# Patient Record
Sex: MALE | Race: WHITE | NOT HISPANIC OR LATINO | Employment: OTHER | URBAN - METROPOLITAN AREA
[De-identification: names, ages, dates, MRNs, and addresses within clinical notes are randomized per-mention and may not be internally consistent; named-entity substitution may affect disease eponyms.]

---

## 2017-01-12 ENCOUNTER — HOSPITAL ENCOUNTER (OUTPATIENT)
Dept: PALLIATIVE MEDICINE | Facility: OTHER | Age: 72
Discharge: HOME | End: 2017-01-12
Attending: PHYSICIAN ASSISTANT

## 2017-01-12 DIAGNOSIS — G89.4 CHRONIC PAIN SYNDROME: ICD-10-CM

## 2017-01-12 DIAGNOSIS — F11.20 OPIOID TYPE DEPENDENCE, CONTINUOUS (H): ICD-10-CM

## 2017-01-12 DIAGNOSIS — T40.2X5A THERAPEUTIC OPIOID-INDUCED CONSTIPATION (OIC): ICD-10-CM

## 2017-01-12 DIAGNOSIS — M51.379 DEGENERATION OF LUMBAR OR LUMBOSACRAL INTERVERTEBRAL DISC: ICD-10-CM

## 2017-01-12 DIAGNOSIS — M54.15 RADICULOPATHY OF THORACOLUMBAR REGION: ICD-10-CM

## 2017-01-12 DIAGNOSIS — K59.03 THERAPEUTIC OPIOID-INDUCED CONSTIPATION (OIC): ICD-10-CM

## 2017-01-12 ASSESSMENT — MIFFLIN-ST. JEOR: SCORE: 1713.54

## 2017-01-23 ENCOUNTER — HOSPITAL ENCOUNTER (OUTPATIENT)
Dept: PALLIATIVE MEDICINE | Facility: OTHER | Age: 72
Discharge: HOME | End: 2017-01-23
Attending: PAIN MEDICINE

## 2017-01-23 DIAGNOSIS — M54.16 LUMBAR RADICULITIS: ICD-10-CM

## 2017-01-23 ASSESSMENT — MIFFLIN-ST. JEOR: SCORE: 1713.54

## 2017-01-25 ENCOUNTER — COMMUNICATION - HEALTHEAST (OUTPATIENT)
Dept: PALLIATIVE MEDICINE | Facility: OTHER | Age: 72
End: 2017-01-25

## 2017-01-26 ENCOUNTER — COMMUNICATION - HEALTHEAST (OUTPATIENT)
Dept: PALLIATIVE MEDICINE | Facility: CLINIC | Age: 72
End: 2017-01-26

## 2017-01-26 ENCOUNTER — HOSPITAL ENCOUNTER (OUTPATIENT)
Dept: PALLIATIVE MEDICINE | Facility: OTHER | Age: 72
Discharge: HOME | End: 2017-01-26
Attending: PAIN MEDICINE

## 2017-01-26 DIAGNOSIS — G89.4 CHRONIC PAIN SYNDROME: ICD-10-CM

## 2017-01-26 DIAGNOSIS — M54.16 BILATERAL LUMBAR RADICULOPATHY: ICD-10-CM

## 2017-01-26 ASSESSMENT — MIFFLIN-ST. JEOR: SCORE: 1788.39

## 2017-02-13 ENCOUNTER — COMMUNICATION - HEALTHEAST (OUTPATIENT)
Dept: PALLIATIVE MEDICINE | Facility: CLINIC | Age: 72
End: 2017-02-13

## 2017-02-13 DIAGNOSIS — G89.4 CHRONIC PAIN SYNDROME: ICD-10-CM

## 2017-02-16 ENCOUNTER — COMMUNICATION - HEALTHEAST (OUTPATIENT)
Dept: PALLIATIVE MEDICINE | Facility: CLINIC | Age: 72
End: 2017-02-16

## 2017-02-16 DIAGNOSIS — M51.379 DEGENERATION OF LUMBAR OR LUMBOSACRAL INTERVERTEBRAL DISC: ICD-10-CM

## 2017-03-09 ENCOUNTER — HOSPITAL ENCOUNTER (OUTPATIENT)
Dept: PALLIATIVE MEDICINE | Facility: OTHER | Age: 72
Discharge: HOME | End: 2017-03-09
Attending: PHYSICIAN ASSISTANT

## 2017-03-09 DIAGNOSIS — F11.20 OPIOID DEPENDENCE, CONTINUOUS (H): ICD-10-CM

## 2017-03-09 DIAGNOSIS — M54.15 RADICULOPATHY OF THORACOLUMBAR REGION: ICD-10-CM

## 2017-03-09 DIAGNOSIS — G89.4 CHRONIC PAIN SYNDROME: ICD-10-CM

## 2017-03-09 DIAGNOSIS — M51.379 DEGENERATION OF LUMBAR OR LUMBOSACRAL INTERVERTEBRAL DISC: ICD-10-CM

## 2017-03-09 ASSESSMENT — MIFFLIN-ST. JEOR: SCORE: 1736.22

## 2017-03-22 ENCOUNTER — COMMUNICATION - HEALTHEAST (OUTPATIENT)
Dept: PALLIATIVE MEDICINE | Facility: OTHER | Age: 72
End: 2017-03-22

## 2017-03-23 ENCOUNTER — HOSPITAL ENCOUNTER (OUTPATIENT)
Dept: PALLIATIVE MEDICINE | Facility: OTHER | Age: 72
Discharge: HOME | End: 2017-03-23
Attending: PAIN MEDICINE

## 2017-03-23 DIAGNOSIS — M54.16 BILATERAL LUMBAR RADICULOPATHY: ICD-10-CM

## 2017-03-23 DIAGNOSIS — M54.16 LUMBAR RADICULOPATHY, RIGHT: ICD-10-CM

## 2017-03-23 ASSESSMENT — MIFFLIN-ST. JEOR: SCORE: 1736.22

## 2017-03-27 ENCOUNTER — COMMUNICATION - HEALTHEAST (OUTPATIENT)
Dept: PALLIATIVE MEDICINE | Facility: OTHER | Age: 72
End: 2017-03-27

## 2017-04-05 ENCOUNTER — COMMUNICATION - HEALTHEAST (OUTPATIENT)
Dept: PALLIATIVE MEDICINE | Facility: CLINIC | Age: 72
End: 2017-04-05

## 2017-04-05 DIAGNOSIS — K59.03 CONSTIPATION DUE TO PAIN MEDICATION: ICD-10-CM

## 2017-04-08 ENCOUNTER — COMMUNICATION - HEALTHEAST (OUTPATIENT)
Dept: PALLIATIVE MEDICINE | Facility: OTHER | Age: 72
End: 2017-04-08

## 2017-04-08 DIAGNOSIS — G89.4 CHRONIC PAIN SYNDROME: ICD-10-CM

## 2017-04-18 ENCOUNTER — COMMUNICATION - HEALTHEAST (OUTPATIENT)
Dept: PALLIATIVE MEDICINE | Facility: OTHER | Age: 72
End: 2017-04-18

## 2017-04-18 DIAGNOSIS — G89.4 CHRONIC PAIN SYNDROME: ICD-10-CM

## 2017-05-03 ENCOUNTER — HOSPITAL ENCOUNTER (OUTPATIENT)
Dept: PALLIATIVE MEDICINE | Facility: OTHER | Age: 72
Discharge: HOME | End: 2017-05-03
Attending: PHYSICIAN ASSISTANT

## 2017-05-03 DIAGNOSIS — G89.4 CHRONIC PAIN SYNDROME: ICD-10-CM

## 2017-05-03 DIAGNOSIS — M54.15 RADICULOPATHY OF THORACOLUMBAR REGION: ICD-10-CM

## 2017-05-03 DIAGNOSIS — M51.379 DEGENERATION OF LUMBAR OR LUMBOSACRAL INTERVERTEBRAL DISC: ICD-10-CM

## 2017-05-03 DIAGNOSIS — F11.20 OPIOID TYPE DEPENDENCE, CONTINUOUS (H): ICD-10-CM

## 2017-05-03 ASSESSMENT — MIFFLIN-ST. JEOR: SCORE: 1736.22

## 2017-05-08 ENCOUNTER — COMMUNICATION - HEALTHEAST (OUTPATIENT)
Dept: PALLIATIVE MEDICINE | Facility: CLINIC | Age: 72
End: 2017-05-08

## 2017-05-08 DIAGNOSIS — G89.4 CHRONIC PAIN SYNDROME: ICD-10-CM

## 2017-05-09 ENCOUNTER — AMBULATORY - HEALTHEAST (OUTPATIENT)
Dept: PALLIATIVE MEDICINE | Facility: OTHER | Age: 72
End: 2017-05-09

## 2017-05-23 ENCOUNTER — COMMUNICATION - HEALTHEAST (OUTPATIENT)
Dept: PALLIATIVE MEDICINE | Facility: OTHER | Age: 72
End: 2017-05-23

## 2017-05-24 ENCOUNTER — HOSPITAL ENCOUNTER (OUTPATIENT)
Dept: PALLIATIVE MEDICINE | Facility: OTHER | Age: 72
Discharge: HOME | End: 2017-05-24
Attending: PAIN MEDICINE

## 2017-05-24 DIAGNOSIS — M54.16 LUMBAR RADICULOPATHY, RIGHT: ICD-10-CM

## 2017-05-24 ASSESSMENT — MIFFLIN-ST. JEOR: SCORE: 1736.22

## 2017-05-25 ENCOUNTER — COMMUNICATION - HEALTHEAST (OUTPATIENT)
Dept: PALLIATIVE MEDICINE | Facility: OTHER | Age: 72
End: 2017-05-25

## 2017-06-01 ENCOUNTER — AMBULATORY - HEALTHEAST (OUTPATIENT)
Dept: PALLIATIVE MEDICINE | Facility: OTHER | Age: 72
End: 2017-06-01

## 2017-06-13 ENCOUNTER — COMMUNICATION - HEALTHEAST (OUTPATIENT)
Dept: PALLIATIVE MEDICINE | Facility: OTHER | Age: 72
End: 2017-06-13

## 2017-06-13 DIAGNOSIS — G89.4 CHRONIC PAIN SYNDROME: ICD-10-CM

## 2017-06-28 ENCOUNTER — HOSPITAL ENCOUNTER (OUTPATIENT)
Dept: PALLIATIVE MEDICINE | Facility: OTHER | Age: 72
Discharge: HOME | End: 2017-06-28
Attending: PHYSICIAN ASSISTANT

## 2017-06-28 DIAGNOSIS — G89.4 CHRONIC PAIN SYNDROME: ICD-10-CM

## 2017-06-28 DIAGNOSIS — F11.20 OPIOID DEPENDENCE, CONTINUOUS (H): ICD-10-CM

## 2017-06-28 DIAGNOSIS — G89.29 CHRONIC RIGHT-SIDED THORACIC BACK PAIN: ICD-10-CM

## 2017-06-28 DIAGNOSIS — M51.379 DEGENERATION OF LUMBAR OR LUMBOSACRAL INTERVERTEBRAL DISC: ICD-10-CM

## 2017-06-28 DIAGNOSIS — M54.6 CHRONIC RIGHT-SIDED THORACIC BACK PAIN: ICD-10-CM

## 2017-06-28 DIAGNOSIS — M54.15 RADICULOPATHY OF THORACOLUMBAR REGION: ICD-10-CM

## 2017-06-28 ASSESSMENT — MIFFLIN-ST. JEOR: SCORE: 1736.22

## 2017-08-17 ENCOUNTER — COMMUNICATION - HEALTHEAST (OUTPATIENT)
Dept: PALLIATIVE MEDICINE | Facility: OTHER | Age: 72
End: 2017-08-17

## 2017-08-17 DIAGNOSIS — G89.4 CHRONIC PAIN SYNDROME: ICD-10-CM

## 2017-08-21 ENCOUNTER — COMMUNICATION - HEALTHEAST (OUTPATIENT)
Dept: PALLIATIVE MEDICINE | Facility: OTHER | Age: 72
End: 2017-08-21

## 2017-08-21 DIAGNOSIS — G89.4 CHRONIC PAIN SYNDROME: ICD-10-CM

## 2017-08-23 ENCOUNTER — HOSPITAL ENCOUNTER (OUTPATIENT)
Dept: PALLIATIVE MEDICINE | Facility: OTHER | Age: 72
Discharge: HOME | End: 2017-08-23
Attending: PHYSICIAN ASSISTANT

## 2017-08-23 DIAGNOSIS — F11.20 OPIOID TYPE DEPENDENCE, CONTINUOUS (H): ICD-10-CM

## 2017-08-23 DIAGNOSIS — M51.379 DEGENERATION OF LUMBAR OR LUMBOSACRAL INTERVERTEBRAL DISC: ICD-10-CM

## 2017-08-23 DIAGNOSIS — M54.15 RADICULOPATHY OF THORACOLUMBAR REGION: ICD-10-CM

## 2017-08-23 DIAGNOSIS — G89.4 CHRONIC PAIN SYNDROME: ICD-10-CM

## 2017-08-23 ASSESSMENT — MIFFLIN-ST. JEOR: SCORE: 1736.22

## 2017-09-25 ENCOUNTER — HOSPITAL ENCOUNTER (OUTPATIENT)
Dept: PALLIATIVE MEDICINE | Facility: OTHER | Age: 72
Discharge: HOME | End: 2017-09-25
Attending: PAIN MEDICINE

## 2017-09-25 DIAGNOSIS — M54.16 LUMBAR RADICULOPATHY, RIGHT: ICD-10-CM

## 2017-09-25 ASSESSMENT — MIFFLIN-ST. JEOR: SCORE: 1736.22

## 2017-09-26 ENCOUNTER — COMMUNICATION - HEALTHEAST (OUTPATIENT)
Dept: PALLIATIVE MEDICINE | Facility: OTHER | Age: 72
End: 2017-09-26

## 2017-10-18 ENCOUNTER — COMMUNICATION - HEALTHEAST (OUTPATIENT)
Dept: TELEHEALTH | Facility: CLINIC | Age: 72
End: 2017-10-18

## 2017-10-18 ENCOUNTER — HOSPITAL ENCOUNTER (OUTPATIENT)
Dept: PALLIATIVE MEDICINE | Facility: OTHER | Age: 72
Discharge: HOME | End: 2017-10-18
Attending: PHYSICIAN ASSISTANT

## 2017-10-18 DIAGNOSIS — G89.4 CHRONIC PAIN SYNDROME: ICD-10-CM

## 2017-10-18 DIAGNOSIS — F11.20 OPIOID TYPE DEPENDENCE, CONTINUOUS (H): ICD-10-CM

## 2017-10-18 DIAGNOSIS — M51.379 DEGENERATION OF LUMBAR OR LUMBOSACRAL INTERVERTEBRAL DISC: ICD-10-CM

## 2017-10-18 DIAGNOSIS — M54.15 RADICULOPATHY OF THORACOLUMBAR REGION: ICD-10-CM

## 2017-10-18 ASSESSMENT — MIFFLIN-ST. JEOR: SCORE: 1736.22

## 2017-10-24 ENCOUNTER — COMMUNICATION - HEALTHEAST (OUTPATIENT)
Dept: PALLIATIVE MEDICINE | Facility: OTHER | Age: 72
End: 2017-10-24

## 2017-10-24 DIAGNOSIS — G89.4 CHRONIC PAIN SYNDROME: ICD-10-CM

## 2017-11-06 ENCOUNTER — COMMUNICATION - HEALTHEAST (OUTPATIENT)
Dept: PALLIATIVE MEDICINE | Facility: OTHER | Age: 72
End: 2017-11-06

## 2017-11-07 ENCOUNTER — HOSPITAL ENCOUNTER (OUTPATIENT)
Dept: PALLIATIVE MEDICINE | Facility: OTHER | Age: 72
Discharge: HOME | End: 2017-11-07
Attending: PAIN MEDICINE

## 2017-11-07 DIAGNOSIS — M54.16 LUMBAR RADICULOPATHY, RIGHT: ICD-10-CM

## 2017-11-07 ASSESSMENT — MIFFLIN-ST. JEOR: SCORE: 1713.54

## 2017-11-08 ENCOUNTER — COMMUNICATION - HEALTHEAST (OUTPATIENT)
Dept: PALLIATIVE MEDICINE | Facility: OTHER | Age: 72
End: 2017-11-08

## 2017-12-04 ENCOUNTER — COMMUNICATION - HEALTHEAST (OUTPATIENT)
Dept: PALLIATIVE MEDICINE | Facility: OTHER | Age: 72
End: 2017-12-04

## 2017-12-04 DIAGNOSIS — G89.4 CHRONIC PAIN SYNDROME: ICD-10-CM

## 2017-12-12 ENCOUNTER — HOSPITAL ENCOUNTER (OUTPATIENT)
Dept: PALLIATIVE MEDICINE | Facility: OTHER | Age: 72
Discharge: HOME | End: 2017-12-12
Attending: PHYSICIAN ASSISTANT

## 2017-12-12 DIAGNOSIS — M51.379 DEGENERATION OF LUMBAR OR LUMBOSACRAL INTERVERTEBRAL DISC: ICD-10-CM

## 2017-12-12 DIAGNOSIS — M96.1 POSTLAMINECTOMY SYNDROME, LUMBAR REGION: ICD-10-CM

## 2017-12-12 DIAGNOSIS — G89.4 CHRONIC PAIN SYNDROME: ICD-10-CM

## 2017-12-12 DIAGNOSIS — M54.15 RADICULOPATHY OF THORACOLUMBAR REGION: ICD-10-CM

## 2017-12-12 DIAGNOSIS — F11.20 OPIOID TYPE DEPENDENCE, CONTINUOUS (H): ICD-10-CM

## 2017-12-12 ASSESSMENT — MIFFLIN-ST. JEOR: SCORE: 1713.54

## 2017-12-20 ENCOUNTER — COMMUNICATION - HEALTHEAST (OUTPATIENT)
Dept: PALLIATIVE MEDICINE | Facility: OTHER | Age: 72
End: 2017-12-20

## 2017-12-20 ENCOUNTER — HOSPITAL ENCOUNTER (OUTPATIENT)
Dept: RADIOLOGY | Facility: HOSPITAL | Age: 72
Discharge: HOME | End: 2017-12-20
Attending: PHYSICIAN ASSISTANT

## 2017-12-20 DIAGNOSIS — M96.1 POSTLAMINECTOMY SYNDROME, LUMBAR REGION: ICD-10-CM

## 2017-12-23 ENCOUNTER — COMMUNICATION - HEALTHEAST (OUTPATIENT)
Dept: PALLIATIVE MEDICINE | Facility: OTHER | Age: 72
End: 2017-12-23

## 2017-12-23 DIAGNOSIS — G89.4 CHRONIC PAIN SYNDROME: ICD-10-CM

## 2018-01-22 ENCOUNTER — COMMUNICATION - HEALTHEAST (OUTPATIENT)
Dept: PALLIATIVE MEDICINE | Facility: OTHER | Age: 73
End: 2018-01-22

## 2018-01-22 DIAGNOSIS — G89.4 CHRONIC PAIN SYNDROME: ICD-10-CM

## 2018-02-06 ENCOUNTER — HOSPITAL ENCOUNTER (OUTPATIENT)
Dept: PALLIATIVE MEDICINE | Facility: OTHER | Age: 73
Discharge: HOME | End: 2018-02-06
Attending: PHYSICIAN ASSISTANT

## 2018-02-06 DIAGNOSIS — M47.816 LUMBAR FACET ARTHROPATHY: ICD-10-CM

## 2018-02-06 DIAGNOSIS — F11.20 OPIOID DEPENDENCE, CONTINUOUS (H): ICD-10-CM

## 2018-02-06 DIAGNOSIS — M54.15 RADICULOPATHY OF THORACOLUMBAR REGION: ICD-10-CM

## 2018-02-06 DIAGNOSIS — G89.4 CHRONIC PAIN SYNDROME: ICD-10-CM

## 2018-02-06 ASSESSMENT — MIFFLIN-ST. JEOR: SCORE: 1736.22

## 2018-02-19 ENCOUNTER — COMMUNICATION - HEALTHEAST (OUTPATIENT)
Dept: PALLIATIVE MEDICINE | Facility: OTHER | Age: 73
End: 2018-02-19

## 2018-02-26 ENCOUNTER — HOSPITAL ENCOUNTER (OUTPATIENT)
Dept: PALLIATIVE MEDICINE | Facility: OTHER | Age: 73
Discharge: HOME | End: 2018-02-26
Attending: PAIN MEDICINE | Admitting: PAIN MEDICINE

## 2018-02-26 DIAGNOSIS — M54.16 LUMBAR RADICULOPATHY, RIGHT: ICD-10-CM

## 2018-02-26 DIAGNOSIS — M47.816 LUMBAR SPONDYLOSIS: ICD-10-CM

## 2018-02-26 ASSESSMENT — MIFFLIN-ST. JEOR: SCORE: 1736.22

## 2018-02-27 ENCOUNTER — COMMUNICATION - HEALTHEAST (OUTPATIENT)
Dept: PALLIATIVE MEDICINE | Facility: OTHER | Age: 73
End: 2018-02-27

## 2018-04-03 ENCOUNTER — HOSPITAL ENCOUNTER (OUTPATIENT)
Dept: PALLIATIVE MEDICINE | Facility: OTHER | Age: 73
Discharge: HOME | End: 2018-04-03
Attending: PHYSICIAN ASSISTANT

## 2018-04-03 ENCOUNTER — COMMUNICATION - HEALTHEAST (OUTPATIENT)
Dept: TELEHEALTH | Facility: CLINIC | Age: 73
End: 2018-04-03

## 2018-04-03 DIAGNOSIS — M51.379 DEGENERATION OF LUMBAR OR LUMBOSACRAL INTERVERTEBRAL DISC: ICD-10-CM

## 2018-04-03 DIAGNOSIS — G89.4 CHRONIC PAIN SYNDROME: ICD-10-CM

## 2018-04-03 DIAGNOSIS — F11.20 OPIOID TYPE DEPENDENCE, CONTINUOUS (H): ICD-10-CM

## 2018-04-03 DIAGNOSIS — M54.15 RADICULOPATHY OF THORACOLUMBAR REGION: ICD-10-CM

## 2018-04-03 ASSESSMENT — MIFFLIN-ST. JEOR: SCORE: 1736.22

## 2018-05-29 ENCOUNTER — COMMUNICATION - HEALTHEAST (OUTPATIENT)
Dept: PALLIATIVE MEDICINE | Facility: OTHER | Age: 73
End: 2018-05-29

## 2018-05-29 ENCOUNTER — COMMUNICATION - HEALTHEAST (OUTPATIENT)
Dept: TELEHEALTH | Facility: CLINIC | Age: 73
End: 2018-05-29

## 2018-05-29 ENCOUNTER — HOSPITAL ENCOUNTER (OUTPATIENT)
Dept: PALLIATIVE MEDICINE | Facility: OTHER | Age: 73
Discharge: HOME | End: 2018-05-29
Attending: PHYSICIAN ASSISTANT

## 2018-05-29 DIAGNOSIS — G89.4 CHRONIC PAIN SYNDROME: ICD-10-CM

## 2018-05-29 DIAGNOSIS — M54.15 RADICULOPATHY OF THORACOLUMBAR REGION: ICD-10-CM

## 2018-05-29 DIAGNOSIS — M54.16 LUMBAR RADICULOPATHY: ICD-10-CM

## 2018-05-29 DIAGNOSIS — M51.379 DEGENERATION OF LUMBAR OR LUMBOSACRAL INTERVERTEBRAL DISC: ICD-10-CM

## 2018-05-29 DIAGNOSIS — F11.20 OPIOID TYPE DEPENDENCE, CONTINUOUS (H): ICD-10-CM

## 2018-05-29 ASSESSMENT — MIFFLIN-ST. JEOR: SCORE: 1690.86

## 2018-06-07 ENCOUNTER — COMMUNICATION - HEALTHEAST (OUTPATIENT)
Dept: PALLIATIVE MEDICINE | Facility: CLINIC | Age: 73
End: 2018-06-07

## 2018-06-07 DIAGNOSIS — M51.379 DEGENERATION OF LUMBAR OR LUMBOSACRAL INTERVERTEBRAL DISC: ICD-10-CM

## 2018-06-11 ENCOUNTER — COMMUNICATION - HEALTHEAST (OUTPATIENT)
Dept: PALLIATIVE MEDICINE | Facility: OTHER | Age: 73
End: 2018-06-11

## 2018-06-11 DIAGNOSIS — G89.4 CHRONIC PAIN SYNDROME: ICD-10-CM

## 2018-06-18 ENCOUNTER — COMMUNICATION - HEALTHEAST (OUTPATIENT)
Dept: PALLIATIVE MEDICINE | Facility: OTHER | Age: 73
End: 2018-06-18

## 2018-06-20 ENCOUNTER — HOSPITAL ENCOUNTER (OUTPATIENT)
Dept: PALLIATIVE MEDICINE | Facility: OTHER | Age: 73
Discharge: HOME | End: 2018-06-20
Attending: PAIN MEDICINE | Admitting: PAIN MEDICINE

## 2018-06-20 DIAGNOSIS — M54.16 LUMBAR RADICULOPATHY: ICD-10-CM

## 2018-06-20 ASSESSMENT — MIFFLIN-ST. JEOR: SCORE: 1690.86

## 2018-06-21 ENCOUNTER — COMMUNICATION - HEALTHEAST (OUTPATIENT)
Dept: PALLIATIVE MEDICINE | Facility: OTHER | Age: 73
End: 2018-06-21

## 2018-07-24 ENCOUNTER — HOSPITAL ENCOUNTER (OUTPATIENT)
Dept: PALLIATIVE MEDICINE | Facility: OTHER | Age: 73
Discharge: HOME | End: 2018-07-24
Attending: PHYSICIAN ASSISTANT

## 2018-07-24 DIAGNOSIS — F11.90 CHRONIC, CONTINUOUS USE OF OPIOIDS: ICD-10-CM

## 2018-07-24 DIAGNOSIS — K59.03 THERAPEUTIC OPIOID-INDUCED CONSTIPATION (OIC): ICD-10-CM

## 2018-07-24 DIAGNOSIS — M51.379 DEGENERATION OF LUMBAR OR LUMBOSACRAL INTERVERTEBRAL DISC: ICD-10-CM

## 2018-07-24 DIAGNOSIS — G89.4 CHRONIC PAIN SYNDROME: ICD-10-CM

## 2018-07-24 DIAGNOSIS — T40.2X5A THERAPEUTIC OPIOID-INDUCED CONSTIPATION (OIC): ICD-10-CM

## 2018-07-24 ASSESSMENT — MIFFLIN-ST. JEOR: SCORE: 1690.86

## 2018-09-04 ENCOUNTER — COMMUNICATION - HEALTHEAST (OUTPATIENT)
Dept: PALLIATIVE MEDICINE | Facility: OTHER | Age: 73
End: 2018-09-04

## 2018-09-04 DIAGNOSIS — G89.4 CHRONIC PAIN SYNDROME: ICD-10-CM

## 2018-09-07 ENCOUNTER — COMMUNICATION - HEALTHEAST (OUTPATIENT)
Dept: PALLIATIVE MEDICINE | Facility: OTHER | Age: 73
End: 2018-09-07

## 2018-09-07 DIAGNOSIS — G89.4 CHRONIC PAIN SYNDROME: ICD-10-CM

## 2018-09-18 ENCOUNTER — RECORDS - HEALTHEAST (OUTPATIENT)
Dept: ADMINISTRATIVE | Facility: OTHER | Age: 73
End: 2018-09-18

## 2018-09-18 ENCOUNTER — HOSPITAL ENCOUNTER (OUTPATIENT)
Dept: PALLIATIVE MEDICINE | Facility: OTHER | Age: 73
Discharge: HOME | End: 2018-09-18
Attending: PHYSICIAN ASSISTANT

## 2018-09-18 DIAGNOSIS — M51.379 DEGENERATION OF LUMBAR OR LUMBOSACRAL INTERVERTEBRAL DISC: ICD-10-CM

## 2018-09-18 DIAGNOSIS — G89.4 CHRONIC PAIN SYNDROME: ICD-10-CM

## 2018-09-18 DIAGNOSIS — K59.03 THERAPEUTIC OPIOID-INDUCED CONSTIPATION (OIC): ICD-10-CM

## 2018-09-18 DIAGNOSIS — M54.15 RADICULOPATHY OF THORACOLUMBAR REGION: ICD-10-CM

## 2018-09-18 DIAGNOSIS — T40.2X5A THERAPEUTIC OPIOID-INDUCED CONSTIPATION (OIC): ICD-10-CM

## 2018-09-18 DIAGNOSIS — F11.90 CHRONIC, CONTINUOUS USE OF OPIOIDS: ICD-10-CM

## 2018-09-18 ASSESSMENT — MIFFLIN-ST. JEOR: SCORE: 1690.86

## 2018-10-01 ENCOUNTER — COMMUNICATION - HEALTHEAST (OUTPATIENT)
Dept: PALLIATIVE MEDICINE | Facility: OTHER | Age: 73
End: 2018-10-01

## 2018-10-02 ENCOUNTER — HOSPITAL ENCOUNTER (OUTPATIENT)
Dept: PALLIATIVE MEDICINE | Facility: OTHER | Age: 73
Discharge: HOME | End: 2018-10-02
Attending: PHYSICIAN ASSISTANT | Admitting: PAIN MEDICINE

## 2018-10-02 DIAGNOSIS — M54.15 RADICULOPATHY OF THORACOLUMBAR REGION: ICD-10-CM

## 2018-10-02 DIAGNOSIS — M54.16 LUMBAR RADICULOPATHY: ICD-10-CM

## 2018-10-02 ASSESSMENT — MIFFLIN-ST. JEOR: SCORE: 1637.57

## 2018-10-03 ENCOUNTER — COMMUNICATION - HEALTHEAST (OUTPATIENT)
Dept: PALLIATIVE MEDICINE | Facility: OTHER | Age: 73
End: 2018-10-03

## 2018-11-13 ENCOUNTER — HOSPITAL ENCOUNTER (OUTPATIENT)
Dept: PALLIATIVE MEDICINE | Facility: OTHER | Age: 73
Discharge: HOME | End: 2018-11-13
Attending: PHYSICIAN ASSISTANT

## 2018-11-13 DIAGNOSIS — G89.4 CHRONIC PAIN SYNDROME: ICD-10-CM

## 2018-11-13 DIAGNOSIS — M54.15 RADICULOPATHY OF THORACOLUMBAR REGION: ICD-10-CM

## 2018-11-13 DIAGNOSIS — F11.90 CHRONIC, CONTINUOUS USE OF OPIOIDS: ICD-10-CM

## 2018-11-13 DIAGNOSIS — M51.379 DEGENERATION OF LUMBAR OR LUMBOSACRAL INTERVERTEBRAL DISC: ICD-10-CM

## 2018-11-13 ASSESSMENT — MIFFLIN-ST. JEOR: SCORE: 1637.57

## 2018-11-15 LAB — ARUP MISCELLANEOUS TEST: NORMAL

## 2018-11-16 LAB
MISCELLANEOUS TEST DEPT. - HE HISTORICAL: NORMAL
PERFORMING LAB: NORMAL
SPECIMEN STATUS: NORMAL
TEST NAME: NORMAL

## 2018-12-03 ENCOUNTER — COMMUNICATION - HEALTHEAST (OUTPATIENT)
Dept: PALLIATIVE MEDICINE | Facility: OTHER | Age: 73
End: 2018-12-03

## 2018-12-03 DIAGNOSIS — G89.4 CHRONIC PAIN SYNDROME: ICD-10-CM

## 2018-12-03 DIAGNOSIS — K59.03 THERAPEUTIC OPIOID-INDUCED CONSTIPATION (OIC): ICD-10-CM

## 2018-12-03 DIAGNOSIS — T40.2X5A THERAPEUTIC OPIOID-INDUCED CONSTIPATION (OIC): ICD-10-CM

## 2018-12-11 ENCOUNTER — COMMUNICATION - HEALTHEAST (OUTPATIENT)
Dept: PALLIATIVE MEDICINE | Facility: OTHER | Age: 73
End: 2018-12-11

## 2018-12-11 DIAGNOSIS — G89.4 CHRONIC PAIN SYNDROME: ICD-10-CM

## 2019-01-08 ENCOUNTER — HOSPITAL ENCOUNTER (OUTPATIENT)
Dept: PALLIATIVE MEDICINE | Facility: OTHER | Age: 74
Discharge: HOME | End: 2019-01-08
Attending: PHYSICIAN ASSISTANT

## 2019-01-08 DIAGNOSIS — M51.379 DEGENERATION OF LUMBAR OR LUMBOSACRAL INTERVERTEBRAL DISC: ICD-10-CM

## 2019-01-08 DIAGNOSIS — M54.15 RADICULOPATHY OF THORACOLUMBAR REGION: ICD-10-CM

## 2019-01-08 DIAGNOSIS — F11.90 CHRONIC, CONTINUOUS USE OF OPIOIDS: ICD-10-CM

## 2019-01-08 DIAGNOSIS — G89.4 CHRONIC PAIN SYNDROME: ICD-10-CM

## 2019-01-08 ASSESSMENT — MIFFLIN-ST. JEOR: SCORE: 1637.57

## 2019-01-15 ENCOUNTER — COMMUNICATION - HEALTHEAST (OUTPATIENT)
Dept: PALLIATIVE MEDICINE | Facility: OTHER | Age: 74
End: 2019-01-15

## 2019-03-04 ENCOUNTER — COMMUNICATION - HEALTHEAST (OUTPATIENT)
Dept: PALLIATIVE MEDICINE | Facility: OTHER | Age: 74
End: 2019-03-04

## 2019-03-04 DIAGNOSIS — T40.2X5A THERAPEUTIC OPIOID-INDUCED CONSTIPATION (OIC): ICD-10-CM

## 2019-03-04 DIAGNOSIS — G89.4 CHRONIC PAIN SYNDROME: ICD-10-CM

## 2019-03-04 DIAGNOSIS — K59.03 THERAPEUTIC OPIOID-INDUCED CONSTIPATION (OIC): ICD-10-CM

## 2019-03-05 ENCOUNTER — HOSPITAL ENCOUNTER (OUTPATIENT)
Dept: PALLIATIVE MEDICINE | Facility: OTHER | Age: 74
Discharge: HOME | End: 2019-03-05
Attending: PHYSICIAN ASSISTANT

## 2019-03-05 DIAGNOSIS — F11.90 CHRONIC, CONTINUOUS USE OF OPIOIDS: ICD-10-CM

## 2019-03-05 DIAGNOSIS — T40.2X5A THERAPEUTIC OPIOID-INDUCED CONSTIPATION (OIC): ICD-10-CM

## 2019-03-05 DIAGNOSIS — M51.379 DEGENERATION OF LUMBAR OR LUMBOSACRAL INTERVERTEBRAL DISC: ICD-10-CM

## 2019-03-05 DIAGNOSIS — M54.15 RADICULOPATHY OF THORACOLUMBAR REGION: ICD-10-CM

## 2019-03-05 DIAGNOSIS — G89.4 CHRONIC PAIN SYNDROME: ICD-10-CM

## 2019-03-05 DIAGNOSIS — K59.03 THERAPEUTIC OPIOID-INDUCED CONSTIPATION (OIC): ICD-10-CM

## 2019-03-05 ASSESSMENT — MIFFLIN-ST. JEOR: SCORE: 1637.57

## 2019-03-12 ENCOUNTER — COMMUNICATION - HEALTHEAST (OUTPATIENT)
Dept: PALLIATIVE MEDICINE | Facility: OTHER | Age: 74
End: 2019-03-12

## 2019-04-04 ENCOUNTER — COMMUNICATION - HEALTHEAST (OUTPATIENT)
Dept: PALLIATIVE MEDICINE | Facility: OTHER | Age: 74
End: 2019-04-04

## 2019-04-30 ENCOUNTER — HOSPITAL ENCOUNTER (OUTPATIENT)
Dept: PALLIATIVE MEDICINE | Facility: OTHER | Age: 74
Discharge: HOME | End: 2019-04-30
Attending: PHYSICIAN ASSISTANT

## 2019-04-30 DIAGNOSIS — G89.4 CHRONIC PAIN SYNDROME: ICD-10-CM

## 2019-04-30 DIAGNOSIS — F11.90 CHRONIC, CONTINUOUS USE OF OPIOIDS: ICD-10-CM

## 2019-04-30 DIAGNOSIS — M96.1 POSTLAMINECTOMY SYNDROME, LUMBAR REGION: ICD-10-CM

## 2019-04-30 DIAGNOSIS — M51.379 DEGENERATION OF LUMBAR OR LUMBOSACRAL INTERVERTEBRAL DISC: ICD-10-CM

## 2019-04-30 ASSESSMENT — MIFFLIN-ST. JEOR: SCORE: 1637.57

## 2019-05-19 ENCOUNTER — COMMUNICATION - HEALTHEAST (OUTPATIENT)
Dept: PALLIATIVE MEDICINE | Facility: OTHER | Age: 74
End: 2019-05-19

## 2019-05-19 DIAGNOSIS — G89.4 CHRONIC PAIN SYNDROME: ICD-10-CM

## 2019-06-04 ENCOUNTER — HOSPITAL ENCOUNTER (OUTPATIENT)
Dept: PALLIATIVE MEDICINE | Facility: OTHER | Age: 74
Discharge: HOME | End: 2019-06-04
Attending: PAIN MEDICINE | Admitting: PAIN MEDICINE

## 2019-06-04 DIAGNOSIS — M54.16 LUMBAR RADICULOPATHY: ICD-10-CM

## 2019-06-04 ASSESSMENT — MIFFLIN-ST. JEOR: SCORE: 1637.57

## 2019-06-05 ENCOUNTER — COMMUNICATION - HEALTHEAST (OUTPATIENT)
Dept: PALLIATIVE MEDICINE | Facility: OTHER | Age: 74
End: 2019-06-05

## 2019-06-18 ENCOUNTER — COMMUNICATION - HEALTHEAST (OUTPATIENT)
Dept: PALLIATIVE MEDICINE | Facility: OTHER | Age: 74
End: 2019-06-18

## 2019-06-18 DIAGNOSIS — G89.4 CHRONIC PAIN SYNDROME: ICD-10-CM

## 2019-06-18 DIAGNOSIS — K59.03 THERAPEUTIC OPIOID-INDUCED CONSTIPATION (OIC): ICD-10-CM

## 2019-06-18 DIAGNOSIS — T40.2X5A THERAPEUTIC OPIOID-INDUCED CONSTIPATION (OIC): ICD-10-CM

## 2019-06-27 ENCOUNTER — HOSPITAL ENCOUNTER (OUTPATIENT)
Dept: PALLIATIVE MEDICINE | Facility: OTHER | Age: 74
Discharge: HOME | End: 2019-06-27
Attending: PHYSICIAN ASSISTANT

## 2019-06-27 ENCOUNTER — HOSPITAL ENCOUNTER (OUTPATIENT)
Dept: PHARMACY | Facility: OTHER | Age: 74
Discharge: HOME | End: 2019-06-27
Attending: PHARMACIST

## 2019-06-27 DIAGNOSIS — K59.03 DRUG-INDUCED CONSTIPATION: ICD-10-CM

## 2019-06-27 DIAGNOSIS — G89.4 CHRONIC PAIN SYNDROME: ICD-10-CM

## 2019-06-27 DIAGNOSIS — J30.2 SEASONAL ALLERGIC RHINITIS, UNSPECIFIED TRIGGER: ICD-10-CM

## 2019-06-27 DIAGNOSIS — I10 ESSENTIAL HYPERTENSION: ICD-10-CM

## 2019-06-27 DIAGNOSIS — F11.90 CHRONIC, CONTINUOUS USE OF OPIOIDS: ICD-10-CM

## 2019-06-27 DIAGNOSIS — F43.21 ADJUSTMENT DISORDER WITH DEPRESSED MOOD: ICD-10-CM

## 2019-06-27 DIAGNOSIS — M51.379 DEGENERATION OF LUMBAR OR LUMBOSACRAL INTERVERTEBRAL DISC: ICD-10-CM

## 2019-06-27 DIAGNOSIS — T40.2X5A THERAPEUTIC OPIOID-INDUCED CONSTIPATION (OIC): ICD-10-CM

## 2019-06-27 DIAGNOSIS — I25.810 CORONARY ARTERY DISEASE INVOLVING AUTOLOGOUS ARTERY CORONARY BYPASS GRAFT WITHOUT ANGINA PECTORIS: ICD-10-CM

## 2019-06-27 DIAGNOSIS — M96.1 POSTLAMINECTOMY SYNDROME, LUMBAR REGION: ICD-10-CM

## 2019-06-27 DIAGNOSIS — K59.03 THERAPEUTIC OPIOID-INDUCED CONSTIPATION (OIC): ICD-10-CM

## 2019-06-27 DIAGNOSIS — E11.9 TYPE 2 DIABETES MELLITUS WITHOUT COMPLICATION, WITHOUT LONG-TERM CURRENT USE OF INSULIN (H): ICD-10-CM

## 2019-06-27 RX ORDER — POLYETHYLENE GLYCOL 3350 17 G/17G
17 POWDER, FOR SOLUTION ORAL DAILY
Qty: 595 G | Refills: 1 | Status: SHIPPED | OUTPATIENT
Start: 2019-06-27 | End: 2023-01-17

## 2019-06-27 ASSESSMENT — MIFFLIN-ST. JEOR
SCORE: 1637.57
SCORE: 1637.57

## 2019-07-09 ENCOUNTER — COMMUNICATION - HEALTHEAST (OUTPATIENT)
Dept: PALLIATIVE MEDICINE | Facility: OTHER | Age: 74
End: 2019-07-09

## 2019-07-09 DIAGNOSIS — M51.379 DEGENERATION OF LUMBAR OR LUMBOSACRAL INTERVERTEBRAL DISC: ICD-10-CM

## 2019-08-20 ENCOUNTER — AMBULATORY - HEALTHEAST (OUTPATIENT)
Dept: PALLIATIVE MEDICINE | Facility: OTHER | Age: 74
End: 2019-08-20

## 2019-08-22 ENCOUNTER — HOSPITAL ENCOUNTER (OUTPATIENT)
Dept: PALLIATIVE MEDICINE | Facility: OTHER | Age: 74
Discharge: HOME | End: 2019-08-22
Attending: PHYSICIAN ASSISTANT

## 2019-08-22 DIAGNOSIS — F11.90 CHRONIC, CONTINUOUS USE OF OPIOIDS: ICD-10-CM

## 2019-08-22 DIAGNOSIS — G89.4 CHRONIC PAIN SYNDROME: ICD-10-CM

## 2019-08-22 DIAGNOSIS — M51.379 DEGENERATION OF LUMBAR OR LUMBOSACRAL INTERVERTEBRAL DISC: ICD-10-CM

## 2019-08-22 ASSESSMENT — MIFFLIN-ST. JEOR: SCORE: 1687.47

## 2019-08-23 ENCOUNTER — RECORDS - HEALTHEAST (OUTPATIENT)
Dept: ADMINISTRATIVE | Facility: OTHER | Age: 74
End: 2019-08-23

## 2019-08-28 ENCOUNTER — COMMUNICATION - HEALTHEAST (OUTPATIENT)
Dept: PALLIATIVE MEDICINE | Facility: OTHER | Age: 74
End: 2019-08-28

## 2019-08-28 DIAGNOSIS — G89.4 CHRONIC PAIN SYNDROME: ICD-10-CM

## 2019-10-22 ENCOUNTER — HOSPITAL ENCOUNTER (OUTPATIENT)
Dept: PALLIATIVE MEDICINE | Facility: OTHER | Age: 74
Discharge: HOME | End: 2019-10-22
Attending: PHYSICIAN ASSISTANT

## 2019-10-22 DIAGNOSIS — M51.379 DEGENERATION OF LUMBAR OR LUMBOSACRAL INTERVERTEBRAL DISC: ICD-10-CM

## 2019-10-22 DIAGNOSIS — M96.1 POSTLAMINECTOMY SYNDROME, LUMBAR REGION: ICD-10-CM

## 2019-10-22 DIAGNOSIS — G89.4 CHRONIC PAIN SYNDROME: ICD-10-CM

## 2019-10-22 DIAGNOSIS — M54.15 RADICULOPATHY OF THORACOLUMBAR REGION: ICD-10-CM

## 2019-10-22 DIAGNOSIS — F11.90 CHRONIC, CONTINUOUS USE OF OPIOIDS: ICD-10-CM

## 2019-10-22 ASSESSMENT — MIFFLIN-ST. JEOR: SCORE: 1687.47

## 2019-10-31 ENCOUNTER — COMMUNICATION - HEALTHEAST (OUTPATIENT)
Dept: PALLIATIVE MEDICINE | Facility: OTHER | Age: 74
End: 2019-10-31

## 2019-11-05 ENCOUNTER — HOSPITAL ENCOUNTER (OUTPATIENT)
Dept: PALLIATIVE MEDICINE | Facility: OTHER | Age: 74
Discharge: HOME | End: 2019-11-05
Attending: PAIN MEDICINE | Admitting: PAIN MEDICINE

## 2019-11-05 ENCOUNTER — COMMUNICATION - HEALTHEAST (OUTPATIENT)
Dept: PALLIATIVE MEDICINE | Facility: OTHER | Age: 74
End: 2019-11-05

## 2019-11-05 DIAGNOSIS — K59.03 THERAPEUTIC OPIOID-INDUCED CONSTIPATION (OIC): ICD-10-CM

## 2019-11-05 DIAGNOSIS — G89.4 CHRONIC PAIN SYNDROME: ICD-10-CM

## 2019-11-05 DIAGNOSIS — M54.16 LUMBAR RADICULOPATHY: ICD-10-CM

## 2019-11-05 DIAGNOSIS — T40.2X5A THERAPEUTIC OPIOID-INDUCED CONSTIPATION (OIC): ICD-10-CM

## 2019-11-05 ASSESSMENT — MIFFLIN-ST. JEOR: SCORE: 1687.47

## 2019-11-06 ENCOUNTER — COMMUNICATION - HEALTHEAST (OUTPATIENT)
Dept: PALLIATIVE MEDICINE | Facility: OTHER | Age: 74
End: 2019-11-06

## 2019-11-11 ENCOUNTER — COMMUNICATION - HEALTHEAST (OUTPATIENT)
Dept: PALLIATIVE MEDICINE | Facility: OTHER | Age: 74
End: 2019-11-11

## 2019-11-11 DIAGNOSIS — G89.4 CHRONIC PAIN SYNDROME: ICD-10-CM

## 2019-11-14 ENCOUNTER — COMMUNICATION - HEALTHEAST (OUTPATIENT)
Dept: PALLIATIVE MEDICINE | Facility: OTHER | Age: 74
End: 2019-11-14

## 2019-11-27 ENCOUNTER — COMMUNICATION - HEALTHEAST (OUTPATIENT)
Dept: PALLIATIVE MEDICINE | Facility: OTHER | Age: 74
End: 2019-11-27

## 2019-11-27 DIAGNOSIS — M51.379 DEGENERATION OF LUMBAR OR LUMBOSACRAL INTERVERTEBRAL DISC: ICD-10-CM

## 2019-11-27 DIAGNOSIS — G89.4 CHRONIC PAIN SYNDROME: ICD-10-CM

## 2019-12-02 ENCOUNTER — COMMUNICATION - HEALTHEAST (OUTPATIENT)
Dept: PALLIATIVE MEDICINE | Facility: OTHER | Age: 74
End: 2019-12-02

## 2019-12-03 ENCOUNTER — HOSPITAL ENCOUNTER (OUTPATIENT)
Dept: PALLIATIVE MEDICINE | Facility: OTHER | Age: 74
Discharge: HOME | End: 2019-12-03
Attending: PHYSICIAN ASSISTANT

## 2019-12-03 DIAGNOSIS — M96.1 POSTLAMINECTOMY SYNDROME, LUMBAR REGION: ICD-10-CM

## 2019-12-03 DIAGNOSIS — G89.4 CHRONIC PAIN SYNDROME: ICD-10-CM

## 2019-12-03 DIAGNOSIS — M51.379 DEGENERATION OF LUMBAR OR LUMBOSACRAL INTERVERTEBRAL DISC: ICD-10-CM

## 2019-12-03 DIAGNOSIS — F11.93 OPIOID WITHDRAWAL (H): ICD-10-CM

## 2019-12-03 DIAGNOSIS — F11.90 CHRONIC, CONTINUOUS USE OF OPIOIDS: ICD-10-CM

## 2019-12-03 DIAGNOSIS — M54.15 RADICULOPATHY OF THORACOLUMBAR REGION: ICD-10-CM

## 2019-12-03 ASSESSMENT — MIFFLIN-ST. JEOR: SCORE: 1687.47

## 2019-12-05 LAB

## 2020-01-07 ENCOUNTER — COMMUNICATION - HEALTHEAST (OUTPATIENT)
Dept: PALLIATIVE MEDICINE | Facility: OTHER | Age: 75
End: 2020-01-07

## 2020-01-07 DIAGNOSIS — G89.4 CHRONIC PAIN SYNDROME: ICD-10-CM

## 2020-01-07 DIAGNOSIS — M96.1 POSTLAMINECTOMY SYNDROME, LUMBAR REGION: ICD-10-CM

## 2020-01-09 ENCOUNTER — COMMUNICATION - HEALTHEAST (OUTPATIENT)
Dept: PALLIATIVE MEDICINE | Facility: OTHER | Age: 75
End: 2020-01-09

## 2020-01-13 ENCOUNTER — COMMUNICATION - HEALTHEAST (OUTPATIENT)
Dept: PALLIATIVE MEDICINE | Facility: OTHER | Age: 75
End: 2020-01-13

## 2020-01-15 ENCOUNTER — HOSPITAL ENCOUNTER (OUTPATIENT)
Dept: PALLIATIVE MEDICINE | Facility: OTHER | Age: 75
Discharge: HOME | End: 2020-01-15
Attending: PHYSICIAN ASSISTANT

## 2020-01-15 DIAGNOSIS — K59.03 THERAPEUTIC OPIOID-INDUCED CONSTIPATION (OIC): ICD-10-CM

## 2020-01-15 DIAGNOSIS — G89.4 CHRONIC PAIN SYNDROME: ICD-10-CM

## 2020-01-15 DIAGNOSIS — M54.15 RADICULOPATHY OF THORACOLUMBAR REGION: ICD-10-CM

## 2020-01-15 DIAGNOSIS — T40.2X5A THERAPEUTIC OPIOID-INDUCED CONSTIPATION (OIC): ICD-10-CM

## 2020-01-15 DIAGNOSIS — M96.1 POSTLAMINECTOMY SYNDROME, LUMBAR REGION: ICD-10-CM

## 2020-01-15 DIAGNOSIS — F11.90 CHRONIC, CONTINUOUS USE OF OPIOIDS: ICD-10-CM

## 2020-01-15 ASSESSMENT — MIFFLIN-ST. JEOR: SCORE: 1687.47

## 2020-02-05 ENCOUNTER — COMMUNICATION - HEALTHEAST (OUTPATIENT)
Dept: PALLIATIVE MEDICINE | Facility: OTHER | Age: 75
End: 2020-02-05

## 2020-02-05 DIAGNOSIS — G89.4 CHRONIC PAIN SYNDROME: ICD-10-CM

## 2020-02-13 ENCOUNTER — HOSPITAL ENCOUNTER (OUTPATIENT)
Dept: PHARMACY | Facility: OTHER | Age: 75
Discharge: HOME | End: 2020-02-13
Attending: PHARMACIST

## 2020-02-13 DIAGNOSIS — K59.03 DRUG-INDUCED CONSTIPATION: ICD-10-CM

## 2020-02-13 DIAGNOSIS — G89.4 CHRONIC PAIN SYNDROME: ICD-10-CM

## 2020-02-13 DIAGNOSIS — I10 ESSENTIAL HYPERTENSION: ICD-10-CM

## 2020-02-13 DIAGNOSIS — F43.21 ADJUSTMENT DISORDER WITH DEPRESSED MOOD: ICD-10-CM

## 2020-02-27 ENCOUNTER — COMMUNICATION - HEALTHEAST (OUTPATIENT)
Dept: PALLIATIVE MEDICINE | Facility: OTHER | Age: 75
End: 2020-02-27

## 2020-03-11 ENCOUNTER — HOSPITAL ENCOUNTER (OUTPATIENT)
Dept: PALLIATIVE MEDICINE | Facility: OTHER | Age: 75
Discharge: HOME | End: 2020-03-11
Attending: PHYSICIAN ASSISTANT

## 2020-03-11 DIAGNOSIS — M96.1 POSTLAMINECTOMY SYNDROME, LUMBAR REGION: ICD-10-CM

## 2020-03-11 DIAGNOSIS — F11.90 CHRONIC, CONTINUOUS USE OF OPIOIDS: ICD-10-CM

## 2020-03-11 DIAGNOSIS — G89.4 CHRONIC PAIN SYNDROME: ICD-10-CM

## 2020-03-11 RX ORDER — LIDOCAINE 50 MG/G
PATCH TOPICAL
Qty: 30 PATCH | Refills: 1 | Status: SHIPPED | OUTPATIENT
Start: 2020-03-11 | End: 2021-07-15

## 2020-03-11 ASSESSMENT — MIFFLIN-ST. JEOR: SCORE: 1601.29

## 2020-04-09 ENCOUNTER — HOSPITAL ENCOUNTER (OUTPATIENT)
Dept: PALLIATIVE MEDICINE | Facility: OTHER | Age: 75
Discharge: HOME | End: 2020-04-09
Attending: PHYSICIAN ASSISTANT

## 2020-04-09 DIAGNOSIS — F11.90 CHRONIC, CONTINUOUS USE OF OPIOIDS: ICD-10-CM

## 2020-04-09 DIAGNOSIS — M54.15 RADICULOPATHY OF THORACOLUMBAR REGION: ICD-10-CM

## 2020-04-09 DIAGNOSIS — F43.21 ADJUSTMENT DISORDER WITH DEPRESSED MOOD: ICD-10-CM

## 2020-04-09 DIAGNOSIS — G89.4 CHRONIC PAIN SYNDROME: ICD-10-CM

## 2020-04-09 DIAGNOSIS — M96.1 POSTLAMINECTOMY SYNDROME, LUMBAR REGION: ICD-10-CM

## 2020-05-11 ENCOUNTER — COMMUNICATION - HEALTHEAST (OUTPATIENT)
Dept: PALLIATIVE MEDICINE | Facility: OTHER | Age: 75
End: 2020-05-11

## 2020-05-11 DIAGNOSIS — G89.4 CHRONIC PAIN SYNDROME: ICD-10-CM

## 2020-05-11 DIAGNOSIS — T40.2X5A THERAPEUTIC OPIOID-INDUCED CONSTIPATION (OIC): ICD-10-CM

## 2020-05-11 DIAGNOSIS — K59.03 THERAPEUTIC OPIOID-INDUCED CONSTIPATION (OIC): ICD-10-CM

## 2020-06-04 ENCOUNTER — HOSPITAL ENCOUNTER (OUTPATIENT)
Dept: PALLIATIVE MEDICINE | Facility: OTHER | Age: 75
Discharge: HOME | End: 2020-06-04
Attending: PHYSICIAN ASSISTANT

## 2020-06-04 DIAGNOSIS — G89.4 CHRONIC PAIN SYNDROME: ICD-10-CM

## 2020-06-04 DIAGNOSIS — M54.15 RADICULOPATHY OF THORACOLUMBAR REGION: ICD-10-CM

## 2020-06-04 DIAGNOSIS — M51.379 DEGENERATION OF LUMBAR OR LUMBOSACRAL INTERVERTEBRAL DISC: ICD-10-CM

## 2020-06-04 DIAGNOSIS — F11.90 CHRONIC, CONTINUOUS USE OF OPIOIDS: ICD-10-CM

## 2020-06-09 ENCOUNTER — HOSPITAL ENCOUNTER (OUTPATIENT)
Dept: RADIOLOGY | Facility: HOSPITAL | Age: 75
Discharge: HOME | End: 2020-06-09
Attending: PHYSICIAN ASSISTANT

## 2020-06-09 DIAGNOSIS — M51.379 DEGENERATION OF LUMBAR OR LUMBOSACRAL INTERVERTEBRAL DISC: ICD-10-CM

## 2020-07-09 ENCOUNTER — COMMUNICATION - HEALTHEAST (OUTPATIENT)
Dept: PALLIATIVE MEDICINE | Facility: OTHER | Age: 75
End: 2020-07-09

## 2020-07-09 DIAGNOSIS — F43.21 ADJUSTMENT DISORDER WITH DEPRESSED MOOD: ICD-10-CM

## 2020-07-09 DIAGNOSIS — G89.4 CHRONIC PAIN SYNDROME: ICD-10-CM

## 2020-08-04 ENCOUNTER — HOSPITAL ENCOUNTER (OUTPATIENT)
Dept: PALLIATIVE MEDICINE | Facility: OTHER | Age: 75
Discharge: HOME | End: 2020-08-04
Attending: PHYSICIAN ASSISTANT

## 2020-08-04 DIAGNOSIS — T40.2X5A THERAPEUTIC OPIOID-INDUCED CONSTIPATION (OIC): ICD-10-CM

## 2020-08-04 DIAGNOSIS — G89.4 CHRONIC PAIN SYNDROME: ICD-10-CM

## 2020-08-04 DIAGNOSIS — F11.90 CHRONIC, CONTINUOUS USE OF OPIOIDS: ICD-10-CM

## 2020-08-04 DIAGNOSIS — M54.15 RADICULOPATHY OF THORACOLUMBAR REGION: ICD-10-CM

## 2020-08-04 DIAGNOSIS — M96.1 POSTLAMINECTOMY SYNDROME, LUMBAR REGION: ICD-10-CM

## 2020-08-04 DIAGNOSIS — K59.03 THERAPEUTIC OPIOID-INDUCED CONSTIPATION (OIC): ICD-10-CM

## 2020-09-09 ENCOUNTER — COMMUNICATION - HEALTHEAST (OUTPATIENT)
Dept: PALLIATIVE MEDICINE | Facility: OTHER | Age: 75
End: 2020-09-09

## 2020-09-09 DIAGNOSIS — G89.4 CHRONIC PAIN SYNDROME: ICD-10-CM

## 2020-09-23 ENCOUNTER — HOSPITAL ENCOUNTER (OUTPATIENT)
Dept: PALLIATIVE MEDICINE | Facility: OTHER | Age: 75
Discharge: HOME | End: 2020-09-23
Attending: PHYSICIAN ASSISTANT

## 2020-09-23 DIAGNOSIS — F11.90 CHRONIC, CONTINUOUS USE OF OPIOIDS: ICD-10-CM

## 2020-09-23 DIAGNOSIS — M51.379 DEGENERATION OF LUMBAR OR LUMBOSACRAL INTERVERTEBRAL DISC: ICD-10-CM

## 2020-09-23 DIAGNOSIS — G89.4 CHRONIC PAIN SYNDROME: ICD-10-CM

## 2020-09-23 ASSESSMENT — MIFFLIN-ST. JEOR: SCORE: 1592.21

## 2020-10-05 ENCOUNTER — COMMUNICATION - HEALTHEAST (OUTPATIENT)
Dept: PALLIATIVE MEDICINE | Facility: OTHER | Age: 75
End: 2020-10-05

## 2020-10-05 DIAGNOSIS — G89.4 CHRONIC PAIN SYNDROME: ICD-10-CM

## 2020-10-09 ENCOUNTER — COMMUNICATION - HEALTHEAST (OUTPATIENT)
Dept: PALLIATIVE MEDICINE | Facility: OTHER | Age: 75
End: 2020-10-09

## 2020-10-09 DIAGNOSIS — F43.21 ADJUSTMENT DISORDER WITH DEPRESSED MOOD: ICD-10-CM

## 2020-10-09 DIAGNOSIS — G89.4 CHRONIC PAIN SYNDROME: ICD-10-CM

## 2020-10-30 ENCOUNTER — COMMUNICATION - HEALTHEAST (OUTPATIENT)
Dept: PALLIATIVE MEDICINE | Facility: OTHER | Age: 75
End: 2020-10-30

## 2020-10-30 DIAGNOSIS — G89.4 CHRONIC PAIN SYNDROME: ICD-10-CM

## 2020-11-04 ENCOUNTER — COMMUNICATION - HEALTHEAST (OUTPATIENT)
Dept: PALLIATIVE MEDICINE | Facility: OTHER | Age: 75
End: 2020-11-04

## 2020-11-04 DIAGNOSIS — G89.4 CHRONIC PAIN SYNDROME: ICD-10-CM

## 2020-11-04 DIAGNOSIS — F43.21 ADJUSTMENT DISORDER WITH DEPRESSED MOOD: ICD-10-CM

## 2020-11-04 DIAGNOSIS — K59.03 THERAPEUTIC OPIOID-INDUCED CONSTIPATION (OIC): ICD-10-CM

## 2020-11-04 DIAGNOSIS — T40.2X5A THERAPEUTIC OPIOID-INDUCED CONSTIPATION (OIC): ICD-10-CM

## 2020-11-06 ENCOUNTER — COMMUNICATION - HEALTHEAST (OUTPATIENT)
Dept: PALLIATIVE MEDICINE | Facility: OTHER | Age: 75
End: 2020-11-06

## 2020-11-06 DIAGNOSIS — G89.4 CHRONIC PAIN SYNDROME: ICD-10-CM

## 2020-11-18 ENCOUNTER — HOSPITAL ENCOUNTER (OUTPATIENT)
Dept: PALLIATIVE MEDICINE | Facility: OTHER | Age: 75
Discharge: HOME | End: 2020-11-18
Attending: PHYSICIAN ASSISTANT

## 2020-11-18 DIAGNOSIS — M96.1 POSTLAMINECTOMY SYNDROME, LUMBAR REGION: ICD-10-CM

## 2020-11-18 DIAGNOSIS — F11.90 CHRONIC, CONTINUOUS USE OF OPIOIDS: ICD-10-CM

## 2020-11-18 DIAGNOSIS — G89.4 CHRONIC PAIN SYNDROME: ICD-10-CM

## 2020-11-18 ASSESSMENT — MIFFLIN-ST. JEOR: SCORE: 1592.21

## 2020-11-20 LAB

## 2021-01-05 ENCOUNTER — COMMUNICATION - HEALTHEAST (OUTPATIENT)
Dept: PALLIATIVE MEDICINE | Facility: OTHER | Age: 76
End: 2021-01-05

## 2021-01-05 DIAGNOSIS — G89.4 CHRONIC PAIN SYNDROME: ICD-10-CM

## 2021-01-11 ENCOUNTER — COMMUNICATION - HEALTHEAST (OUTPATIENT)
Dept: PALLIATIVE MEDICINE | Facility: OTHER | Age: 76
End: 2021-01-11

## 2021-01-11 DIAGNOSIS — F43.21 ADJUSTMENT DISORDER WITH DEPRESSED MOOD: ICD-10-CM

## 2021-01-11 DIAGNOSIS — G89.4 CHRONIC PAIN SYNDROME: ICD-10-CM

## 2021-01-21 ENCOUNTER — HOSPITAL ENCOUNTER (OUTPATIENT)
Dept: PALLIATIVE MEDICINE | Facility: OTHER | Age: 76
Discharge: HOME | End: 2021-01-21
Attending: PHYSICIAN ASSISTANT

## 2021-01-21 DIAGNOSIS — M54.15 RADICULOPATHY OF THORACOLUMBAR REGION: ICD-10-CM

## 2021-01-21 DIAGNOSIS — T40.2X5A THERAPEUTIC OPIOID-INDUCED CONSTIPATION (OIC): ICD-10-CM

## 2021-01-21 DIAGNOSIS — G89.4 CHRONIC PAIN SYNDROME: ICD-10-CM

## 2021-01-21 DIAGNOSIS — F11.90 CHRONIC, CONTINUOUS USE OF OPIOIDS: ICD-10-CM

## 2021-01-21 DIAGNOSIS — K59.03 THERAPEUTIC OPIOID-INDUCED CONSTIPATION (OIC): ICD-10-CM

## 2021-01-21 DIAGNOSIS — M96.1 POSTLAMINECTOMY SYNDROME, LUMBAR REGION: ICD-10-CM

## 2021-01-21 DIAGNOSIS — F43.21 ADJUSTMENT DISORDER WITH DEPRESSED MOOD: ICD-10-CM

## 2021-01-21 RX ORDER — GABAPENTIN 400 MG/1
400 CAPSULE ORAL 2 TIMES DAILY
Qty: 180 CAPSULE | Refills: 1 | Status: SHIPPED | OUTPATIENT
Start: 2021-01-21 | End: 2021-08-18

## 2021-01-21 RX ORDER — LUBIPROSTONE 24 UG/1
24 CAPSULE ORAL 2 TIMES DAILY WITH MEALS
Qty: 180 CAPSULE | Refills: 1 | Status: SHIPPED | OUTPATIENT
Start: 2021-01-21 | End: 2021-08-18

## 2021-01-21 RX ORDER — DULOXETIN HYDROCHLORIDE 60 MG/1
120 CAPSULE, DELAYED RELEASE ORAL DAILY
Qty: 180 CAPSULE | Refills: 1 | Status: SHIPPED | OUTPATIENT
Start: 2021-01-21 | End: 2021-08-18

## 2021-01-21 ASSESSMENT — MIFFLIN-ST. JEOR: SCORE: 1614.89

## 2021-02-01 ENCOUNTER — COMMUNICATION - HEALTHEAST (OUTPATIENT)
Dept: PALLIATIVE MEDICINE | Facility: OTHER | Age: 76
End: 2021-02-01

## 2021-02-09 ENCOUNTER — COMMUNICATION - HEALTHEAST (OUTPATIENT)
Dept: PALLIATIVE MEDICINE | Facility: OTHER | Age: 76
End: 2021-02-09

## 2021-02-10 ENCOUNTER — HOSPITAL ENCOUNTER (OUTPATIENT)
Dept: PALLIATIVE MEDICINE | Facility: OTHER | Age: 76
Discharge: HOME | End: 2021-02-10
Attending: PHYSICIAN ASSISTANT | Admitting: PAIN MEDICINE

## 2021-02-10 DIAGNOSIS — M54.15 RADICULOPATHY OF THORACOLUMBAR REGION: ICD-10-CM

## 2021-03-03 ENCOUNTER — COMMUNICATION - HEALTHEAST (OUTPATIENT)
Dept: PALLIATIVE MEDICINE | Facility: OTHER | Age: 76
End: 2021-03-03

## 2021-03-03 DIAGNOSIS — G89.4 CHRONIC PAIN SYNDROME: ICD-10-CM

## 2021-03-17 ENCOUNTER — HOSPITAL ENCOUNTER (OUTPATIENT)
Dept: PALLIATIVE MEDICINE | Facility: OTHER | Age: 76
Discharge: HOME | End: 2021-03-17
Attending: PHYSICIAN ASSISTANT

## 2021-03-17 DIAGNOSIS — M54.15 RADICULOPATHY OF THORACOLUMBAR REGION: ICD-10-CM

## 2021-03-17 DIAGNOSIS — M96.1 POSTLAMINECTOMY SYNDROME, LUMBAR REGION: ICD-10-CM

## 2021-03-17 DIAGNOSIS — G89.4 CHRONIC PAIN SYNDROME: ICD-10-CM

## 2021-03-17 DIAGNOSIS — F43.21 ADJUSTMENT DISORDER WITH DEPRESSED MOOD: ICD-10-CM

## 2021-03-17 DIAGNOSIS — F11.90 CHRONIC, CONTINUOUS USE OF OPIOIDS: ICD-10-CM

## 2021-03-17 ASSESSMENT — MIFFLIN-ST. JEOR: SCORE: 1614.89

## 2021-03-23 ENCOUNTER — AMBULATORY - HEALTHEAST (OUTPATIENT)
Dept: PALLIATIVE MEDICINE | Facility: OTHER | Age: 76
End: 2021-03-23

## 2021-03-23 DIAGNOSIS — M54.16 LUMBAR RADICULOPATHY: ICD-10-CM

## 2021-04-08 ENCOUNTER — COMMUNICATION - HEALTHEAST (OUTPATIENT)
Dept: PALLIATIVE MEDICINE | Facility: OTHER | Age: 76
End: 2021-04-08

## 2021-04-09 ENCOUNTER — HOSPITAL ENCOUNTER (OUTPATIENT)
Dept: PALLIATIVE MEDICINE | Facility: OTHER | Age: 76
Discharge: HOME | End: 2021-04-09
Attending: PAIN MEDICINE | Admitting: PAIN MEDICINE

## 2021-04-09 DIAGNOSIS — M54.16 LUMBAR RADICULOPATHY: ICD-10-CM

## 2021-04-13 ENCOUNTER — COMMUNICATION - HEALTHEAST (OUTPATIENT)
Dept: PALLIATIVE MEDICINE | Facility: OTHER | Age: 76
End: 2021-04-13

## 2021-04-14 ENCOUNTER — COMMUNICATION - HEALTHEAST (OUTPATIENT)
Dept: PALLIATIVE MEDICINE | Facility: OTHER | Age: 76
End: 2021-04-14

## 2021-04-14 DIAGNOSIS — G89.4 CHRONIC PAIN SYNDROME: ICD-10-CM

## 2021-05-04 ENCOUNTER — COMMUNICATION - HEALTHEAST (OUTPATIENT)
Dept: PALLIATIVE MEDICINE | Facility: OTHER | Age: 76
End: 2021-05-04

## 2021-05-04 DIAGNOSIS — G89.4 CHRONIC PAIN SYNDROME: ICD-10-CM

## 2021-05-04 DIAGNOSIS — F43.21 ADJUSTMENT DISORDER WITH DEPRESSED MOOD: ICD-10-CM

## 2021-05-04 RX ORDER — LAMOTRIGINE 25 MG/1
50 TABLET ORAL 2 TIMES DAILY
Qty: 360 TABLET | Refills: 0 | Status: SHIPPED | OUTPATIENT
Start: 2021-05-04 | End: 2021-08-02

## 2021-05-12 ENCOUNTER — HOSPITAL ENCOUNTER (OUTPATIENT)
Dept: PALLIATIVE MEDICINE | Facility: OTHER | Age: 76
Discharge: HOME | End: 2021-05-12
Attending: PHYSICIAN ASSISTANT

## 2021-05-12 DIAGNOSIS — G89.4 CHRONIC PAIN SYNDROME: ICD-10-CM

## 2021-05-12 DIAGNOSIS — F11.90 CHRONIC, CONTINUOUS USE OF OPIOIDS: ICD-10-CM

## 2021-05-12 DIAGNOSIS — M54.15 RADICULOPATHY OF THORACOLUMBAR REGION: ICD-10-CM

## 2021-05-12 DIAGNOSIS — M96.1 POSTLAMINECTOMY SYNDROME, LUMBAR REGION: ICD-10-CM

## 2021-05-12 RX ORDER — HYDROCODONE BITARTRATE AND ACETAMINOPHEN 10; 325 MG/1; MG/1
0.5-1 TABLET ORAL 2 TIMES DAILY PRN
Qty: 40 TABLET | Refills: 0 | Status: SHIPPED | OUTPATIENT
Start: 2021-06-16 | End: 2021-07-16

## 2021-05-12 RX ORDER — HYDROCODONE BITARTRATE 40 MG/1
40 TABLET, EXTENDED RELEASE ORAL DAILY
Qty: 30 EACH | Refills: 0 | Status: SHIPPED | OUTPATIENT
Start: 2021-07-05 | End: 2021-08-18

## 2021-05-12 RX ORDER — HYDROCODONE BITARTRATE 40 MG/1
40 TABLET, EXTENDED RELEASE ORAL DAILY
Qty: 30 EACH | Refills: 0 | Status: SHIPPED | OUTPATIENT
Start: 2021-06-05 | End: 2021-08-18

## 2021-05-12 ASSESSMENT — MIFFLIN-ST. JEOR: SCORE: 1614.89

## 2021-05-27 VITALS — BODY MASS INDEX: 28.88 KG/M2 | HEIGHT: 69 IN | WEIGHT: 195 LBS

## 2021-05-27 NOTE — TELEPHONE ENCOUNTER
Call placed to patient to review follow up recommendations from provider.  Patient plans to  the paperwork from the clinic at some point.  He declines the offer to have then mailed or faxed.

## 2021-05-28 NOTE — PROGRESS NOTES
PAIN CENTER PROGRESS NOTE    Subjective:   Naveen Toro is a 74 y.o. male who presents for evaluation of low back pain.  He has history of lumbar degeneration.  He has history of lumbar surgery, failed lumbar injections, and is currently using opioid management and implanted spinal cord stimulator to manage pain symptoms.  Follows Dr. Garcia of [facility].      Major issues:  1. Chronic pain syndrome    2. Lumbar Disc Degeneration    3. Chronic, continuous use of opioids    4. Postlaminectomy Syndrome (Lumbar)       Pain location and description: 4/10 intermittent burning, sharp pain in lower back and right buttock and hip.  Mainly lower back, right side worse than the left.  He states he had a flare of pain before [place] due to weather.  Function rated 8. At best, pain rating 3/10 and at worst 7/10, on average pain rated 4/10.  Radiation of pain: buttocks, hips, legs intermittently.    Gait disturbance: None reported.  Denies recent falls or changes in balance.  Uses AD.  Exacerbating factors: Activities, weather changes if cold or damp, winter months, walking on uneven ground, standing, sitting too long  Alleviating factors: Rest, warm pool, pain medications, SCS implant, injections, warmer weather, reclining/laying down  Associated symptoms: Numbness in right hip/leg to foot, weakness in right leg, night pain. Denies bowel or bladder incontinence, fever/chills, unexplained weight loss.  Adverse effects of medications: Constipation.  Amitiza two times a day which is working.  He still uses miralax powder prn a couple times per week.  Has a bowel movement every 2-3 days. Does report some drowsiness related to medications and depression.   Functional Symptoms: Sleep, walking, work, ADLs, relationships/social, sexual health, mood (depressed, angry, frustrated).  Current treatment efficacy: Fair; Morphine ER 15 mg every 8 hours and Vicodin 10/325 mg BID prn. Continues Gabapentin 400 mg bedtime and  "Cymbalta 120 mg daily without change.   Current treatment compliance: Good.  Attending scheduled appointments and taking medication as prescribed. Using SCS daily. Continues to see Dr. Frank monthly for pain psychology.  He states not too much exercise.  He has treadmill at home.      Since last visit, saw Dr. Everton Hoover on 04/16/19 for follow-up:  \"ASSESSMENT AND PLAN     ICD-10-CM   1. Type 2 diabetes mellitus without complication, without long-term current use of insulin ([facility]) E11.9 Hgb A1C   Hgb A1C   Diabetic Foot Check (Ep101)   Diabetic Foot Check   2. CKD (chronic kidney disease) stage 3, GFR 30-59 ml/min ([facility]). Creatinine normal on his last test N18.3 CBC W PLT NO DIFF   3. Coronary artery disease involving native coronary artery of native heart without angina pectoris ([facility]) I25.10   4. Chronic rhinitis. I wonder if this is actually secondary to his CPAP J31.0   5. Onychomycosis of toenail B35.1   6. MC (obstructive sleep apnea) G47.33   7. Dry mouth may be related to nasal congestion although certainly his chronic narcotic therapy may be contributing as well     Follow up Instructions: Pt to return to see me in six months.   Try salagen tablet at bedtime to reduce dry mouth overnight    You could also use a lemon drop or something to stimulate saliva production    The flonase will take about 5-7 days to help with allergy nose symptom.\"    Tried Salagen #3 tabs left, didn't seem to help that much so he doesn't think he will continue taking.  States flonase use was prn and he needs to use more regularly.  Taking an OTC sinus tablet isn't sure the name/dose.    States pain hasn't changed since last visit.  States he has \"not been very good\" due to weather and not doing as much.  He states he reclines to get pain relief \"most of the day\" over 4 hours a day. Limits errands due to pain, wife does most of housecleaning and cooking.  He is hoping to increase activity this summer as he has a 3 wheel bike for " "exercise.  He states this generally aggravates his pain.  Doesn't to go [employer] as water is too cold.  He states pain refers into right hip and groin.  He states laying in bed it wakes him up.      Had TFESI right L5-S1 on 10/02/18 and reports pain relief.  States it worked \"quite a bit\" but difficult to estimate a percentage.  He reports pain relief has lasted but starting to note it is wearing off. Denies post procedure complications.  He is unsure why he hasn't repeated this yet but will consider.    Had SCS battery replaced on 09/09/16 with [employer].  He reports he has 2 reprogramming sessions with [employer].  Seems to work fairly good and covering correct areas of pain.  He is on high frequency setting and doesn't feel except sometimes on feet when laying down. States this seems to be better than traditional.  He is keeping SCS on around the clock, rechargeable battery having to charge every week taking 2-3 hours to charge fully.         Review of Systems  Constitutional: Fatigue. Sleep disturbance, sleeps during the day.  Wearing CPAP every night.  Denies fever, chills, night sweats, weight loss.  Musculoskeletal: Positive for back pain, leg pain/weakness.  Denies joint pain, joint swelling, recent falls.  Gastrointestional: Denies difficulty swallowing, change in appetite, abdominal pain, constipation, nausea, vomiting, diarrhea, GERD, fecal incontinence.  Genitourinary: Denies urinary incontinence, dysuria, hematuria, UTI, frequency, hesitancy  Neurologic:  Denies headaches, confusion, seizure, weakness, changes in balance, changes in speech.  Psychiatric: Positive for depression. Denies memory loss, psychoses, suicidal ideation, substance use/abuse.     Objective:     Vitals:    04/30/19 0955   BP: 136/76   Pulse: 62   Resp: 16   Weight: 200 lb (90.7 kg)   Height: 5' 9\" (1.753 m)   PainSc:   4     Physical Exam  Constitutional- General appearance: Well groomed, well developed, comfortable, overweight and " appearance reflects stated age.  No acute distress or pain behaviors noted. Presents alone today.  Psychiatric- Judgment and insight: Normal.  Speech: Normal rhythm.  Thought process: Normal.  No abnormal thoughts reported. Alert & Oriented to person, place, and time.  Recent and remote memory: Normal.  Observed mood: Quiet, flat affect.  Respiratory- Breathing is non-labored; normal rhythm and rate.  Cardiovascular- Extremities warm and well perfused, no peripheral edema or varicosities.  Dermatologic- Exposed skin is clean, dry, and intact to inspection and palpation.   Musculoskeletal- Gait and station: Abnormal.  Gait evaluation demonstrates ambulating with single point cane with a stiff, slightly antalgic gait.  Patient does have difficulty rising from a seated position.      *Opioid Universal Precautions:    UDS/Swab - 11/13/18, results as expected  Opioid Agreement - 04/30/19  Opioid consent - 04/30/19  Pharmacy- as documented     Reviewed - 04/30/19 as expected  Pill Count - n/a  Psychological evaluation - monthly through Dr. Larry Frank  MME - 65  Pharmacogenetic testing - n/a  VICENTE Score: 58 04/30/19    Imaging:  None reviewed today    Assessment:   Naveen Toro is a 74 y.o. male seen in clinic today for lumbar degeneration and lumbar radiculopathy, most severe at L5-S1.  He continues pain management with Morphine 15 mg every 8 hours and Vicodin 10/325 mg BID prn daily as needed for severe pain.  He reports some relief with dosing but also wonders about if this could be maximized (he has failed opioid rotations to fentanyl, opana, oxycontin, and not advised to take methadone with cardiac risk). Discuss options of CBD oil trial as a supplement and he is interested. Reports OIC but failed Movantik, doing better with Amitiza.  He requests to repeat TFESI right L5-S1 as previous injection was over 3 months ago and relief has worn off.  He has tolerated steroid and injection without complication. He  continues to use his SCS for pain control of radiculopathy symptoms with good coverage.      Plan:   Continue Morphine ER 15 mg every 8 hours.    You may take 1-2 Vicodin 10/325 mg a day as needed for severe breakthrough pain or in the morning as needed.    As your opioid dose remains stable, I will send electronic refills to the pharmacy for 2 subsequent months until your next appointment so you do not have to call our refill line.  The fill dates for your medications are:  05/21/19 & 06/18/19  Check with your pharmacy that all prescriptions are on your profile. Call the pharmacy a week before you want to fill the prescription as stock may vary and the pharmacy may need to order the medication for you. I reserve the right to cancel the electronic prescription at the pharmacy in between appointments and would contact you with an explanation if this were to occur.  For constipation, continue Amitiza 24 mcg twice a day with meals.    Continue taking Cymbalta 120 mg at night   Continue Gabapentin 400 mg at bedtime   Continue Vitamin D 5,000 Units daily  CBD Oil (or Hemp oil) may be helpful - this is the pain reducing component of the cannabis plant and is considered to be a legal, non-addictive, supplement.  Look for a concentration of at least 250 mg. [employer] tends to be the least expensive. I would recommend starting the CBD oil at 2-5 drops two times per day and increasing as needed and as tolerated. On average, people use 20 drops two times per day (of [employer]).   -Website: https://[employer]/ or [employer] are some options  -If you'd prefer to buy locally, look at [employer] ([address] [place]) Whole foods, or [employer].   Continue use of spinal cord stimulator  You may repeat lumbar epidural injection as needed with Dr. Maier (it has been over 6 months).  Opioid agreement and consent form completed today  Discussed having MTM visit with Keanu Marvin PharmD  for evaluation of pain plan, you may schedule with him on a M or Th here in the office.  Follow-up in 8 weeks with Brea.       Brea Sheets PA-C  [address]  [place] [address]  Ph: [phone]  Fax: [phone]

## 2021-05-28 NOTE — PATIENT INSTRUCTIONS - HE
Continue Morphine ER 15 mg every 8 hours.    You may take 1-2 Vicodin 10/325 mg a day as needed for severe breakthrough pain or in the morning as needed.    As your opioid dose remains stable, I will send electronic refills to the pharmacy for 2 subsequent months until your next appointment so you do not have to call our refill line.  The fill dates for your medications are:  05/21/19 & 06/18/19  Check with your pharmacy that all prescriptions are on your profile. Call the pharmacy a week before you want to fill the prescription as stock may vary and the pharmacy may need to order the medication for you. I reserve the right to cancel the electronic prescription at the pharmacy in between appointments and would contact you with an explanation if this were to occur.  For constipation, continue Amitiza 24 mcg twice a day with meals.    Continue taking Cymbalta 120 mg at night   Continue Gabapentin 400 mg at bedtime   Continue Vitamin D 5,000 Units daily  CBD Oil (or Hemp oil) may be helpful - this is the pain reducing component of the cannabis plant and is considered to be a legal, non-addictive, supplement.  Look for a concentration of at least 250 mg. [employer] tends to be the least expensive. I would recommend starting the CBD oil at 2-5 drops two times per day and increasing as needed and as tolerated. On average, people use 20 drops two times per day (of [employer]).   -Website: https://[employer]/ or [employer] are some options  -If you'd prefer to buy locally, look at [facility] ([address] [place]) Whole foods, or [employer].   Continue use of spinal cord stimulator  You may repeat lumbar epidural injection as needed with Dr. Maier (it has been over 6 months).  Opioid agreement and consent form completed today  Discussed having MTM visit with Keanu Marvin PharmD for evaluation of pain plan, you may schedule with him on a M or Th here in the office.  Follow-up in 8  rody with Tonya

## 2021-05-29 NOTE — TELEPHONE ENCOUNTER
Medication being requested: vit D3 5000u daily, amitiza 24mcg 2xd  Last visit date: 4/30.  Provider: MILDRED  Next visit date: 6/27.  Provider: MILDRED  Expected follow up: 8wk  MTM visit (Pain Center) date: scheduled 6/27    Pertinent between visit information about requested medication (telephone, mychart, prior authorization, concerns, comments): no  Script being sent to provider by nurse- dates and quantity: amitiza filled qu60 q mo, last fill 5/25 per pharmacy, vit D filled 3/16 90 day supply  Requested Prescriptions     Pending Prescriptions Disp Refills     cholecalciferol, vitamin D3, 5,000 unit capsule [Pharmacy Med Name: VITAMIN D3 5000UNIT CAPS] 90 capsule 0     Sig: TAKE ONE CAPSULE BY MOUTH EVERY DAY     lubiprostone (AMITIZA) 24 MCG capsule [Pharmacy Med Name: AMITIZA 24MCG CAPS] 180 capsule 0     Sig: TAKE ONE CAPSULE BY MOUTH TWICE A DAY WITH MEALS     Pharmacy cued: [facility]

## 2021-05-29 NOTE — PROGRESS NOTES
Patient here with low back pain radiates to right hip to leg, Having an Epidural Steroid Injection today.

## 2021-05-29 NOTE — PATIENT INSTRUCTIONS - HE
POST PROCEDURE INSTRUCTIONS  PROCEDURE DONE TODAY:RIGHT TRANSFORAMINAL L5-S1 LUMBAR EPIDURAL STEROID INJECTION    Rest today. Resume activity tomorrow as able.  Patient demonstrates the ability to ambulate independently with a steady gait at the time of discharge.      It is not unusual to have a temporary increase in your pain after a procedure. You can ice the area 24-48 hrs. 15 min at a time.      You received a steroid injection. This medication can take 2-7 days to take effect. Some temporary side effects include:    Heartburn    Flushed-red face    Insomnia-trouble sleeping-jitters    Diabetics may see a rise in blood sugar for a few days    Low back or SI joint (sacroiliac) injection: you may experience numbness in one or both legs. Please walk with help. This is temporary and will wear off in a few hours. Do not drive if you are tingling, numbness or weakness in your hands/arms or legs/feet.    Headache:  If you experience a headache after a lumbar epidural injection, lie down and drink fluids (especially caffeine). The headache will go away gradually. If it persists notify our clinic.    Fever: call if fever 100 or over, redness/warmth/swelling over the injection site.    No public hot tubs/pools for a couple days    Physical therapy: check with pain center provider regarding resuming therapy.    Monitor your response to the injection and report this at your next visit.    If you have been referred for a procedure only, please call your referring provider for a follow up.    IF YOU PLAN TO GET A FLU SHOT YOU MUST WAIT 2 WEEKS AFTER THIS INJECTION.      CALL IF ANY QUESTIONS [phone]

## 2021-05-30 ENCOUNTER — RECORDS - HEALTHEAST (OUTPATIENT)
Dept: ADMINISTRATIVE | Facility: CLINIC | Age: 76
End: 2021-05-30

## 2021-05-30 VITALS — BODY MASS INDEX: 30.78 KG/M2 | WEIGHT: 215 LBS | HEIGHT: 70 IN

## 2021-05-30 VITALS — WEIGHT: 220 LBS | HEIGHT: 70 IN | BODY MASS INDEX: 31.5 KG/M2

## 2021-05-30 VITALS — WEIGHT: 231.5 LBS | HEIGHT: 70 IN | BODY MASS INDEX: 33.14 KG/M2

## 2021-05-30 VITALS — BODY MASS INDEX: 30.78 KG/M2 | HEIGHT: 70 IN | WEIGHT: 215 LBS

## 2021-05-30 VITALS — BODY MASS INDEX: 31.5 KG/M2 | WEIGHT: 220 LBS | HEIGHT: 70 IN

## 2021-05-30 NOTE — PROGRESS NOTES
PAIN CENTER PROGRESS NOTE    Subjective:   Naveen Toro is a 74 y.o. male who presents for evaluation of low back pain.  He has history of lumbar degeneration.  He has history of lumbar surgery, failed lumbar injections, and is currently using opioid management and implanted spinal cord stimulator to manage pain symptoms.  Follows Dr. Garcia of [facility].      Major issues:  1. Chronic pain syndrome    2. Lumbar Disc Degeneration    3. Therapeutic opioid-induced constipation (OIC)    4. Chronic, continuous use of opioids    5. Postlaminectomy Syndrome (Lumbar)       Pain location and description: 4/10 intermittent dull, sharp, burning pain in lower back and right buttock and hip.  Mainly lower back, right side worse than the left.  He states he had a flare of pain before Christmas due to weather.  Function rated 8. At best, pain rating 3/10 and at worst 7/10, on average pain rated 4/10.  Radiation of pain: buttocks, hips, legs intermittently.    Gait disturbance: None reported.  Denies recent falls or changes in balance.  Uses AD.  Exacerbating factors: Activities, weather changes if cold or damp, winter months, walking on uneven ground, standing, sitting too long  Alleviating factors: Rest, warm pool, pain medications, SCS implant, injections, warmer weather, reclining/laying down  Associated symptoms: Pain at night.  Numbness in right hip/leg to foot, weakness in right leg, night pain. Denies bowel or bladder incontinence, fever/chills, unexplained weight loss.  Adverse effects of medications: Constipation.  Amitiza two times a day which is working.  He still uses miralax powder prn a couple times per week.  Has a bowel movement every 2-3 days. Does report some drowsiness related to medications and depression.   Functional Symptoms: Pain interferes with sleep, walking, ADLs, relationships/social, sexual health, mood (depressed, angry, frustrated, anxious).  Current treatment efficacy: Fair; Morphine ER  "15 mg every 8 hours and Vicodin 10/325 mg BID prn. Continues Gabapentin 400 mg bedtime and Cymbalta 120 mg daily without change.   Current treatment compliance: Good.  Attending scheduled appointments and taking medication as prescribed. Using SCS daily. Continues to see Dr. Frank monthly for pain psychology.  He states not too much exercise.  He has treadmill at home.      Since last visit, he saw Keanu RothD for MTM consult today prior to visit:  \"MTM Initial Encounter  Assessment & Plan                                                     Medication Adherence/Access: No concerns noted      Back Pain: Partially improved- pt wanting to discuss additional options for pain control. Given sharp/burning nerve pains, may benefit from increased dose of Gabapentin. Per discussion with Brea Sheets PA-C, pt was previously on higher doses but was very sedated. This was around the time of SHERRIE and other high doses of medication, so may tolerate a dose increase at this time. Is still having some drowsiness during the day but this could be related to low mood. Would need close monitoring if gabapentin increase. Could also consider options like Ketamine which would be helpful as needed for pain and help with depressed mood. Other options to consider would be topical lidocaine for PRN relief and alpha lipoic acid as supplement, however I would recommend trial of gabapentin and ketamine first as these may be more beneficial.   -Consider increasing gabapentin to 400 mg two times a day   -Consider trial of Ketamine troches PRN   -Future considerations: trial of alpha lipoic acid 600 mg daily or topical lidocaine   PharmD to discuss above options with Brea Sheets PA-C      Mood: Needs improvement - continues to have depressed mood. Daytime drowsiness could be partly related to low mood or due to not sleeping well at night- would need to monitor if increasing gabapentin use. As noted above, may benefit from Ketamine " "to help with mood and pain.   -Consider Ketamine, as above      Constipation: Needs improvement - continues to have constipation with hard stools. May benefit from addition of gentle osmotic laxative to help with BM frequency and to soften stools.   -Start Miralax 17 g daily      Diabetes:  Stable - A1C at goal <7%. Reported fasting sugars are above goal , although pt was not very sure of ranges so these may not be accurate.   -Continue current therapy         CAD: Needs improvement - appropriately on high intensity statin. Is having bruising, may benefit from reducing aspirin to 81 mg daily.   -Consider reducing aspirin to 81 mg daily (Pt to discuss with PCP. Provided info in AVS).         Hypertension: BP x 2 and at last visit were above goal <140/90. May benefit from increased dose of amlodipine.   -Consider increasing amlodipine to 5 mg daily (pt to discuss with PCP- provided info in AVS).         Allergies: Improved - allergy symptom are getting better per pt report.   -Continue current therapy      Follow Up  No follow-ups on file.   Schedule with PharmD as needed\"      He had right TFESI L5-S1 on 06/04/19 with Dr. Maier and reports this went \"all right\" he describes pain relief took a few days and was 25-30% improvement in his back and bilateral legs equal.  Denies adverse side effects of steroid and denies complications.  Unsure if it is helpful now.  Reports with weather changes he has difficulty telling.    States pain hasn't changed since last visit.  States he has \"not been very good\" due to weather and not doing as much.  He states he reclines to get pain relief \"most of the day\" over 4 hours a day. Limits errands due to pain, wife does most of housecleaning and cooking.  He is hoping to increase activity this summer as he has a 3 wheel bike for exercise.  He states this generally aggravates his pain.  Doesn't to go [employer] as water is too cold.  He states pain refers into right hip and groin.  He " "states laying in bed it wakes him up.      Had SCS battery replaced on 09/09/16 with [employer].  He reports he has 2 reprogramming sessions with [employer].  Seems to work fairly good and covering correct areas of pain.  He is on high frequency setting and doesn't feel except sometimes on feet when laying down. States this seems to be better than traditional.  He is keeping SCS on around the clock, rechargeable battery having to charge every week taking 2-3 hours to charge fully. He is wondering if it would help to reprogram as he has been on high frequency for a long time.  He states when he lays in bed and arches his back he can \"tell if it is working.\"  He is wondering about going back to conventional setting.      Review of Systems  Constitutional: Fatigue. Sleep disturbance, sleeps during the day.  Wearing CPAP every night.  Denies fever, chills, night sweats, weight loss.  Musculoskeletal: Positive for back pain, leg pain/weakness.  Denies joint pain, joint swelling, recent falls.  Gastrointestional: Denies difficulty swallowing, change in appetite, abdominal pain, constipation, nausea, vomiting, diarrhea, GERD, fecal incontinence.  Genitourinary: Denies urinary incontinence, dysuria, hematuria, UTI, frequency, hesitancy  Neurologic:  Denies headaches, confusion, seizure, weakness, changes in balance, changes in speech.  Psychiatric: Positive for depression. Denies memory loss, psychoses, suicidal ideation, substance use/abuse.     Objective:     Vitals:    06/27/19 1101   BP: 134/66   Pulse: 63   Resp: 16   Weight: 200 lb (90.7 kg)   Height: 5' 9\" (1.753 m)   PainSc:   4     Physical Exam  Constitutional- General appearance: Well groomed, well developed, comfortable, overweight and appearance reflects stated age.  No acute distress or pain behaviors noted. Presents alone today.  Psychiatric- Judgment and insight: Normal.  Speech: Normal rhythm.  Thought process: Normal.  No abnormal thoughts reported. Alert & " Oriented to person, place, and time.  Recent and remote memory: Normal.  Observed mood: Quiet, flat affect.  Respiratory- Breathing is non-labored; normal rhythm and rate.  Cardiovascular- Extremities warm and well perfused, no peripheral edema or varicosities.  Dermatologic- Exposed skin is clean, dry, and intact to inspection and palpation.   Musculoskeletal- Gait and station: Abnormal.  Gait evaluation demonstrates ambulating with single point cane with a stiff, slightly antalgic gait.  Patient does have difficulty rising from a seated position.      *Opioid Universal Precautions:    UDS/Swab - 11/13/18, results as expected  Opioid Agreement - 04/30/19  Opioid consent - 04/30/19  Pharmacy- as documented     Reviewed - WI unavailable so call to pharmacy on 06/27/19 as expected:  Vicodin 28 day 06/18-07/16  Morphine 28 day 06/18-07/16  Gabapentin 90 day 03/19/19-06/18/19  Amitiza filled 06/20/19  Pill Count - n/a  Psychological evaluation - monthly through Dr. Larry Frank  MME - 65  Pharmacogenetic testing - n/a  VICENTE 04/30/19    Imaging:  None reviewed today    Assessment:   Naveen Toro is a 74 y.o. male seen in clinic today for lumbar degeneration and lumbar radiculopathy, most severe at L5-S1.  He continues pain management with Morphine 15 mg every 8 hours and Vicodin 10/325 mg BID prn daily as needed for severe pain.  He reports some relief with dosing but also wonders about if this could be maximized (he has failed opioid rotations to fentanyl, opana, oxycontin, and not advised to take methadone with cardiac risk). Discuss options of CBD oil trial as a supplement and he is interested. Reports OIC but failed Movantik, doing better with Amitiza.  He had repeat TFESI right L5-S1 tolerated steroid and injection without complication. He continues to use his SCS for pain control of radiculopathy symptoms with good coverage, discuss returning to conventional settings with [employer] reprogramming visit.    Review MTM recommendations today and patient would like to first increase Gabapentin, consider ketamine or alpha lipoic acid after med change.     Plan:   Continue Morphine ER 15 mg every 8 hours.    You may take 1-2 Vicodin 10/325 mg a day as needed for severe breakthrough pain or in the morning as needed.    As your opioid dose remains stable, I will send electronic refills to the pharmacy for 2 subsequent months until your next appointment so you do not have to call our refill line.  The fill dates for your medications are:  07/16/19 & 08/13/19  Check with your pharmacy that all prescriptions are on your profile. Call the pharmacy a week before you want to fill the prescription as stock may vary and the pharmacy may need to order the medication for you. I reserve the right to cancel the electronic prescription at the pharmacy in between appointments and would contact you with an explanation if this were to occur.  For constipation, continue Amitiza 24 mcg twice a day with meals.    Continue taking Cymbalta 120 mg at night   Increase Gabapentin 400 mg two times a day for pain - monitor for side effects.  Trial Lidoderm patch apply 1-3 patches to painful areas on 12 hours, off 12 hours.    Continue Vitamin D 5,000 Units daily  Discussed CBD Oil (or Hemp oil) may be helpful - this is the pain reducing component of the cannabis plant and is considered to be a legal, non-addictive, supplement.  Look for a concentration of at least 250 mg. [employer] tends to be the least expensive. I would recommend starting the CBD oil at 2-5 drops two times per day and increasing as needed and as tolerated. On average, people use 20 drops two times per day (of [employer]).   -Website: https://[employer].SheerID/ or WooWho are some options  -If you'd prefer to buy locally, look at [employer] ([address] [place]) [employer], or [place].   Continue use of spinal cord stimulator - schedule  reprogramming visit with [employer] if you would like to trial a conventional setting.  Follow-up in 8 weeks with Brea.       Brea Sheets PA-C  [address]  [place] [address]  Ph: [phone]  Fax: [phone]

## 2021-05-30 NOTE — PATIENT INSTRUCTIONS - HE
Recommendations from today's MTM visit:                                                    MTM (medication therapy management) is a service provided by a clinical pharmacist designed to help you get the most of out of your medicines. and Today we reviewed what your medicines are for, how to know if they are working, that your medicines are safe and how to make your medicine regimen as easy as possible.     1. In terms of pain control, I would recommend increasing gabapentin to 400 mg two times a day.     2. Consider other option like Ketamine which can be helpful as needed for pain and helpful for mood.     3. Topical Lidocaine which would be helpful as needed for pain relief if targeting specific areas of pain.     4. You can also consider Alpha Lipoic Acid 600 mg daily. This is available over the counter and has been shown to be helpful for nerve pains.     5. Talk to your primary care doctor about decreasing aspirin to 81 mg daily to lower your risk of bleeding/bruising.     6. Recommend talking to your primary care doctor about increasing the dose of amlodipine to 5 mg daily.     Next MTM visit: Schedule as needed     To schedule another MTM appointment, please call the clinic directly or you may call the MTM scheduling line at [phone] or toll-free at [phone].     My Clinical Pharmacist's contact information:                                                      It was a pleasure seeing you today!  Please feel free to contact me with any questions or concerns you have.      Keanu Marvin, Smita  Medication Therapy Management (MTM) Pharmacist  [facility]     You may receive a survey about the MTM services you received by email and/or US Mail.  I would appreciate your feedback to help me serve you better in the future. Your comments will be anonymous.

## 2021-05-30 NOTE — TELEPHONE ENCOUNTER
Last Visit   19  Next Visit   19     Med Refills From   Hydrocodone-acetaminophen & MS Contin to fill 19.

## 2021-05-30 NOTE — PATIENT INSTRUCTIONS - HE
Continue Morphine ER 15 mg every 8 hours.    You may take 1-2 Vicodin 10/325 mg a day as needed for severe breakthrough pain or in the morning as needed.    As your opioid dose remains stable, I will send electronic refills to the pharmacy for 2 subsequent months until your next appointment so you do not have to call our refill line.  The fill dates for your medications are:  07/16/19 & 08/13/19  Check with your pharmacy that all prescriptions are on your profile. Call the pharmacy a week before you want to fill the prescription as stock may vary and the pharmacy may need to order the medication for you. I reserve the right to cancel the electronic prescription at the pharmacy in between appointments and would contact you with an explanation if this were to occur.  For constipation, continue Amitiza 24 mcg twice a day with meals.    Continue taking Cymbalta 120 mg at night   Increase Gabapentin 400 mg two times a day for pain - monitor for side effects.  Trial Lidoderm patch apply 1-3 patches to painful areas on 12 hours, off 12 hours.    Continue Vitamin D 5,000 Units daily  Discussed CBD Oil (or Hemp oil) may be helpful - this is the pain reducing component of the cannabis plant and is considered to be a legal, non-addictive, supplement.  Look for a concentration of at least 250 mg. [employer] tends to be the least expensive. I would recommend starting the CBD oil at 2-5 drops two times per day and increasing as needed and as tolerated. On average, people use 20 drops two times per day (of [employer]).   -Website: https://Placecast.Cyprotex/ or Etable are some options  -If you'd prefer to buy locally, look at [employer] ([address] [place]) [email or web address], or [employer].   Continue use of spinal cord stimulator - schedule reprogramming visit with [employer] if you would like to trial a conventional setting.  Follow-up in 8 weeks with Brea.

## 2021-05-31 ENCOUNTER — RECORDS - HEALTHEAST (OUTPATIENT)
Dept: ADMINISTRATIVE | Facility: CLINIC | Age: 76
End: 2021-05-31

## 2021-05-31 VITALS — BODY MASS INDEX: 30.78 KG/M2 | HEIGHT: 70 IN | WEIGHT: 215 LBS

## 2021-05-31 VITALS — WEIGHT: 220 LBS | BODY MASS INDEX: 31.5 KG/M2 | HEIGHT: 70 IN

## 2021-05-31 VITALS — WEIGHT: 220 LBS | HEIGHT: 70 IN | BODY MASS INDEX: 31.5 KG/M2

## 2021-05-31 VITALS — HEIGHT: 70 IN | BODY MASS INDEX: 31.5 KG/M2 | WEIGHT: 220 LBS

## 2021-05-31 VITALS — BODY MASS INDEX: 31.5 KG/M2 | HEIGHT: 70 IN | WEIGHT: 220 LBS

## 2021-05-31 VITALS — BODY MASS INDEX: 31.5 KG/M2 | WEIGHT: 220 LBS | HEIGHT: 70 IN

## 2021-05-31 VITALS — WEIGHT: 215 LBS | HEIGHT: 70 IN | BODY MASS INDEX: 30.78 KG/M2

## 2021-05-31 NOTE — TELEPHONE ENCOUNTER
Medication being requested: vitamin D 3 5000 units daily  Last visit date: 8/22 Provider: MILDRED  Continue Vitamin D 5,000 Units daily  Next visit date: 10/22 Provider: MILDRED  Expected follow up: 8 weeks  MTM visit (Pain Center) date: no  Pertinent between visit information about requested medication (telephone, mychart, prior authorization, concerns): NA  Last date prescribed: 6/20  Provider responsible: MILDRED  Spoke with patient: no  Script being sent to provider - dates and quantity:   Requested Prescriptions     Pending Prescriptions Disp Refills     cholecalciferol, vitamin D3, 5,000 unit capsule [Pharmacy Med Name: VITAMIN D3 5000UNIT CAPS] 90 capsule 0     Sig: TAKE ONE CAPSULE BY MOUTH EVERY DAY     Pharmacy cued: [facility]

## 2021-05-31 NOTE — PATIENT INSTRUCTIONS - HE
Continue Morphine ER 15 mg every 8 hours.    You may take 1-2 Vicodin 10/325 mg a day as needed for severe breakthrough pain or in the morning as needed.    As your opioid dose remains stable, I will send electronic refills to the pharmacy for 2 subsequent months until your next appointment so you do not have to call our refill line.  The fill dates for your medications are:  09/10/19 & 10/08/19  Check with your pharmacy that all prescriptions are on your profile. Call the pharmacy a week before you want to fill the prescription as stock may vary and the pharmacy may need to order the medication for you. I reserve the right to cancel the electronic prescription at the pharmacy in between appointments and would contact you with an explanation if this were to occur.  For constipation, continue Amitiza 24 mcg twice a day with meals.    Continue taking Cymbalta 120 mg at night   Continue Gabapentin 400 mg two times a day for pain - monitor for side effects.  Continue Lidoderm patch apply 1-3 patches to painful areas on 12 hours, off 12 hours.    Continue Vitamin D 5,000 Units daily  Discussed CBD Oil (or Hemp oil) may be helpful at 600 mg concentration  Continue use of spinal cord stimulator - schedule reprogramming visit with [employer] if you would like to trial a conventional setting.  Follow-up in 8 weeks with Brea.

## 2021-06-01 ENCOUNTER — RECORDS - HEALTHEAST (OUTPATIENT)
Dept: ADMINISTRATIVE | Facility: CLINIC | Age: 76
End: 2021-06-01

## 2021-06-01 VITALS — WEIGHT: 220 LBS | HEIGHT: 70 IN | BODY MASS INDEX: 31.5 KG/M2

## 2021-06-01 VITALS — BODY MASS INDEX: 30.06 KG/M2 | WEIGHT: 210 LBS | HEIGHT: 70 IN

## 2021-06-01 VITALS — HEIGHT: 70 IN | BODY MASS INDEX: 30.06 KG/M2 | WEIGHT: 210 LBS

## 2021-06-01 VITALS — BODY MASS INDEX: 31.5 KG/M2 | WEIGHT: 220 LBS | HEIGHT: 70 IN

## 2021-06-02 VITALS — BODY MASS INDEX: 29.62 KG/M2 | HEIGHT: 69 IN | WEIGHT: 200 LBS

## 2021-06-02 VITALS — WEIGHT: 200 LBS | BODY MASS INDEX: 29.62 KG/M2 | HEIGHT: 69 IN

## 2021-06-02 VITALS — HEIGHT: 69 IN | BODY MASS INDEX: 29.62 KG/M2 | WEIGHT: 200 LBS

## 2021-06-02 VITALS — WEIGHT: 210 LBS | BODY MASS INDEX: 30.06 KG/M2 | HEIGHT: 70 IN

## 2021-06-02 VITALS — BODY MASS INDEX: 29.62 KG/M2 | WEIGHT: 200 LBS | HEIGHT: 69 IN

## 2021-06-02 NOTE — PROGRESS NOTES
Pt is being seen today by PATRICIA Mendoza, for refill and f/u of 6-intermittent, dull, achy, sharp back and bilat leg pain.   F8

## 2021-06-02 NOTE — PATIENT INSTRUCTIONS - HE
Discussed discontinuing Morphine due to chronic kidney disease changes.  Recommend trial of Exalgo (extended release hydromorphone) 8 mg 1 tab every 24 hours.  This would be due to start on 11/05, sent over to pharmacy now in case it needs time for approval and ordering in stock.  You may take 1-2 Vicodin 10/325 mg a day as needed for severe breakthrough pain or in the morning as needed.    For constipation, continue Amitiza 24 mcg twice a day with meals.    Continue taking Cymbalta 120 mg at night   Continue Gabapentin 400 mg two times a day for pain   Continue Lidoderm patch apply 1-3 patches to painful areas on 12 hours, off 12 hours.  Refill sent.  Continue Vitamin D 5,000 Units daily  Continue use of spinal cord stimulator - schedule reprogramming visit with [employer] if you would like to trial a conventional setting.  You may also repeat TFESI with Dr. Maier any time as it has been over 4 months since last procedure.  Follow-up in 6 weeks with Brea to evaluate medication change.

## 2021-06-02 NOTE — PROGRESS NOTES
PAIN CENTER PROGRESS NOTE    Subjective:   Naveen Toro is a 74 y.o. male who presents for evaluation of low back pain.  He has history of lumbar degeneration.  He has history of lumbar surgery, failed lumbar injections, and is currently using opioid management and implanted spinal cord stimulator to manage pain symptoms.  Follows Dr. Garcia of [facility].      Major issues:  1. Chronic pain syndrome    2. Lumbar Disc Degeneration    3. Postlaminectomy Syndrome (Lumbar)    4. Radiculopathy of thoracolumbar region    5. Chronic, continuous use of opioids       Pain location and description: 6/10 intermittent dull, aching, sharp pain in lower back and right buttock and hip.  Function rated 8. At best, pain rating 4/10 and at worst 7/10, on average pain rated 5/10.  Radiation of pain: buttocks, hips, legs intermittently.    Gait disturbance: None reported.  Denies recent falls or changes in balance.  Uses AD.  Exacerbating factors: Too much standing, walking, sitting  Alleviating factors: Rest, pain medications, SCS implant, injections, warmer weather, reclining/laying down  Associated symptoms: Pain at night.  Numbness in right hip/leg to foot, weakness in right leg, night pain. Denies bowel or bladder incontinence, fever/chills, unexplained weight loss.  Adverse effects of medications: Constipation.  Amitiza two times a day which is working.  He still uses miralax powder prn a couple times per week.  Has a bowel movement every 2-3 days. Does report some drowsiness related to medications and depression.   Functional Symptoms: Pain interferes with sleep, walking, work, ADLs, relationships/social, sexual health, mood (depressed, angry, frustrated, helpless/hopeless).  Current treatment efficacy: Fair; Morphine ER 15 mg every 8 hours and Vicodin 10/325 mg BID prn. Continues Gabapentin 400 mg bedtime and Cymbalta 120 mg daily without change.   Current treatment compliance: Good.  Attending scheduled  "appointments and taking medication as prescribed. Using SCS daily. Continues to see Dr. Frank monthly for pain psychology.  He states not too much exercise.  He has treadmill at home.      Since last visit, he saw Dr. Hoover on 10/15/19:  \"ASSESSMENT AND PLAN     ICD-10-CM   1. Type 2 diabetes mellitus without complication, without long-term current use of insulin (HRC) E11.9 Hgb A1C   Basic Metabolic Panel   Microalbumin/Creatinine Ratio   2. Essential hypertension (HRC) controlled I10 Basic Metabolic Panel   3. CKD (chronic kidney disease) stage 3, GFR 30-59 ml/min (HRC) confirmed with mildly elevated cystatin N18.3 Basic Metabolic Panel   4. Medication monitoring encounter Z51.81 ALT (SGPT)   5. Encounter for immunization Z23 Influenza IIV3 (Trivalent) Fluzone Highdose, 65+ Yrs (33309)   6. Iron deficiency anemia, unspecified iron deficiency anemia type resolved D50.9 Ferritin   Iron Profile (Iron,TIBC,%Sat.(Calc))   CBC W PLT NO DIFF   7. Elevated serum creatinine R79.89 Cystatin C      Follow up Instructions: Pt to return to see me in 6 months. \"    Discuss CKD, states PCP mentioned it has gotten a little worse.      States his pain is \"somewhat more\" severe and he states he is unsure if this is related to colder weather.  Denies new injuries, falls, or new pain symptoms.    He had right TFESI L5-S1 on 06/04/19 with Dr. Maier and reports this went \"all right\" he describes pain relief took a few days and was 25-30% improvement in his back and bilateral legs equal.  Denies adverse side effects of steroid and denies complications.  Would like to repeat at this time.      Had SCS battery replaced on 09/09/16 with [employer].  He reports he has 2 reprogramming sessions with [employer].  Seems to work fairly good and covering correct areas of pain.  He is on high frequency setting and doesn't feel except sometimes on feet when laying down. States this seems to be better than traditional.  He is keeping SCS on " "around the clock, rechargeable battery having to charge every week taking 2-3 hours to charge fully. He is wondering if it would help to reprogram as he has been on high frequency for a long time.  He states when he lays in bed and arches his back he can \"tell if it is working.\"  He is wondering about going back to conventional setting.  States he only feels it laying flat.      Last visit discussed increasing Gabapentin to 400 mg two times a day - he states he has and is well tolerated.  Lidocaine 5% patch obtained and uses prn - thinks they do help for 3-4 hours at a time.  Uses 1 patch per time.    Discussed CBD oil and he states he did obtain it from [employer] liquid concentration 300 mg.  He states he spent $60 + tax.  He used twice daily x 4 weeks and he states he has been taking it more infrequently now.  He states he saw something on the news this morning and that [facility] didn't say it didn't help for pain but that there is \"no proof\" that it helps.  He thinks it maybe helped \"some\" more for his mood than for pain.  He states no difference in sleep.  He denies side effects.  He is curious about increasing his concentration to 600 mg but will be around $100 for next dose.   Hasn't been using, saving up for next refill.    Review of Systems  Constitutional: Fatigue. Sleep disturbance, sleeps during the day.  Wearing CPAP every night.  Denies fever, chills, night sweats, weight loss.  Musculoskeletal: Positive for back pain, leg pain/weakness.  Denies joint pain, joint swelling, recent falls.  Gastrointestional: Denies difficulty swallowing, change in appetite, abdominal pain, constipation, nausea, vomiting, diarrhea, GERD, fecal incontinence.  Genitourinary: Denies urinary incontinence, dysuria, hematuria, UTI, frequency, hesitancy  Neurologic:  Denies headaches, confusion, seizure, weakness, changes in balance, changes in speech.  Psychiatric: Positive for depression. Denies memory loss, psychoses, " "suicidal ideation, substance use/abuse.     Objective:     Vitals:    10/22/19 1006   BP: 132/64   Pulse: 64   Weight: 211 lb (95.7 kg)   Height: 5' 9\" (1.753 m)   PainSc:   6   PainLoc: Back     Physical Exam  Constitutional- General appearance: Well groomed, well developed, comfortable, overweight and appearance reflects stated age.  No acute distress or pain behaviors noted. Presents alone today.  Psychiatric- Judgment and insight: Normal.  Speech: Normal rhythm.  Thought process: Normal.  No abnormal thoughts reported. Alert & Oriented to person, place, and time.  Recent and remote memory: Normal.  Observed mood: Quiet, flat affect.  Respiratory- Breathing is non-labored; normal rhythm and rate.  Cardiovascular- Extremities warm and well perfused, no peripheral edema or varicosities.  Dermatologic- Exposed skin is clean, dry, and intact to inspection and palpation.   Musculoskeletal- Gait and station: Abnormal.  Gait evaluation demonstrates ambulating with single point cane with a stiff, slightly antalgic gait.  Patient does have difficulty rising from a seated position.      *Opioid Universal Precautions:    UDS/Swab - 11/13/18, results as expected  Opioid Agreement - 04/30/19  Opioid consent - 04/30/19  Pharmacy- as documented     Reviewed - 10/22/19 as expected  Pill Count - n/a  Psychological evaluation - monthly through Dr. Larry Frank  MME - 65  Pharmacogenetic testing - n/a  VICENTE 08/22/19    Imaging:  None reviewed today    Assessment:   Naveen Toro is a 74 y.o. male seen in clinic today for lumbar degeneration and lumbar radiculopathy, most severe at L5-S1.  He continues pain management with Morphine 15 mg every 8 hours and Vicodin 10/325 mg BID prn daily as needed for severe pain.  We discuss concerns with stage 3 CKD and MSER use; he is advised to change to Exalgo ER 8 mg (MME on morphine 30 and MME dilaudid 32). He reports some relief with dosing but also wonders about if this could be " maximized (he has failed opioid rotations to fentanyl, opana, oxycontin, and not advised to take methadone with cardiac risk). Discuss options of CBD oil trial as a supplement and he has started x 1 month, discuss increasing the concentration. Reports OIC but failed Movantik, doing better with Amitiza.  He had TFESI right L5-S1 06/2019 tolerated steroid and injection without complication, may repeat. He continues to use his SCS for pain control of radiculopathy symptoms with good coverage, discuss returning to conventional settings with [employer] reprogramming visit.       Plan:   Discussed discontinuing Morphine due to chronic kidney disease changes.  Recommend trial of Exalgo (extended release hydromorphone) 8 mg 1 tab every 24 hours.  This would be due to start on 11/05, sent over to pharmacy now in case it needs time for approval and ordering in stock.  You may take 1-2 Vicodin 10/325 mg a day as needed for severe breakthrough pain or in the morning as needed.    For constipation, continue Amitiza 24 mcg twice a day with meals.    Continue taking Cymbalta 120 mg at night   Continue Gabapentin 400 mg two times a day for pain   Continue Lidoderm patch apply 1-3 patches to painful areas on 12 hours, off 12 hours.  Refill sent.  Continue Vitamin D 5,000 Units daily  Continue use of spinal cord stimulator - schedule reprogramming visit with [employer] if you would like to trial a conventional setting.  You may also repeat TFESI with Dr. Maier any time as it has been over 4 months since last procedure.  Follow-up in 6 weeks with Brea to evaluate medication change.       Brea Sheets PA-C  [address]  [place] [address]  Ph: [phone]  Fax: [phone]

## 2021-06-03 VITALS — BODY MASS INDEX: 31.25 KG/M2 | HEIGHT: 69 IN | WEIGHT: 211 LBS

## 2021-06-03 VITALS — HEIGHT: 69 IN | BODY MASS INDEX: 31.25 KG/M2 | WEIGHT: 211 LBS

## 2021-06-03 VITALS — BODY MASS INDEX: 29.62 KG/M2 | HEIGHT: 69 IN | WEIGHT: 200 LBS

## 2021-06-03 VITALS — HEIGHT: 69 IN | WEIGHT: 200 LBS | BODY MASS INDEX: 29.62 KG/M2

## 2021-06-03 VITALS — HEIGHT: 69 IN | WEIGHT: 211 LBS | BODY MASS INDEX: 31.25 KG/M2

## 2021-06-03 VITALS — BODY MASS INDEX: 29.62 KG/M2 | WEIGHT: 200 LBS | HEIGHT: 69 IN

## 2021-06-03 VITALS — WEIGHT: 200 LBS | BODY MASS INDEX: 29.62 KG/M2 | HEIGHT: 69 IN

## 2021-06-03 NOTE — PROGRESS NOTES
Pt is being seen today by PATRICIA Mendoza, for refills, UDT, VICENTE and f/u of 7-constant, dull, aching, burning back and rt leg pain.  F8

## 2021-06-03 NOTE — TELEPHONE ENCOUNTER
It appears patient is coming to see me same day as his medication is due to refill.  Waiting on ordering as we may need to make further adjustments in Vicodin as he did not tolerate the Exalgo dose.

## 2021-06-03 NOTE — TELEPHONE ENCOUNTER
"Patient calls, reporting side effects from the \"new med\"  Call placed to patient:  Reviewed side effects of:  Sleepiness and fatigue  Sweats  Upset stomach/nausea  Patient reports that he took only one dose of hydromorphone 8 mg, no longer taking morphine(last dose taken on 11/09)  Continues with norco 10/325    In review of the plan:  Patient also states that he has been out of gabapentin with his new pharmacy([facility])  [facility] pharmacy has closed and transferred all patients to [facility].  There has been some confusion with getting this filled with the change of pharmacies.  Patient has an existing refill on the current prescription but [facility] is unable to fill anything.    Patient does not want to take another dose of exalgo due to the reported side effects, however this could related to withdrawal symptoms as well as he is not longer taking morphine.          "

## 2021-06-03 NOTE — PATIENT INSTRUCTIONS - HE
Start Hysingla (24 hour hydrocodone without tylenol) to take 40 mg daily for 10 days and then if tolerated without side effects increase to 60 mg daily.  This medication will likely need a prior authorization, so I am sending in a week of your usual Vicodin prescription to take during this time.  Once the Hysingla is approved and filled, stop taking Vicodin and bring any remaining tabs to your next visit for witnessed disposal.  For withdrawal, you may take Vistaril 25 mg 1-2 capsules every 6 hours as needed - this may make you drowsy.      For constipation, continue Amitiza 24 mcg twice a day with meals.    Continue taking Cymbalta 120 mg at night   Continue Gabapentin 400 mg two times a day for pain   Continue Lidoderm patch apply 1-3 patches to painful areas on 12 hours, off 12 hours.  Refill sent.  Continue Vitamin D 5,000 Units daily  Continue use of spinal cord stimulator - will try to obtain the recent programming report and scan into chart.  You may also repeat TFESI with Dr. Maier as it has been over 2 weeks.  Diagnostics: UDT/SWAB collected today results are pending.  Patient required a random Urine Drug Test due to the need to comply with [place] [employer] Policy Guidelines and CDC Guideline for the use of any controlled substances. Reasons for definitive testing include:  -Identify specific medication(s) or drug(s) in a class (e.g. Benzodiazepines, barbiturates, opioids, antidepressants)  -Definitive concentration of medication(s), drug(s), and/or metabolites needed to guide management  -Identify non-prescribed medication or illicit drug use for ongoing safe prescribing of controlled substances  -Identify substances that may present high risk for additive or synergistic interaction with prescription medication (e.g. Alcohol).  Patient is either being considered for or taking a controlled substance. Unexpected findings will be discussed and treatment decision may be adjusted. Testing is being  implemented across the board randomly w/o bias related to age, race, gender, socioeconomic status or Mosque affiliation.   Follow-up in 6 weeks with Brea to evaluate medication change.

## 2021-06-03 NOTE — TELEPHONE ENCOUNTER
Patient left message that he was returning a call with an update, on how he was doing with Hydrocodone only. Would like us to call him back.    Per enc on 11/12  Note      Per Dr. Parker, patient ok to increase to hydrocodone 10/325 mg 4 times daily.  Gabapentin has been approved and sent to the pharmacy.  Patient advised to call the clinic by the end of this week, prior to the weekend to offer an update as to his status.  Patient agrees to this plan and will call by the end of this week.

## 2021-06-03 NOTE — TELEPHONE ENCOUNTER
Medication being requested: amitiza 24 mcg, gabapentin 400 mg and vitamin D3 5000 units  Last visit date:10/22  Provider: MILDRED  For constipation, continue Amitiza 24 mcg twice a day with meals.    Continue Vitamin D 5,000 Units daily  Next visit date: 12/03 Provider: MILDRED  Expected follow up: 8 weeks  MTM visit (Pain Center) date: no  Pertinent between visit information about requested medication (telephone, mychart, prior authorization, concerns): 11/5 with JA procedure  Last date prescribed: 6/20, 8/22, 8/29 respectively  Provider responsible: MILDRED  Spoke with patient: no  Script being sent to provider - dates and quantity: gabapentin refused-too soon    Pharmacy cued: cat

## 2021-06-03 NOTE — PATIENT INSTRUCTIONS - HE
POST PROCEDURE INSTRUCTIONS  PROCEDURE DONE TODAY: RIGHT L5-S1 EPIDURAL STEROID INJECTION    Rest today. Resume activity tomorrow as able.  Patient demonstrates the ability to ambulate independently with a steady gait at the time of discharge.      It is not unusual to have a temporary increase in your pain after a procedure. You can ice the area 24-48 hrs. 15 min at a time.      You received a steroid injection. This medication can take 2-7 days to take effect. Some temporary side effects include:    Heartburn    Flushed-red face    Insomnia-trouble sleeping-jitters    Diabetics may see a rise in blood sugar for a few days    Low back or SI joint (sacroiliac) injection: you may experience numbness in one or both legs. Please walk with help. This is temporary and will wear off in a few hours. Do not drive if you are tingling, numbness or weakness in your hands/arms or legs/feet.    Headache:  If you experience a headache after a lumbar epidural injection, lie down and drink fluids (especially caffeine). The headache will go away gradually. If it persists notify our clinic.    Fever: call if fever 100 or over, redness/warmth/swelling over the injection site.    No public hot tubs/pools for a couple days    Physical therapy: check with pain center provider regarding resuming therapy.    Monitor your response to the injection and report this at your next visit.    If you have been referred for a procedure only, please call your referring provider for a follow up.    IF YOU PLAN TO GET A FLU SHOT YOU MUST WAIT 2 WEEKS AFTER THIS INJECTION.      CALL IF ANY QUESTIONS [phone]

## 2021-06-03 NOTE — TELEPHONE ENCOUNTER
patient left another message, that he needs a refill of his Hydrocodone. Has one pill left.     Per  last OV with [facility]Y.  You may take 1-2 Vicodin 10/325 mg a day as needed for severe breakthrough pain or in the morning as needed.     Message had been left on VM, can get refill at appt tomorrow.

## 2021-06-03 NOTE — TELEPHONE ENCOUNTER
Per Dr. Parker, patient ok to increase to hydrocodone 10/325 mg 4 times daily.  Gabapentin has been approved and sent to the pharmacy.  Patient advised to call the clinic by the end of this week, prior to the weekend to offer an update as to his status.  Patient agrees to this plan and will call by the end of this week.

## 2021-06-03 NOTE — TELEPHONE ENCOUNTER
"Returned call to patient as requested, states he is doing \"somewhat better\" with the increase of Hydrocodone. He also states that he has restarted using CBD oil as of today. Requested that he call back next week if not continuing to improve.  "

## 2021-06-03 NOTE — PROGRESS NOTES
PAIN CENTER PROGRESS NOTE    Subjective:   Naveen Toro is a 74 y.o. male who presents for evaluation of low back pain.  He has history of lumbar degeneration.  He has history of lumbar surgery, failed lumbar injections, and is currently using opioid management and implanted spinal cord stimulator to manage pain symptoms. Had SCS battery replaced on 09/09/16 with [employer].     Major issues:  1. Chronic pain syndrome    2. Postlaminectomy Syndrome (Lumbar)    3. Radiculopathy of thoracolumbar region    4. Chronic, continuous use of opioids    5. Lumbar Disc Degeneration       Pain location and description: 7/10 constant dull, aching, burning pain in lower back and right leg.  Function rated 8. At best, pain rating 4/10 and at worst 7/10, on average pain rated 5/10.  Radiation of pain: buttocks, hips, legs intermittently.    Gait disturbance: None reported.  Denies recent falls or changes in balance.  Uses AD.  Exacerbating factors: Too much standing, walking, sitting  Alleviating factors: Rest, pain medications, SCS implant, injections, warmer weather, reclining/laying down  Associated symptoms: Pain at night.  Numbness in right hip/leg to foot, weakness in right leg, night pain. Denies bowel or bladder incontinence, fever/chills, unexplained weight loss.  Adverse effects of medications: Constipation maybe a little better without the MSER.  Amitiza two times a day which is working. Has a bowel movement every 2-3 days. Does report some drowsiness related to medications and depression.   Functional Symptoms: Pain interferes with sleep, walking, work, ADLs, relationships/social, sexual health, mood (frustrated, anxious).  Current treatment efficacy: Fair; Vicodin 10/325 mg QID prn. Continues Gabapentin 400 mg BID and Cymbalta 120 mg daily without change.   Current treatment compliance: Good.  Attending scheduled appointments and taking medication as prescribed. Using SCS daily. Continues to see Dr. Frank monthly for  "pain psychology.  He states not too much exercise.  He has treadmill at home.      Since last visit, due to CKD stage 3 we discussed discontinuation of morphine ER and start of Exalgo. He reported blurry vision, temperature changes, drowsiness, nausea on 1 dose of this new medication. He called nurse line reporting side effects and refused to continue taking it.  Was advised per Dr. Parker in this providers absence to increase Vicodin 10/325 mg to four times a day prn and follow-up with this provider.  He states pain is worse since last visit.  He states without morphine he has had withdrawal symptoms of sweating, hot/cold, and anxious after 3 hours of taking Vicodin.  He is not on any withdrawal medications.    He also started CBD Oil and has continued to use that twice a day, seems to help his joint shoulder pain and hand pain.  Hasn't seen a lot of relief in lower back pain.  He states his back is aching in the axial middle.  Wearing lidoderm patch.      He had right TFESI L5-S1 on 11/05/19 with Dr. Maier and reports this was helpful to reduce pain for a few weeks.  Wants to repeat more frequently.  Denies complications, does get flushed from steroid for short term.      He did have sleep follow-up on 11/12/19 reviewed today:  \"Assessment/Plan    Complex Sleep Apnea: Severe severity. Not controlled.   Discussed with Froy the pressure change from last time, 20/16 to 21/17, did not improve his AHI. At this time I would recommend getting titration sleep study for further evaluation and Froy agree. Order was placed. Froy may need to get a new machine after sleep study and I will order this.  -Encouraged nightly use of PAP therapy and for at least 4 hours.  -Discussed replacing supplies/equipment and how often this can be done based on insurance. Timeframe grid for coverage given today.   -Pathophysiology of sleep apnea and risks of untreated sleep apnea discussed.  -Discussed importance of appropriatly treating " "sleep apnea to reduce cardiovascular, neurological, endocrine, kidney, and other system future pathology.   -Reviewed sleep study data with patient.  -Importance of maintaining healthy weight in the management of sleep apnea and overall health.  -Importance of getting adequate amounts of sleep, 7 1/2 - 8 hours nightly.   -Importance to avoid driving if feeling sleepy or fatigued or pull over if feeling drowsy while already driving reviewed  -Changed Bi-Level therapy at 22/18 cmH20.   -Naveen has great compliance on download today  -Naveen is benefiting from PAP therapy and should continue to wear it nightly.    CKD  Discussed with Naveen how sleep apnea is correlated to CKD and may exacerbate the symptoms of kidney disease, such as daytime fatigue, sleepiness, and impaired neurocognitive function, along with cardiovascular complications.     Coronary Artery Disease/Hx CABG/Cardiomyopathy  Discussed with Naveen how CAD is correlated to sleep apnea and treating sleep apnea appropriately plays a temple role in protecting the heart and can help with CAD management.     Hypertension  Discussed with Naveen how HTN is correlated to sleep apnea and how treating sleep apnea appropriately plays a temple role in protecting the heart and can help with HTN management.    Naveen to return to clinic in pending sleep study, sooner if needed.     Guy Dumont, APRN, CNP 11/12/2019, 2:19 PM\"    Had SCS battery replaced on 09/09/16 with [employer].  He reports he has 3 reprogramming sessions with [email or web address].  Seems to work fairly good and covering correct areas of pain.  He did return to conventional setting a month ago.      Review of Systems  Constitutional: Fatigue. Sleep disturbance, sleeps during the day.  Wearing CPAP every night.  Denies fever, night sweats, weight loss.  Musculoskeletal: Positive for back pain, leg pain/weakness.  Denies joint pain, joint swelling, recent falls.  Gastrointestional: Denies difficulty swallowing, change in " "appetite, abdominal pain, constipation, nausea, vomiting, diarrhea, GERD, fecal incontinence.  Genitourinary: Denies urinary incontinence, dysuria, hematuria, UTI, frequency, hesitancy  Neurologic:  Denies headaches, confusion, seizure, weakness, changes in balance, changes in speech.  Psychiatric: Positive for depression, anxiety. Denies memory loss, psychoses, suicidal ideation, substance use/abuse.     Objective:     Vitals:    12/03/19 1044   BP: 156/74   Pulse: 83   Weight: 211 lb (95.7 kg)   Height: 5' 9\" (1.753 m)   PainSc:   7   PainLoc: Back     Physical Exam  Constitutional- General appearance: Well groomed, well developed, uncomfortable, overweight and appearance reflects stated age.  No acute distress or pain behaviors noted. Presents alone today.  Psychiatric- Judgment and insight: Normal.  Speech: Normal rhythm.  Thought process: Normal.  No abnormal thoughts reported. Alert & Oriented to person, place, and time.  Recent and remote memory: Normal.  Observed mood: frustrated, anxious.  Respiratory- Breathing is non-labored; normal rhythm and rate.  Cardiovascular- Extremities warm and well perfused, no peripheral edema or varicosities.  Dermatologic- Exposed skin is clean, dry, and intact to inspection and palpation.   Musculoskeletal- Gait and station: Abnormal.  Gait evaluation demonstrates ambulating with single point cane with a stiff, antalgic gait.  Patient does have difficulty rising from a seated position.      *Opioid Universal Precautions:    UDS/Swab - Collected 12/03/19, results pending  Opioid Agreement - 04/30/19  Opioid consent - 04/30/19  Pharmacy- as documented     Reviewed - 12/03/19 as expected  Pill Count - n/a  Psychological evaluation - monthly through Dr. Larry Frank  MME - 40  Pharmacogenetic testing - n/a  VICENTE Score: 62 12/03/19    Imaging:  None reviewed today    Assessment:   Naveen Toro is a 74 y.o. male seen in clinic today for lumbar degeneration and lumbar " radiculopathy, most severe at L5-S1. He reports pain as increased due to change in medications, was unable to tolerate Exalgo 8 mg in place of MSER 15 mg two times a day therefore we discuss trial of a different extended release medication as currently Vicodin dosing is not covering pain 24 hours a day and reports after 3 hours of dose he is having withdrawal symptoms.  He has previously failed OxyContin and Fentanyl.  Discuss hysingla as an extended release hydrocodone and will start at current 40 mg and plan to titrate up once tolerated for additional pain control in place of Vicodin.  Discuss withdrawal medications to support during this transition, discuss the likelihood these will also cause drowsiness.  Discuss options of CBD oil trial as a supplement and he has started x 1 month, discuss increasing the concentration. Reports OIC but failed Movantik, doing better with Amitiza.  He continues TFESI prn and SCS for pain control of radiculopathy symptoms with good coverage.     Plan:   Start Hysingla (24 hour hydrocodone without tylenol) to take 40 mg daily for 10 days and then if tolerated without side effects increase to 60 mg daily.  This medication will likely need a prior authorization, so I am sending in a week of your usual Vicodin prescription to take during this time.  Once the Hysingla is approved and filled, stop taking Vicodin and bring any remaining tabs to your next visit for witnessed disposal.  For withdrawal, you may take Vistaril 25 mg 1-2 capsules every 6 hours as needed - this may make you drowsy.      For constipation, continue Amitiza 24 mcg twice a day with meals.    Continue taking Cymbalta 120 mg at night   Continue Gabapentin 400 mg two times a day for pain   Continue Lidoderm patch apply 1-3 patches to painful areas on 12 hours, off 12 hours.  Refill sent.  Continue Vitamin D 5,000 Units daily  Continue use of spinal cord stimulator - will try to obtain the recent programming report and  scan into chart.  You may also repeat TFESI with Dr. Maier as it has been over 2 weeks.  Diagnostics: UDT/SWAB collected today results are pending.  Patient required a random Urine Drug Test due to the need to comply with [facility] Model Policy Guidelines and CDC Guideline for the use of any controlled substances. Reasons for definitive testing include:  -Identify specific medication(s) or drug(s) in a class (e.g. Benzodiazepines, barbiturates, opioids, antidepressants)  -Definitive concentration of medication(s), drug(s), and/or metabolites needed to guide management  -Identify non-prescribed medication or illicit drug use for ongoing safe prescribing of controlled substances  -Identify substances that may present high risk for additive or synergistic interaction with prescription medication (e.g. Alcohol).  Patient is either being considered for or taking a controlled substance. Unexpected findings will be discussed and treatment decision may be adjusted. Testing is being implemented across the board randomly w/o bias related to age, race, gender, socioeconomic status or Confucianist affiliation.   Follow-up in 6 weeks with Brea to evaluate medication change.       Brea Sheets PA-C  [facility]  [address]  [place] [address]  Ph: [phone]  Fax: [phone]

## 2021-06-04 VITALS — HEIGHT: 69 IN | BODY MASS INDEX: 28.44 KG/M2 | WEIGHT: 192 LBS

## 2021-06-04 VITALS — WEIGHT: 211 LBS | BODY MASS INDEX: 31.25 KG/M2 | HEIGHT: 69 IN

## 2021-06-04 VITALS — WEIGHT: 192 LBS | BODY MASS INDEX: 28.35 KG/M2

## 2021-06-04 VITALS — BODY MASS INDEX: 31.25 KG/M2 | WEIGHT: 211 LBS | HEIGHT: 69 IN

## 2021-06-05 VITALS — HEIGHT: 69 IN | BODY MASS INDEX: 28.88 KG/M2 | WEIGHT: 195 LBS

## 2021-06-05 VITALS — HEIGHT: 69 IN | WEIGHT: 190 LBS | BODY MASS INDEX: 28.14 KG/M2

## 2021-06-05 VITALS — BODY MASS INDEX: 28.8 KG/M2 | WEIGHT: 195 LBS

## 2021-06-05 VITALS — HEIGHT: 69 IN | WEIGHT: 195 LBS | BODY MASS INDEX: 28.88 KG/M2

## 2021-06-05 VITALS — BODY MASS INDEX: 28.14 KG/M2 | WEIGHT: 190 LBS | HEIGHT: 69 IN

## 2021-06-05 VITALS — WEIGHT: 195 LBS | BODY MASS INDEX: 28.8 KG/M2

## 2021-06-05 NOTE — PATIENT INSTRUCTIONS - HE
Continue Hysingla (24 hour hydrocodone without tylenol) 60 mg daily. The next refill is to start on 02/10/2020.  I will send it to refill by 02/07/2020 so that your pharmacy has a few days to order it in stock as they likely don't carry it on the shelves.  Please speak to your pharmacy manager to find out how much time is expected for this refill and how the process can go better for you.    Disposal of Vicodin and Exalgo tablets today today as you are no longer taking these medications.  For constipation, continue Amitiza 24 mcg twice a day with meals. Refills sent  Continue taking Cymbalta 120 mg at night   Continue Gabapentin 400 mg two times a day for pain   Continue Lidoderm patch apply 1-3 patches to painful areas on 12 hours, off 12 hours.    Continue Vitamin D 5,000 Units daily - refill sent, this should go through work comp.  Continue use of spinal cord stimulator   Follow-up in 4 weeks with Keanu Marvin for MTM visit to follow-up on medications and how the refills have been going.  You can see me in 8 weeks.

## 2021-06-05 NOTE — TELEPHONE ENCOUNTER
Prior Authorization Request  Who s requesting:  Pharmacy/Sudeep  Pharmacy Name and Location: [facility]  Medication Name: hysingla er 30mg, 1/day  Insurance Plan: [employer]  Insurance Member ID Number:  [id number]  CoverMyMeds Key: N/A  Informed patient that prior authorizations can take up to 10 business days for response:   Yes  Okay to leave a detailed message: No    '

## 2021-06-05 NOTE — TELEPHONE ENCOUNTER
Central PA team  [phone]  Pool: HE PA MED ([id number])          PA has been initiated.       PA form completed and faxed insurance via Cover My Meds     Key:  [id number]      Medication:  Hysingla ER 60MG tablets      Insurance:  [employer]        Response will be received via fax and may take up to 5-10 business days depending on plan

## 2021-06-05 NOTE — TELEPHONE ENCOUNTER
Central PA team  [phone]  Pool: HE PA MED ([id number])          PA has been initiated.       PA form completed and faxed insurance via Cover My Meds     Key:  [id number]      Medication:  LIDOCAINE PATCH    Insurance:  [employer]        Response will be received via fax and may take up to 5-10 business days depending on plan

## 2021-06-05 NOTE — TELEPHONE ENCOUNTER
Medication being requested: Lidocaine 5%  Last visit date: 12/3/19 Provider: MILDRED  Next visit date: 1/15/20 Provider: MILDRED  Expected follow up: 6 weeks  MTM visit (Pain Center) date: No  Pertinent between visit information about requested medication (telephone, mychart, prior authorization, concerns):   Note from visit on 12/3/19  Start Hysingla (24 hour hydrocodone without tylenol) to take 40 mg daily for 10 days and then if tolerated without side effects increase to 60 mg daily.  This medication will likely need a prior authorization, so I am sending in a week of your usual Vicodin prescription to take during this time.  Once the Hysingla is approved and filled, stop taking Vicodin and bring any remaining tabs to your next visit for witnessed disposal.  For withdrawal, you may take Vistaril 25 mg 1-2 capsules every 6 hours as needed - this may make you drowsy.      For constipation, continue Amitiza 24 mcg twice a day with meals.    Continue taking Cymbalta 120 mg at night   Continue Gabapentin 400 mg two times a day for pain   Continue Lidoderm patch apply 1-3 patches to painful areas on 12 hours, off 12 hours.  Refill sent.   Continue Vitamin D 5,000 Units daily  Continue use of spinal cord stimulator - will try to obtain the recent programming report and scan into chart.  You may also repeat TFESI with Dr. Maier as it has been over 2 weeks.  Last date prescribed: 10/22/19  Provider responsible: MILDRED  Spoke with patient: debra  Script being sent to provider - dates and quantity: I did not see the refill done according to note so I cued for refill  Requested Prescriptions     Pending Prescriptions Disp Refills     lidocaine (LIDODERM) 5 % [Pharmacy Med Name: LIDOCAINE 5% PATCH] 30 patch 0     Sig: UNWRAP AND APPLY 1 PATCH TO SKIN DAILY, REMOVE AND DISCARD PATCH WITHIN 12 HOURS OR AS DIRECTED BY DOCTOR     Pharmacy cued: [facility]

## 2021-06-05 NOTE — TELEPHONE ENCOUNTER
Prior Authorization Request  Who s requesting:  Pharmacy/Sudeep  Pharmacy Name and Location: [facility]  Medication Name: lidocaine 5% patch, 1/day  Insurance Plan: [employer]  Insurance Member ID Number:  [id number]  CoverMyMeds Key: [id number]  Informed patient that prior authorizations can take up to 10 business days for response:   Yes  Okay to leave a detailed message: No

## 2021-06-05 NOTE — PROGRESS NOTES
PAIN CENTER PROGRESS NOTE    Subjective:   Naveen Toro is a 75 y.o. male who presents for evaluation of low back pain.  He has history of lumbar degeneration.  He has history of lumbar surgery, failed lumbar injections, and is currently using opioid management and implanted spinal cord stimulator to manage pain symptoms. Had SCS battery replaced on 09/09/16 with [employer].     Major issues:  1. Chronic, continuous use of opioids    2. Chronic pain syndrome    3. Therapeutic opioid-induced constipation (OIC)    4. Postlaminectomy Syndrome (Lumbar)    5. Radiculopathy of thoracolumbar region       Pain location and description: 4/10 intermittent dull, aching pain in lower back and right leg.  Last visit pain rated 7/10. Function rated 8. At best, pain rating 3/10 and at worst 5/10, on average pain rated 4/10.  Radiation of pain: buttocks, hips, legs intermittently.    Gait disturbance: None reported.  Denies recent falls or changes in balance.  Uses AD.  Exacerbating factors: Too much standing, walking, sitting  Alleviating factors: Rest, pain medications, SCS implant, injections, warmer weather, reclining/laying down, some walking.  Associated symptoms: Pain at night.  Numbness in right hip/leg to foot, weakness in right leg, night pain. Denies bowel or bladder incontinence, fever/chills, unexplained weight loss.  Adverse effects of medications: Constipation maybe a little better without the MSER.  Amitiza two times a day which is working. Has a bowel movement every 2-3 days. Does report some drowsiness related to medications and depression.   Functional Symptoms: Pain interferes with sleep, walking, work, ADLs, relationships/social, sexual health, mood (frustrated, anxious).  Current treatment efficacy: Fair; Hysingla 60 mg daily. Continues Gabapentin 400 mg BID and Cymbalta 120 mg daily without change.   Current treatment compliance: Good.  Attending scheduled appointments and taking medication as prescribed.  "Using SCS daily. Continues to see Dr. Frank monthly for pain psychology.  He states not too much exercise.  He has treadmill at home.  He brings all medication bottles in today for review.    Due to CKD stage 3 we discontinued morphine ER and then patient failed Exalgo due to side effects reported blurry vision, temperature changes, drowsiness, nausea on 1 dose of this new medication. He called nurse line reporting side effects and refused to continue taking it.  We decided to continue Vicodin and see if we could get Hysingla prior authorized as he was tolerating Vicodin.  He started Hysingla 40 mg daily and tolerated so increased to 60 mg daily.  He states there have been delays getting it at the pharmacy related to insurance approval and also likely a stock issue.  He did not speak to pharmacist or manager to understand what the issues were but that he has been unhappy since changing from [facility] to [facility]. I see a prior authorization of this medication on 01/13/2020 and now formulary states patient must try/fail Xtampza ER capsule, however he does not use his medicare for these medications as he is a work comp patient and therefore we do not do PAs for his medication.  He states Navdeep Long is his WC adjustor [phone] and is typically responsive to his calls.    He takes Hysingla dose at noon.  He states he is not having many gaps of pain control/coverage. He states sometimes at 1500 he felt like he had more coldness in feet and having \"withdrawal\" almost which he states has gotten better on the higher dose. He states he sleeps more and states he is awake for something interesting but otherwise doses off during the day.  He has not tried taking later in the day.  He is no longer taking hydroxyzine which was prescribed for withdrawal.  Denies any worsening of constipation, nausea, dizziness, itching, mood changes on medication.      He had right TFESI L5-S1 on 11/05/19 with Dr. Maier " "and reports this was helpful to reduce pain for a few weeks.  Wants to repeat more frequently.  Denies complications, does get flushed from steroid for short term.      He did have sleep study on 12/09/2019 and results reviewed by RADHA Servin, NERY on 12/31/19:  Guy Dumont APRN, CNP - 12/09/2019 8:00 PM CST  \"Please let Froy know the sleep study found a lower pressure of 12/8 cmH20 worked well for him. I have to admit I am a little surprised a lower pressure worked. However, if he is willing to try a lower pressure I will place the order to change it. I will then get a download in one month to assess his numbers after the change. Please let me know if there are any questions.    Thanks,  RADHA Koch, CNP 12/31/2019, 8:37 AM\"    Had SCS battery replaced on 09/09/16 with [employer].  He reports he has 3 reprogramming sessions with [employer].  Seems to work fairly good and covering correct areas of pain.  He did return to conventional setting a month ago.      Review of Systems  Constitutional: Fatigue. Sleep disturbance, sleeps during the day.  Wearing CPAP every night.  Denies fever, night sweats, weight loss.  Musculoskeletal: Positive for back pain, leg pain/weakness.  Denies joint pain, joint swelling, recent falls.  Gastrointestional: Denies difficulty swallowing, change in appetite, abdominal pain, constipation, nausea, vomiting, diarrhea, GERD, fecal incontinence.  Genitourinary: Denies urinary incontinence, dysuria, hematuria, UTI, frequency, hesitancy  Neurologic:  Denies headaches, confusion, seizure, weakness, changes in balance, changes in speech.  Psychiatric: Positive for depression, anxiety. Denies memory loss, psychoses, suicidal ideation, substance use/abuse.     Objective:     Vitals:    01/15/20 1021   BP: (!) 166/107   Pulse: 69   Resp: 18   Weight: 211 lb (95.7 kg)   Height: 5' 9\" (1.753 m)   PainSc:   4     Physical Exam  Constitutional- General appearance: Well groomed, " well developed, comfortable, overweight and appearance reflects stated age.  No acute distress or pain behaviors noted. Presents alone today.  Psychiatric- Judgment and insight: Normal.  Speech: Normal rhythm, slowed rate.  Thought process: Normal.  No abnormal thoughts reported. Alert & Oriented to person, place, and time.  Recent and remote memory: Normal.  Observed mood: frustrated, anxious.  Respiratory- Breathing is non-labored; normal rhythm and rate.  Cardiovascular- Extremities warm and well perfused, no peripheral edema or varicosities.  Dermatologic- Exposed skin is clean, dry, and intact to inspection and palpation.   Musculoskeletal- Gait and station: Abnormal.  Gait evaluation demonstrates ambulating with single point cane with a stiff, antalgic gait.  Patient does have difficulty rising from a seated position.      *Opioid Universal Precautions:    UDS/Swab - Reviewed from 12/03/19, results as expected  Opioid Agreement - 04/30/19  Opioid consent - 04/30/19  Pharmacy- as documented     Reviewed - 01/15/2020 as expected  Pill Count - n/a  Psychological evaluation - monthly through Dr. Larry Frank  MME - 60  Pharmacogenetic testing - n/a  VICENTE 12/03/19    Imaging:  None reviewed today    Assessment:   Naveen Toro is a 75 y.o. male seen in clinic today for lumbar degeneration and lumbar radiculopathy, most severe at L5-S1. He is reporting less pain per pain intensity numerical scale since starting Hysingla; he had initially some withdrawal upon changing and at 40 mg dose, is doing better for pain control on 60 mg dose but does note daytime drowsiness.  This was present before and likely multifactorial; he states if he is mentally stimulated or interested he is able to stay awake.  Denies dosing off while driving or other activities.  He is offered to reduce back down to 40 mg dose with supplementing breakthrough pain with Vicodin prn but he declines.  He is advised to continue current medications  and have MTM visit next month.  He has frustrations over the refill process; likely a combination of getting work comp approval and also having the pharmacy stock the medication. Also, our prior authorization department attempted to authorize Hysingla in error as he is a work comp patient and they ran it through his private insurance.   I explain to him he should speak to his  to better understand this process as he is new to their pharmacy.  He is also ok to fill 1-2 days early in case the medication needs to be ordered as per  his refill was 1 day late in January.  He did bring back unused opioid medications which were disposed of today (see CMA note).     Plan:   Continue Hysingla (24 hour hydrocodone without tylenol) 60 mg daily. The next refill is to start on 02/10/2020.  I will send it to refill by 02/07/2020 so that your pharmacy has a few days to order it in stock as they likely don't carry it on the shelves.  Please speak to your pharmacy manager to find out how much time is expected for this refill and how the process can go better for you.    Disposal of Vicodin and Exalgo tablets today today as you are no longer taking these medications.  For constipation, continue Amitiza 24 mcg twice a day with meals. Refills sent  Continue taking Cymbalta 120 mg at night   Continue Gabapentin 400 mg two times a day for pain   Continue Lidoderm patch apply 1-3 patches to painful areas on 12 hours, off 12 hours.    Continue Vitamin D 5,000 Units daily - refill sent, this should go through work comp.  Continue use of spinal cord stimulator   Follow-up in 4 weeks with Keanu Marvin for MTM visit to follow-up on medications and how the refills have been going.  You can see me in 8 weeks.       Brea Sheets PA-C  [facility]  [address]  [place] [address]  Ph: [phone]  Fax: [phone]

## 2021-06-05 NOTE — PROGRESS NOTES
Patient presents to the clinic today for a follow up with Brea Sheets PA-C regarding back and leg pain. Patient describes their pain as achy and dull. Her/His function score is 8.    Patient came in today with pills to destruct, they were as followed:    *Hydromorphone 8mg ER had 27 tablets destroyed  *Hydrocodone 10/325mg had 28 tablets destroyed  *Hydroxyzine 25mg had 28 tablets destroyed

## 2021-06-06 NOTE — TELEPHONE ENCOUNTER
I will prepare letter for patient with below information from Keanu's visit.  Please find out from patient if he would like us to mail/fax it to him or if he has Navdeep's information to fax or mail.  Thanks.

## 2021-06-06 NOTE — PATIENT INSTRUCTIONS - HE
"Recommendations from today's MTM visit:                                                    MTM (medication therapy management) is a service provided by a clinical pharmacist designed to help you get the most of out of your medicines. and Today we reviewed what your medicines are for, how to know if they are working, that your medicines are safe and how to make your medicine regimen as easy as possible.     1. Start Lamotrigine 25 mg daily for 2 weeks then increase to 1 tab two times a day for two weeks. This medicine should help with burning, tingling, electric types of pain and would likely also help with mood.     2. Start Melatonin 5 mg 30 minutes before bedtime. This is to help reset your \"internal-clock\" and promote better sleep.     3. Use Diclofenac 1% gel 2 grams up to four times a day as needed for pain flares. Think of this as a topical version of ibuprofen. Effective for pain, but safer for the kidneys.     4. Increase Miralax to once daily to help maintain daily, soft bowel movements.     It was great to speak with you today.  I value your experience and would be very thankful for your time with providing feedback on our clinic survey. You may receive a survey via email or text message in the next few days.     Next MTM visit: Schedule with me as needed, based on work comp coverage    To schedule another MTM appointment, please call the clinic directly or you may call the MTM scheduling line at [phone] or toll-free at [phone].     My Clinical Pharmacist's contact information:                                                      It was a pleasure seeing you today!  Please feel free to contact me with any questions or concerns you have.      Keanu Marvin, PharmD  Medication Therapy Management (MTM) Pharmacist  [facility]        "

## 2021-06-06 NOTE — PROGRESS NOTES
Pt is being seen today by PATRICIA Mendoza, for refills and f/u of 6-intermittent, achy, dull, burning back pain.   F8

## 2021-06-06 NOTE — TELEPHONE ENCOUNTER
PT requested this letter be faxed.  Pt stated he would call back with the contact information and fax number.

## 2021-06-06 NOTE — TELEPHONE ENCOUNTER
The patient called.  He requested the letter be faxed to .  The letter was faxed 02/27/20 at [phone].

## 2021-06-06 NOTE — PATIENT INSTRUCTIONS - HE
Reduce Hysingla (24 hour hydrocodone without tylenol) to 40 mg daily. Sent to pharmacy for today to get approved and stocked before you need to start it on Saturday.  If there are any delays, have adjustor call me today or tomorrow.   Continue lamical 25 mg take at night to reduce daytime drowsiness. OK to increase to 25 mg two times a day next week if you are tolerating.    For constipation, continue Amitiza 24 mcg twice a day with meals.   Continue taking Cymbalta 120 mg at night   Continue Gabapentin 400 mg two times a day for pain   Continue Lidoderm patch apply 1-3 patches to painful areas on 12 hours, off 12 hours.    May continue using voltaren gel to lower back 2 grams 4 times a day  Continue Vitamin D 5,000 Units daily   Continue use of spinal cord stimulator   See Dr. Hoover and followup labs as scheduled  Follow-up in 4 weeks with Brea

## 2021-06-06 NOTE — PROGRESS NOTES
PAIN CENTER PROGRESS NOTE    Subjective:   Naveen Toro is a 75 y.o. male who presents for evaluation of low back pain.  He has history of lumbar degeneration.  He has history of lumbar surgery, failed lumbar injections, and is currently using opioid management and implanted spinal cord stimulator to manage pain symptoms. Had SCS battery replaced on 09/09/16 with [employer].     Major issues:  1. Chronic pain syndrome    2. Postlaminectomy Syndrome (Lumbar)    3. Chronic, continuous use of opioids       Pain location and description: 5-6/10 intermittent dull, aching, burning pain in lower back and right leg.  Last visit pain rated 5/10. Function rated 8. At best, pain rating 4/10 and at worst 6/10, on average pain rated 5/10.  Radiation of pain: buttocks, hips, legs intermittently.    Gait disturbance: None reported.  Denies recent falls or changes in balance.  Uses AD.  Exacerbating factors: Too much standing, walking, sitting  Alleviating factors: Rest, pain medications, SCS implant, injections, warmer weather, reclining/laying down, some walking.  Associated symptoms: Pain at night.  Numbness in right hip/leg to foot, weakness in right leg, night pain. Denies bowel or bladder incontinence, fever/chills, unexplained weight loss.  Adverse effects of medications: Constipation maybe a little better without the MSER.  Amitiza two times a day which is working. Has a bowel movement every 2-3 days. Does report some   drowsiness related to medications and depression.  Low appetite.  Functional Symptoms: Pain interferes with sleep, walking, work, ADLs, relationships/social, sexual health, mood (depressd, frustrated, angry).  Current treatment efficacy: Fair; Hysingla 60 mg daily. Continues Gabapentin 400 mg BID and Cymbalta 120 mg daily without change.   Current treatment compliance: Good.  Attending scheduled appointments and taking medication as prescribed. Using SCS daily. Continues to see Dr. Frank monthly for pain  "psychology.  He states not too much exercise.  He has treadmill at home.      He had MTM visit on 02/13/2020 with Keanu Marvin PharmD:  \"MTM Initial Encounter  Assessment & Plan                                                     Medication Adherence/Access: No concerns noted      Back Pain: Partially improved- Feels like pain is fairly well controlled with current Hysingla dose, but is having flares from 3-7 pm, likely not related to hysingla. Given that pain is low back, and doesn't seem to be neuropathic, likely not related to trough in gabapentin levels. May benefit from addition of topical NSAID as a as needed option to help prevent pain from flaring significantly. Given sharp/burning/electrical nerve pains, may benefit from addition of lamotrigine, which would also likely be beneficial for mood.  -Start diclofenac 1% gel-2 g up to 4 times daily as needed  -Start lamotrigine 25 mg daily x2 weeks, then 1 tab twice daily x2 weeks     Mood/Sleep: Needs improvement - continues to have depressed mood.  Suspect that daytime drowsiness is partly related to low mood and partly related she not sleeping well at night.  Discussed sleep hygiene points that patient can implement.  May also benefit from addition of melatonin to help reset pt's sleep/wake cycle. As noted above, addition of Lamotrigine may also be beneficial for mood and give pt more drive and energy to be active during the day.   -Patient to avoid watching TV in bed  -If unable to fall asleep for 15 to 20 minutes, patient to get out of bed, try relaxing activity, then return to bed  -Start melatonin 5 mg 30 minutes before bedtime  -Start lamotrigine, as above     Constipation: Needs improvement - continues to have constipation with hard stools and some pain and straining.  Would likely benefit from increasing MiraLAX use  -Patient to use MiraLAX 17 g daily      Hypertension: Improved-BP at goal of less than 130/80.  Pulse at goal of .  -Continue current " "therapy     Follow Up  No follow-ups on file.   Schedule with PharmD as needed\"    He needed letter for work comp justifying the new medications and this was provided on 02/27.  He states he picked up topical diclofenac on Monday has only been able to use 1 day, unsure how helpful. He reports lamictal was started 03/03 and reports this may be helping the leg pain. He is taking 25 mg daily.  He denies side effects but wife states they knew someone in burn unit from [facility] so very hesitant about medication.    States he is sleeping a lot during the day. Wife is present today and reports often he will go to coffee in the morning with friends and once he returns home mid morning will recline in chair and often sleep the rest of the day, getting up only to use the bathroom or sometimes to eat.  Sometimes he will decline eating dinner and has low appetite.  She notes this did not seem as severe when he was taking hysingla 40 mg dose.      Due to CKD stage 3 we discontinued morphine ER and then patient failed Exalgo due to side effects reported blurry vision, temperature changes, drowsiness, nausea on 1 dose of this new medication. He called nurse line reporting side effects and refused to continue taking it.  We decided to continue Vicodin and see if we could get Hysingla prior authorized as he was tolerating Vicodin.  He started Hysingla 40 mg daily and tolerated so increased to 60 mg daily.  He states there have been delays getting it at the pharmacy related to insurance approval and also likely a stock issue.  He did not speak to pharmacist or manager to understand what the issues were but that he has been unhappy since changing from [facility] to [facility]. He states Navdeepkelton Ruizsylvia is his  adjustor [phone] and is typically responsive to his calls.    He had right TFESI L5-S1 on 11/05/19 with Dr. Maier and reports this was helpful to reduce pain for a few weeks.  Wants to repeat more frequently.  " "Denies complications, does get flushed from steroid for short term.      Had SCS battery replaced on 09/09/16 with [employer].  He reports he has 3 reprogramming sessions with [employer].  Seems to work fairly good and covering correct areas of pain.  He did return to conventional setting a month ago.      Review of Systems  Constitutional: Fatigue. Sleep disturbance, sleeps during the day.  Wearing CPAP every night.  Denies fever, night sweats, weight loss.  Musculoskeletal: Positive for back pain, leg pain/weakness.  Denies joint pain, joint swelling, recent falls.  Gastrointestional: Denies difficulty swallowing, change in appetite, abdominal pain, constipation, nausea, vomiting, diarrhea, GERD, fecal incontinence.  Genitourinary: Denies urinary incontinence, dysuria, hematuria, UTI, frequency, hesitancy  Neurologic:  Denies headaches, confusion, seizure, weakness, changes in balance, changes in speech.  Psychiatric: Positive for depression, anxiety. Denies memory loss, psychoses, suicidal ideation, substance use/abuse.     Objective:     Vitals:    03/11/20 0937   BP: 152/75   Pulse: 67   Weight: 192 lb (87.1 kg)   Height: 5' 9\" (1.753 m)   PainSc:   6   PainLoc: Back     Physical Exam  Constitutional- General appearance: Well groomed, well developed, comfortable, overweight and appearance reflects stated age.  No acute distress or pain behaviors noted. Presents with wife today.  Psychiatric- Judgment and insight: Normal.  Poor historian.  Speech: Normal rhythm, slowed rate.  Thought process: Normal.  No abnormal thoughts reported. Alert & Oriented to person, place, and time.  Recent and remote memory: Normal.  Observed mood: concerned, quiet.  Respiratory- Breathing is non-labored; normal rhythm and rate.  Cardiovascular- Extremities warm and well perfused, no peripheral edema or varicosities.  Dermatologic- Exposed skin is clean, dry, and intact to inspection and palpation.   Musculoskeletal- Gait and station: " Abnormal.  Gait evaluation demonstrates ambulating with single point cane with a stiff, antalgic gait.  Patient does have difficulty rising from a seated position.      *Opioid Universal Precautions:    UDT/Blood - 12/03/19, results as expected  Opioid Agreement - 04/30/19  Opioid consent - 04/30/19  Pharmacy- as documented     Reviewed - 03/11/20 as expected  Pill Count - n/a  Psychological evaluation - monthly through Dr. Larry Frank  MME - 60  Pharmacogenetic testing - n/a  VICENTE 12/03/19    Imaging:  None reviewed today    Assessment:   Naveen Toro is a 75 y.o. male seen in clinic today for lumbar degeneration and lumbar radiculopathy, most severe at L5-S1. He is reporting less pain per pain intensity numerical scale since starting Hysingla; he had initially taken 40 mg dose, is doing better for pain control on 60 mg dose but does note daytime drowsiness, wife reports he is sleeping most of the day.  He does not appear drowsy today but he is agreeable to reduction in dose back to 40 mg to see if this improves drowsiness and keeps pain stable.  He has added in lamictal and advised to take dose at night as this is helping his radicular pain.  We review that his med changes as a result of his kidney function changes; he will see PCP next month to re-evaluate renal function.      Plan:   Reduce Hysingla (24 hour hydrocodone without tylenol) to 40 mg daily. Sent to pharmacy for today to get approved and stocked before you need to start it on Saturday.  If there are any delays, have adjustor call me today or tomorrow.   Continue lamical 25 mg take at night to reduce daytime drowsiness. OK to increase to 25 mg two times a day next week if you are tolerating.    For constipation, continue Amitiza 24 mcg twice a day with meals.   Continue taking Cymbalta 120 mg at night   Continue Gabapentin 400 mg two times a day for pain   Continue Lidoderm patch apply 1-3 patches to painful areas on 12 hours, off 12 hours.     May continue using voltaren gel to lower back 2 grams 4 times a day  Continue Vitamin D 5,000 Units daily   Continue use of spinal cord stimulator   See Dr. Hoover and followup labs as scheduled  Follow-up in 4 weeks with Brea Sheets PA-C  [facility]  [address] [place] [address]. [address]  [place] [address]  Ph: [phone]  Fax: [phone]

## 2021-06-07 NOTE — PROGRESS NOTES
PAIN CENTER PROGRESS NOTE    Subjective:   Naveen Toro is a 75 y.o. male who presents for evaluation of low back pain.  He has history of lumbar degeneration.  He has history of lumbar surgery, failed lumbar injections, and is currently using opioid management and implanted spinal cord stimulator to manage pain symptoms. Had SCS battery replaced on 09/09/16 with [employer].     Major issues:  1. Chronic, continuous use of opioids    2. Chronic pain syndrome    3. Adjustment disorder with depressed mood    4. Postlaminectomy Syndrome (Lumbar)    5. Radiculopathy of thoracolumbar region       Pain location and description: 4/10 intermittent dull, aching pain in lower back and right leg. Function rated 4.  Radiation of pain: buttocks, hips, legs intermittently.     Gait disturbance: None reported.  Denies recent falls or changes in balance.  Uses AD.  Exacerbating factors: Too much standing, walking, sitting, lifting/bending  Alleviating factors: Rest, pain medications, SCS implant, injections, warmer weather, reclining/laying down, some walking.  Associated symptoms: Pain at night.  Numbness in right hip/leg to foot, weakness in right leg, night pain. Denies bowel or bladder incontinence, fever/chills, unexplained weight loss.  Adverse effects of medications: Constipation maybe a little better without the MSER.  Amitiza two times a day which is working. Has a bowel movement every 2-3 days.  He states his appetite is no much different.  Functional Symptoms: Pain interferes with sleep, ADLs, relationships/social, mood.  Current treatment efficacy: Good; Hysingla 40 mg daily. Continues Gabapentin 400 mg BID and Cymbalta 120 mg daily without change.  Lamictal 25 mg two times a day helping leg pain.  Current treatment compliance: Good.  Attending scheduled appointments and taking medication as prescribed. Using SCS daily. Continues to see Dr. Frank monthly for pain psychology.  He states not too much exercise.  He has  "treadmill at home.      Since the last Pain Center visit, the patient denies any use of anticoagulant medications. Denies any new diagnostic testing, new treatments or new medical conditions, any changes in medications, or any ED/urgent care visits.  He denies any changes in back pain, denies new falls or injuries.  Patient states his pain is a dull, aching in his lower back and states most of the leg pain has let up quite a bit.  He attributes this to lamictal 25 mg BID.  He states he has not had side effects.      We decided to reduce Hysingla to 40 mg daily last visit due to fatigue.  Patient reports this reduction seemed to be tolerated, states it took a few days to \"level out\" and denies withdrawal but reports somewhat anxious during that change.  He states this dose is fairly well to control his pain taking 0900 and feels it is lasting through the night.  He states this has helped his daytime drowsiness is a little better, still has days he sleeps but has been up doing more activity.  He states he is cleaning the bedroom up, walking more.  He states his sleep has been a little better as he is taking melatonin 5 mg before bed.  He denies waking groggy in the morning.      He had MTM visit on 02/13/2020 with Keanu Marvin PharmD:  \"MTM Initial Encounter  Assessment & Plan                                                     Medication Adherence/Access: No concerns noted      Back Pain: Partially improved- Feels like pain is fairly well controlled with current Hysingla dose, but is having flares from 3-7 pm, likely not related to hysingla. Given that pain is low back, and doesn't seem to be neuropathic, likely not related to trough in gabapentin levels. May benefit from addition of topical NSAID as a as needed option to help prevent pain from flaring significantly. Given sharp/burning/electrical nerve pains, may benefit from addition of lamotrigine, which would also likely be beneficial for mood.  -Start diclofenac " "1% gel-2 g up to 4 times daily as needed  -Start lamotrigine 25 mg daily x2 weeks, then 1 tab twice daily x2 weeks     Mood/Sleep: Needs improvement - continues to have depressed mood.  Suspect that daytime drowsiness is partly related to low mood and partly related she not sleeping well at night.  Discussed sleep hygiene points that patient can implement.  May also benefit from addition of melatonin to help reset pt's sleep/wake cycle. As noted above, addition of Lamotrigine may also be beneficial for mood and give pt more drive and energy to be active during the day.   -Patient to avoid watching TV in bed  -If unable to fall asleep for 15 to 20 minutes, patient to get out of bed, try relaxing activity, then return to bed  -Start melatonin 5 mg 30 minutes before bedtime  -Start lamotrigine, as above     Constipation: Needs improvement - continues to have constipation with hard stools and some pain and straining.  Would likely benefit from increasing MiraLAX use  -Patient to use MiraLAX 17 g daily      Hypertension: Improved-BP at goal of less than 130/80.  Pulse at goal of .  -Continue current therapy     Follow Up  No follow-ups on file.   Schedule with PharmD as needed\"      He had right TFESI L5-S1 on 11/05/19 with Dr. Maier and reports this was helpful to reduce pain for a few weeks.  Wants to repeat more frequently.  Denies complications, does get flushed from steroid for short term.      Had SCS battery replaced on 09/09/16 with [employer].  He reports he has 3 reprogramming sessions with [employer].  Seems to work fairly good and covering correct areas of pain.  He did return to conventional setting.  He is using it 24 hours a day.  He states he doesn't feel it working unless he is in a certain position; laying down or reclining in recliner he can feel it work.  He states charging is ok.      *Opioid Universal Precautions:    UDT/Blood - 12/03/19, results as expected  Opioid Agreement - " 04/30/19  Opioid consent - 04/30/19  Pharmacy- as documented     Reviewed - 04/09/2020 as expected  Pill Count - n/a  Psychological evaluation - monthly through Dr. Larry Frank  MME - 40  Pharmacogenetic testing - n/a  VICENTE 12/03/19    Imaging:  None reviewed today    Assessment:   Naveen Toro is a 75 y.o. male seen in clinic today for lumbar degeneration and lumbar radiculopathy, most severe at L5-S1. He is reporting less pain since starting Hysingla and lamictal; doing stable on current doses, reports less daytime drowsiness so will keep at reduced dose.  He will also continue adjunct duloxetine and gabapentin for nerve pain and mood.  He is to continue Vitamin D for chronic pain, sleep, and mood benefit.  He may continue topicals as needed, review use when he has more activity related pain.  Review kidney function changes; he will see PCP when able in the future re-evaluate renal function after COVID19 delays.      Plan:   Continue Hysingla (24 hour hydrocodone without tylenol) 40 mg daily. Sent to pharmacy for today 04/09 to get approved and stocked before you need to start it on Sunday, 04/12.    Continue lamical 25 mg two times a day - 90 day refill sent  For constipation, continue Amitiza 24 mcg twice a day with meals.   Continue taking Cymbalta 120 mg at night   Continue Gabapentin 400 mg two times a day for pain   Continue Lidoderm patch apply 1-3 patches to painful areas on 12 hours, off 12 hours.    May continue using voltaren gel to lower back 2 grams 4 times a day  Continue Vitamin D 5,000 Units daily - 90 day refill sent  Continue use of spinal cord stimulator   Follow-up in 8 weeks with Brea      Call Time: 17 minutes  Start - 0948  Stop - 1561     Brea Sheets PA-C  [facility]  [address]  [place] [address]  Ph: [phone]  Fax: [phone]

## 2021-06-07 NOTE — PATIENT INSTRUCTIONS - HE
Continue Hysingla (24 hour hydrocodone without tylenol) 40 mg daily. Sent to pharmacy for today 04/09 to get approved and stocked before you need to start it on Sunday, 04/12.    Continue lamical 25 mg two times a day - 90 day refill sent  For constipation, continue Amitiza 24 mcg twice a day with meals.   Continue taking Cymbalta 120 mg at night   Continue Gabapentin 400 mg two times a day for pain   Continue Lidoderm patch apply 1-3 patches to painful areas on 12 hours, off 12 hours.    May continue using voltaren gel to lower back 2 grams 4 times a day  Continue Vitamin D 5,000 Units daily - 90 day refill sent  Continue use of spinal cord stimulator   Follow-up in 8 weeks with Brea

## 2021-06-08 NOTE — PROGRESS NOTES
PAIN CENTER PROGRESS NOTE    Subjective:   Naveen Toro is a 75 y.o. male who presents for evaluation of low back pain.  He has history of lumbar degeneration.  He has history of lumbar surgery, failed lumbar injections, and is currently using opioid management and implanted spinal cord stimulator to manage pain symptoms. Had SCS battery replaced on 09/09/16 with [employer].     Major issues:  1. Radiculopathy of thoracolumbar region    2. Chronic pain syndrome    3. Lumbar Disc Degeneration    4. Chronic, continuous use of opioids       Pain location and description: 4/10 intermittent mild aching pain in bilateral lower back and hips. Function rated 4.    Radiation of pain: buttocks, hips.    Gait disturbance: None reported.  Denies recent falls or changes in balance.  Uses AD.  Exacerbating factors: Too much standing, walking, sitting, lifting/bending  Alleviating factors: Rest, pain medications, SCS implant, injections, warmer weather, reclining/laying down, some walking.  Associated symptoms: Pain at night.  Numbness in right hip/leg to foot, weakness in right leg, night pain. Denies bowel or bladder incontinence, fever/chills, unexplained weight loss.  Adverse effects of medications: Constipation maybe a little better without the MSER.  Amitiza two times a day which is working. Has a bowel movement every 2-3 days.  He states his appetite is not much different.  Functional Symptoms: See CMA note  Current treatment efficacy: Good; Hysingla 40 mg daily. Continues Gabapentin 400 mg BID and Cymbalta 120 mg daily without change.  Lamictal 25 mg two times a day helping leg pain.  Current treatment compliance: Good.  Attending scheduled appointments and taking medication as prescribed. Using SCS daily. Continues to see Dr. Frank monthly for pain psychology.  He states not too much exercise.  He has treadmill at home.      Since the last Pain Center visit, the patient denies any use of anticoagulant medications.  Denies any new diagnostic testing, new treatments or new medical conditions, any changes in medications, or any ED/urgent care visits.  He denies any changes in back pain, denies new falls or injuries.  Patient states his pain is a dull, aching in his lower back and states most of the leg pain has let up quite a bit.  He attributes this to lamictal 25 mg BID.  He states he has not had side effects.       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  He has 1 daughter age 16.  Froy was at the [facility] when it happened and then had to pack up and travel to [facility] where his sons reside but he his home now and doesn't anticipate any more travel.  He hasn't spoken to Dr. Frank yet but has an appointment at the end of the month.  He states his sleep has been bad as he has difficulty getting to sleep and staying asleep.  He states his appetite is ok; having 1 meal and snacks, not as much appetite the last 6-8 months.      We decided to reduce Hysingla to 40 mg daily due to fatigue.  Patient reports this reduction seemed to be tolerated.  He states this dose is fairly well to control his pain taking 0900 and feels it is lasting through the night.  He states this has helped his daytime drowsiness is a little better, still has days he sleeps but has been up doing more activity.  He states his pain level is maybe a little more than usual due to stress and grief.  He denies new pain location or new injuries/trauma.      He had right TFESI L5-S1 on 11/05/19 with Dr. Maier and reports this was helpful to reduce pain for a few weeks.  Wants to repeat more frequently but doesn't have anything scheduled right now.  Denies complications, does get flushed from steroid for short term.      Had SCS battery  replaced on 09/09/16 with [employer].  He reports he has 3 reprogramming sessions with [employer].  Seems to work fairly good and covering correct areas of pain.  He did return to conventional setting.  He is using it 24 hours a day.  He states he doesn't feel it working unless he is in a certain position; laying down or reclining in recliner he can feel it work.  He states charging is ok.  He had x-ray to evaluate leads in 12/2017, would like to repeat due to coverage concerns.    Review of Systems  Constitutional: Fatigue. Sleep disturbance, sleeps during the day.  Wearing CPAP every night.  Denies fever, night sweats, weight loss.  Musculoskeletal: Positive for back pain, leg pain/weakness.  Denies joint pain, joint swelling, recent falls.  Gastrointestional: Denies difficulty swallowing, change in appetite, abdominal pain, constipation, nausea, vomiting, diarrhea, GERD, fecal incontinence.  Genitourinary: Denies urinary incontinence, dysuria, hematuria, UTI, frequency, hesitancy  Neurologic:  Denies headaches, confusion, seizure, weakness, changes in balance, changes in speech.  Psychiatric: Positive for depression, anxiety, grief. Denies memory loss, psychoses, suicidal ideation, substance     *Opioid Universal Precautions:    UDT/Blood - 12/03/19, results as expected  Opioid Agreement - 04/30/19  Opioid consent - 04/30/19  Pharmacy- as documented     Reviewed - 06/04/20 as expected  Pill Count - n/a  Psychological evaluation - monthly through Dr. Larry Frank  MME - 40  Pharmacogenetic testing - n/a  VICENTE 12/03/19    Imaging:  None reviewed today    Assessment:   Naveen Toro is a 75 y.o. male seen in clinic today for lumbar degeneration and lumbar radiculopathy, most severe at L5-S1. He is reporting less pain since starting Hysingla and lamictal; doing stable on current doses, reports less daytime drowsiness so will keep at reduced dose.  He will also continue adjunct duloxetine and gabapentin for nerve  pain and mood.  He is to continue Vitamin D for chronic pain, sleep, and mood benefit.  He may continue topicals as needed, review use when he has more activity related pain.      Plan:   Continue Hysingla (24 hour hydrocodone without tylenol) 40 mg daily.   As your opioid dose remains stable, I will send electronic refills to the pharmacy for 2 subsequent months until your next appointment so you do not have to call our refill line.  The fill dates for your medications are:  Ok to fill 06/09/20 to start on 06/11/20 & 07/07/20 to start on 07/09/20  Check with your pharmacy that all prescriptions are on your profile. Call the pharmacy a week before you want to fill the prescription as stock may vary and the pharmacy may need to order the medication for you. I reserve the right to cancel the electronic prescription at the pharmacy in between appointments and would contact you with an explanation if this were to occur.  Continue lamical 25 mg two times a day   For constipation, continue Amitiza 24 mcg twice a day with meals.   Continue taking Cymbalta 120 mg at night   Continue Gabapentin 400 mg two times a day for pain   Continue Lidoderm patch apply 1-3 patches to painful areas on 12 hours, off 12 hours.    May continue using voltaren gel to lower back 2 grams 4 times a day  Continue Vitamin D 5,000 Units daily   Continue use of spinal cord stimulator - will order lead imaging to check placement.  Follow-up in 8 weeks with Brea      Call Time: 22 minutes  Start - 1108  Stop - 7450     Brea Sheets PA-C  [facility]  [address]  [place] [address]  Ph: [phone]  Fax: [phone]

## 2021-06-08 NOTE — PROGRESS NOTES
PAIN CENTER PROGRESS NOTE    Subjective:   Naveen Toro is a 72 y.o. male who presents for evaluation of low back pain.  He has history of lumbar degeneration.  He has history of lumbar surgery, failed lumbar injections, and is currently using opioid management and implanted spinal cord stimulator to manage pain symptoms.      Major issues:  1. Opioid Dependence With Continuous Use    2. Lumbar Disc Degeneration    3. Radiculopathy of thoracolumbar region    4. Therapeutic opioid-induced constipation (OIC)       Pain location and description: 8/10 constant dull, throbbing ache in lower back and right hip is sharp, stabbing, grinding pain. Last pain rating 4/10.  Function rated 7.  Denies fall, injury, new activity to cause increase in pain but intermittently has increased hip pain.  Radiation of pain: buttocks, hips, legs intermittently.   Paresthesias, numbness, weakness: Denies new lower extremity symptoms.  Gait disturbance: None reported.  Denies recent falls or changes in balance.    Exacerbating factors: Activities, weather changes if cold or damp, cold weather in winter.  Alleviating factors: Rest, warm pool, pain medications, SCS implant.  Associated symptoms: Sedentary lifestyle, depression.   Adverse effects of medications: Constipation.  Uses miralax powder prn.  Does report some drowsiness related to medications and depression.   Current treatment efficacy: Fair; Morphine ER 15 mg every 8 hours and Vicodin BID prn. Continues Gabapentin and Cymbalta without change.    Current treatment compliance: Good.  Attending scheduled appointments and taking medication as prescribed. Using SCS daily. Continues to see Dr. Frank monthly for pain psychology.  Has not been compliant with warm pool exercise recently.    Right hip pain increased since [place], possibly before.  Denies injury or fall.  He states it started down from his back and down around the hip.  He denies groin pain.  He has usual pain in lower  "legs from back.  He states no previous hip x-rays or orthopedic evaluations.  Difficulty bearing weight, position changes help reclining is best and laying down.      He has not had any LESI as discussed last visit and reports all relief has worn off from treatment on 08/01/16.  He would like to repeat at this time.    Had SCS battery replaced on 09/09/16 with [employer].  He reports he has reprogrammed with [employer].  He describes a visit to his cabin when he was unable to turn down the stimulation while he was laying down.  He states he was able to reduce the strength but may need to reprogram as \"something changed.\"  States he is only feeling stimulator in certain positions by leaning back in certain positions.  He states laying flat also is better stimulation.      Review of Systems  Constitutional: Fatigue. Weight gain 30 pounds, less active. Sleep disturbance, sleeps during the day.  Wearing CPAP every night.  Denies fever, chills, night sweats, weight loss.  Musculoskeletal: Positive for back pain, right leg pain.  Denies weakness, joint pain, joint swelling, recent falls.  Gastrointestional: Denies difficulty swallowing, change in appetite, abdominal pain, constipation, nausea, vomiting, diarrhea, GERD, fecal incontinence.  Genitourinary: Denies urinary incontinence, dysuria, hematuria, UTI, frequency, hesitancy  Neurologic: Denies headaches, confusion, seizure, weakness, changes in balance, changes in speech.  Psychiatric: Positive for depression. Denies memory loss, psychoses, suicidal ideation, substance use/abuse.     Objective:     Vitals:    01/12/17 1111   BP: 155/80   Pulse: 68   Resp: 14   Weight: 215 lb (97.5 kg)   Height: 5' 9.5\" (1.765 m)   PainSc:   8     Physical Exam  Constitutional- General appearance: Presents with wife today, uncomfortable shifting positions in chair, overweight and appearance reflects stated age.  No acute distress or pain behaviors noted.   Psychiatric- Judgment and " insight: Normal.  Speech: Normal rhythm.  Thought process: Normal.  No abnormal thoughts reported. Alert & Oriented to person, place, and time.  Recent and remote memory: Normal.  Observed mood: Depressed, flat. Poor eye contact.  Difficult historian.  Respiratory- Breathing is non-labored; normal rhythm and rate.  Cardiovascular- Extremities warm and well perfused, no peripheral edema or varicosities.  Dermatologic- Exposed skin is clean, dry, and intact to inspection and palpation.   Musculoskeletal- Gait and station: Abnormal.  Gait evaluation demonstrates ambulating with single point cane with a stiff, slightly antalgic gait.  Patient does have difficulty rising from a seated position.     *Opioid Universal Precautions:    UDS/Swab - 09/27/16  Opioid Consent - due    Opioid Agreement -   Pharmacy- as documented    [place]  Reviewed - 09/27/16  Pill Count - n/a  Psychological evaluation - through Dr. Frank  MME - 65  Pharmacogenetic testing - n/a    Assessment:   Naveen Toro is a 72 y.o. male seen in clinic today for lumbar degeneration and lumbar radiculopathy.  He continues pain management with Morphine 15 mg every 8 hours and Vicodin 10/325 mg BID prn daily as needed for severe pain.  Reports OIC and discuss Movantik today with possible side effects.  Did have relief from LESI in 08/2016 but now has worn off and recommend repeating with right TFESI L5-S1 as he is having increased right hip and leg symptoms.  He is also to meet with [employer] for further reprogramming as he is having some changes related to position and he had battery replaced 09/09/16.  If hip pain persists after injection, he will see PMD to evaluate for separate hip pathology.     Plan:   Continue Morphine ER 15 mg every 8 hours.    You may take 1-2 Vicodin 10/325 mg a day as needed for severe breakthrough pain or in the morning as needed.    Both to fill 01/18/17  Please call our Nurse Triage phone # [phone] seven days in advance  for next month's opioid prescription refill to be sent electronically to your pharmacy.  Start movantik 25 mg daily for opioid induced constipation.  Should work 6-12 hours after taking.  Stop OTC measures  Continue taking Cymbalta 120 mg at night for less drowsiness during the day.  Please schedule with [employer] for a reprogramming visit for your spinal cord stimulator.  Schedule with Dr. Maier for repeat LESI   If this doesn't help right hip, see Dr. Hoover for separate hip pathology work up  Follow-up in 8 weeks with Brea.       Brea Sheets PA-C  [address]  [place] [address]  Ph: [phone]  Fax: [phone]

## 2021-06-08 NOTE — TELEPHONE ENCOUNTER
Completed  Requested Prescriptions     Signed Prescriptions Disp Refills     lubiprostone (AMITIZA) 24 MCG capsule 180 capsule 0     Sig: Take 1 capsule (24 mcg total) by mouth 2 (two) times a day with meals.     Authorizing Provider: LADI KAHN     gabapentin (NEURONTIN) 400 MG capsule 180 capsule 0     Sig: Take 1 capsule (400 mg total) by mouth 2 (two) times a day.     Authorizing Provider: LADI KAHN     HYDROcodone bitartrate (HYSINGLA ER) 40 mg TP24 30 each 0     Sig: Take 40 mg by mouth daily.     Authorizing Provider: LADI KAHN

## 2021-06-08 NOTE — PATIENT INSTRUCTIONS - HE
Continue Hysingla (24 hour hydrocodone without tylenol) 40 mg daily.   As your opioid dose remains stable, I will send electronic refills to the pharmacy for 2 subsequent months until your next appointment so you do not have to call our refill line.  The fill dates for your medications are:  Ok to fill 06/09/20 to start on 06/11/20 & 07/07/20 to start on 07/09/20  Check with your pharmacy that all prescriptions are on your profile. Call the pharmacy a week before you want to fill the prescription as stock may vary and the pharmacy may need to order the medication for you. I reserve the right to cancel the electronic prescription at the pharmacy in between appointments and would contact you with an explanation if this were to occur.  Continue lamical 25 mg two times a day  For constipation, continue Amitiza 24 mcg twice a day with meals.   Continue taking Cymbalta 120 mg at night   Continue Gabapentin 400 mg two times a day for pain   Continue Lidoderm patch apply 1-3 patches to painful areas on 12 hours, off 12 hours.    May continue using voltaren gel to lower back 2 grams 4 times a day  Continue Vitamin D 5,000 Units daily   Continue use of spinal cord stimulator   Follow-up in 8 weeks with Brea

## 2021-06-08 NOTE — PROGRESS NOTES
[employer] Reprogramming Visit    Mr. Toro has a [employer] Spinal Cord Stimulator implanted.  He is here today to have the [employer] representative adjust the stimulator programs to better cover his pain.  He is also having some increased pain in his right hip.  He will be returning here on Thursday for an evaluation with me.

## 2021-06-09 NOTE — PROGRESS NOTES
PAIN CENTER PROGRESS NOTE    Subjective:   Naveen Toro is a 72 y.o. male who presents for evaluation of low back pain.  He has history of lumbar degeneration.  He has history of lumbar surgery, failed lumbar injections, and is currently using opioid management and implanted spinal cord stimulator to manage pain symptoms.      Major issues:  1. Chronic pain syndrome    2. Lumbar Disc Degeneration    3. Opioid dependence, continuous    4. Radiculopathy of thoracolumbar region       Pain location and description: 4/10 intermittent to steady dull, ache in lower back and right hip is sharp, stabbing, grinding pain. Last pain rating 8/10.  Function rated 8.   Radiation of pain: buttocks, hips, legs intermittently.   Paresthesias, numbness, weakness: Denies new lower extremity symptoms.  Gait disturbance: None reported.  Denies recent falls or changes in balance.  Uses AD.  Exacerbating factors: Activities, weather changes if cold or damp, cold weather in winter.  Alleviating factors: Rest, warm pool, pain medications, SCS implant, injections.  Associated symptoms: Sedentary lifestyle, depression.   Adverse effects of medications: Constipation.  Uses miralax powder prn.  Does report some drowsiness related to medications and depression.   Current treatment efficacy: Fair; Morphine ER 15 mg every 8 hours and Vicodin BID prn. Continues Gabapentin and Cymbalta without change.    Current treatment compliance: Good.  Attending scheduled appointments and taking medication as prescribed. Using SCS daily. Continues to see Dr. Frank monthly for pain psychology.  Has not been compliant with warm pool exercise recently.    Had TFESI right L5-S1 on 01/26/17 and reports it was 80% helpful for pain relief and within last week has started to taper off.  Denies steroid adverse effects or post procedure complications.  He asks about repeating before his pain level increases.    Had SCS battery replaced on 09/09/16 with [employer].  He  "reports he has reprogrammed with [employer].  He describes a visit to his cabin when he was unable to turn down the stimulation while he was laying down.  He states he was able to reduce the strength but may need to reprogram as \"something changed.\"  States he is only feeling stimulator in certain positions by leaning back in certain positions.  He states laying flat also is better stimulation.      States he took Movantik once and felt it work too strong had loose bowel movement within an hour.  He states he had abdominal pains and cramping and didn't take it again.      Review of Systems  Constitutional: Fatigue. Weight gain 30 pounds, less active. Sleep disturbance, sleeps during the day.  Wearing CPAP every night.  Denies fever, chills, night sweats, weight loss.  Musculoskeletal: Positive for back pain, right leg pain.  Denies weakness, joint pain, joint swelling, recent falls.  Gastrointestional: Denies difficulty swallowing, change in appetite, abdominal pain, constipation, nausea, vomiting, diarrhea, GERD, fecal incontinence.  Genitourinary: Denies urinary incontinence, dysuria, hematuria, UTI, frequency, hesitancy  Neurologic: Denies headaches, confusion, seizure, weakness, changes in balance, changes in speech.  Psychiatric: Positive for depression. Denies memory loss, psychoses, suicidal ideation, substance use/abuse.     Objective:     Vitals:    03/09/17 0949   BP: (!) 151/91   Pulse: 79   Resp: 16   Weight: 220 lb (99.8 kg)   Height: 5' 9.5\" (1.765 m)   PainSc:   4     Physical Exam  Constitutional- General appearance: Presents alone today, comfortable, overweight and appearance reflects stated age.  No acute distress or pain behaviors noted.   Psychiatric- Judgment and insight: Normal.  Speech: Normal rhythm.  Thought process: Normal.  No abnormal thoughts reported. Alert & Oriented to person, place, and time.  Recent and remote memory: Normal.  Observed mood: Depressed, flat. Poor eye contact.  " Difficult historian.  Respiratory- Breathing is non-labored; normal rhythm and rate.  Cardiovascular- Extremities warm and well perfused, no peripheral edema or varicosities.  Dermatologic- Exposed skin is clean, dry, and intact to inspection and palpation.   Musculoskeletal- Gait and station: Abnormal.  Gait evaluation demonstrates ambulating independently with a stiff, slightly antalgic gait.  Patient does have difficulty rising from a seated position.     *Opioid Universal Precautions:    UDS/Swab - 09/27/16  Opioid Consent - due    Opioid Agreement - 03/09/17  Pharmacy- as documented    [place]  Reviewed - 03/09/17  Pill Count - n/a  Psychological evaluation - through Dr. Frank  MME - 65  Pharmacogenetic testing - n/a    Assessment:   Naveen Toro is a 72 y.o. male seen in clinic today for lumbar degeneration and lumbar radiculopathy.  He continues pain management with Morphine 15 mg every 8 hours and Vicodin 10/325 mg BID prn daily as needed for severe pain.  Reports OIC and discuss Movantik today with possible side effects and that 12.5 mg dose may be better tolerated.  Did have relief from LESI in 01/2016 but is starting to wear off and recommend repeating with right TFESI L5-S1 as he is having increased right hip and leg symptoms.  He is also to meet with [employer] for further reprogramming as he is having some changes related to position and he had battery replaced 09/09/16.       Plan:   Continue Morphine ER 15 mg every 8 hours.    You may take 1-2 Vicodin 10/325 mg a day as needed for severe breakthrough pain or in the morning as needed.    As your opioid dose remains stable, I will send electronic refills to the pharmacy for 2 subsequent months until your next appointment so you do not have to call our refill line.  The fill dates for your medications are:  03/16/17 &  04/13/17  Check with your pharmacy that all prescriptions are on your profile. Call the pharmacy a week before you want to fill the  prescription as stock may vary and the pharmacy may need to order the medication for you. I reserve the right to cancel the electronic prescription at the pharmacy in between appointments and would contact you with an explanation if this were to occur.  Reduce movantik to 12.5 mg daily for opioid induced constipation.  Should work 6-12 hours after taking.  Stop OTC measures  Continue taking Cymbalta 120 mg at night for less drowsiness during the day. - refill sent today  Continue Gabapentin 400 mg at bedtime - refill sent today  Schedule with Dr. Maier for repeat LESI with a   Opioid agreement renewed today  Follow-up in 8 weeks with Brea.       Brea Sheets PA-C  [address]  [place] [address]  Ph: [phone]  Fax: [phone]

## 2021-06-09 NOTE — TELEPHONE ENCOUNTER
Medication being requested: Lamictal and melatonin  Last visit date:   6/4 Provider: MILDRED  Next visit date:   8/4 Provider: Mildred  Expected follow up: 8 weeks  MTM visit (Pain Center) date: na  Pertinent between visit information about requested medication (telephone, mychart, prior authorization, concerns): na  Last date prescribed: 4/9 &  2/13  Provider responsible: BE  Spoke with patient: no  Script being sent to provider - dates and quantity:   Pharmacy cued: [facility]

## 2021-06-10 NOTE — PROGRESS NOTES
PAIN CENTER PROGRESS NOTE    Subjective:   Naveen Toro is a 75 y.o. male who presents for evaluation of low back pain.  He has history of lumbar degeneration.  He has history of lumbar surgery, failed lumbar injections, and is currently using opioid management and implanted spinal cord stimulator to manage pain symptoms. Had SCS battery replaced on 09/09/16 with [employer].     Major issues:  1. Chronic, continuous use of opioids    2. Chronic pain syndrome    3. Postlaminectomy Syndrome (Lumbar)    4. Radiculopathy of thoracolumbar region    5. Therapeutic opioid-induced constipation (OIC)       Pain location and description: 4/10 intermittent aching, burning, stabbing pain in bilateral lower back and hips. Function rated 2.  Mainly left leg pain which goes down to calf and sometimes into left foot.    Radiation of pain: buttocks, hips.    Gait disturbance: None reported.  Denies recent falls or changes in balance.  Uses AD.  Exacerbating factors: Too much standing, walking, sitting, lifting/bending  Alleviating factors: Rest, pain medications, SCS implant, injections, warmer weather, reclining/laying down, some walking.  Associated symptoms: Pain at night.  Numbness in right hip/leg to foot, weakness in right leg, night pain. Denies bowel or bladder incontinence, fever/chills, unexplained weight loss.  Adverse effects of medications: Constipation maybe a little better without the MSER.  Amitiza two times a day which is working. Has a bowel movement every 2-3 days.  He states his appetite is not much different.  Functional Symptoms: See CMA note  Current treatment efficacy: Good; Hysingla 40 mg daily. Continues Gabapentin 400 mg BID and Cymbalta 120 mg daily without change.  Lamictal 25 mg two times a day helping leg pain.  Current treatment compliance: Good.  Attending scheduled appointments and taking medication as prescribed. Using SCS daily. Continues to see Dr. Frank monthly for pain psychology.  He states  "not too much exercise.  He has treadmill at home.      Since the last Pain Center visit, the patient denies any use of anticoagulant medications. Denies any new diagnostic testing, new treatments or new medical conditions, any changes in medications, or any ED/urgent care visits.  He denies any changes in back pain, denies new falls or injuries.  Patient states his pain is a dull, aching in his lower back and states most of the leg pain has let up quite a bit, primarily located in left leg to calf.  He states in general does better in summer months, hasn't needed to schedule lumbar procedure but knows it is available.    He saw Dr. Hoover on 06/04/20 for annual wellness exam:  \"Assessment/Plan     1. Encounter for Medicare annual wellness exam   2. Type 2 diabetes mellitus without complication, without long-term current use of insulin (HRC) controlled   3. CKD (chronic kidney disease) stage 3, GFR 30-59 ml/min (HRC) with a slight bump in the creatinine today   4. Iron deficiency anemia, unspecified iron deficiency anemia type. Iron levels are now normal and hemoglobin is just slightly diminished   5. Screening for AAA (abdominal aortic aneurysm)   6. Essential hypertension (HRC) controlled   7. Coronary artery disease involving native coronary artery of native heart without angina pectoris (HRC)   8. Chronic midline low back pain without sciatica (HRC) for which he follows at the pain clinic. He does have some new medications   9. Expected bereavement due to life event due to the death of his son   Plan  we will follow up with your lab results\"    He continues Hysingla to 40 mg daily.  He states this dose is fairly well to control his pain taking 0900 and feels it is lasting through the night.  He states this has helped his daytime drowsiness is a little better, still has days he sleeps but has been up doing more activity.        Had SCS battery replaced on 09/09/16 with [employer].  He reports he has 3 reprogramming " sessions with [employer].  Seems to work fairly good and covering correct areas of pain.  He did return to conventional setting.  He is using it 24 hours a day.  He states he doesn't feel it working unless he is in a certain position; laying down or reclining in recliner he can feel it work.  He states charging is ok.  He had x-ray to evaluate leads in 12/2017, would like to repeat due to coverage concerns and repeat imaging demonstrated leads in normal position.    Review of Systems  Constitutional: Fatigue. Sleep disturbance, sleeps during the day.  Wearing CPAP every night.  Denies fever, night sweats, weight loss.  Musculoskeletal: Positive for back pain, leg pain/weakness.  Denies joint pain, joint swelling, recent falls.  Gastrointestional: Denies difficulty swallowing, change in appetite, abdominal pain, constipation, nausea, vomiting, diarrhea, GERD, fecal incontinence.  Genitourinary: Denies urinary incontinence, dysuria, hematuria, UTI, frequency, hesitancy  Neurologic:  Denies headaches, confusion, seizure, weakness, changes in balance, changes in speech.  Psychiatric: Positive for depression, anxiety. Denies memory loss, psychoses, suicidal ideation, substance     Objective:     Vitals:    08/04/20 0828   BP: 146/80   Pulse: 63   Resp: 18     Physical Exam  Constitutional- General appearance: Normal.  Well developed, comfortable, weight overweight, and appearance reflects stated age.  No acute distress or pain behaviors noted.  Presents alone today.  Psychiatric- Judgment and insight: Normal.  Speech: Normal rhythm.  Thought process: Normal.  No abnormal thoughts reported. Alert & Oriented to person, place, and time.  Recent and remote memory: Normal.  Observed mood: appropriate  Respiratory- Breathing is non-labored; normal rhythm and rate.  Dermatologic- Exposed skin is clean, dry, and intact to inspection and palpation.  Musculoskeletal- Gait and station: Ambulates independently with antalgia.  Sit to  stand with slight difficulty.    *Opioid [place] Precautions:    UDT/Blood - 12/03/19, results as expected  Opioid Agreement - 08/04/20  Opioid consent - 08/04/20  Pharmacy- as documented     Reviewed - [place] unavailable, call to pharmacy was not open at time of visit.  Used last refill dates available in epic.  Pill Count - n/a  Psychological evaluation - monthly through Dr. Larry Frank  MME - 40  Pharmacogenetic testing - n/a  VICENTE 08/04/20    Imaging:  EXAM: XR THORACOLUMBAR 2 VWS  LOCATION: [facility]  DATE/TIME: 6/9/2020 11:21 AM     INDICATION: Spinal cord simulator lead position.  COMPARISON: 12/20/2017.     IMPRESSION:   Transitional lumbosacral anatomy with sacralized L5.     Spinal cord stimulator leads are not changed in position. Spinal cord simulator lead entering into the spinal canal at the level of T11-T12 terminates at the level of the T9 pedicles. Additional spinal cord stimulator lead entering into the spinal canal   at the level of T12-L1 terminates at the level of the infrapedicular T11 vertebral body.     Unchanged old compression deformities of the T12, L1, and L2 vertebrae. No new fracture. No aggressive osseous abnormality. No significant change in lumbar spondylosis.    Assessment:   Naveen Toro is a 75 y.o. male seen in clinic today for lumbar degeneration and lumbar radiculopathy, most severe at L5-S1. He is reporting stable pain with current doses and intermittent injections and use of SCS.  Reports less daytime drowsiness so will keep at reduced dose.  He will also continue adjunct duloxetine and gabapentin for nerve pain and mood.  He is to continue Vitamin D for chronic pain, sleep, and mood benefit.  He may continue topicals as needed, review use when he has more activity related pain.      Plan:   Continue Hysingla (24 hour hydrocodone without tylenol) 40 mg daily.   As your opioid dose remains stable, I will send electronic refills to the pharmacy for 2 subsequent  months until your next appointment so you do not have to call our refill line.  The fill dates for your medications are:  Ok to fill 08/4/08 to start on 08/08/20 & 09/03/20 to start on 09/07/20  Check with your pharmacy that all prescriptions are on your profile. Call the pharmacy a week before you want to fill the prescription as stock may vary and the pharmacy may need to order the medication for you. I reserve the right to cancel the electronic prescription at the pharmacy in between appointments and would contact you with an explanation if this were to occur.  Continue lamical 25 mg two times a day   For constipation, continue Amitiza 24 mcg twice a day with meals.   Continue taking Cymbalta 120 mg at night.  Continue Gabapentin 400 mg two times a day for pain   Continue Lidoderm patch apply 1-3 patches to painful areas on 12 hours, off 12 hours.    May continue using voltaren gel to lower back 2 grams 4 times a day  Continue Vitamin D 5,000 Units daily   Continue use of spinal cord stimulator, reviewed imaging leads appear unchanged.  Opioid agreement and consent form signed today.  Discussed bringing these copies and your most recent After Visit Summary (AVS) from our appointment to the pharmacy when you are refilling controlled medications to assist with any additional information the pharmacist may require.   Follow-up in 8 weeks with Brea in clinic     BERNABE Mendoza [facility]  [address]  [place] [address]  Ph: [phone]  Fax: [phone]

## 2021-06-10 NOTE — PROGRESS NOTES
Patient presents to the clinic today for a follow up with Brea Sheets PA-C regarding back pain. Patient describes their pain as aching, burning, stabbing, intemittent (F=2).    Jojo SCHAFER CMA  8:19 AM  8/4/2020

## 2021-06-10 NOTE — PATIENT INSTRUCTIONS - HE
Continue Hysingla (24 hour hydrocodone without tylenol) 40 mg daily.   As your opioid dose remains stable, I will send electronic refills to the pharmacy for 2 subsequent months until your next appointment so you do not have to call our refill line.  The fill dates for your medications are:  Ok to fill 08/4/08 to start on 08/08/20 & 09/03/20 to start on 09/07/20  Check with your pharmacy that all prescriptions are on your profile. Call the pharmacy a week before you want to fill the prescription as stock may vary and the pharmacy may need to order the medication for you. I reserve the right to cancel the electronic prescription at the pharmacy in between appointments and would contact you with an explanation if this were to occur.  Continue lamical 25 mg two times a day   For constipation, continue Amitiza 24 mcg twice a day with meals.   Continue taking Cymbalta 120 mg at night.  Continue Gabapentin 400 mg two times a day for pain   Continue Lidoderm patch apply 1-3 patches to painful areas on 12 hours, off 12 hours.    May continue using voltaren gel to lower back 2 grams 4 times a day  Continue Vitamin D 5,000 Units daily   Continue use of spinal cord stimulator, reviewed imaging leads appear unchanged.  Opioid agreement and consent form signed today.  Discussed bringing these copies and your most recent After Visit Summary (AVS) from our appointment to the pharmacy when you are refilling controlled medications to assist with any additional information the pharmacist may require.   Follow-up in 8 weeks with Brea in clinic

## 2021-06-10 NOTE — PROGRESS NOTES
PAIN CENTER PROGRESS NOTE    Subjective:   Naveen Toro is a 72 y.o. male who presents for evaluation of low back pain.  He has history of lumbar degeneration.  He has history of lumbar surgery, failed lumbar injections, and is currently using opioid management and implanted spinal cord stimulator to manage pain symptoms.      Major issues:  1. Opioid Dependence With Continuous Use    2. Lumbar Disc Degeneration    3. Radiculopathy of thoracolumbar region       Pain location and description: 5/10 dull ache in lower back and down right leg.  Mainly lower back, right side worse than the left.  Function rated 7.   Radiation of pain: buttocks, hips, legs intermittently.   Paresthesias, numbness, weakness: Denies new lower extremity symptoms.  States leg pain symptoms not bad.  Gait disturbance: None reported.  Denies recent falls or changes in balance.  Uses AD.  Exacerbating factors: Activities, weather changes if cold or damp, cold weather in winter.  Alleviating factors: Rest, warm pool, pain medications, SCS implant, injections.  Associated symptoms: Sedentary lifestyle, depression.  Cramping in calves intermittent, tries to stretch frequency is variable a few times per week.  Adverse effects of medications: Constipation.  Uses miralax powder prn.  Does report some drowsiness related to medications and depression.  Discontinued Movantik.  Current treatment efficacy: Fair; Morphine ER 15 mg every 8 hours and Vicodin BID prn. Continues Gabapentin and Cymbalta without change.   Denies taking muscle relaxants.    Current treatment compliance: Good.  Attending scheduled appointments and taking medication as prescribed. Using SCS daily. Continues to see Dr. Frank monthly for pain psychology.  Has not been compliant with warm pool exercise recently.    Had TFESI right L5-S1 on 03/23/17 and reports it was helpful for pain relief about 75% and still helping some.  Denies steroid adverse effects or post procedure  "complications.  He asks about repeating before his pain level increases.      Had SCS battery replaced on 09/09/16 with [employer].  He reports he has 2 reprogramming sessions with [employer].  Seems to work fairly good and covering correct areas of pain.  He is on high dose setting and doesn't feel except sometimes on feet when laying down.  He is keeping on around the clock, rechargeable battery with good charge once per week but thinks he should do every 3-4 hours.    Review of Systems  Constitutional: Fatigue. Weight gain 30 pounds, less active. Sleep disturbance, sleeps during the day.  Wearing CPAP every night.  Denies fever, chills, night sweats, weight loss.  Musculoskeletal: Positive for back pain, right leg pain.  Denies weakness, joint pain, joint swelling, recent falls.  Gastrointestional: Denies difficulty swallowing, change in appetite, abdominal pain, constipation, nausea, vomiting, diarrhea, GERD, fecal incontinence.  Genitourinary: Denies urinary incontinence, dysuria, hematuria, UTI, frequency, hesitancy  Neurologic: Denies headaches, confusion, seizure, weakness, changes in balance, changes in speech.  Psychiatric: Positive for depression. Denies memory loss, psychoses, suicidal ideation, substance use/abuse.     Objective:     Vitals:    05/03/17 1057   BP: 160/79   Pulse: 65   Resp: 14   Weight: 220 lb (99.8 kg)   Height: 5' 9.5\" (1.765 m)   PainSc:   5     Physical Exam  Constitutional- General appearance: Presents alone today, comfortable, overweight and appearance reflects stated age.  No acute distress or pain behaviors noted.   Psychiatric- Judgment and insight: Normal.  Speech: Normal rhythm.  Thought process: Normal.  No abnormal thoughts reported. Alert & Oriented to person, place, and time.  Recent and remote memory: Normal.  Observed mood: Appropriate. Poor eye contact.  Difficult historian.  Respiratory- Breathing is non-labored; normal rhythm and rate.  Cardiovascular- Extremities warm " and well perfused, no peripheral edema or varicosities.  Dermatologic- Exposed skin is clean, dry, and intact to inspection and palpation.   Musculoskeletal- Gait and station: Abnormal.  Gait evaluation demonstrates ambulating independently with a stiff, slightly antalgic gait.  Patient does have difficulty rising from a seated position.     *Opioid Universal Precautions:    UDS/Swab - 09/27/16  Opioid Consent - due    Opioid Agreement - 03/09/17  Pharmacy- as documented    [place]  Reviewed - 05/03/17 as expected  Pill Count - n/a  Psychological evaluation - through Dr. Frank  MME - 65  Pharmacogenetic testing - n/a    Assessment:   Naveen Toro is a 72 y.o. male seen in clinic today for lumbar degeneration and lumbar radiculopathy.  He continues pain management with Morphine 15 mg every 8 hours and Vicodin 10/325 mg BID prn daily as needed for severe pain.  Reports OIC but failed Movantik.  Did have relief from LESI 03/23/2017 but is starting to wear off and recommend repeating right TFESI L5-S1 every 2-3 months as he is having increased right hip and leg symptoms.         Plan:   Continue Morphine ER 15 mg every 8 hours.    You may take 1-2 Vicodin 10/325 mg a day as needed for severe breakthrough pain or in the morning as needed.    As your opioid dose remains stable, I will send electronic refills to the pharmacy for 2 subsequent months until your next appointment so you do not have to call our refill line.  The fill dates for your medications are:  05/11/17 & 06/08/17  Check with your pharmacy that all prescriptions are on your profile. Call the pharmacy a week before you want to fill the prescription as stock may vary and the pharmacy may need to order the medication for you. I reserve the right to cancel the electronic prescription at the pharmacy in between appointments and would contact you with an explanation if this were to occur.  Discontinued movantik, continue Miralax as needed for constipation 1  capful in 8 ounces liquid.  Continue taking Cymbalta 120 mg at night for less drowsiness during the day  Continue Gabapentin 400 mg at bedtime   Schedule with Dr. Maier for repeat LESI with a  after 05/23.  Continue use of spinal cord stimulator  Follow-up in 8 weeks with Brea.       Brea Sheets PA-C  [address]  [place] [address]  Ph: [phone]  Fax: [phone]

## 2021-06-11 NOTE — PROGRESS NOTES
Patient is here for a follow up visit with Brea Sheets PA-C. Patient has low back and bilateral hand pain, describes the pain as intermittent, dull, sharp, radiating and throbbing and rates the pain 4/10. Patient needs refills on Hyslinga. Functionality is 8. Patient states their pain interferes with sleep, walking, work, ADL's and relationships.

## 2021-06-11 NOTE — PATIENT INSTRUCTIONS - HE
Continue Hysingla (24 hour hydrocodone without tylenol) 40 mg daily.   As your opioid dose remains stable, I will send electronic refills to the pharmacy for 2 subsequent months until your next appointment so you do not have to call our refill line.  The fill dates for your medications are:  Ok to fill 10/04/20 to start on 10/08/20 & 11/01/20 to start 11/05/20  Check with your pharmacy that all prescriptions are on your profile. Call the pharmacy a week before you want to fill the prescription as stock may vary and the pharmacy may need to order the medication for you. I reserve the right to cancel the electronic prescription at the pharmacy in between appointments and would contact you with an explanation if this were to occur.  Continue lamical 25 mg two times a day   For constipation, continue Amitiza 24 mcg twice a day with meals.   Continue taking Cymbalta 120 mg at night.  Continue Gabapentin 400 mg two times a day for pain   Continue Lidoderm patch apply 1-3 patches to painful areas on 12 hours, off 12 hours.    May continue using voltaren gel to lower back 2 grams 4 times a day  Continue Vitamin D 5,000 Units daily   Continue use of spinal cord stimulator  Follow-up in 8 weeks with Brea in clinic

## 2021-06-11 NOTE — PROGRESS NOTES
PAIN CENTER PROGRESS NOTE    Subjective:   Naveen Toro is a 75 y.o. male who presents for evaluation of low back pain.  He has history of lumbar degeneration.  He has history of lumbar surgery, failed lumbar injections, and is currently using opioid management and implanted spinal cord stimulator to manage pain symptoms. Had SCS battery replaced on 09/09/16 with [employer].     Major issues:  1. Chronic, continuous use of opioids    2. Chronic pain syndrome    3. Lumbar Disc Degeneration       Pain location and description: 4/10 intermittent dull to sharp, radiating and throbbing in bilateral lower back and hips. Function rated 8. At best pain rated 3/10, at worst 7/10 with activity.  Radiation of pain: buttocks, hips.    Gait disturbance: Denies recent falls or changes in balance.  Uses AD.  Exacerbating factors: Too much standing, walking, sitting prolonged (10-20 minutes), lifting/bending  Alleviating factors: Rest, pain medications, SCS implant, injections, warmer weather, reclining/laying down, some walking.  Associated symptoms: Pain at night.  Numbness in right hip/leg to foot, weakness in right leg, night pain. Denies bowel or bladder incontinence, fever/chills, unexplained weight loss.  Adverse effects of medications: Constipation maybe a little better without the MSER.  Amitiza two times a day which is working. Has a bowel movement every 2-3 days.  He states his appetite is not much different.  Functional Symptoms: See rooming note  Current treatment efficacy: Good; Hysingla 40 mg daily at 0900. Continues Gabapentin 400 mg BID and Cymbalta 120 mg daily without change.  Lamictal 25 mg two times a day helping leg/neuropathic pain.  Current treatment compliance: Good.  Attending scheduled appointments and taking medication as prescribed. Using SCS daily. Continues to see Dr. Frank monthly for pain psychology.  He states not too much exercise, he has a treadmill at home.      Since the last Pain Center  visit, the patient denies any use of anticoagulant medications. Denies any new diagnostic testing, new treatments or new medical conditions, any changes in medications, or any ED/urgent care visits.  He denies any changes in back pain, denies new falls or injuries.  Patient states his pain is a dull, aching across his lower back and states most of the leg pain has let up quite a bit.  He states in general does better in summer months, hasn't needed to schedule lumbar procedure but knows it is available.  We discuss today lumbar facet injections as he has spondylosis in lower back and not much reported leg pain at this time.    He continues Hysingla to 40 mg daily.  He states this dose is fairly well to control his pain taking 0900 and feels it is lasting through the night.  He states this has helped his daytime drowsiness is a little better, still has days he sleeps but has been up doing more activity.        Had SCS battery replaced on 09/09/16 with [employer].  He reports he has 3 reprogramming sessions with [employer].  Seems to work fairly good and covering correct areas of pain.  He did return to conventional setting.  He is using it 24 hours a day.  He states he doesn't feel it working unless he is in a certain position; laying down or reclining in recliner he can feel it work.  He states charging is ok.  He had x-ray to evaluate leads in 12/2017 and 06/2020.    He does report more arthritis type symptoms in bilateral hands with nodules on PIP joints; he has not seen PCP, ortho, or rheumatologist about this.  States he is not taking oral NSAIDs.  Does have voltaren gel at home which he hasn't tried on his hands.  Discuss he needs a separate appointment outside of work comp visit for this concern.    Review of Systems  Constitutional: Fatigue. Sleep disturbance, sleeps during the day.  Wearing CPAP every night.  Denies fever, night sweats, weight loss.  Musculoskeletal: Positive for back pain.  Hand joint pain,  joint swelling.  Denies recent falls.  Gastrointestional: Denies difficulty swallowing, change in appetite, abdominal pain, constipation, nausea, vomiting, diarrhea, GERD, fecal incontinence.  Genitourinary: Denies urinary incontinence, dysuria, hematuria, UTI, frequency, hesitancy  Neurologic:  Denies headaches, confusion, seizure, weakness, changes in balance, changes in speech.  Psychiatric: Positive for depression, anxiety. Denies memory loss, psychoses, suicidal ideation, substance     Objective:     Vitals:    09/23/20 0823   BP: 133/60   Pulse: (!) 59     Physical Exam  Constitutional- General appearance: Normal.  Well developed, comfortable, weight overweight, and appearance reflects stated age.  No acute distress or pain behaviors noted. Presents alone today.  Psychiatric- Judgment and insight: Normal.  Speech: Normal rhythm.  Thought process: Normal.  No abnormal thoughts reported. Alert & Oriented to person, place, and time.  Recent and remote memory: Normal.  Observed mood: appropriate  Respiratory- Breathing is non-labored; normal rhythm and rate.  Dermatologic- Exposed skin is clean, dry, and intact to inspection and palpation.  Musculoskeletal- Gait and station: Ambulates independently with antalgia.  Sit to stand with slight difficulty.    *Opioid [place] Precautions:    UDT/Blood - 12/03/19, results as expected  Opioid Agreement - 08/04/20  Opioid consent - 08/04/20  Pharmacy- as documented     Reviewed - 09/23/20 as expected  Pill Count - n/a  Psychological evaluation - monthly through Dr. Larry Frank  MME - 40  Pharmacogenetic testing - n/a  VICENTE 08/04/20    Imaging:  EXAM: XR THORACOLUMBAR 2 VWS  LOCATION: [facility]  DATE/TIME: 6/9/2020 11:21 AM     INDICATION: Spinal cord simulator lead position.  COMPARISON: 12/20/2017.     IMPRESSION:   Transitional lumbosacral anatomy with sacralized L5.     Spinal cord stimulator leads are not changed in position. Spinal cord simulator lead  entering into the spinal canal at the level of T11-T12 terminates at the level of the T9 pedicles. Additional spinal cord stimulator lead entering into the spinal canal   at the level of T12-L1 terminates at the level of the infrapedicular T11 vertebral body.     Unchanged old compression deformities of the T12, L1, and L2 vertebrae. No new fracture. No aggressive osseous abnormality. No significant change in lumbar spondylosis.    Assessment:   Naveen Toro is a 75 y.o. male with visit today for lumbar degeneration and lumbar radiculopathy, most severe at L5-S1. He is reporting stable pain with current doses and intermittent injections and use of SCS.  He will also continue adjunct duloxetine and gabapentin for nerve pain and mood.  He is to continue Vitamin D for chronic pain, sleep, and mood benefit.  He may continue topicals as needed, review use when he has more activity related pain.      Plan:   Continue Hysingla (24 hour hydrocodone without tylenol) 40 mg daily.   As your opioid dose remains stable, I will send electronic refills to the pharmacy for 2 subsequent months until your next appointment so you do not have to call our refill line.  The fill dates for your medications are:  Ok to fill 10/04/20 to start on 10/08/20 & 11/01/20 to start 11/05/20  Check with your pharmacy that all prescriptions are on your profile. Call the pharmacy a week before you want to fill the prescription as stock may vary and the pharmacy may need to order the medication for you. I reserve the right to cancel the electronic prescription at the pharmacy in between appointments and would contact you with an explanation if this were to occur.  Continue lamical 25 mg two times a day   For constipation, continue Amitiza 24 mcg twice a day with meals.   Continue taking Cymbalta 120 mg at night.  Continue Gabapentin 400 mg two times a day for pain   Continue Lidoderm patch apply 1-3 patches to painful areas on 12 hours, off 12  hours.    May continue using voltaren gel to lower back 2 grams 4 times a day  Continue Vitamin D 5,000 Units daily   Continue use of spinal cord stimulator  Follow-up in 8 weeks with Brea in clinic     Brea Sheets PA-C  [facility]  [address]  [place] [address]  Ph: [phone]  Fax: [phone]

## 2021-06-11 NOTE — PROGRESS NOTES
PAIN CENTER PROGRESS NOTE    Subjective:   Naveen Toro is a 72 y.o. male who presents for evaluation of low back pain.  He has history of lumbar degeneration.  He has history of lumbar surgery, failed lumbar injections, and is currently using opioid management and implanted spinal cord stimulator to manage pain symptoms.  Dr. Garcia.      Major issues:  1. Chronic pain syndrome    2. Lumbar Disc Degeneration    3. Chronic right-sided thoracic back pain    4. Opioid dependence, continuous    5. Radiculopathy of thoracolumbar region       Pain location and description: 4/10 dull ache in lower back and intermittently sharp and burning, aching down right leg.  Mainly lower back, right side worse than the left.  Function rated 8.  Last visit pain rated 5/10.  Sometimes has mid back pain that is right sided.    Radiation of pain: buttocks, hips, legs intermittently.  Sometimes into right great toe.  Paresthesias, numbness, weakness: Denies new lower extremity symptoms.  States leg pain symptoms not bad.  Gait disturbance: None reported.  Denies recent falls or changes in balance.  Uses AD.  Exacerbating factors: Activities, weather changes if cold or damp, cold weather in winter.  Alleviating factors: Rest, warm pool, pain medications, SCS implant, injections.  Associated symptoms: Sedentary lifestyle, depression.  Cramping in calves intermittent, tries to stretch frequency is variable a few times per week.  Adverse effects of medications: Constipation.  Uses miralax powder prn.  Does report some drowsiness related to medications and depression.  Discontinued Movantik.  Current treatment efficacy: Fair; Morphine ER 15 mg every 8 hours and Vicodin BID prn. Continues Gabapentin and Cymbalta without change.   Denies taking muscle relaxants.    Current treatment compliance: Good.  Attending scheduled appointments and taking medication as prescribed. Using SCS daily. Continues to see Dr. Frank monthly for pain psychology.   Has not been compliant with warm pool exercise recently.    Had TFESI right L5-S1 on 05/24/17 and reports took 1 week to help, gave pain relief 25%.  He denies post procedure complications or adverse side effects of steroid.  He states in general he feels his lower back and leg pain worsening, unsure if there have been changes in his lower back without recent MRI.  Denies new injury or trauma.  He states he has right lower thoracic pain as well which he has concerns about.  Previously treated this area with trigger points in 2015 but had short duration and minimal relief of this treatment.      Had SCS battery replaced on 09/09/16 with [employer].  He reports he has 2 reprogramming sessions with [employer].  Seems to work fairly good and covering correct areas of pain.  He is on high dose setting and doesn't feel except sometimes on feet when laying down.  He is keeping on around the clock, rechargeable battery with good charge once per week but thinks he should do every 3-4 hours. He is set to high density and is long as long as his battery is charged.  He states he is having to charge every 4-5 days taking 2-3 hours to charge fully.    Review of Systems  Constitutional: Fatigue. Weight gain 30 pounds, less active. Sleep disturbance, sleeps during the day.  Wearing CPAP every night.  Denies fever, chills, night sweats, weight loss.  Musculoskeletal: Positive for back pain, right leg pain.  Denies weakness, joint pain, joint swelling, recent falls.  Gastrointestional: Denies difficulty swallowing, change in appetite, abdominal pain, constipation, nausea, vomiting, diarrhea, GERD, fecal incontinence.  Genitourinary: Denies urinary incontinence, dysuria, hematuria, UTI, frequency, hesitancy  Neurologic: Denies headaches, confusion, seizure, weakness, changes in balance, changes in speech.  Psychiatric: Positive for depression. Denies memory loss, psychoses, suicidal ideation, substance use/abuse.     Objective:  "    Vitals:    06/28/17 0918   BP: 136/74   Pulse: 66   Resp: 16   Weight: 220 lb (99.8 kg)   Height: 5' 9.5\" (1.765 m)   PainSc:   4     Physical Exam  Constitutional- General appearance: Presents alone today, appears tired, uncomfortable, overweight and appearance reflects stated age.  No acute distress or pain behaviors noted.   Psychiatric- Judgment and insight: Normal.  Speech: Normal rhythm.  Thought process: Normal.  No abnormal thoughts reported. Alert & Oriented to person, place, and time.  Recent and remote memory: Normal.  Observed mood: Appropriate. Poor eye contact.  Difficult historian.  Respiratory- Breathing is non-labored; normal rhythm and rate.  Cardiovascular- Extremities warm and well perfused, no peripheral edema or varicosities.  Dermatologic- Exposed skin is clean, dry, and intact to inspection and palpation.   Musculoskeletal- Gait and station: Abnormal.  Gait evaluation demonstrates ambulating independently with a stiff, slightly antalgic gait.  Patient does have difficulty rising from a seated position.     *Opioid Universal Precautions:    UDS/Swab - 09/27/16  Opioid Consent - due    Opioid Agreement - 03/09/17  Pharmacy- as documented    [place]  Reviewed - 05/03/17 as expected  Pill Count - n/a  Psychological evaluation - through Dr. Frank  MME - 65  Pharmacogenetic testing - n/a    Assessment:   Naveen Toro is a 72 y.o. male seen in clinic today for lumbar degeneration and lumbar radiculopathy.  He continues pain management with Morphine 15 mg every 8 hours and Vicodin 10/325 mg BID prn daily as needed for severe pain.  Reports OIC but failed Movantik, continues over the counter measures.  Did have relief from recent TFESI right L5-S1 without complication but reported as 25% as he is having more pain in his right lower extremity.   He continues to use his SCS for pain control of radiculopathy symptoms with good coverage. He is wondering about reassessment of lumbar spine with MRI " and also evaluation of lower thoracic pain with imaging; he is instructed to see Dr. 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     Plan:   Continue Morphine ER 15 mg every 8 hours.    You may take 1-2 Vicodin 10/325 mg a day as needed for severe breakthrough pain or in the morning as needed.    As your opioid dose remains stable, I will send electronic refills to the pharmacy for 2 subsequent months until your next appointment so you do not have to call our refill line.  The fill dates for your medications are:  07/06/17 & 08/03/17  Check with your pharmacy that all prescriptions are on your profile. Call the pharmacy a week before you want to fill the prescription as stock may vary and the pharmacy may need to order the medication for you. I reserve the right to cancel the electronic prescription at the pharmacy in between appointments and would contact you with an explanation if this were to occur.  Miralax as needed for constipation 1 capful in 8 ounces liquid.  Continue taking Cymbalta 120 mg at night for less drowsiness during the day  Continue Gabapentin 400 mg at bedtime.  Continue use of spinal cord stimulator  Make appointment with Dr. Garcia to evaluate lower thoracic spine and also increased right lower extremity pain with physical exam, updated imaging, and surgical consult with partners if indicated.  Have records faxed to us for review [phone]  Follow-up in 8 weeks with Brea.       Brea Sheets PA-C  [address]  [place] [address]  Ph: [phone]  Fax: [phone]

## 2021-06-11 NOTE — TELEPHONE ENCOUNTER
Medication being requested: Vitamin D3  Last visit date: 8/4/20 Provider: MILDRED  Next visit date: 9/23/20 Provider: MILDRED  Expected follow up: 8 weeks  MTM visit (Pain Center) date: No  Pertinent between visit information about requested medication (telephone, mychart, prior authorization, concerns): No  Last date prescribed: 4/9/20  Provider responsible: MILDRED  Spoke with patient: debra  Script being sent to provider - dates and quantity:   Requested Prescriptions     Pending Prescriptions Disp Refills     cholecalciferol, vitamin D3, 125 mcg (5,000 unit) capsule [Pharmacy Med Name: VITAMIN D3 5000 UNIT CAPSULES] 90 capsule 0     Sig: Take 1 capsule (5,000 Units total) by mouth daily.     Pharmacy cued: [facility]

## 2021-06-12 NOTE — TELEPHONE ENCOUNTER
[facility] call, they had thought this patient wanted to transfer everything to them, there appears to be a new RX that sent to [facility] today, 11/5.  Was this sent to [facility] in error.

## 2021-06-12 NOTE — PROGRESS NOTES
PAIN CENTER PROGRESS NOTE    Subjective:   Naveen Toro is a 72 y.o. male who presents for evaluation of low back pain.  He has history of lumbar degeneration.  He has history of lumbar surgery, failed lumbar injections, and is currently using opioid management and implanted spinal cord stimulator to manage pain symptoms.  Dr. Garcia.      Major issues:  1. Chronic pain syndrome    2. Lumbar Disc Degeneration    3. Radiculopathy of thoracolumbar region    4. Opioid Dependence With Continuous Use       Pain location and description: 5/10 intermittent dull ache and grinding in lower back and to right buttock and leg.  Mainly lower back, right side worse than the left.  Function rated 7.  Last visit pain rated 4/10.  He states his pain hasn't been too bad this summer.   Radiation of pain: buttocks, hips, legs intermittently.  Sometimes into right great toe.  Right now reports this as fairly well controlled, some pain into right buttock.  Paresthesias, numbness, weakness: Denies new lower extremity symptoms.  States leg pain symptoms not bad currently attributed to SCS and LESI treatments.  Gait disturbance: None reported.  Denies recent falls or changes in balance.  Uses AD.  Exacerbating factors: Activities, weather changes if cold or damp, winter months.  Alleviating factors: Rest, warm pool, pain medications, SCS implant, injections, warmer weather.  Associated symptoms: Sedentary lifestyle, depression.  Cramping in calves intermittent, tries to stretch frequency is variable a few times per week.  Adverse effects of medications: Constipation.  Uses miralax powder prn.  Does report some drowsiness related to medications and depression.  Discontinued Movantik.  Current treatment efficacy: Fair; Morphine ER 15 mg every 8 hours and Vicodin BID prn. Continues Gabapentin 400 mg bedtime and Cymbalta 120 mg daily without change.   Denies taking muscle relaxants.    Current treatment compliance: Good.  Attending scheduled  appointments and taking medication as prescribed. Using SCS daily. Continues to see Dr. Frank monthly for pain psychology.  Has not been compliant with warm pool exercise in the summer months as he is often at the family cabin in [place].    Had TFESI right L5-S1 on 05/24/17 and reports took 1 week to help, gave pain relief 25%.  He denies post procedure complications or adverse side effects of steroid.  Scheduled 09/05/17 for repeat.      Had SCS battery replaced on 09/09/16 with [employer].  He reports he has 2 reprogramming sessions with [employer].  Seems to work fairly good and covering correct areas of pain.  He is on high dose setting and doesn't feel except sometimes on feet when laying down.  He is keeping on around the clock, rechargeable battery having to charge every 4-5 days taking 2-3 hours to charge fully.    Thoracic pain not as bad over the past month or so.  He states he hasn't scheduled with Dr. Garcia which he requested last visit to evaluate his thoracic and lumbar spine.  He denies new imaging and wasn't aware that his new battery is MRI compatible.    Review of Systems  Constitutional: Fatigue. Weight gain 30 pounds, less active. Sleep disturbance, sleeps during the day.  Wearing CPAP every night.  Denies fever, chills, night sweats, weight loss.  Musculoskeletal: Positive for back pain, right leg pain.  Denies weakness, joint pain, joint swelling, recent falls.  Gastrointestional: Denies difficulty swallowing, change in appetite, abdominal pain, constipation, nausea, vomiting, diarrhea, GERD, fecal incontinence.  Genitourinary: Denies urinary incontinence, dysuria, hematuria, UTI, frequency, hesitancy  Neurologic: Denies headaches, confusion, seizure, weakness, changes in balance, changes in speech.  Psychiatric: Positive for depression. Denies memory loss, psychoses, suicidal ideation, substance use/abuse.     Objective:     Vitals:    08/23/17 0853   BP: 138/74   Pulse: (!) 58   Resp: 16  "  Weight: 220 lb (99.8 kg)   Height: 5' 9.5\" (1.765 m)   PainSc:   5     Physical Exam  Constitutional- General appearance: Well groomed, well developed, comfortable, overweight and appearance reflects stated age.  No acute distress or pain behaviors noted. Presents alone today.  Psychiatric- Judgment and insight: Normal.  Speech: Normal rhythm.  Thought process: Normal.  No abnormal thoughts reported. Alert & Oriented to person, place, and time.  Recent and remote memory: Normal.  Observed mood: Appropriate.   Respiratory- Breathing is non-labored; normal rhythm and rate.  Cardiovascular- Extremities warm and well perfused, no peripheral edema or varicosities.  Dermatologic- Exposed skin is clean, dry, and intact to inspection and palpation.   Musculoskeletal- Gait and station: Abnormal.  Gait evaluation demonstrates ambulating independently with a stiff, slightly antalgic gait.  Patient does have difficulty rising from a seated position.     *Opioid Universal Precautions:    UDS/Swab - 09/27/16  Opioid Consent - due    Opioid Agreement - 03/09/17  Pharmacy- as documented    [place]  Reviewed - 05/03/17 as expected  Pill Count - n/a  Psychological evaluation - monthly through Dr. Frank  MME - 65  Pharmacogenetic testing - n/a    Assessment:   Naveen Toro is a 72 y.o. male seen in clinic today for lumbar degeneration and lumbar radiculopathy.  He continues pain management with Morphine 15 mg every 8 hours and Vicodin 10/325 mg BID prn daily as needed for severe pain.  Reports OIC but failed Movantik, continues over the counter measures.  Did have relief from recent TFESI right L5-S1 without complication and will repeat next month, re-educated on the procedure and possible risks and benefits.  He continues to use his SCS for pain control of radiculopathy symptoms with good coverage. He is wondering about reassessment of lumbar spine with MRI and also evaluation of lower thoracic pain with imaging; he is instructed " to see Dr. 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  Currently his symptoms are stable and he has not yet scheduled this.     Plan:   Continue Morphine ER 15 mg every 8 hours.    You may take 1-2 Vicodin 10/325 mg a day as needed for severe breakthrough pain or in the morning as needed.    As your opioid dose remains stable, I will send electronic refills to the pharmacy for 2 subsequent months until your next appointment so you do not have to call our refill line.  The fill dates for your medications are:  8/31/17 & 09/28/17  Check with your pharmacy that all prescriptions are on your profile. Call the pharmacy a week before you want to fill the prescription as stock may vary and the pharmacy may need to order the medication for you. I reserve the right to cancel the electronic prescription at the pharmacy in between appointments and would contact you with an explanation if this were to occur.  Continue Miralax as needed for constipation 1 capful in 8 ounces liquid.  Continue taking Cymbalta 120 mg at night for less drowsiness during the day  Continue Gabapentin 400 mg at bedtime.  Continue use of spinal cord stimulator  Make appointment with Dr. Garcia for consult regarding low back and leg pain. Ok to have updated MRI with new battery as it is MRI compatible. Have records faxed to us for review [phone]  Keep scheduled epidural injection with Dr. Maier and have a  day of procedure.  Follow-up in 8 weeks with Brea.       Brea Sheets PA-C  [address]. [address]  [place] [address]  Ph: [phone]  Fax: [phone]

## 2021-06-12 NOTE — TELEPHONE ENCOUNTER
I haven't sent anything recently for lamictal; last RX was from Dr. Parker on 10/09 to [facility].  Please send back to me with clarification if further action needed, thanks.

## 2021-06-12 NOTE — TELEPHONE ENCOUNTER
Approving to new pharmacy.  Allowing to process refill on 10/06/20 in order to have this ready on 10/08/20 as it also needs work comp approval.

## 2021-06-12 NOTE — TELEPHONE ENCOUNTER
Medication being requested: Lamotrigine 25 mg  Last visit date: 9/23/20 Provider: MILDRED  Next visit date: 11/18/20 Provider RW  Expected follow up: 8 WEEKS  MTM visit (Pain Center) date: NA  Pertinent between visit information about requested medication (telephone, mychart, prior authorization, concerns): NA  Last date prescribed: 7/10/20  Provider responsible: BE  Spoke with patient: NO  Script being sent to provider - dates and quantity:   Requested Prescriptions     Pending Prescriptions Disp Refills     lamoTRIgine (LAMICTAL) 25 MG tablet 180 tablet 0     Sig: TAKE 1 TABLET BY MOUTH TWICE DAILY     Pharmacy cued: [facility]

## 2021-06-12 NOTE — TELEPHONE ENCOUNTER
Medication being requested: Duloxetine, Amitiza, Gabapentin and Melatonin  Last visit date: 9/23/20.  Provider: MILDRED  Next visit date: 11/18/20.  Provider: MILDRED  Expected follow up: 8 weeks  MTM visit (Pain Center) date: No  UDT date: 12/2019  Agreement date: 8/2020   (Last fill date; name; strength; provider; MME; quantity):  None of the medication noted on   Pertinent between visit information about requested medication (telephone, mychart, prior authorization, concerns, comments): No  Last Ordered:  Duloxetine 8/4/20  Amitiza 8/4/20  Gabapentin 8/4/20  Melatonin 7/10/20  Script being sent to provider by nurse- dates and quantity:   11/4/20-2/2/21  Requested Prescriptions     Pending Prescriptions Disp Refills     melatonin 5 mg Tab tablet [Pharmacy Med Name: MELATONIN 5MG] 30 tablet 2     Sig: Take 1 tablet (5 mg total) by mouth at bedtime.     gabapentin (NEURONTIN) 400 MG capsule [Pharmacy Med Name: GABAPENTIN 400MG] 180 capsule 0     Sig: Take 1 capsule (400 mg total) by mouth 2 (two) times a day.     lubiprostone (AMITIZA) 24 MCG capsule [Pharmacy Med Name: AMITIZA 24MCG] 180 capsule 0     Sig: Take 1 capsule (24 mcg total) by mouth 2 (two) times a day with meals.     DULoxetine (CYMBALTA) 60 MG capsule [Pharmacy Med Name: DULOXETINE HCL CAP DR 60 MG] 180 capsule 0     Sig: Take 2 capsules (120 mg total) by mouth daily.     Pharmacy cued: [facility]  Standing orders for withdrawal protocol implemented: ZACH

## 2021-06-12 NOTE — TELEPHONE ENCOUNTER
Medication being requested: Hysingla 40 mg  Last visit date: 9/23    Provider: MILDRED  Next visit date:  11/18    Provider: MILDRED  Expected follow up: 8 weeks  MTM visit (Pain Center) date: FRANSISCO  UDT date: 12/2019  Agreement date: 8/4/2020   (Last fill date; name; strength; provider; MME; quantity):   reviewed: Hysingla 40 mg, Last fills 8/8-9/7, then 9/8-10/8 next refill due 10/8  Pertinent between visit information about requested medication (telephone, mychart, prior authorization, concerns, comments):   Script being sent to provider by nurse- dates and quantity: Patient called to report that his pharmacy does not have Hysingla so he found another that does. [facility] [place].  Pharmacy cued: [facility]  Standing orders for withdrawal protocol implemented: fransisco

## 2021-06-12 NOTE — TELEPHONE ENCOUNTER
Call from pt stating script for Hysingla was supposed to be sent to [facility], not [facility]. Called [facility] to cancel.  Med queued for [facility]

## 2021-06-13 NOTE — PATIENT INSTRUCTIONS - HE
Continue Hysingla (24 hour hydrocodone without tylenol) 40 mg daily.   As your opioid dose remains stable, I will send electronic refills to the pharmacy for 2 subsequent months until your next appointment so you do not have to call our refill line.  The fill dates for your medications are:  Ok to fill 12/06/20 & 01/05/21  Check with your pharmacy that all prescriptions are on your profile. Call the pharmacy a week before you want to fill the prescription as stock may vary and the pharmacy may need to order the medication for you. I reserve the right to cancel the electronic prescription at the pharmacy in between appointments and would contact you with an explanation if this were to occur.  Continue lamical 25 mg two times a day   For constipation, continue Amitiza 24 mcg twice a day with meals. Discussed you have been on this dose since 07/24/2018.  Other options for constipation until you can get it approved with work comp:  Step    Medication   Regimen  1.                                          Senna    1 capsule two times a day  2.            Increase senna  2 caps two times a day  3.        Increase senna  3 caps two times a day  4.     Increase Senna  4 caps two times a day   AND ADD      Sorbitol   30 mL po two times a day      OR       Polyethylene Glycol (PEG) 17 grams in 4-8 oz liquid po daily      OR      Bisacodyl   2 tabs two times a day  5.     Increase sorbitol  30 mL three times a day  OR  PEG    17 grams in 4-8 oz liquid two times a day  OR   Bisacodyl   3 tabs three times a day  6.     Discuss other prescription options with provider          Continue taking Cymbalta 120 mg at night.  Continue Gabapentin 400 mg two times a day for pain   Continue Lidoderm patch apply 1-3 patches to painful areas on 12 hours, off 12 hours.    May continue using voltaren gel to lower back 2 grams 4 times a day  Continue Vitamin D 5,000 Units daily - 90 day refill sent to new pharmacy  Continue use of spinal cord  stimulator  Diagnostics: UDT/Blood collected today results are pending.  Patient required a random Drug Test due to the need to comply with [place] Model Policy Guidelines and CDC Guideline for the use of any controlled substances. Reasons for definitive testing include:  -Identify specific medication(s) or drug(s) in a class (e.g. Benzodiazepines, barbiturates, opioids, antidepressants)  -Definitive concentration of medication(s), drug(s), and/or metabolites needed to guide management  -Identify non-prescribed medication or illicit drug use for ongoing safe prescribing of controlled substances  -Identify substances that may present high risk for additive or synergistic interaction with prescription medication (e.g. Alcohol).  Patient is either being considered for or taking a controlled substance. Unexpected findings will be discussed and treatment decision may be adjusted. Testing is being implemented w/o bias related to age, race, gender, socioeconomic status or Jew affiliation.   Follow-up in 8 weeks with Brea in clinic

## 2021-06-13 NOTE — PROGRESS NOTES
PAIN CENTER PROGRESS NOTE    Subjective:   Naveen Toro is a 72 y.o. male who presents for evaluation of low back pain.  He has history of lumbar degeneration.  He has history of lumbar surgery, failed lumbar injections, and is currently using opioid management and implanted spinal cord stimulator to manage pain symptoms.  Dr. Garcia.      Major issues:  1. Chronic pain syndrome    2. Lumbar Disc Degeneration    3. Opioid Dependence With Continuous Use    4. Radiculopathy of thoracolumbar region       Pain location and description: 4/10 intermittent dull ache in lower back and sharp to right buttock and hip.  Mainly lower back, right side worse than the left.  Function rated 7.  Last visit pain rated 5/10.  He states he feels detention decent today with good weather.  Radiation of pain: buttocks, hips, legs intermittently.  Sometimes into right great toe.  Right now reports this as fairly well controlled, some pain into right buttock.  Paresthesias, numbness, weakness: Denies new lower extremity symptoms.  States leg pain symptoms not bad currently attributed to SCS and LESI treatments.  Gait disturbance: None reported.  Denies recent falls or changes in balance.  Uses AD.  Exacerbating factors: Activities, weather changes if cold or damp, winter months.  Alleviating factors: Rest, warm pool, pain medications, SCS implant, injections, warmer weather.  Associated symptoms: Sedentary lifestyle, depression.  Cramping in calves intermittent, tries to stretch frequency is variable a few times per week.  Adverse effects of medications: Constipation.  Uses miralax powder prn.  Does report some drowsiness related to medications and depression.  Discontinued Movantik.  Current treatment efficacy: Fair; Morphine ER 15 mg every 8 hours and Vicodin BID prn. Continues Gabapentin 400 mg bedtime and Cymbalta 120 mg daily without change.   Denies taking muscle relaxants.    Current treatment compliance: Good.  Attending scheduled  "appointments and taking medication as prescribed. Using SCS daily. Continues to see Dr. Frank monthly for pain psychology.  Has not been compliant with warm pool exercise in the summer months as he is often at the family cabin in [place].    Had TFESI right L5-S1 on 09/25/17 and reports pain relief 50%  He denies post procedure complications or adverse side effects of steroid.  He states he noticed this time he had steroid adverse effects warm and nauseated 2nd day and lasted for 3-4 days.  He denies any other symptoms including fatigue, fever, chills, site reaction.    Had SCS battery replaced on 09/09/16 with [employer].  He reports he has 2 reprogramming sessions with [employer].  Seems to work fairly good and covering correct areas of pain.  He is on high dose setting and doesn't feel except sometimes on feet when laying down.  He is keeping on around the clock, rechargeable battery having to charge every 3-4 days taking 2-3 hours to charge fully.      He has not made attempts to see Dr. Garcia as pain is stable.    Review of Systems  Constitutional: Fatigue. Weight gain 30 pounds, less active. Sleep disturbance, sleeps during the day.  Wearing CPAP every night.  Denies fever, chills, night sweats, weight loss.  Musculoskeletal: Positive for back pain, right leg pain.  Denies weakness, joint pain, joint swelling, recent falls.  Gastrointestional: Denies difficulty swallowing, change in appetite, abdominal pain, constipation, nausea, vomiting, diarrhea, GERD, fecal incontinence.  Genitourinary: Denies urinary incontinence, dysuria, hematuria, UTI, frequency, hesitancy  Neurologic: Denies headaches, confusion, seizure, weakness, changes in balance, changes in speech.  Psychiatric: Positive for depression. Denies memory loss, psychoses, suicidal ideation, substance use/abuse.     Objective:     Vitals:    10/18/17 1024   BP: 134/78   Pulse: 64   Resp: 16   Weight: 220 lb (99.8 kg)   Height: 5' 9.5\" (1.765 m) "   PainSc:   4     Physical Exam  Constitutional- General appearance: Well groomed, well developed, comfortable, overweight and appearance reflects stated age.  No acute distress or pain behaviors noted. Presents alone today, wife is in waiting room.  Psychiatric- Judgment and insight: Normal.  Speech: Normal rhythm.  Thought process: Normal.  No abnormal thoughts reported. Alert & Oriented to person, place, and time.  Recent and remote memory: Normal.  Observed mood: Appropriate.   Respiratory- Breathing is non-labored; normal rhythm and rate.  Cardiovascular- Extremities warm and well perfused, no peripheral edema or varicosities.  Dermatologic- Exposed skin is clean, dry, and intact to inspection and palpation.   Musculoskeletal- Gait and station: Abnormal.  Gait evaluation demonstrates ambulating with single point cane with a stiff, slightly antalgic gait.  Patient does have difficulty rising from a seated position.     *Opioid Universal Precautions:    UDS/Swab - Collected 10/18/17, result pending   Opioid Agreement - 03/09/17  Pharmacy- as documented    [place]  Reviewed - unable to review on 10/18/17 due to website  Pill Count - n/a  Psychological evaluation - monthly through Dr. Frank  MME - 65  Pharmacogenetic testing - n/a    Assessment:   Naveen Toro is a 72 y.o. male seen in clinic today for lumbar degeneration and lumbar radiculopathy.  He continues pain management with Morphine 15 mg every 8 hours and Vicodin 10/325 mg BID prn daily as needed for severe pain.  Reports OIC but failed Movantik, continues over the counter measures.  Did have relief from recent TFESI right L5-S1 without complication. He continues to use his SCS for pain control of radiculopathy symptoms with good coverage. As he is stable with plan of care, he has not scheduled with Dr. Garcia.     Plan:   Continue Morphine ER 15 mg every 8 hours.    You may take 1-2 Vicodin 10/325 mg a day as needed for severe breakthrough pain or in  the morning as needed.    As your opioid dose remains stable, I will send electronic refills to the pharmacy for 2 subsequent months until your next appointment so you do not have to call our refill line.  The fill dates for your medications are:  10/26/17 & fill 11/22/17 for use 11/23/17 due to holiday.    Check with your pharmacy that all prescriptions are on your profile. Call the pharmacy a week before you want to fill the prescription as stock may vary and the pharmacy may need to order the medication for you. I reserve the right to cancel the electronic prescription at the pharmacy in between appointments and would contact you with an explanation if this were to occur.  Continue Miralax as needed for constipation 1 capful in 8 ounces liquid.  Continue taking Cymbalta 120 mg at night for less drowsiness during the day  Continue Gabapentin 400 mg at bedtime.  Continue use of spinal cord stimulator  Diagnostics: UDT/SWAB collected today results are pending.  UDT/SWAB:  Patient required a random Urine Drug Testing, due to the need to comply with [facility] Model Policy Guidelines and CDC Guideline for the use of any controlled substances. This is to ensure that patient is compliant with treatment, and monitor for risks such as diversion, abuse, or any other aberrant behaviors. Patient is either being considered for or taking a controlled substance. Unexpected findings will be discussed and treatment decision may be adjusted. Testing is being implemented across the board randomly w/o bias related to age, race, gender, socioeconomic status or Protestant affiliation.   Follow-up in 8 weeks with Brea.       Brea Sheets PA-C  [address]  [place] [address]  Ph: [phone]  Fax: [phone]

## 2021-06-13 NOTE — PROGRESS NOTES
Patient presents to the clinic today for a follow up with Brea Sheets PA-C regarding back pain.     Pain score: 5  intermittent  What does your pain feel like: aching, stabbing  Does the pain interfere with:  Work: n/a  Walking/distance: yes  Sleep: yes  Daily activities: yes  Relationships/social life: no  Mood: no  F= 5

## 2021-06-13 NOTE — PROGRESS NOTES
PAIN CENTER PROGRESS NOTE    Subjective:   Naveen Toro is a 75 y.o. male who presents for evaluation of low back pain.  He has history of lumbar degeneration.  He has history of lumbar surgery, failed lumbar injections, and is currently using opioid management and implanted spinal cord stimulator to manage pain symptoms. Had SCS battery replaced on 09/09/16 with [employer].     Major issues:  1. Postlaminectomy Syndrome (Lumbar)    2. Chronic pain syndrome    3. Chronic, continuous use of opioids       Pain location and description: 5/10 intermittent aching to stabbing pain in bilateral lower back and hips. Function rated 5.   Radiation of pain: buttocks, hips.    Gait disturbance: Denies recent falls or changes in balance.  Uses AD.  Exacerbating factors: Too much standing, walking, sitting prolonged (10-20 minutes), lifting/bending, cold or rainy weather changes  Alleviating factors: Rest, pain medications, SCS implant, injections, warmer weather, reclining/laying down, some walking.  Associated symptoms: Pain at night.  Numbness in right hip/leg to foot, weakness in right leg, night pain. Denies bowel or bladder incontinence, fever/chills, unexplained weight loss.  Adverse effects of medications: Constipation maybe a little better without the MSER.  Amitiza prescribed two times a day which is working. Has a bowel movement every 2-3 days.  Recently, taking 3 times a week on average to space out dosing as refill was denied.  Denies use of OTC.   Functional Symptoms: See CMA note  Current treatment efficacy: Good; Hysingla 40 mg daily at 0900. Continues Gabapentin 400 mg BID and Cymbalta 120 mg daily without change.  Lamictal 25 mg two times a day helping leg/neuropathic pain.  Current treatment compliance: Good.  Attending scheduled appointments and taking medication as prescribed. Using SCS daily. Continues to see Dr. Frank monthly for pain psychology.  He states not too much exercise, he has a treadmill at  home.      Since the last Pain Center visit, the patient denies any use of anticoagulant medications. Denies any new diagnostic testing, new treatments or new medical conditions, any changes in medications, or any ED/urgent care visits.  He denies any changes in back pain, denies new falls or injuries.   We discussed last visit lumbar facet injections as he has spondylosis in lower back and not much reported leg pain at this time.  He also has not repeated LESI since 11/2019, may consider with pain in colder weather.  States some increase in aching due to weather - tries heat and ice.  He states unsure which one works better.  He states he is inside most of the time, did go to [place] the last few days and felt the same as being at home.       He continues Hysingla to 40 mg daily.  He states this dose is fairly well to control his pain taking 0900 and feels it is lasting through the night.  He states this has helped his daytime drowsiness is a little better, still has days he sleeps but has been up doing more activity.  He states he had to change pharmacies as [facility] couldn't get it anymore.  Last refill was sent to wrong pharmacy and had to be filled after hours by on call provider - [facility] pharmacy has since been removed from his pharmacy.  Also states his Amitiza was refused by work comp insurance stating he should be on senna or bisacodyl - he has failed these in the past.  Has been on Amitiza since 07/2018 with good benefit.  Also previously on Movantik.  No previous trials of symproic.      Had SCS battery replaced on 09/09/16 with [employer].  He reports he has 3 reprogramming sessions with [employer].  Seems to work fairly good and covering correct areas of pain.  He did return to conventional setting.  He is using it 24 hours a day.  He states he doesn't feel it working unless he is in a certain position; laying down or reclining in recliner he can feel it work.  He states charging is ok.  He had  x-ray to evaluate leads in 12/2017 and 06/2020.    Review of Systems  Constitutional: Fatigue. Sleep disturbance, sleeps during the day.  Wearing CPAP every night.  Denies fever, night sweats, weight loss.  Musculoskeletal: Positive for back pain, leg pain.  Denies neck pain. Denies recent falls.  Gastrointestional: Denies difficulty swallowing, change in appetite, abdominal pain, constipation, nausea, vomiting, diarrhea, GERD, fecal incontinence.  Genitourinary: Denies urinary incontinence, dysuria, hematuria, UTI, frequency, hesitancy  Neurologic:  Denies headaches, confusion, seizure, weakness, changes in balance, changes in speech.  Psychiatric: Positive for depression, anxiety. Denies memory loss, psychoses, suicidal ideation, substance     Objective:     Vitals:    11/18/20 1018   BP: 137/78   Pulse: 63   Resp: 14     Physical Exam  Constitutional- General appearance: Normal.  Well developed, comfortable, weight overweight, and appearance reflects stated age.  No acute distress or pain behaviors noted. Presents alone today.  Psychiatric- Judgment and insight: Normal.  Speech: Normal rhythm.  Thought process: Normal.  No abnormal thoughts reported. Alert & Oriented to person, place, and time.  Recent and remote memory: Normal.  Observed mood: appropriate  Respiratory- Breathing is non-labored; normal rhythm and rate.  Dermatologic- Exposed skin is clean, dry, and intact to inspection and palpation.  Musculoskeletal- Gait and station: Ambulates independently with antalgia.  Sit to stand with slight difficulty.    *Opioid [place] Precautions:    UDT/Blood - Collected 11/18/20, results pending  Opioid Agreement - 08/04/20  Opioid consent - 08/04/20  Pharmacy- as documented     Reviewed - 11/18/20 as expected  Pill Count - n/a  Psychological evaluation - monthly through Dr. Larry Frank  MME - 40  Pharmacogenetic testing - n/a  VICENTE 08/04/20    Imaging:  EXAM: XR THORACOLUMBAR 2 VWS  LOCATION: [facility]  Hospital  DATE/TIME: 6/9/2020 11:21 AM     INDICATION: Spinal cord simulator lead position.  COMPARISON: 12/20/2017.     IMPRESSION:   Transitional lumbosacral anatomy with sacralized L5.     Spinal cord stimulator leads are not changed in position. Spinal cord simulator lead entering into the spinal canal at the level of T11-T12 terminates at the level of the T9 pedicles. Additional spinal cord stimulator lead entering into the spinal canal   at the level of T12-L1 terminates at the level of the infrapedicular T11 vertebral body.     Unchanged old compression deformities of the T12, L1, and L2 vertebrae. No new fracture. No aggressive osseous abnormality. No significant change in lumbar spondylosis.    Assessment:   Naveen Toro is a 75 y.o. male with visit today for lumbar degeneration and lumbar radiculopathy, most severe at L5-S1. He is reporting stable pain with current doses and intermittent injections and use of SCS.  He will also continue adjunct duloxetine and gabapentin for nerve pain and mood.  He is to continue Vitamin D for chronic pain, sleep, and mood benefit.  He may continue topicals as needed, review use when he has more activity related pain.  Discussed for OIC, continue to recommend Amitiza as he has been on for over 2 years with good benefit and has failed the following OTC medications (listed below in the table).  If not approved, alternatives may be symproic (may still be a coverage issue).    Plan:   Continue Hysingla (24 hour hydrocodone without tylenol) 40 mg daily.   As your opioid dose remains stable, I will send electronic refills to the pharmacy for 2 subsequent months until your next appointment so you do not have to call our refill line.  The fill dates for your medications are:  Ok to fill 12/06/20 & 01/05/21  Check with your pharmacy that all prescriptions are on your profile. Call the pharmacy a week before you want to fill the prescription as stock may vary and the pharmacy may  need to order the medication for you. I reserve the right to cancel the electronic prescription at the pharmacy in between appointments and would contact you with an explanation if this were to occur.  Continue lamical 25 mg two times a day   For constipation, continue Amitiza 24 mcg twice a day with meals. Discussed you have been on this dose since 07/24/2018.  Other options for constipation until you can get it approved with work comp:  Step    Medication   Regimen  1.                                          Senna    1 capsule two times a day  2.            Increase senna  2 caps two times a day  3.        Increase senna  3 caps two times a day  4.     Increase Senna  4 caps two times a day   AND ADD      Sorbitol   30 mL po two times a day      OR       Polyethylene Glycol (PEG) 17 grams in 4-8 oz liquid po daily      OR      Bisacodyl   2 tabs two times a day  5.     Increase sorbitol  30 mL three times a day  OR  PEG    17 grams in 4-8 oz liquid two times a day  OR   Bisacodyl   3 tabs three times a day  6.     Discuss other prescription options with provider          Continue taking Cymbalta 120 mg at night.  Continue Gabapentin 400 mg two times a day for pain   Continue Lidoderm patch apply 1-3 patches to painful areas on 12 hours, off 12 hours.    May continue using voltaren gel to lower back 2 grams 4 times a day  Continue Vitamin D 5,000 Units daily - 90 day refill sent to new pharmacy  Continue use of spinal cord stimulator  Diagnostics: UDT/Blood collected today results are pending.  Patient required a random Drug Test due to the need to comply with [place] Policy Guidelines and CDC Guideline for the use of any controlled substances. Reasons for definitive testing include:  -Identify specific medication(s) or drug(s) in a class (e.g. Benzodiazepines, barbiturates, opioids, antidepressants)  -Definitive concentration of medication(s), drug(s), and/or metabolites needed to guide  management  -Identify non-prescribed medication or illicit drug use for ongoing safe prescribing of controlled substances  -Identify substances that may present high risk for additive or synergistic interaction with prescription medication (e.g. Alcohol).  Patient is either being considered for or taking a controlled substance. Unexpected findings will be discussed and treatment decision may be adjusted. Testing is being implemented w/o bias related to age, race, gender, socioeconomic status or Muslim affiliation.   Follow-up in 8 weeks with Brea in clinic     BERNABE Mendoza [facility]  [address] [place] [address]. [address]  [place] [address]  Ph: [phone]  Fax: [phone]

## 2021-06-14 NOTE — PROGRESS NOTES
PAIN CENTER PROGRESS NOTE    Subjective:   Naveen Toro is a 76 y.o. male who presents for evaluation of low back pain.  He has history of lumbar degeneration.  He has history of lumbar surgery, failed lumbar injections, and is currently using opioid management and implanted spinal cord stimulator to manage pain symptoms. Had SCS battery replaced on 09/09/16 with [employer].     Major issues:  1. Chronic, continuous use of opioids    2. Chronic pain syndrome    3. Therapeutic opioid-induced constipation (OIC)    4. Adjustment disorder with depressed mood    5. Postlaminectomy Syndrome (Lumbar)    6. Radiculopathy of thoracolumbar region       Pain location and description: 5/10 intermittent aching to stabbing pain in bilateral lower back and hips. Function rated 5.   Radiation of pain: buttocks, hips.    Gait disturbance: Denies recent falls or changes in balance.  Uses cane.    Exacerbating factors: Too much standing, walking, sitting prolonged (10-20 minutes), lifting/bending, cold or rainy weather changes  Alleviating factors: Rest, pain medications, SCS implant, injections, warmer weather, reclining/laying down, some walking.  Associated symptoms: Pain at night.  Numbness in right hip/leg to foot, weakness in right leg, night pain. Denies bowel or bladder incontinence, fever/chills, unexplained weight loss.  Adverse effects of medications: Constipation taking Amitiza prescribed two times a day which is working. Has a bowel movement every 2-3 days.  Hasn't taken miralax recently.  Denies use of OTC.   Functional Symptoms: See CMA note  Current treatment efficacy: Good; Hysingla 40 mg daily at 0900. Continues Gabapentin 400 mg BID and Cymbalta 120 mg daily without change.  Lamictal 25 mg two times a day helping leg/neuropathic pain.  Current treatment compliance: Good.  Attending scheduled appointments and taking medication as prescribed. Using SCS daily. Continues to see Dr. Frank monthly for pain psychology.   "He states not too much exercise, he has a treadmill at home.      Since the last Pain Center visit, the patient denies any use of anticoagulant medications. Denies any new diagnostic testing, new treatments or new medical conditions, any changes in medications, or any ED/urgent care visits.  He denies any changes in back pain, denies new falls or injuries.         He states \"could be better\" in regards to pain control.  Denies new areas of pain.  Getting to be more of a steady pain and grinding in his lower back.  Cold weather is aggravating.  Pain refers into buttock mainly on right side for past month.  He states pain into the leg but stays to mid hamstring.  He states some weakness and numbness in his legs which feels gradually progressive.  He states his function is about the same; doesn't walk as much as he used to.  Uses cane if he goes to store and doesn't use inside the house.      He saw Dr. Hoover on 12/08/2020 reviewed today:  \"ASSESSMENT AND PLAN     ICD-10-CM   1. Type 2 diabetes mellitus without complication, without long-term current use of insulin (HRC) . We will attempt a dosage reduction because of the excellent A1c results the last two checks E11.9 Hgb A1C   Microalb/Creat Ratio   Basic Metabolic Panel   blood glucose (PRODIGY NO CODING BLOOD GLUC) test strip   lancets (PRODIGY TWIST TOP LANCETS 28G)   metFORMIN (GLUCOPHAGE) 500 MG tablet   2. Essential hypertension (HRC) controlled I10 Basic Metabolic Panel   3. Stage 3a chronic kidney disease basically stable. He does have a slow steady increase in his creatinine over years which is not unexpected related to his diabetes N18.31 Basic Metabolic Panel   4. Medication monitoring encounter Z51.81 ALT (SGPT)   5. Urine frequency no sign of urinary tract infection on his urinalysis. Most likely this represents prostatism with some flare up possibly related to some mild prostatitis R35.0 UA Micro If   6. Encounter for immunization Z23 Influenza IIV4 " "(Quadrivalent) Fluad, 65+ Yrs (35983)   7. 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 He is not a candidate for NSAID therapy given his diabetes and blood pressure and renal insufficiency M25.716     Follow up Instructions: Pt to return to see me in six months.   Reduce the metformin to 500 mg twice a day with next refill    Try Oumar Emu or Flexall 454 topical for the hand stiffness    I will follow up with the urine results    You diabetes looks great    The kidney test is stable\"     He continues Hysingla to 40 mg daily.  He states this dose is fairly well to control his pain taking 0900 and feels it is lasting through the night.  He states this has helped his daytime drowsiness is a little better, still has days he sleeps but has been up doing more activity.      Had SCS battery replaced on 09/09/16 with [employer].  He reports he has 3 reprogramming sessions with [employer].  Seems to work fairly good and covering correct areas of pain.  He did return to conventional setting.  He is using it 24 hours a day.  He states he doesn't feel it working unless he is in a certain position; laying down or reclining in recliner he can feel it work.  He states charging is ok.  He had x-ray to evaluate leads in 12/2017 and 06/2020.  He states lately the right side is where he notices coverage and doesn't have adequate coverage on the left side for a few months.      Review of Systems  Constitutional: Fatigue. Sleep disturbance, sleeps during the day.  Wearing CPAP every night.  Denies fever, night sweats, weight loss.  Musculoskeletal: Positive for back pain, right leg pain.  Denies neck pain. Denies recent falls.  Gastrointestional: Denies difficulty swallowing, change in appetite, abdominal pain, nausea, vomiting, diarrhea, GERD, fecal incontinence.  Genitourinary: Denies urinary incontinence, dysuria, hematuria, UTI, frequency, hesitancy  Neurologic:  Denies " headaches, confusion, seizure, weakness, changes in balance, changes in speech.  Psychiatric: Positive for depression, anxiety. Denies memory loss, psychoses, suicidal ideation, substance     Objective:     Vitals:    01/21/21 1503   BP: 141/77   Pulse: 68   Resp: 16     Physical Exam  Constitutional- General appearance: Normal.  Well developed, comfortable, overweight, and appearance reflects stated age.  No acute distress or pain behaviors noted. Presents alone today.  Psychiatric- Judgment and insight: Normal.  Speech: Normal rhythm.  Thought process: Normal.  No abnormal thoughts reported. Alert & Oriented to person, place, and time.  Recent and remote memory: Normal.  Observed mood: appropriate  Respiratory- Breathing is non-labored; normal rhythm and rate.  Dermatologic- Exposed skin is clean, dry, and intact to inspection and palpation.  Musculoskeletal- Gait and station: Ambulates independently with antalgia.  Sit to stand with slight difficulty.    *Opioid [place] Precautions:    UDT/Blood - Reviewed from 11/18/20, results expected  Opioid Agreement - 08/04/20  Opioid consent - 08/04/20  Pharmacy- as documented     Reviewed - 01/21/2021 as expected  Pill Count - n/a  Psychological evaluation - monthly through Dr. Larry Frank  MME - 40  Pharmacogenetic testing - n/a    Imaging:  EXAM: XR THORACOLUMBAR 2 VWS  LOCATION: [facility]  DATE/TIME: 6/9/2020 11:21 AM     INDICATION: Spinal cord simulator lead position.  COMPARISON: 12/20/2017.     IMPRESSION:   Transitional lumbosacral anatomy with sacralized L5.     Spinal cord stimulator leads are not changed in position. Spinal cord simulator lead entering into the spinal canal at the level of T11-T12 terminates at the level of the T9 pedicles. Additional spinal cord stimulator lead entering into the spinal canal   at the level of T12-L1 terminates at the level of the infrapedicular T11 vertebral body.     Unchanged old compression deformities of  the T12, L1, and L2 vertebrae. No new fracture. No aggressive osseous abnormality. No significant change in lumbar spondylosis.    Assessment:   Naveen Toro is a 76 y.o. male with visit today for lumbar degeneration and lumbar radiculopathy, most severe at L5-S1. He is reporting worsened pain without new injury or change in activity.  Discuss he has not had TFESI in over 1 year; recommend repeating to see if pain levels can reduce to baseline with current doses and intermittent injections and use of SCS.  Discussed also he may need another reprogramming visit if he is not having adequate stimulator coverage in left lower extremity.  He will also continue adjunct duloxetine and gabapentin for nerve pain and mood.  Creatinine clearance is 56.3 ml/min calculated from labs 12/08/20.  He is to continue Vitamin D for chronic pain, sleep, and mood benefit.  He may continue topicals as needed, review use when he has more activity related pain.        Plan:   Continue Hysingla (24 hour hydrocodone without tylenol) 40 mg daily.   Ok to fill 02/04/2021   Please call our phone # [phone] and choose option #4 to request a medication refill seven days in advance for your second month opioid prescription refill to be sent electronically to your pharmacy.  We will not return a phone call when the refill is sent to your pharmacy, but expect you to communicate with your pharmacy to ensure it is received and ready by the date needed.  Increase lamical to 25 mg in am and 50 mg (2 tabs) at night x 2 weeks, then 50 mg twice a day.  Call nurse line with any side effect concerns.  Monitor for rash which is emergent.  Continue taking Cymbalta 120 mg at night. 90 day refill sent  Continue Gabapentin 400 mg two times a day for pain 90 day refill sent  Continue Lidoderm patch apply 1-3 patches to painful areas on 12 hours, off 12 hours.    May continue using voltaren gel to lower back 2 grams 4 times a day  Continue Vitamin D 5,000  Units daily   Continue use of spinal cord stimulator - schedule with [employer] here for assessment of leads/programminga as you do not have coverage on the left side.  Schedule right TFESI with Dr. Maier and a .  Follow-up in 8 weeks with Brea in clinic     BERNABE Mendoza [facility]  [address]  [place] [address]  Ph: [phone]  Fax: [phone]

## 2021-06-14 NOTE — PROGRESS NOTES
PAIN CENTER PROGRESS NOTE    Subjective:   Naveen Toro is a 72 y.o. male who presents for evaluation of low back pain.  He has history of lumbar degeneration.  He has history of lumbar surgery, failed lumbar injections, and is currently using opioid management and implanted spinal cord stimulator to manage pain symptoms.  Dr. Garcia.      Major issues:  1. Chronic pain syndrome    2. Lumbar Disc Degeneration       Pain location and description: 5/10 dull ache constant in lower back and intermittently sharp to right buttock and hip.  Mainly lower back, right side worse than the left.  Function rated 8.  Last visit pain rated 4/10.    Radiation of pain: buttocks, hips, legs intermittently.  Sometimes into right great toe.  Right now reports this as fairly well controlled, some pain into right buttock.  Paresthesias, numbness, weakness: Denies new lower extremity symptoms.  States leg pain symptoms not bad currently attributed to SCS and LESI treatments.  Gait disturbance: None reported.  Denies recent falls or changes in balance.  Uses AD.  Exacerbating factors: Activities, weather changes if cold or damp, winter months.  Alleviating factors: Rest, warm pool, pain medications, SCS implant, injections, warmer weather.  Associated symptoms: Sedentary lifestyle, depression.  Cramping in calves intermittent, tries to stretch frequency is variable a few times per week.  Adverse effects of medications: Constipation.  Uses miralax powder prn.  Does report some drowsiness related to medications and depression.  Discontinued Movantik.  Current treatment efficacy: Fair; Morphine ER 15 mg every 8 hours and Vicodin BID prn. Continues Gabapentin 400 mg bedtime and Cymbalta 120 mg daily without change.   Denies taking muscle relaxants.    Current treatment compliance: Good.  Attending scheduled appointments and taking medication as prescribed. Using SCS daily. Continues to see Dr. Frank monthly for pain psychology.      Had  TFESI right L5-S1 on 11/07/17 and reports pain relief about 50%.   He feels it is still helpful but hard with colder weather to know for sure.  He denies post procedure complications or adverse side effects of steroid.  He states he noticed this time he had steroid adverse effects warm and nauseated lasts 2-3 days.  He denies any other symptoms including dizziness, severe headache, fatigue, fever, chills, site reaction.      Had SCS battery replaced on 09/09/16 with [employer].  He reports he has 2 reprogramming sessions with [employer].  Seems to work fairly good and covering correct areas of pain.  He is on high dose setting and doesn't feel except sometimes on feet when laying down.  He is keeping on around the clock, rechargeable battery having to charge every 3-4 days taking 2-3 hours to charge fully.      He did see Dr. Garcia at [facility] since last visit; records unavailable to review today but will request them. He states he was not advised to do any imaging or further treatments than what he is already doing.  He states he is having upper lumbar, lower thoracic pain that is bothersome and would like to know if there are any procedures to help this area of pain.  We review ordering thoracolumbar x-ray to evaluate.    Review of Systems  Constitutional: Fatigue. Weight gain 30 pounds, less active. Sleep disturbance, sleeps during the day.  Wearing CPAP every night.  Denies fever, chills, night sweats, weight loss.  Musculoskeletal: Positive for back pain, right leg pain.  Denies weakness, joint pain, joint swelling, recent falls.  Gastrointestional: Denies difficulty swallowing, change in appetite, abdominal pain, constipation, nausea, vomiting, diarrhea, GERD, fecal incontinence.  Genitourinary: Denies urinary incontinence, dysuria, hematuria, UTI, frequency, hesitancy  Neurologic: Denies headaches, confusion, seizure, weakness, changes in balance, changes in speech.  Psychiatric: Positive for  "depression. Denies memory loss, psychoses, suicidal ideation, substance use/abuse.     Objective:     Vitals:    12/12/17 1055   BP: 144/76   Pulse: (!) 57   Resp: 16   Weight: 215 lb (97.5 kg)   Height: 5' 9.5\" (1.765 m)   PainSc:   5     Physical Exam  Constitutional- General appearance: Well groomed, well developed, comfortable, overweight and appearance reflects stated age.  No acute distress or pain behaviors noted. Presents alone today, wife is in waiting room.  Psychiatric- Judgment and insight: Normal.  Speech: Normal rhythm.  Thought process: Normal.  No abnormal thoughts reported. Alert & Oriented to person, place, and time.  Recent and remote memory: Normal.  Observed mood: Appropriate.   Respiratory- Breathing is non-labored; normal rhythm and rate.  Cardiovascular- Extremities warm and well perfused, no peripheral edema or varicosities.  Dermatologic- Exposed skin is clean, dry, and intact to inspection and palpation.   Musculoskeletal- Gait and station: Abnormal.  Gait evaluation demonstrates ambulating with single point cane with a stiff, slightly antalgic gait.  Patient does have difficulty rising from a seated position.  Pain in back from T10-L5    *Opioid [place] Precautions:    UDS/Swab - Reviewed from 10/18/17 as expected.   Opioid Agreement - 03/09/17  Pharmacy- as documented    [place]  Reviewed - called pharmacy for last refill dates on 12/12/17 as expected  Pill Count - n/a  Psychological evaluation - monthly through Dr. Frank  MME - 65  Pharmacogenetic testing - n/a  VICENTE Score: 72 12/12/17    Assessment:   Naveen Toro is a 72 y.o. male seen in clinic today for lumbar degeneration and lumbar radiculopathy.  He continues pain management with Morphine 15 mg every 8 hours and Vicodin 10/325 mg BID prn daily as needed for severe pain.  Reports OIC but failed Movantik, continues over the counter measures.  Did have relief from recent TFESI right L5-S1 without complication. He continues to use " his SCS for pain control of radiculopathy symptoms with good coverage.  He is reporting pain in lower thoracic, upper lumbar spine and no new injury or activity.  Will evaluate with x-ray to determine if degeneration above the levels of his fusion, and can assess if injections at higher level would be appropriate.  He did see Dr. Garcia and will sign a release of information to review the consult note.     Plan:   Continue Morphine ER 15 mg every 8 hours.    You may take 1-2 Vicodin 10/325 mg a day as needed for severe breakthrough pain or in the morning as needed.    As your opioid dose remains stable, I will send electronic refills to the pharmacy for 2 subsequent months until your next appointment so you do not have to call our refill line.  The fill dates for your medications are:  12/21/17 & 01/18/18  Check with your pharmacy that all prescriptions are on your profile. Call the pharmacy a week before you want to fill the prescription as stock may vary and the pharmacy may need to order the medication for you. I reserve the right to cancel the electronic prescription at the pharmacy in between appointments and would contact you with an explanation if this were to occur.  Continue Miralax as needed for constipation 1 capful in 8 ounces liquid.  Continue taking Cymbalta 120 mg at night for less drowsiness during the day  Continue Gabapentin 400 mg at bedtime.  Continue use of spinal cord stimulator  Sign release of information today for Dr. Garcia at [facility]  Will order lower thoracic and lumbar x-rays to evaluate pain that is higher in your back to determine if there are injection treatments that may be helpful   Follow-up in 8 weeks with Brea.       Brea Sheets PA-C  [address]  [place] [address]  Ph: [phone]  Fax: [phone]

## 2021-06-14 NOTE — PROGRESS NOTES
Patient presents to the clinic today for a follow up with Brea Sheets PA-C regarding back pain.      Pain score: 5  intermittent  What does your pain feel like: aching, stabbing, mainly on right side  Does the pain interfere with:  Work: n/a  Walking/distance: yes  Sleep: yes  Daily activities: yes  Relationships/social life: no  Mood: no  F= 5

## 2021-06-14 NOTE — TELEPHONE ENCOUNTER
Medication being requested: Lamictal  Last visit date: 11/18 Provider: MILDRED  Next visit date:  1/21 Provider: MILDRED  Expected follow up: 8 weeks  MTM visit (Pain Center) date: na  Pertinent between visit information about requested medication (telephone, mychart, prior authorization, concerns): na  Last date prescribed: 10/9   90 day supply  Provider responsible: MILDRED  Spoke with patient: no  Script being sent to provider - dates and quantity:   Requested Prescriptions     Pending Prescriptions Disp Refills     lamoTRIgine (LAMICTAL) 25 MG tablet 180 tablet 0     Sig: TAKE 1 TABLET BY MOUTH TWICE DAILY     Pharmacy cued: [facility]

## 2021-06-14 NOTE — PATIENT INSTRUCTIONS - HE
Continue Hysingla (24 hour hydrocodone without tylenol) 40 mg daily.   Ok to fill 02/04/2021   Please call our phone # [phone] and choose option #4 to request a medication refill seven days in advance for your second month opioid prescription refill to be sent electronically to your pharmacy.  We will not return a phone call when the refill is sent to your pharmacy, but expect you to communicate with your pharmacy to ensure it is received and ready by the date needed.  Increase lamical to 25 mg in am and 50 mg (2 tabs) at night x 2 weeks, then 50 mg twice a day.  Call nurse line with any side effect concerns.  Monitor for rash which is emergent.  Continue taking Cymbalta 120 mg at night. 90 day refill sent  Continue Gabapentin 400 mg two times a day for pain 90 day refill sent  Continue Lidoderm patch apply 1-3 patches to painful areas on 12 hours, off 12 hours.    May continue using voltaren gel to lower back 2 grams 4 times a day  Continue Vitamin D 5,000 Units daily   Continue use of spinal cord stimulator - schedule with [employer] here for assessment of leads/programminga as you do not have coverage on the left side.  Schedule right TFESI with Dr. Maier and a .  Follow-up in 8 weeks with Brea in clinic

## 2021-06-15 NOTE — TELEPHONE ENCOUNTER
Medication being requested: Hysingla  Last visit date: 1/21/21.  Provider: MILDRED  Next visit date: 3/17/21.  Provider: MILDRED  Expected follow up: 8 weeks  MTM visit (Pain Center) date: No  UDT date: 1/2020  Agreement date: 8/2020   (Last fill date; name; strength; provider; MME; quantity):  Unable to login to , queued off of last Rx ordered to start 2/4/21  Pertinent between visit information about requested medication (telephone, mychart, prior authorization, concerns, comments): No  Script being sent to provider by nurse- dates and quantity:   3/6/21-4/5/21  Requested Prescriptions     Pending Prescriptions Disp Refills     HYDROcodone bitartrate (HYSINGLA ER) 40 mg TP24 30 each 0     Sig: Take 40 mg by mouth daily.     Pharmacy cued: [facility]  Standing orders for withdrawal protocol implemented: [facility]

## 2021-06-15 NOTE — PATIENT INSTRUCTIONS - HE
POST PROCEDURE INSTRUCTIONS  PROCEDURE DONE TODAY: Right L5-S1 Epidural Steroid Injection    Rest today. Resume activity tomorrow as able.  Patient demonstrates the ability to ambulate independently with a steady gait at the time of discharge.      It is not unusual to have a temporary increase in your pain after a procedure. You can ice the area 24-48 hrs. 15 min at a time.      You received a steroid injection. This medication can take 2-7 days to take effect. Some temporary side effects include:    Heartburn    Flushed-red face    Insomnia-trouble sleeping-jitters    Diabetics may see a rise in blood sugar for a few days    Low back or SI joint (sacroiliac) injection: you may experience numbness in one or both legs. Please walk with help. This is temporary and will wear off in a few hours. Do not drive if you are tingling, numbness or weakness in your hands/arms or legs/feet.    Headache:  If you experience a headache after a lumbar epidural injection, lie down and drink fluids (especially caffeine). The headache will go away gradually. If it persists notify our clinic.    Fever: call if fever 100 or over, redness/warmth/swelling over the injection site.    No public hot tubs/pools for a couple days    Physical therapy: check with pain center provider regarding resuming therapy.    Monitor your response to the injection and report this at your next visit.    If you have been referred for a procedure only, please call your referring provider for a follow up.    IF YOU PLAN TO GET A FLU SHOT YOU MUST WAIT 2 WEEKS AFTER THIS INJECTION.      CALL IF ANY QUESTIONS [phone]

## 2021-06-15 NOTE — PROGRESS NOTES
PAIN CENTER PROGRESS NOTE    Subjective:   Naveen Toro is a 73 y.o. male who presents for evaluation of low back pain.  He has history of lumbar degeneration.  He has history of lumbar surgery, failed lumbar injections, and is currently using opioid management and implanted spinal cord stimulator to manage pain symptoms.  Dr. Garcia.      Major issues:  1. Radiculopathy of thoracolumbar region    2. Lumbar facet arthropathy    3. Chronic pain syndrome    4. Opioid dependence, continuous       Pain location and description: 5/10 dull ache intermittent in lower back and intermittently sharp to right buttock and hip.  Mainly lower back, right side worse than the left.  Function rated 8.  Last visit pain rated 5/10.  At best, pain rating 4/10 and at worst 7/10.  Radiation of pain: buttocks, hips, legs intermittently.  Sometimes into right great toe.  Right now reports this as fairly well controlled, some pain into right buttock.  Paresthesias, numbness, weakness: Denies new lower extremity symptoms.  States leg pain symptoms not bad currently attributed to SCS and LESI treatments.  Gait disturbance: None reported.  Denies recent falls or changes in balance.  Uses AD.  Exacerbating factors: Activities, weather changes if cold or damp, winter months, walking, sitting too long  Alleviating factors: Rest, warm pool, pain medications, SCS implant, injections, warmer weather, reclining  Associated symptoms: Sedentary lifestyle, depression.  Cramping in calves intermittent, tries to stretch frequency is variable a few times per week.  Numbness and weakness chronic in right leg and foot.  Denies bowel or bladder incontinence, fever/chills, unexplained weight loss.  Adverse effects of medications: Constipation.  Uses miralax powder prn. Denies stool softener. Does report some drowsiness related to medications and depression.   Functional Symptoms: Sleep, walking, activities of daily living, relationships, mood.  Current  treatment efficacy: Fair; Morphine ER 15 mg every 8 hours and Vicodin BID prn. Continues Gabapentin 400 mg bedtime and Cymbalta 120 mg daily without change.   Denies taking muscle relaxants.    Current treatment compliance: Good.  Attending scheduled appointments and taking medication as prescribed. Using SCS daily. Continues to see Dr. Frank monthly for pain psychology.      Had TFESI right L5-S1 on 11/07/17 and reports pain relief about 50%.   He feels it is still helpful but hard with colder weather to know for sure.  He denies post procedure complications or adverse side effects of steroid.  He states he noticed this time he had steroid adverse effects warm and nauseated lasts 2-3 days.  He denies any other symptoms including dizziness, severe headache, fatigue, fever, chills, site reaction.      Had SCS battery replaced on 09/09/16 with [employer].  He reports he has 2 reprogramming sessions with [employer].  Seems to work fairly good and covering correct areas of pain.  He is on high dose setting and doesn't feel except sometimes on feet when laying down.  He is keeping on around the clock, rechargeable battery having to charge every 3-4 days taking 2-3 hours to charge fully.      He did see Dr. Garcia at [facility].  He states he was not advised to do any imaging or further treatments than what he is already doing.  We did order a thoracolumbar x-ray last visit and reviewed (see below) with spine model.  L5-S1 demonstrates most advanced degeneration and also facet arthropathy, discuss facet injection today as he is scheduled with Dr. Maier.    Review of Systems  Constitutional: Fatigue. Weight gain 30 pounds, less active. Sleep disturbance, sleeps during the day.  Wearing CPAP every night.  Denies fever, chills, night sweats, weight loss.  Musculoskeletal: Positive for back pain, right leg pain/weakness.  Denies joint pain, joint swelling, recent falls.  Gastrointestional: Denies difficulty  "swallowing, change in appetite, abdominal pain, constipation, nausea, vomiting, diarrhea, GERD, fecal incontinence.  Genitourinary: Denies urinary incontinence, dysuria, hematuria, UTI, frequency, hesitancy  Neurologic: Denies headaches, confusion, seizure, weakness, changes in balance, changes in speech.  Psychiatric: Positive for depression. Denies memory loss, psychoses, suicidal ideation, substance use/abuse.     Objective:     Vitals:    02/06/18 1100   BP: 148/73   Pulse: 65   Resp: 16   Weight: 220 lb (99.8 kg)   Height: 5' 9.5\" (1.765 m)   PainSc:   5     Physical Exam  Constitutional- General appearance: Well groomed, well developed, comfortable, overweight and appearance reflects stated age.  No acute distress or pain behaviors noted. Presents alone today, wife is in waiting room.  Psychiatric- Judgment and insight: Normal.  Speech: Normal rhythm.  Thought process: Normal.  No abnormal thoughts reported. Alert & Oriented to person, place, and time.  Recent and remote memory: Normal.  Observed mood: Appropriate.   Respiratory- Breathing is non-labored; normal rhythm and rate.  Cardiovascular- Extremities warm and well perfused, no peripheral edema or varicosities.  Dermatologic- Exposed skin is clean, dry, and intact to inspection and palpation.   Musculoskeletal- Gait and station: Abnormal.  Gait evaluation demonstrates ambulating with single point cane with a stiff, slightly antalgic gait.  Patient does have difficulty rising from a seated position.  Pain in back from T10-L5    *Opioid [place] Precautions:    UDS/Swab - 10/18/17 as expected.   Opioid Agreement - 03/09/17  Pharmacy- as documented    [place]  Reviewed - 12/12/17 as expected  Pill Count - n/a  Psychological evaluation - monthly through Dr. Frank  MME - 65  Pharmacogenetic testing - n/a  VICENTE 12/12/17    Imaging:  Thoracolumbar x-rays 12/20/17:  FINDINGS:  There are posterior spinal stimulator leads. Assuming 12 thoracic type vertebral " bodies, one of the leads terminates at the T8-T9 level and the other at the T11 level. There are median sternotomy wires. Superior endplate deformity of L3 which may   represent a Schmorl's node vs. less likely an age-indeterminate compression fracture. There is severe loss of disc space at L5-S1. Severe facet arthropathy at the lumbosacral junction. Retrolisthesis of L2 on L3 and L3 on L4. Atherosclerotic   calcification of the abdominal aorta.     Assessment:   Naveen Toro is a 73 y.o. male seen in clinic today for lumbar degeneration and lumbar radiculopathy, most severe at L5-S1.  He continues pain management with Morphine 15 mg every 8 hours and Vicodin 10/325 mg BID prn daily as needed for severe pain.  Reports OIC but failed Movantik, continues over the counter measures.  Did have relief from recent TFESI right L5-S1 without complication.  We also discuss lumbar facet injection at L5-S1 as he has severe facet arthropathy as well. He continues to use his SCS for pain control of radiculopathy symptoms with good coverage.      Plan:   Continue Morphine ER 15 mg every 8 hours.    You may take 1-2 Vicodin 10/325 mg a day as needed for severe breakthrough pain or in the morning as needed.    As your opioid dose remains stable, I will send electronic refills to the pharmacy for 2 subsequent months until your next appointment so you do not have to call our refill line.  The fill dates for your medications are:  02/15/18 & 03/15/18  Check with your pharmacy that all prescriptions are on your profile. Call the pharmacy a week before you want to fill the prescription as stock may vary and the pharmacy may need to order the medication for you. I reserve the right to cancel the electronic prescription at the pharmacy in between appointments and would contact you with an explanation if this were to occur.  Continue Miralax as needed for constipation 1 capful in 8 ounces liquid.  Continue taking Cymbalta 120 mg at night  for less drowsiness during the day - 90 day refill sent when you need to fill it  Continue Gabapentin 400 mg at bedtime - 90 day refill sent when you need to fill it  Continue Vitamin D 5,000 Units daily - 90 day refill sent when you need to fill it  Continue use of spinal cord stimulator  Reviewed x-rays today; you can keep appointment with Dr. Maier on 02/20 and discuss repeating lumbar epidural injection versus lumbar facet injection to treat arthritis pain in lower back.   Follow-up in 8 weeks with Brea.       Brea Sheets PA-C  [address] [place] [address]. [address]  [place] [address]  Ph: [phone]  Fax: [phone]

## 2021-06-16 ENCOUNTER — RECORDS - HEALTHEAST (OUTPATIENT)
Dept: ADMINISTRATIVE | Facility: OTHER | Age: 76
End: 2021-06-16

## 2021-06-16 NOTE — PROGRESS NOTES
PAIN CENTER PROGRESS NOTE    Subjective:   Naveen Toro is a 76 y.o. male who presents for evaluation of low back pain.  He has history of lumbar degeneration.  He has history of lumbar surgery, failed lumbar injections, and is currently using opioid management and implanted spinal cord stimulator to manage pain symptoms. Had SCS battery replaced on 09/09/16 with [employer].     Major issues:  1. Chronic, continuous use of opioids    2. Chronic pain syndrome    3. Postlaminectomy Syndrome (Lumbar)    4. Radiculopathy of thoracolumbar region    5. Adjustment disorder with depressed mood       Pain location and description: See CMA Note  Radiation of pain: buttocks, hips.    Gait disturbance: Denies recent falls or changes in balance.  Uses cane.    Exacerbating factors: Too much standing, walking, sitting prolonged (10-20 minutes), lifting/bending, cold or rainy weather changes  Alleviating factors: Rest, pain medications, SCS implant, injections, warmer weather, reclining/laying down, some walking.  Associated symptoms: Pain at night.  Numbness in right hip/leg to foot, weakness in right leg, night pain. Denies bowel or bladder incontinence, fever/chills, unexplained weight loss.  Adverse effects of medications: Constipation taking Amitiza prescribed two times a day which is working. Has a bowel movement every 2-3 days.  Hasn't taken miralax recently.  Denies use of OTC.   Functional Symptoms: See CMA note  Current treatment efficacy: Good; see medication list.  Current treatment compliance: Good.  Attending scheduled appointments and taking medication as prescribed. Using SCS daily. Continues to see Dr. Frank monthly for pain psychology.  He states not too much exercise, he has a treadmill at home.      Since the last Pain Center visit, the patient denies any use of anticoagulant medications. Denies any new diagnostic testing, new treatments or new medical conditions, any changes in medications, or any ED/urgent  care visits.  He denies any changes in back pain, denies new falls or injuries.   He had repeat right L5-S1 TFESI on 02/10/2021 and reports 3 weeks of 50% pain relief and at this point he is unsure if any pain relief is lasting.    He is having 2nd COVID vaccine today and will reschedule LESI for 2 weeks after.         He was also to increase lamictal for nerve pain in bilateral legs to 50 mg two times a day and he reports he is taking 2 tablets at night but not during the day as he was unclear on the dosing instructions and also needs a refill.      He continues Hysingla to 40 mg daily.  He states this dose is fairly well to control his pain taking 0900 during the day but finding more pain in the evening and bedtime hours. He states this has helped his daytime drowsiness is a little better, still has days he sleeps but has been up doing more activity.  He states he is sore by the time he goes to bed at night and unsure if it lasts the 24 hours.  He states he is pretty stiff in the morning when he gets up out of bed.  We discuss use of short-acting opioid at night as he did previously take Vicodin and he would like to resume.    Had SCS battery replaced on 09/09/16 with [employer]. He has conventional setting.  He is using it 24 hours a day.  He states he doesn't feel it working unless he is in a certain position; laying down or reclining in recliner he can feel it work.  He states charging is ok.  He had x-ray to evaluate leads in 12/2017 and 06/2020.  He states lately the right side is where he notices coverage and doesn't have adequate coverage on the left side for a few months.  He is meeting with [facility] today for assistance after our visit.    Review of Systems  Constitutional: Fatigue. Sleep disturbance, sleeps during the day.  Wearing CPAP every night.  Denies fever, night sweats, weight loss.  Musculoskeletal: Positive for back pain, right leg pain.  Denies neck pain. Denies recent  falls.  Gastrointestional: Denies difficulty swallowing, change in appetite, abdominal pain, nausea, vomiting, diarrhea, GERD, fecal incontinence.  Genitourinary: Denies urinary incontinence, dysuria, hematuria, UTI, frequency, hesitancy  Neurologic:  Denies headaches, confusion, seizure, weakness, changes in balance, changes in speech.  Psychiatric: Positive for depression, anxiety. Denies memory loss, psychoses, suicidal ideation, substance     Objective:     Vitals:    03/17/21 1101   BP: 147/74   Pulse: (!) 57   Resp: 16     Physical Exam  Constitutional- General appearance: Normal.  Well developed, comfortable, overweight, and appearance reflects stated age.  No acute distress or pain behaviors noted. Presents alone today.  Psychiatric- Judgment and insight: Normal.  Speech: Normal rhythm.  Thought process: Normal.  No abnormal thoughts reported. Alert & Oriented to person, place, and time.  Recent and remote memory: Normal.  Observed mood: appropriate  Respiratory- Breathing is non-labored; normal rhythm and rate.  Dermatologic- Exposed skin is clean, dry, and intact to inspection and palpation.  Musculoskeletal- Gait and station: Ambulates independently with antalgia.  Sit to stand with slight difficulty.    *Opioid [place] Precautions:    UDT/Blood - 11/18/20, results expected  Opioid Agreement - 08/04/20  Opioid consent - 08/04/20  Pharmacy- as documented     Reviewed -03/17/2021 as expected  Pill Count - n/a  Psychological evaluation - monthly through Dr. Larry Frank  MME - 40 increasing to 50  Pharmacogenetic testing - n/a    Imaging:  EXAM: XR THORACOLUMBAR 2 VWS  LOCATION: [facility]  DATE/TIME: 6/9/2020 11:21 AM     INDICATION: Spinal cord simulator lead position.  COMPARISON: 12/20/2017.     IMPRESSION:   Transitional lumbosacral anatomy with sacralized L5.     Spinal cord stimulator leads are not changed in position. Spinal cord simulator lead entering into the spinal canal at the  level of T11-T12 terminates at the level of the T9 pedicles. Additional spinal cord stimulator lead entering into the spinal canal   at the level of T12-L1 terminates at the level of the infrapedicular T11 vertebral body.     Unchanged old compression deformities of the T12, L1, and L2 vertebrae. No new fracture. No aggressive osseous abnormality. No significant change in lumbar spondylosis.    Assessment:   Naveen Toro is a 76 y.o. male with visit today for lumbar degeneration and lumbar radiculopathy, most severe at L5-S1. He is reporting worsened pain without new injury or change in activity.  He did have TFESI with 50% pain relief x 2 weeks, would like to repeat as it had been over 1 year and will see if we can get prolonged duration of relief with repeat - discuss avoiding 2 weeks after COVID vaccine.  Discussed also visit today for SCS reprogramming visit as he is not having adequate stimulator coverage in left lower extremity.  He will also continue adjunct duloxetine and gabapentin for nerve pain and mood and will increase lamotrigine for pain and mood benefit.  Creatinine clearance is 56.3 ml/min calculated from labs 12/08/20.  He is to continue Vitamin D for chronic pain, sleep, and mood benefit.  He may continue topicals as needed, review use when he has more activity related pain.  Will continue dose of Hysingla and will add in Vicodin 10/325 mg 1 tab in evening or bedtime to take for increased pain - previously tolerated dose and still within CDC guidelines for MME.      Plan:   Continue Hysingla (24 hour hydrocodone without tylenol) 40 mg daily.   Ok to fill 04/02/2021 & 05/01/21 to start on 05/02/21  Also ok to take Vicodin 10/325 mg 1 tab at bedtime as needed for pain - refills sent for 03/17/2021 & 04/16/2021  Increase lamictal to 50 mg twice a day.  90 day supply sent  Continue taking Cymbalta 120 mg at night. 90 day refill at pharmacy  Continue Gabapentin 400 mg two times a day for pain  90  day refill at pharmacy  Continue Lidoderm patch apply 1-3 patches to painful areas on 12 hours, off 12 hours.    May continue using voltaren gel to lower back 2 grams 4 times a day  Continue Vitamin D 5,000 Units daily   Continue use of spinal cord stimulator - meeting today with [employer] assessment of leads/programming as you do not have coverage on the left side.  Repeat TFESI with Dr. Maier at least 2 weeks after COVID vaccine  Follow-up in 8 weeks with Brea in clinic     Brea Sheets PA-C  [facility]  [address] [place] [address]. [address]  [place] [address]  Ph: [phone]  Fax: [phone]

## 2021-06-16 NOTE — TELEPHONE ENCOUNTER
Telephone Encounter by Irma Mauricio RN at 12/2/2019 10:14 AM     Author: Irma Mauricio RN Service: -- Author Type: Registered Nurse    Filed: 12/2/2019 10:26 AM Encounter Date: 12/2/2019 Status: Signed    : Irma Mauricio RN (Registered Nurse)       Medication being requested: Hydrocodone  Last visit date: 10/22   Provider: MILDRED  Next visit date: 12/3   Provider: MILDRED  Expected follow up: 6 weeks  MTM visit (Pain Center) date: na  UDT date: 11/13/18  Agreement date: 4/30/19   snipped in:    Pertinent between visit information about requested medication (telephone, mychart, prior authorization, concerns, comments): Refill request denied, patient has appointment on 12/3 when medication refill is due.  Script being sent to provider by nurse- dates and quantity: na  Pharmacy cued: [facility]  Standing orders for withdrawal protocol implemented: [facility]

## 2021-06-16 NOTE — PATIENT INSTRUCTIONS - HE
Continue Hysingla (24 hour hydrocodone without tylenol) 40 mg daily.   Ok to fill 04/02/2021 & 05/01/21 to start on 05/02/21  Also ok to take Vicodin 10/325 mg 1 tab at bedtime as needed for pain - refills sent for 03/17/2021 & 04/16/2021  Increase lamictal to 50 mg twice a day.  90 day supply sent  Continue taking Cymbalta 120 mg at night. 90 day refill at pharmacy  Continue Gabapentin 400 mg two times a day for pain  90 day refill at pharmacy  Continue Lidoderm patch apply 1-3 patches to painful areas on 12 hours, off 12 hours.    May continue using voltaren gel to lower back 2 grams 4 times a day  Continue Vitamin D 5,000 Units daily   Continue use of spinal cord stimulator - meeting today with [employer] assessment of leads/programming as you do not have coverage on the left side.  Repeat TFESI with Dr. Maier at least 2 weeks after COVID vaccine  Follow-up in 8 weeks with Brea in clinic

## 2021-06-16 NOTE — TELEPHONE ENCOUNTER
Telephone Encounter by Sada Guzman RN at 1/7/2020 12:27 PM     Author: Sada Guzman RN Service: -- Author Type: Registered Nurse    Filed: 1/7/2020 12:33 PM Encounter Date: 1/7/2020 Status: Signed    : Sada Guzman RN (Registered Nurse)       Medication being requested: Hysingla     snipped in:    Script being sent to provider by nurse- dates and quantity: 1/12/20-2/11/20, Qty: 30  Requested Prescriptions     Pending Prescriptions Disp Refills   ? lidocaine (LIDODERM) 5 % [Pharmacy Med Name: LIDOCAINE 5% PATCH] 30 patch 0     Sig: UNWRAP AND APPLY 1 PATCH TO SKIN DAILY, REMOVE AND DISCARD PATCH WITHIN 12 HOURS OR AS DIRECTED BY DOCTOR   ? HYDROcodone bitartrate (HYSINGLA ER) 60 mg TP24  0     Sig: Take 60 mg by mouth daily.     Pharmacy cued: [facility]  Standing orders for withdrawal protocol implemented: NA

## 2021-06-16 NOTE — PATIENT INSTRUCTIONS - HE
POST PROCEDURE INSTRUCTIONS  PROCEDURE DONE TODAY: L5-S1 transforaminal epidural steroid injection    Rest today. Resume activity tomorrow as able.  Patient demonstrates the ability to ambulate independently with a steady gait at the time of discharge.      It is not unusual to have a temporary increase in your pain after a procedure. You can ice the area 24-48 hrs. 15 min at a time.      You received a steroid injection. This medication can take 2-7 days to take effect. Some temporary side effects include:    Heartburn    Flushed-red face    Insomnia-trouble sleeping-jitters    Diabetics may see a rise in blood sugar for a few days    Low back or SI joint (sacroiliac) injection: you may experience numbness in one or both legs. Please walk with help. This is temporary and will wear off in a few hours. Do not drive if you are tingling, numbness or weakness in your hands/arms or legs/feet.    Headache:  If you experience a headache after a lumbar epidural injection, lie down and drink fluids (especially caffeine). The headache will go away gradually. If it persists notify our clinic.    Fever: call if fever 100 or over, redness/warmth/swelling over the injection site.    No public hot tubs/pools for a couple days    Physical therapy: check with pain center provider regarding resuming therapy.    Monitor your response to the injection and report this at your next visit.    If you have been referred for a procedure only, please call your referring provider for a follow up.    IF YOU PLAN TO GET A FLU SHOT YOU MUST WAIT 2 WEEKS AFTER THIS INJECTION.      CALL IF ANY QUESTIONS [phone]

## 2021-06-16 NOTE — TELEPHONE ENCOUNTER
"Telephone Encounter by Sada Guzman RN at 2/27/2020 11:25 AM     Author: Sada Guzman RN Service: -- Author Type: Registered Nurse    Filed: 2/27/2020 11:31 AM Encounter Date: 2/27/2020 Status: Signed    : Sada Guzman RN (Registered Nurse)       Patient calling stating he needs a letter sent to \"[employer]\" from Brea Gonzales PA-C stating why he needs to be on the additional medication that was discussed at appointment with Keanu.    2/13/20      Brea Sheets PA-C please let me know if you need any further assistance.  Patient does have an appointment scheduled for 3/11/20.       "

## 2021-06-16 NOTE — TELEPHONE ENCOUNTER
Telephone Encounter by Mary Jo Peck at 1/14/2020  9:23 AM     Author: Mary Jo Peck Service: -- Author Type: --    Filed: 1/14/2020  9:23 AM Encounter Date: 1/13/2020 Status: Signed    : Mary Jo Peck       PRIOR AUTHORIZATION DENIED    Denial Rational: must try/fail Xtampza ER capsule sprinkle        Appeal Information: This medication was denied. If physician would like to appeal because patient has contraindication or allergy to covered medication please write letter of medical necessity and route back to PA team to initiate.  If no further action is needed please close encounter thank you.

## 2021-06-16 NOTE — TELEPHONE ENCOUNTER
Telephone Encounter by Mica Amador RN at 11/27/2019 11:18 AM     Author: Mica Amador RN Service: -- Author Type: Registered Nurse    Filed: 11/27/2019 11:44 AM Encounter Date: 11/27/2019 Status: Signed    : Mica Amador RN (Registered Nurse)       Medication being requested: norco 10/325  Last visit date: 10/22  Provider: [facility]  11/05-PA visit  Next visit date: 12/03  Provider: [facility]  Expected follow up: 8 weeks  MTM visit (Pain Center) date: no  UDS- 11/13/18 CSA- 04/30/19   snipped in:    Pertinent between visit information about requested medication (telephone, mychart, prior authorization, concerns, comments): NA  Script being sent to provider by nurse- dates and quantity:   Requested Prescriptions     Pending Prescriptions Disp Refills   ? HYDROmorphone (EXALGO ER) 8 mg Tb24 28 tablet 0     Sig: Take 1 tablet by mouth daily.   ? HYDROcodone-acetaminophen (NORCO )  mg per tablet 56 tablet 0     Sig: Take 1 tablet by mouth 2 (two) times a day as needed for pain.     Pharmacy cued: [facility]  Standing orders for withdrawal protocol implemented: NA

## 2021-06-16 NOTE — TELEPHONE ENCOUNTER
Telephone Encounter by Mica Amador RN at 2/5/2020 10:45 AM     Author: Mica Amador RN Service: -- Author Type: Registered Nurse    Filed: 2/5/2020 10:56 AM Encounter Date: 2/5/2020 Status: Signed    : Mica Amador RN (Registered Nurse)       Medication being requested: gabapentin 400 mg  Last visit date: 1/15 Provider: MIL[facility]ED  Next visit date: 3/11  Provider: [facility]ED  Expected follow up: 4 weels with Keanu WALLER  8 weeks for RW  MTM visit (Pain Center) date: 2/13  UDT- 12/2019  CSA- 04/30/19   snipped in:    Pertinent between visit information about requested medication (telephone, mychart, prior authorization, concerns, comments):   NA  Script being sent to provider by nurse- dates and quantity:     Requested Prescriptions     Pending Prescriptions Disp Refills   ? gabapentin (NEURONTIN) 400 MG capsule [Pharmacy Med Name: GABAPENTIN 400MG CAPSULES] 180 capsule 0     Sig: Take 1 capsule (400 mg total) by mouth 2 (two) times a day.     Pharmacy cued: [facility]  Standing orders for withdrawal protocol implemented: NA

## 2021-06-17 NOTE — TELEPHONE ENCOUNTER
Telephone Encounter by Irma Mauricio RN at 5/11/2020  3:16 PM     Author: Irma Mauricio RN Service: -- Author Type: Registered Nurse    Filed: 5/11/2020  3:38 PM Encounter Date: 5/11/2020 Status: Signed    : Irma Mauricio RN (Registered Nurse)       Medication being requested: Neurontin, Amitiza, Hysingla  Last visit date: 4/9   Provider: [facility]ED  Next visit date: 6/4   Provider: [facility]ED  Expected follow up: 8 weeks  MTM visit (Pain Center) date: 2/13  UDT date: 12/2019  Agreement date: 4/30/19   snipped in:      Pertinent between visit information about requested medication (telephone, mychart, prior authorization, concerns, comments): We decided to reduce Hysingla to 40 mg daily last visit due to fatigue. Continue Gabapentin 400 mg two times a day for pain. For constipation, continue Amitiza 24 mcg twice a day with meals  Script being sent to provider by nurse- dates and quantity:   Requested Prescriptions     Pending Prescriptions Disp Refills   ? lubiprostone (AMITIZA) 24 MCG capsule [Pharmacy Med Name: AMITIZA 24MCG CAPSULES] 180 capsule 0     Sig: TAKE ONE CAPSULE BY MOUTH TWICE DAILY WITH MEALS   ? gabapentin (NEURONTIN) 400 MG capsule [Pharmacy Med Name: GABAPENTIN 400MG CAPSULES] 180 capsule 0     Sig: TAKE 1 CAPSULE BY MOUTH TWICE DAILY   ? HYDROcodone bitartrate (HYSINGLA ER) 40 mg TP24 30 each 0     Sig: Take 40 mg by mouth daily.     Pharmacy cued: [facility]  Standing orders for withdrawal protocol implemented: [facility]
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
## 2021-06-17 NOTE — PATIENT INSTRUCTIONS - HE
Continue Hysingla (24 hour hydrocodone without tylenol) 40 mg daily.   Ok to fill 06/02/21 to start on 06/05/21 & ok to fill 06/30/21 to start on 07/05/21 (due to holiday weekend).    Also ok to take Vicodin 10/325 mg 1 tab at bedtime as needed for pain and added #10 tabs to use during the day as needed for increased pain.  #40 tabs to last 30 days  - refills sent for 05/17/2021 & 06/16/2021  Continue lamictal to 50 mg twice a day.    Continue taking Cymbalta 120 mg at night.   Continue Gabapentin 400 mg two times a day for pain.  Discussed we could try reducing dose to see if needed, consider change to 300 mg two times a day and further reductions as tolerated.    Continue Lidoderm patch apply 1-3 patches to painful areas on 12 hours, off 12 hours.    May continue using voltaren gel to lower back 2 grams 4 times a day  Continue Vitamin D 5,000 Units daily   Continue use of spinal cord stimulator  May repeat LESI with Dr. Maier in July as needed.  There is a future order in the chart.  Follow-up in 8 weeks with Brea in clinic

## 2021-06-17 NOTE — TELEPHONE ENCOUNTER
Refill request: lamictal 25 mg  Last ov with RW: 3/17/21  4/9/21: lumbar epidural with JA  Follow up in 8 weeks  Next ov with RW: 5/12/21    lamictal last ordered on 3/17/21  Appears to be a 45 day refill not 90, patient would need a refill at this time.    Requested Prescriptions     Pending Prescriptions Disp Refills     lamoTRIgine (LAMICTAL) 25 MG tablet [Pharmacy Med Name: LAMOTRIGINE 25MG] 180 tablet 1     Sig: Take 2 tablets (50 mg total) by mouth 2 (two) times a day.     [facility]

## 2021-06-17 NOTE — PROGRESS NOTES
Patient presents to the clinic today for a follow up with Brea Sheets PA-C regarding back pain.      Pain score: 4  intermittent  What does your pain feel like: aching, stabbing, mainly on right side  Does the pain interfere with:  Work: n/a  Walking/distance: yes  Sleep: yes  Daily activities: yes  Relationships/social life: no  Mood: no  F= 5

## 2021-06-17 NOTE — PROGRESS NOTES
PAIN CENTER PROGRESS NOTE    Subjective:   Naveen Toro is a 76 y.o. male who presents for evaluation of low back pain.  He has history of lumbar degeneration.  He has history of lumbar surgery, failed lumbar injections, and is currently using opioid management and implanted spinal cord stimulator to manage pain symptoms. Had SCS battery replaced on 09/09/16 with [employer].     Major issues:  1. Chronic, continuous use of opioids    2. Chronic pain syndrome    3. Postlaminectomy Syndrome (Lumbar)    4. Radiculopathy of thoracolumbar region       Pain location and description: See CMA Note  Radiation of pain: buttocks, hips.    Gait disturbance: Denies recent falls or changes in balance.  Uses cane.    Exacerbating factors: Too much standing, walking, sitting prolonged (10-20 minutes), lifting/bending, cold or rainy weather changes  Alleviating factors: Rest, pain medications, SCS implant, injections, warmer weather, reclining/laying down, some walking.  Associated symptoms: Pain at night.  Numbness in right hip/leg to foot, weakness in right leg, night pain. Denies bowel or bladder incontinence, fever/chills, unexplained weight loss.  Adverse effects of medications: Constipation taking Amitiza prescribed two times a day which is working. Has a bowel movement every 2-3 days.  Uses miralax 2-3 times per month as needed.    Functional Symptoms: See CMA note  Current treatment efficacy: Good; see medication list.  Current treatment compliance: Good.  Attending scheduled appointments and taking medication as prescribed. Using SCS daily. Continues to see Dr. Frank monthly for pain psychology.  He states not too much exercise, he has a treadmill at home.      Since the last Pain Center visit, the patient denies any use of anticoagulant medications. Denies any new diagnostic testing, new treatments or new medical conditions, any changes in medications, or any ED/urgent care visits.  He denies any changes in back pain,  "denies new falls or injuries.   He had repeat right L5-S1 TFESI on 04/09/2021 and reports this seems to help by 30-40% pain relief and difficult to tell if still lasting.  He denies side effects or complications or procedure.    He states this past month was \"goofy weather\" with damp, cold days which increased pain in the center of his lower back.  He states this is aching in character.  He states on these days he doesn't do as much and stays in recliner more.  He states sometimes uses heating pad - helps a little.  He states this relaxes his muscles.  He denies use of OTC.  He states the leg pain is not as bad recently which he attributes to lamictal increase.        He continues Hysingla 40 mg daily.  He states this dose is fairly well to control his pain during the day but finding more pain in the evening and bedtime hours.  He states he is sore by the time he goes to bed at night and unsure if it lasts the 24 hours.  He states he is pretty stiff in the morning when he gets up out of bed.  We discuss use of short-acting opioid at night as he did previously take Vicodin and he would like to resume.  Added in Vicodin 10/325 mg last visit and mainly taking it at night when he goes to bed which seems to help his soreness at night.  He states he does still wake up 3-4 hours later in pain.      Had SCS battery replaced on 09/09/16 with [employer]. He has conventional setting.  He is using it 24 hours a day.  He states he doesn't feel it working unless he is in a certain position; laying down or reclining in recliner he can feel it work.  He states charging is ok.  He had x-ray to evaluate leads in 12/2017 and 06/2020. He had reprogramming last visit and reports his pain is about the same, some of his settings were adjusted and seems like it is working better than it was. He states he still feels he has to lean back to get the best coverage.      Review of Systems  Constitutional: Fatigue. Sleep disturbance, sleeps during " the day.  Wearing CPAP every night.  Denies fever, night sweats, weight loss.  Musculoskeletal: Positive for back pain, right leg pain.  Denies neck pain. Denies recent falls.  Gastrointestional: Denies difficulty swallowing, change in appetite, abdominal pain, nausea, vomiting, diarrhea, GERD, fecal incontinence.  Genitourinary: Denies urinary incontinence, dysuria, hematuria, UTI, frequency, hesitancy  Neurologic:  Denies headaches, confusion, seizure, weakness, changes in balance, changes in speech.  Psychiatric: Positive for depression, anxiety. Denies memory loss, psychoses, suicidal ideation, substance     Objective:     Vitals:    05/12/21 1246   BP: 162/69   Pulse: 70   Resp: 16     Physical Exam  Constitutional- General appearance: Normal.  Well developed, comfortable, overweight, and appearance reflects stated age.  No acute distress or pain behaviors noted. Presents alone today.  Psychiatric- Judgment and insight: Normal.  Speech: Normal rhythm.  Thought process: Normal.  No abnormal thoughts reported. Alert & Oriented to person, place, and time.  Recent and remote memory: Normal.  Observed mood: appropriate  Respiratory- Breathing is non-labored; normal rhythm and rate.  Dermatologic- Exposed skin is clean, dry, and intact to inspection and palpation.  Musculoskeletal- Gait and station: Ambulates independently with antalgia.  Sit to stand with slight difficulty.    *Opioid [place] Precautions:    UDT/Blood - 11/18/20, results expected  Opioid Agreement - 08/04/20  Opioid consent - 08/04/20  Pharmacy- as documented     Reviewed - 05/12/2021 as expected  Pill Count - n/a  Psychological evaluation - monthly through Dr. Larry Frank  MME -  50  Pharmacogenetic testing - n/a    Imaging:  EXAM: XR THORACOLUMBAR 2 VWS  LOCATION: [facility]  DATE/TIME: 6/9/2020 11:21 AM     INDICATION: Spinal cord simulator lead position.  COMPARISON: 12/20/2017.     IMPRESSION:   Transitional lumbosacral anatomy  with sacralized L5.     Spinal cord stimulator leads are not changed in position. Spinal cord simulator lead entering into the spinal canal at the level of T11-T12 terminates at the level of the T9 pedicles. Additional spinal cord stimulator lead entering into the spinal canal   at the level of T12-L1 terminates at the level of the infrapedicular T11 vertebral body.     Unchanged old compression deformities of the T12, L1, and L2 vertebrae. No new fracture. No aggressive osseous abnormality. No significant change in lumbar spondylosis.    Assessment:   Naveen Toro is a 76 y.o. male with visit today for lumbar degeneration and lumbar radiculopathy, most severe at L5-S1. He did have TFESI with 40% pain relief, lasts anywhere from a couple weeks to over 1 month.  Discuss repeating at 3 month intervals as needed.  Continues use of SCS with recent reprogramming 03/2021.  He will also continue adjunct duloxetine and gabapentin for nerve pain and mood and lamotrigine for pain and mood benefit - leg pain has improved and we discuss considering a weaning of gabapentin as he may not need both medications and may make his daytime fatigue lessen.  He is to continue Vitamin D for chronic pain, sleep, and mood benefit.  He may continue topicals as needed, review use when he has more activity related pain.  Will continue dose of Hysingla and Vicodin 10/325 mg 1 tab in evening or bedtime to take for increased pain - discuss adjusting quantity to also have a dose to take during daytime when pain is severe - not to exceed 2 doses in 24 hours.      Plan:   Continue Hysingla (24 hour hydrocodone without tylenol) 40 mg daily.   Ok to fill 06/02/21 to start on 06/05/21 & ok to fill 06/30/21 to start on 07/05/21 (due to holiday weekend).    Also ok to take Vicodin 10/325 mg 1 tab at bedtime as needed for pain and added #10 tabs to use during the day as needed for increased pain.  #40 tabs to last 30 days  - refills sent for 05/17/2021  & 06/16/2021  Continue lamictal to 50 mg twice a day.    Continue taking Cymbalta 120 mg at night.   Continue Gabapentin 400 mg two times a day for pain.  Discussed we could try reducing dose to see if needed, consider change to 300 mg two times a day and further reductions as tolerated.    Continue Lidoderm patch apply 1-3 patches to painful areas on 12 hours, off 12 hours.    May continue using voltaren gel to lower back 2 grams 4 times a day  Continue Vitamin D 5,000 Units daily   Continue use of spinal cord stimulator  May repeat LESI with Dr. Maier in July as needed.  There is a future order in the chart.  Follow-up in 8 weeks with Brea in clinic     BERNABE Mendoza [facility]  [address]  [place] [address]  Ph: [phone]  Fax: [phone]

## 2021-06-18 NOTE — PROGRESS NOTES
PAIN CENTER PROGRESS NOTE    Subjective:   Naveen Toro is a 73 y.o. male who presents for evaluation of low back pain.  He has history of lumbar degeneration.  He has history of lumbar surgery, failed lumbar injections, and is currently using opioid management and implanted spinal cord stimulator to manage pain symptoms.  Follows Dr. Garcia of [facility].      Major issues:  1. Chronic pain syndrome    2. Lumbar Disc Degeneration    3. Radiculopathy of thoracolumbar region    4. Opioid Dependence With Continuous Use       Pain location and description: 6/10 constant aching to sharp in lower back and right buttock and hip.  Mainly lower back, right side worse than the left.  Function rated 8.  Last visit pain rated 5/10.  At best, pain rating 4/10 and at worst 7/10, on average pain rated 6/10.  Radiation of pain: buttocks, hips, legs intermittently.  Sometimes into right great toe.  Right now reports this as fairly well controlled, some pain into right buttock.  Paresthesias, numbness, weakness: Numbness in right foot and calf.  Gait disturbance: None reported.  Denies recent falls or changes in balance.  Uses AD.  Exacerbating factors: Activities, weather changes if cold or damp, winter months, walking, sitting too long  Alleviating factors: Rest, warm pool, pain medications, SCS implant, injections, warmer weather, reclining  Associated symptoms: Numbness in right leg and foot, night pain. Denies weakness, bowel or bladder incontinence, fever/chills, unexplained weight loss.  Adverse effects of medications: Constipation.  Uses miralax powder every 3 days prn drinks V8 juice every morning. Denies stool softener. Does report some drowsiness related to medications and depression.   Functional Symptoms: Sleep, walking, activities of daily living, relationships, sexual health, mood (depressed, angry, frustrated, anxious, helpless/hopeless).  Current treatment efficacy: Fair; Morphine ER 15 mg every 8 hours  "and Vicodin BID prn. Continues Gabapentin 400 mg bedtime and Cymbalta 120 mg daily without change.   Denies taking muscle relaxants.    Current treatment compliance: Good.  Attending scheduled appointments and taking medication as prescribed. Using SCS daily. Continues to see Dr. Frank monthly for pain psychology.      Denies any new diagnostic testing, medications, new medical conditions, any ER/urgent care visits since last seen. He takes  mg since his open heart surgery for anticoagulant.  He states he has had dental x-rays with 4 teeth pulled on the bottom.  He has to have upper extractions coming up in preparation for dentures.  He is asking what he can take for when he has dental extractions.  In the past, he had SHERRIE from taking Ibuprofen after dental work and is worried about that.  He also recently had Sinus infection treated with antibiotics per Dr. Hoover completed 1 week ago and he has had persistent frontal headache mainly left side. Using ice today.  Wonders if this headache is related to his pain medications, no recent changes or adjustment in doses.    Feels pain has worsened in past 2 months.  He denies any new injuries.    Had TFESI right L5-S1 on 02/26/18 and reports pain relief about 40%.  He states if he didn't do these injections he would \"really know\" his pain.  He reports this relief has worn off and he is ready to repeat.    Had SCS battery replaced on 09/09/16 with [employer].  He reports he has 2 reprogramming sessions with [employer].  Seems to work fairly good and covering correct areas of pain.  He is on high dose setting and doesn't feel except sometimes on feet when laying down.  He is keeping on around the clock, rechargeable battery having to charge every week taking 2-3 hours to charge fully.      Review of Systems  Constitutional: Fatigue. Weight gain 30 pounds, less active. Sleep disturbance, sleeps during the day.  Wearing CPAP every night.  Denies fever, chills, night " "sweats, weight loss.  Musculoskeletal: Positive for back pain, right leg pain/weakness.  Denies joint pain, joint swelling, recent falls.  Gastrointestional: Denies difficulty swallowing, change in appetite, abdominal pain, constipation, nausea, vomiting, diarrhea, GERD, fecal incontinence.  Genitourinary: Denies urinary incontinence, dysuria, hematuria, UTI, frequency, hesitancy  Neurologic: Headaches.  Denies confusion, seizure, weakness, changes in balance, changes in speech.  Psychiatric: Positive for depression. Denies memory loss, psychoses, suicidal ideation, substance use/abuse.     Objective:     Vitals:    05/29/18 1115   BP: 126/85   Pulse: 75   Resp: 16   Weight: 210 lb (95.3 kg)   Height: 5' 9.5\" (1.765 m)   PainSc:   6     Physical Exam  Constitutional- General appearance: Well groomed, well developed, uncomfortable using ice today on head, overweight and appearance reflects stated age.  No acute distress or pain behaviors noted. Presents alone today.  Psychiatric- Judgment and insight: Normal.  Speech: Normal rhythm.  Thought process: Normal.  No abnormal thoughts reported. Alert & Oriented to person, place, and time.  Recent and remote memory: Normal.  Observed mood: Appropriate, concerned.  Respiratory- Breathing is non-labored; normal rhythm and rate.  Cardiovascular- Extremities warm and well perfused, no peripheral edema or varicosities.  Dermatologic- Exposed skin is clean, dry, and intact to inspection and palpation.   Musculoskeletal- Gait and station: Abnormal.  Gait evaluation demonstrates ambulating with single point cane with a stiff, slightly antalgic gait.  Patient does have difficulty rising from a seated position.      *Opioid Universal Precautions:    UDS/Swab - 10/18/17 as expected.   Opioid Agreement - 03/09/17  Pharmacy- as documented    [place]  Reviewed - 12/12/17 as expected.    Pill Count - n/a  Psychological evaluation - monthly through Dr. Frank  MME - 65  Pharmacogenetic " testing - n/a  VICENTE 12/12/17     Imaging:  None reviewed today    Assessment:   Naveen Toro is a 73 y.o. male seen in clinic today for lumbar degeneration and lumbar radiculopathy, most severe at L5-S1.  He continues pain management with Morphine 15 mg every 8 hours and Vicodin 10/325 mg BID prn daily as needed for severe pain.  Reports OIC but failed Movantik, continues over the counter measures.  Did have relief from recent TFESI right L5-S1 without complication but does report headaches as a side effect of the steroid.  He is also a candidate for lumbar medial branch blocks without steroid in pursuit of lumbar RF for lower back pain as he has severe facet arthropathy as well. He continues to use his SCS for pain control of radiculopathy symptoms with good coverage.      Plan:   Continue Morphine ER 15 mg every 8 hours.    You may take 1-2 Vicodin 10/325 mg a day as needed for severe breakthrough pain or in the morning as needed.    As your opioid dose remains stable, I will send electronic refills to the pharmacy for 2 subsequent months until your next appointment so you do not have to call our refill line.  The fill dates for your medications are:  06/07/18 & 07/05/18  Check with your pharmacy that all prescriptions are on your profile. Call the pharmacy a week before you want to fill the prescription as stock may vary and the pharmacy may need to order the medication for you. I reserve the right to cancel the electronic prescription at the pharmacy in between appointments and would contact you with an explanation if this were to occur.  Continue Miralax as needed for constipation 1 capful in 8 ounces liquid.  Take more frequently, can also add in stool softener with it 1-2 capsules a day.  Also use fluid and fiber in diet.    Continue taking Cymbalta 120 mg at night   Continue Gabapentin 400 mg at bedtime   Continue Vitamin D 5,000 Units daily   Continue use of spinal cord stimulator  Repeat Lumbar epidural  steroid injection with Dr. Maier and have a .  If you need additional pain control for dental work, you must work with oral surgeon on recommendations.  It isn't against your opioid agreement to fill a prescription.  Advise against adding in more NSAIDS which are hardest on kidneys.  Surgeon may consider a short course of oxycodone or tramadol depending on pain severity as these do not have tylenol or ibuprofen included.    Follow-up in 8 weeks with Brea.       Brea Sheets PA-C  [address] [place] [address]. [address]  [place] [address]  Ph: [phone]  Fax: [phone]

## 2021-06-19 NOTE — PROGRESS NOTES
PAIN CENTER PROGRESS NOTE    Subjective:   Naveen Toro is a 73 y.o. male who presents for evaluation of low back pain.  He has history of lumbar degeneration.  He has history of lumbar surgery, failed lumbar injections, and is currently using opioid management and implanted spinal cord stimulator to manage pain symptoms.  Follows Dr. Garcia of [facility].      Major issues:  1. Chronic pain syndrome    2. Lumbar Disc Degeneration    3. Therapeutic opioid-induced constipation (OIC)    4. Chronic, continuous use of opioids       Pain location and description: 4/10 constant grinding pain in lower back and right buttock and hip.  Mainly lower back, right side worse than the left.  Function rated 8.  Last visit pain rated 6/10.  At best, pain rating 3/10 and at worst 6/10, on average pain rated 5/10.  Radiation of pain: buttocks, hips, legs intermittently.  Sometimes into right great toe.  Right now reports this as fairly well controlled, some pain into right buttock.  Paresthesias, numbness, weakness: Numbness in right foot and calf.  Gait disturbance: None reported.  Denies recent falls or changes in balance.  Uses AD.  Exacerbating factors: Activities, weather changes if cold or damp, winter months, walking, sitting too long  Alleviating factors: Rest, warm pool, pain medications, SCS implant, injections, warmer weather, reclining  Associated symptoms: Numbness in right leg and foot, weakness right leg, night pain. Denies bowel or bladder incontinence, fever/chills, unexplained weight loss.  Adverse effects of medications: Constipation.  Uses miralax powder every 3 days prn drinks V8 juice every morning. Denies stool softener.  Has a bowel movement every 2-3 days. Does report some drowsiness related to medications and depression.   Functional Symptoms: Sleep, walking, activities of daily living, relationships, sexual health, mood (depressed, angry, frustrated, helpless/hopeless).  Current treatment  "efficacy: Fair; Morphine ER 15 mg every 8 hours and Vicodin BID prn. Continues Gabapentin 400 mg bedtime and Cymbalta 120 mg daily without change.   Denies taking muscle relaxants.    Current treatment compliance: Good.  Attending scheduled appointments and taking medication as prescribed. Using SCS daily. Continues to see Dr. Frank monthly for pain psychology.      Denies any new diagnostic testing, medications, new medical conditions, any ER/urgent care visits since last seen. He takes  mg since his open heart surgery for anticoagulant.  Denies adverse effects to medications except constipation which is not new.  He states he has no questions today.  Reports he did see Dr. Hoover for headaches on 06/27/18 and negative work up with CT head and carotid ultrasound and headaches have resolved without new treatment after 6 months.    Feels pain has improved in past 2 months.     Had TFESI right L5-S1 on 06/20/18 and reports pain relief about 40-50%.  Felt better right away almost as soon as medication placed.  He states it is still helpful at this point.  He states if he didn't do these injections he would \"really know\" his pain.   He denies adverse effects or complications from the procedure.    Had SCS battery replaced on 09/09/16 with [employer].  He reports he has 2 reprogramming sessions with [employer].  Seems to work fairly good and covering correct areas of pain.  He is on high dose setting and doesn't feel except sometimes on feet when laying down.  He is keeping on around the clock, rechargeable battery having to charge every week taking 2-3 hours to charge fully.      Review of Systems  Constitutional: Fatigue. Sleep disturbance, sleeps during the day.  Wearing CPAP every night.  Denies fever, chills, night sweats, weight loss.  Musculoskeletal: Positive for back pain, right leg pain/weakness.  Denies joint pain, joint swelling, recent falls.  Gastrointestional: Denies difficulty swallowing, change in " "appetite, abdominal pain, constipation, nausea, vomiting, diarrhea, GERD, fecal incontinence.  Genitourinary: Denies urinary incontinence, dysuria, hematuria, UTI, frequency, hesitancy  Neurologic:  Denies headaches, confusion, seizure, weakness, changes in balance, changes in speech.  Psychiatric: Positive for depression. Denies memory loss, psychoses, suicidal ideation, substance use/abuse.     Objective:     Vitals:    07/24/18 1057   BP: 130/69   Pulse: (!) 57   Resp: 16   Weight: 210 lb (95.3 kg)   Height: 5' 9.5\" (1.765 m)   PainSc:   4     Physical Exam  Constitutional- General appearance: Well groomed, well developed, comfortable, overweight and appearance reflects stated age.  No acute distress or pain behaviors noted. Presents alone today.  Psychiatric- Judgment and insight: Normal.  Speech: Normal rhythm.  Thought process: Normal.  No abnormal thoughts reported. Alert & Oriented to person, place, and time.  Recent and remote memory: Normal.  Observed mood: Appropriate, concerned.  Respiratory- Breathing is non-labored; normal rhythm and rate.  Cardiovascular- Extremities warm and well perfused, no peripheral edema or varicosities.  Dermatologic- Exposed skin is clean, dry, and intact to inspection and palpation.   Musculoskeletal- Gait and station: Abnormal.  Gait evaluation demonstrates ambulating with single point cane with a stiff, slightly antalgic gait.  Patient does have difficulty rising from a seated position.      *Opioid Universal Precautions:    UDS/Swab - 10/18/17 as expected.   Opioid Agreement - 04/03/18  Opioid consent - 04/03/18  Pharmacy- as documented    [place]  Reviewed - 07/24/18  Pill Count - n/a  Psychological evaluation - monthly through Dr. Frank  MME - 65  Pharmacogenetic testing - n/a  VICENTE Score: 66 07/24/18    Imaging:  None reviewed today    Assessment:   Naveen Toro is a 73 y.o. male seen in clinic today for lumbar degeneration and lumbar radiculopathy, most severe at " L5-S1.  He continues pain management with Morphine 15 mg every 8 hours and Vicodin 10/325 mg BID prn daily as needed for severe pain.  Reports OIC but failed Movantik, continues over the counter measures discuss Amitiza today as another option.  Did have relief from recent TFESI right L5-S1 without complication. He continues to use his SCS for pain control of radiculopathy symptoms with good coverage.      Plan:   Continue Morphine ER 15 mg every 8 hours.    You may take 1-2 Vicodin 10/325 mg a day as needed for severe breakthrough pain or in the morning as needed.    As your opioid dose remains stable, I will send electronic refills to the pharmacy for 2 subsequent months until your next appointment so you do not have to call our refill line.  The fill dates for your medications are:  08/07/18 & 09/04/18  Check with your pharmacy that all prescriptions are on your profile. Call the pharmacy a week before you want to fill the prescription as stock may vary and the pharmacy may need to order the medication for you. I reserve the right to cancel the electronic prescription at the pharmacy in between appointments and would contact you with an explanation if this were to occur.  For constipation, start Amitiza 24 mcg twice a day with meals.  Reduce to once per day if side effects.  Do not take over the counter medications with this.    Continue taking Cymbalta 120 mg at night   Continue Gabapentin 400 mg at bedtime   Continue Vitamin D 5,000 Units daily   Continue use of spinal cord stimulator  Follow-up in 8 weeks with Brea.       Brea Sheets PA-C  [address] [facility]. [address]  [place] [address]  Ph: [phone]  Fax: [phone]

## 2021-06-20 NOTE — LETTER
Letter by Brea Sheets PA-C at      Author: Brea Sheets PA-C Service: -- Author Type: --    Filed:  Encounter Date: 2/27/2020 Status: (Other)         February 27, 2020     Patient: Naveen Toro   YOB: 1945   Date of Visit: 02/13/2020       To Whom it May Concern:    This patient was recommended to have the following medication therapies for his chronic back pain.  His mood and sleep concerns directly relate to his chronic back injury and therefore are also included in this communication.    Back Pain: Partially improved- Feels like pain is fairly well controlled with current Hysingla dose, but is having flares from 3-7 pm, likely not related to hysingla. Given that pain is low back, and doesn't seem to be neuropathic, likely not related to trough in gabapentin levels. May benefit from addition of topical NSAID as a as needed option to help prevent pain from flaring significantly. Given sharp/burning/electrical nerve pains, may benefit from addition of lamotrigine, which would also likely be beneficial for mood.  -Start diclofenac 1% gel-2 g up to 4 times daily as needed  -Start lamotrigine 25 mg daily x2 weeks, then 1 tab twice daily x2 weeks     Mood/Sleep: Needs improvement - continues to have depressed mood.  Suspect that daytime drowsiness is partly related to low mood and partly related she not sleeping well at night.  Discussed sleep hygiene points that patient can implement.  May also benefit from addition of melatonin to help reset pt's sleep/wake cycle. As noted above, addition of Lamotrigine may also be beneficial for mood and give pt more drive and energy to be active during the day.   -Patient to avoid watching TV in bed  -If unable to fall asleep for 15 to 20 minutes, patient to get out of bed, try relaxing activity, then return to bed  -Start melatonin 5 mg 30 minutes before bedtime  -Start lamotrigine, as above    Please contact our office if you have further  questions or concerns.    Thanks,    Electronically signed by Brea Sheets PA-C

## 2021-06-20 NOTE — PROGRESS NOTES
PAIN CENTER PROGRESS NOTE    Subjective:   Naveen Toro is a 73 y.o. male who presents for evaluation of low back pain.  He has history of lumbar degeneration.  He has history of lumbar surgery, failed lumbar injections, and is currently using opioid management and implanted spinal cord stimulator to manage pain symptoms.  Follows Dr. Garcia of [facility].      Major issues:  1. Chronic pain syndrome    2. Lumbar Disc Degeneration    3. Chronic, continuous use of opioids    4. Radiculopathy of thoracolumbar region    5. Therapeutic opioid-induced constipation (OIC)       Pain location and description: 4/10 intermittent pain in lower back and right buttock and hip.  Mainly lower back, right side worse than the left.  Function rated 8.  Last visit pain rated 6/10.  At best, pain rating 3/10 and at worst 5/10, on average pain rated 4/10.  Radiation of pain: buttocks, hips, legs intermittently.    Paresthesias, numbness, weakness: Numbness in right foot and calf.  Gait disturbance: None reported.  Denies recent falls or changes in balance.  Uses AD.  Exacerbating factors: Activities, weather changes if cold or damp, winter months, walking, sitting too long  Alleviating factors: Rest, warm pool, pain medications, SCS implant, injections, warmer weather, reclining  Associated symptoms: Numbness in feet, weakness in legs, night pain. Denies bowel or bladder incontinence, fever/chills, unexplained weight loss.  Adverse effects of medications: Constipation.  Uses miralax powder every 3 days prn drinks V8 juice every morning. Denies stool softener.  Has a bowel movement every 2-3 days. Does report some drowsiness related to medications and depression.   Functional Symptoms: Sleep, walking, relationships, sexual health, mood (depressed, angry, frustrated, helpless/hopeless).  Current treatment efficacy: Fair; Morphine ER 15 mg every 8 hours and Vicodin BID prn. Continues Gabapentin 400 mg bedtime and Cymbalta 120 mg  daily without change.   Denies taking muscle relaxants.    Current treatment compliance: Good.  Attending scheduled appointments and taking medication as prescribed. Using SCS daily. Continues to see Dr. Frank monthly for pain psychology.      Denies any new diagnostic testing, medications, new medical conditions, any ER/urgent care visits since last seen. He takes  mg since his open heart surgery for anticoagulant.  Denies adverse effects to medications except constipation which is not new.  He states he has no questions today.     Reports pain as unchanged since last visit.      Had TFESI right L5-S1 on 06/20/18 and reports pain relief about 40-50%.  Felt better right away almost as soon as medication placed.  He reports pain relief has worn off and he plans to repeat at 3 months.    Had SCS battery replaced on 09/09/16 with [employer].  He reports he has 2 reprogramming sessions with [employer].  Seems to work fairly good and covering correct areas of pain.  He is on high frequency setting and doesn't feel except sometimes on feet when laying down. States this seems to be better than traditional.  He is keeping on around the clock, rechargeable battery having to charge every week taking 2-3 hours to charge fully.  He states he forgot a week to charge and has had an episode where it has shut off on him.         Review of Systems  Constitutional: Fatigue. Sleep disturbance, sleeps during the day.  Wearing CPAP every night.  Denies fever, chills, night sweats, weight loss.  Musculoskeletal: Positive for back pain, leg pain/weakness.  Denies joint pain, joint swelling, recent falls.  Gastrointestional: Denies difficulty swallowing, change in appetite, abdominal pain, constipation, nausea, vomiting, diarrhea, GERD, fecal incontinence.  Genitourinary: Denies urinary incontinence, dysuria, hematuria, UTI, frequency, hesitancy  Neurologic:  Denies headaches, confusion, seizure, weakness, changes in balance, changes  "in speech.  Psychiatric: Positive for depression. Denies memory loss, psychoses, suicidal ideation, substance use/abuse.     Objective:     Vitals:    09/18/18 1140   BP: 139/75   Pulse: 67   Resp: 16   Weight: 210 lb (95.3 kg)   Height: 5' 9.5\" (1.765 m)   PainSc:   4     Physical Exam  Constitutional- General appearance: Well groomed, well developed, comfortable, overweight and appearance reflects stated age.  No acute distress or pain behaviors noted. Presents alone today.  Psychiatric- Judgment and insight: Normal.  Speech: Normal rhythm.  Thought process: Normal.  No abnormal thoughts reported. Alert & Oriented to person, place, and time.  Recent and remote memory: Normal.  Observed mood: Appropriate  Respiratory- Breathing is non-labored; normal rhythm and rate.  Cardiovascular- Extremities warm and well perfused, no peripheral edema or varicosities.  Dermatologic- Exposed skin is clean, dry, and intact to inspection and palpation.   Musculoskeletal- Gait and station: Abnormal.  Gait evaluation demonstrates ambulating with single point cane with a stiff, slightly antalgic gait.  Patient does have difficulty rising from a seated position.      *Opioid Universal Precautions:    UDS/Swab - 10/18/17 as expected.   Opioid Agreement - 04/03/18  Opioid consent - 04/03/18  Pharmacy- as documented    [place]  Reviewed - 09/18/18  Pill Count - n/a  Psychological evaluation - monthly through Dr. Frank  MME - 65  Pharmacogenetic testing - n/a  VICENTE 07/24/18    Imaging:  None reviewed today    Assessment:   Naveen Toro is a 73 y.o. male seen in clinic today for lumbar degeneration and lumbar radiculopathy, most severe at L5-S1.  He continues pain management with Morphine 15 mg every 8 hours and Vicodin 10/325 mg BID prn daily as needed for severe pain.  Reports OIC but failed Movantik, continues over the counter measures discuss Amitiza today as another option.  Continues to have relief with TFESI right L5-S1 without " complication. He continues to use his SCS for pain control of radiculopathy symptoms with good coverage.      Plan:   Continue Morphine ER 15 mg every 8 hours.    You may take 1-2 Vicodin 10/325 mg a day as needed for severe breakthrough pain or in the morning as needed.    As your opioid dose remains stable, I will send electronic refills to the pharmacy for 2 subsequent months until your next appointment so you do not have to call our refill line.  The fill dates for your medications are:  10/02/18 & 10/30/18  Check with your pharmacy that all prescriptions are on your profile. Call the pharmacy a week before you want to fill the prescription as stock may vary and the pharmacy may need to order the medication for you. I reserve the right to cancel the electronic prescription at the pharmacy in between appointments and would contact you with an explanation if this were to occur.  For constipation, start Amitiza 24 mcg twice a day with meals.  Reduce to once per day if side effects.  Do not take over the counter medications with this.    Continue taking Cymbalta 120 mg at night   Continue Gabapentin 400 mg at bedtime   Continue Vitamin D 5,000 Units daily - 90 day supply refill  Continue use of spinal cord stimulator  Schedule repeat epidural injection with Dr. Maier after 09/20/18  Follow-up in 8 weeks with Brea.       Brea Sheets PA-C  [address]  [place] [address]  Ph: [phone]  Fax: [phone]

## 2021-06-21 NOTE — PROGRESS NOTES
Pt is being seen today by PATRICIA Mendoza, for f/u of 4-grinding, stabbing back pain F-7. Due for UDS and VICENTE.

## 2021-06-21 NOTE — PROGRESS NOTES
PAIN CENTER PROGRESS NOTE    Subjective:   Naveen Toro is a 73 y.o. male who presents for evaluation of low back pain.  He has history of lumbar degeneration.  He has history of lumbar surgery, failed lumbar injections, and is currently using opioid management and implanted spinal cord stimulator to manage pain symptoms.  Follows Dr. Garcia of [facility].      Major issues:  1. Chronic pain syndrome    2. Lumbar Disc Degeneration    3. Radiculopathy of thoracolumbar region    4. Chronic, continuous use of opioids       Pain location and description: 4/10 intermittent pain in lower back and right buttock and hip.  Mainly lower back, right side worse than the left.  Function rated 7.  Last visit pain rated 4/10.  At best, pain rating 3/10 and at worst 7/10, on average pain rated 5/10.  Radiation of pain: buttocks, hips, legs intermittently.    Paresthesias, numbness, weakness: numbness in feet, bilateral lower legs and weakness in right knee.  Gait disturbance: None reported.  Denies recent falls or changes in balance.  Uses AD.  Exacerbating factors: Activities, weather changes if cold or damp, winter months, walking on uneven ground, sitting too long  Alleviating factors: Rest, warm pool, pain medications, SCS implant, injections, warmer weather, reclining  Associated symptoms: Numbness in feet and bilateral legs, weakness in right knee, night pain. Denies bowel or bladder incontinence, fever/chills, unexplained weight loss.  Adverse effects of medications: Constipation.  Amitiza two times a day which is working.   Denies stool softener.  Has a bowel movement every 2-3 days. Does report some drowsiness related to medications and depression.   Functional Symptoms: Sleep, walking, work, relationships, ADLs, relationships/social, sexual health, mood (depressed, frustrated, helpless/hopeless).  Current treatment efficacy: Fair; Morphine ER 15 mg every 8 hours and Vicodin BID prn. Continues Gabapentin 400 mg  "bedtime and Cymbalta 120 mg daily without change.   Denies taking muscle relaxants.    Current treatment compliance: Good.  Attending scheduled appointments and taking medication as prescribed. Using SCS daily. Continues to see Dr. Frank monthly for pain psychology.  He states not too much exercise.  He has treadmill at home.      Denies any new diagnostic testing, medications, new medical conditions, any ER/urgent care visits since last seen. He takes  mg since his open heart surgery for anticoagulant.  Denies adverse effects to medications except constipation which is not new.  He states he has no questions today.     Saw Dr. Hoover on 10/16/18 for office visit reviewed today:  \"ASSESSMENT AND PLAN     ICD-10-CM   1. Type 2 diabetes mellitus without complication, without long-term current use of insulin (HRC) E11.9 Hgb A1c   Microalb/Creat Ratio   Hgb A1c   Microalb/Creat Ratio   2. Essential hypertension (HRC) I10 amLODIPine (NORVASC) 2.5 MG tablet   3. Dyslipidemia (HRC) E78.5 atorvastatin (LIPITOR) 40 MG tablet   4. Coronary artery disease involving native coronary artery of native heart without angina pectoris (HRC) I25.10 \"    Reports pain as unchanged since last visit.      Had TFESI right L5-S1 on 10/02/18 and reports pain relief about 20% depending on day and activity.  States it worked \"quite a bit\" but difficult to estimate a percentage.  He reports pain relief has lasted but starting to note it is wearing off. Denies post procedure complications.      Had SCS battery replaced on 09/09/16 with [employer].  He reports he has 2 reprogramming sessions with [employer].  Seems to work fairly good and covering correct areas of pain.  He is on high frequency setting and doesn't feel except sometimes on feet when laying down. States this seems to be better than traditional.  He is keeping on around the clock, rechargeable battery having to charge every week taking 2-3 hours to charge fully.  He states he " "forgot a week to charge and has had an episode where it has shut off on him.         Review of Systems  Constitutional: Fatigue. Sleep disturbance, sleeps during the day.  Wearing CPAP every night.  Denies fever, chills, night sweats, weight loss.  Musculoskeletal: Positive for back pain, leg pain/weakness.  Denies joint pain, joint swelling, recent falls.  Gastrointestional: Denies difficulty swallowing, change in appetite, abdominal pain, constipation, nausea, vomiting, diarrhea, GERD, fecal incontinence.  Genitourinary: Denies urinary incontinence, dysuria, hematuria, UTI, frequency, hesitancy  Neurologic:  Denies headaches, confusion, seizure, weakness, changes in balance, changes in speech.  Psychiatric: Positive for depression. Denies memory loss, psychoses, suicidal ideation, substance use/abuse.     Objective:     Vitals:    11/13/18 1153   BP: 180/89   Pulse: 73   Weight: 200 lb (90.7 kg)   Height: 5' 9\" (1.753 m)   PainSc:   4   PainLoc: Back     Physical Exam  Constitutional- General appearance: Well groomed, well developed, comfortable, overweight and appearance reflects stated age.  No acute distress or pain behaviors noted. Presents alone today.  Psychiatric- Judgment and insight: Normal.  Speech: Normal rhythm.  Thought process: Normal.  No abnormal thoughts reported. Alert & Oriented to person, place, and time.  Recent and remote memory: Normal.  Observed mood: Appropriate  Respiratory- Breathing is non-labored; normal rhythm and rate.  Cardiovascular- Extremities warm and well perfused, no peripheral edema or varicosities.  Dermatologic- Exposed skin is clean, dry, and intact to inspection and palpation.   Musculoskeletal- Gait and station: Abnormal.  Gait evaluation demonstrates ambulating with single point cane with a stiff, slightly antalgic gait.  Patient does have difficulty rising from a seated position.      *Opioid Universal Precautions:    UDS/Swab - Collected 11/13/18, results " pending  Opioid Agreement - 04/03/18  Opioid consent - 04/03/18  Pharmacy- as documented    [place]  Reviewed - 09/18/18.  Called pharmacy and last refill for both 10/30/18 for 30 days.  Pill Count - n/a  Psychological evaluation - monthly through Dr. Frank  MME - 65  Pharmacogenetic testing - n/a  VICENTE Score: 33 11/13/18    Imaging:  None reviewed today    Assessment:   Naveen Toro is a 73 y.o. male seen in clinic today for lumbar degeneration and lumbar radiculopathy, most severe at L5-S1.  He continues pain management with Morphine 15 mg every 8 hours and Vicodin 10/325 mg BID prn daily as needed for severe pain.  Reports OIC but failed Movantik, doing better with Amitiza.  Continues to have relief with TFESI right L5-S1 without complication. He continues to use his SCS for pain control of radiculopathy symptoms with good coverage.      Plan:   Continue Morphine ER 15 mg every 8 hours.    You may take 1-2 Vicodin 10/325 mg a day as needed for severe breakthrough pain or in the morning as needed.    As your opioid dose remains stable, I will send electronic refills to the pharmacy for 2 subsequent months until your next appointment so you do not have to call our refill line.  The fill dates for your medications are:  11/27/18 & 12/25/18 (can attempt to fill 12/23 or 12/24 due to holiday)  Check with your pharmacy that all prescriptions are on your profile. Call the pharmacy a week before you want to fill the prescription as stock may vary and the pharmacy may need to order the medication for you. I reserve the right to cancel the electronic prescription at the pharmacy in between appointments and would contact you with an explanation if this were to occur.  For constipation, start Amitiza 24 mcg twice a day with meals.    Continue taking Cymbalta 120 mg at night   Continue Gabapentin 400 mg at bedtime - refill sent in today  Continue Vitamin D 5,000 Units daily  Continue use of spinal cord  stimulator  Diagnostics: UDT/SWAB collected today results are pending.  Patient required a random Urine Drug Test due to the need to comply with [place] [employer] Policy Guidelines and CDC Guideline for the use of any controlled substances. Reasons for definitive testing include:  -Identify specific medication(s) or drug(s) in a class (e.g. Benzodiazepines, barbiturates, opioids, antidepressants)  -Definitive concentration of medication(s), drug(s), and/or metabolites needed to guide management  -Identify non-prescribed medication or illicit drug use for ongoing safe prescribing of controlled substances  -Identify substances that may present high risk for additive or synergistic interaction with prescription medication (e.g. Alcohol).  Patient is either being considered for or taking a controlled substance. Unexpected findings will be discussed and treatment decision may be adjusted. Testing is being implemented across the board randomly w/o bias related to age, race, gender, socioeconomic status or [employer] affiliation.   Follow-up in 8 weeks with Brea.       Brea Sheets PA-C  [address] [facility] [facility] [address] [facility]  [place] [address]  Ph: [phone]  Fax: [phone]

## 2021-06-22 NOTE — PROGRESS NOTES
PAIN CENTER PROGRESS NOTE    Subjective:   Naveen Toro is a 74 y.o. male who presents for evaluation of low back pain.  He has history of lumbar degeneration.  He has history of lumbar surgery, failed lumbar injections, and is currently using opioid management and implanted spinal cord stimulator to manage pain symptoms.  Follows Dr. Garcia of [facility].      Major issues:  1. Chronic pain syndrome    2. Lumbar Disc Degeneration    3. Radiculopathy of thoracolumbar region    4. Chronic, continuous use of opioids       Pain location and description: 4/10 intermittent dull, burning, sharp pain in lower back and right buttock and hip.  Mainly lower back, right side worse than the left.  He states he had a flare of pain before [place] due to weather.  Function rated 8.  Last visit pain rated 4/10.  At best, pain rating 3/10 and at worst 7/10, on average pain rated 5/10.  Radiation of pain: buttocks, hips, legs intermittently.    Paresthesias, numbness, weakness: numbness in feet, bilateral lower legs and weakness in right knee.  Gait disturbance: None reported.  Denies recent falls or changes in balance.  Uses AD.  Exacerbating factors: Activities, weather changes if cold or damp, winter months, walking on uneven ground, standing, sitting too long  Alleviating factors: Rest, warm pool, pain medications, SCS implant, injections, warmer weather, reclining  Associated symptoms: Numbness in feet and bilateral legs, weakness in right leg, night pain. Denies bowel or bladder incontinence, fever/chills, unexplained weight loss.  Adverse effects of medications: Constipation.  Amitiza two times a day which is working.  He still uses miralax powder prn a couple times per week.  Has a bowel movement every 2-3 days. Does report some drowsiness related to medications and depression.   Functional Symptoms: Sleep, walking, work, ADLs, relationships/social, sexual health, mood (depressed, frustrated).  Current treatment  "efficacy: Fair; Morphine ER 15 mg every 8 hours and Vicodin 10/325 mg BID prn. Continues Gabapentin 400 mg bedtime and Cymbalta 120 mg daily without change.   Denies taking muscle relaxants.    Current treatment compliance: Good.  Attending scheduled appointments and taking medication as prescribed. Using SCS daily. Continues to see Dr. Frank monthly for pain psychology.  He states not too much exercise.  He has treadmill at home.      Denies any new diagnostic testing, medications, new medical conditions, any ER/urgent care visits since last seen. He takes  mg since his open heart surgery for anticoagulant. Saw Jada Jacobson DPM on 11/14/18 for food check and nail onychomycosis debridement. Denies adverse effects to medications except constipation which is not new.  He states he has no questions today.     Reports pain as unchanged since last visit.      Had TFESI right L5-S1 on 10/02/18 and reports pain relief about 20% depending on day and activity.  States it worked \"quite a bit\" but difficult to estimate a percentage.  He reports pain relief has lasted but starting to note it is wearing off. Denies post procedure complications.      Had SCS battery replaced on 09/09/16 with [employer].  He reports he has 2 reprogramming sessions with [employer].  Seems to work fairly good and covering correct areas of pain.  He is on high frequency setting and doesn't feel except sometimes on feet when laying down. States this seems to be better than traditional.  He is keeping SCS on around the clock, rechargeable battery having to charge every week taking 2-3 hours to charge fully.         Review of Systems  Constitutional: Fatigue. Sleep disturbance, sleeps during the day.  Wearing CPAP every night.  Denies fever, chills, night sweats, weight loss.  Musculoskeletal: Positive for back pain, leg pain/weakness.  Denies joint pain, joint swelling, recent falls.  Gastrointestional: Denies difficulty swallowing, change " "in appetite, abdominal pain, constipation, nausea, vomiting, diarrhea, GERD, fecal incontinence.  Genitourinary: Denies urinary incontinence, dysuria, hematuria, UTI, frequency, hesitancy  Neurologic:  Denies headaches, confusion, seizure, weakness, changes in balance, changes in speech.  Psychiatric: Positive for depression. Denies memory loss, psychoses, suicidal ideation, substance use/abuse.     Objective:     Vitals:    01/08/19 0958   BP: 108/59   Pulse: 67   Resp: 16   Weight: 200 lb (90.7 kg)   Height: 5' 9\" (1.753 m)   PainSc:   4     Physical Exam  Constitutional- General appearance: Well groomed, well developed, comfortable, overweight and appearance reflects stated age.  No acute distress or pain behaviors noted. Presents alone today.  Psychiatric- Judgment and insight: Normal.  Speech: Normal rhythm.  Thought process: Normal.  No abnormal thoughts reported. Alert & Oriented to person, place, and time.  Recent and remote memory: Normal.  Observed mood: Appropriate  Respiratory- Breathing is non-labored; normal rhythm and rate.  Cardiovascular- Extremities warm and well perfused, no peripheral edema or varicosities.  Dermatologic- Exposed skin is clean, dry, and intact to inspection and palpation.   Musculoskeletal- Gait and station: Abnormal.  Gait evaluation demonstrates ambulating with single point cane with a stiff, slightly antalgic gait.  Patient does have difficulty rising from a seated position.      *Opioid Universal Precautions:    UDS/Swab - Reviewed 11/13/18, results as expected  Opioid Agreement - 04/03/18  Opioid consent - 04/03/18  Pharmacy- as documented    [place]  Reviewed - 01/08/19  Pill Count - n/a  Psychological evaluation - monthly through Dr. Larry Frank  MME - 65  Pharmacogenetic testing - n/a  VICENTE 11/13/18    Imaging:  None reviewed today    Assessment:   Naveen Toro is a 74 y.o. male seen in clinic today for lumbar degeneration and lumbar radiculopathy, most severe at " L5-S1.  He continues pain management with Morphine 15 mg every 8 hours and Vicodin 10/325 mg BID prn daily as needed for severe pain.  Reports OIC but failed Movantik, doing better with Amitiza.  He requests to repeat TFESI right L5-S1 as previous injection was 3 months ago and relief has worn off.  He has tolerated steroid and injection without complication. He continues to use his SCS for pain control of radiculopathy symptoms with good coverage.     He continues to report daytime fatigue; this is not new.  Discuss multifactorial including medications, low testosterone, depression, poor sleep at night.  He is using CPAP.  We discuss trial of Amantadine which could help neuropathic pain working as NMDA antagonist but also may give more energy/stimulation during the daytime.  He may be able to wean off Gabapentin if this is effective. Discuss off label use of medication and possible side effects in detail today.     Plan:   Continue Morphine ER 15 mg every 8 hours.    You may take 1-2 Vicodin 10/325 mg a day as needed for severe breakthrough pain or in the morning as needed.    As your opioid dose remains stable, I will send electronic refills to the pharmacy for 2 subsequent months until your next appointment so you do not have to call our refill line.  The fill dates for your medications are:  01/23/19 & 02/20/19  Check with your pharmacy that all prescriptions are on your profile. Call the pharmacy a week before you want to fill the prescription as stock may vary and the pharmacy may need to order the medication for you. I reserve the right to cancel the electronic prescription at the pharmacy in between appointments and would contact you with an explanation if this were to occur.  For constipation, continue Amitiza 24 mcg twice a day with meals.    Continue taking Cymbalta 120 mg at night   Continue Gabapentin 400 mg at bedtime - refill sent in today.  This may be stopped if you see improvement with Amantadine  for nerve pain.  Start Amantadine 100 mg in the morning x 7 days, then increase to 100 mg twice a day. Discuss this is used off label for pain and may give you more energy during the day.  Please make sure not to take too late in the day as it may keep you awake.  Continue Vitamin D 5,000 Units daily  Continue use of spinal cord stimulator  Follow-up in 8 weeks with Brea.       Brea Sheets PA-C  [address]  [place] [address]  Ph: [phone]  Fax: [phone]

## 2021-06-23 NOTE — TELEPHONE ENCOUNTER
I would advise patient to see Keanu Marvin PharmD for follow-up on Amantadine side effects if he has further concerns/questions.  Keanu is seeing patients M/Th starting in February.  Thanks.

## 2021-06-23 NOTE — TELEPHONE ENCOUNTER
Patient calls back today. Wants to talk to Brea, about side effects from the med she prescribed. No other message.

## 2021-06-23 NOTE — TELEPHONE ENCOUNTER
"Call from [facility] pharmacist Andre. They will not process a W/C PA. He has no idea how to do that and won't be doing it. Suggested either The pain clinic or patient call the . I agreed to call the patient. Before calling the patient, I called the PA team at [facility]. Wanted to know if they now,will process PA'S for W/C. [facility] does  not do W/C. I then called the patient and tried to explain the process, and I asked him to call his W/C . He had no idea what I was talking about, and had no idea who to call. He then said, \"maybe its the gary I talked to earlier\". I also suggested that he give me another pharmacy that he would go to. We would resend to that pharmacy and a PA would be done. Patient again, said he didn't understand why its a problem. He did not chose another pharmacy to resend rx. Will update provider.  "

## 2021-06-23 NOTE — TELEPHONE ENCOUNTER
Called and pt states he thinks work comp will pay for Amantadine, but he's worried about possible side-effects, so hasn't picked it up. Will schedule follow-up sooner to discuss if Brea would like.

## 2021-06-23 NOTE — TELEPHONE ENCOUNTER
"Patient left message this am, a new med that Brea prescribed, \"W/C don't want to pay for\".  I called the pharmacy to review what med. It is Amantadine. I asked that the pharmacy do a PA for it, and I was told, by the tech. they do not do PA'S. I asked if they will call W/C and explain the med is prescribed for pain. The tech agreed to do that.  "

## 2021-06-23 NOTE — TELEPHONE ENCOUNTER
I spoke with patient this am. Explained Brea's response. Patient will call us back with an adjustor name and number.

## 2021-06-23 NOTE — TELEPHONE ENCOUNTER
Nurse called the patient and relayed the message below. The patient said he would call back and make an appt with Keanu.

## 2021-06-23 NOTE — TELEPHONE ENCOUNTER
If he is not going to choose another local pharmacy, we could try and see if he could fill through [facility] as they help patients in obtaining approval for medications without delaying the refill.  This would be sent through the mail to him.  If he doesn't want us to do that, I can also speak to his adjustor but I will need a name/phone number in order to do so.  Thanks.

## 2021-06-24 NOTE — PATIENT INSTRUCTIONS - HE
Continue Morphine ER 15 mg every 8 hours.    You may take 1-2 Vicodin 10/325 mg a day as needed for severe breakthrough pain or in the morning as needed.    As your opioid dose remains stable, I will send electronic refills to the pharmacy for 2 subsequent months until your next appointment so you do not have to call our refill line.  The fill dates for your medications are:  03/23/19 & 04/20/19  Check with your pharmacy that all prescriptions are on your profile. Call the pharmacy a week before you want to fill the prescription as stock may vary and the pharmacy may need to order the medication for you. I reserve the right to cancel the electronic prescription at the pharmacy in between appointments and would contact you with an explanation if this were to occur.  For constipation, continue Amitiza 24 mcg twice a day with meals.    Continue taking Cymbalta 120 mg at night   Continue Gabapentin 400 mg at bedtime   Continue Vitamin D 5,000 Units daily  Continue use of spinal cord stimulator  Schedule with Dr. Hoover for follow-up in April  You may repeat lumbar epidural injection as needed with Dr. Maier  Follow-up in 8 weeks with Brea.

## 2021-06-24 NOTE — PROGRESS NOTES
PAIN CENTER PROGRESS NOTE    Subjective:   Naveen Toro is a 74 y.o. male who presents for evaluation of low back pain.  He has history of lumbar degeneration.  He has history of lumbar surgery, failed lumbar injections, and is currently using opioid management and implanted spinal cord stimulator to manage pain symptoms.  Follows Dr. Garcia of [facility].      Major issues:  1. Chronic pain syndrome    2. Therapeutic opioid-induced constipation (OIC)    3. Lumbar Disc Degeneration       Pain location and description: 5/10 intermittent burning, sharp pain in lower back and right buttock and hip.  Mainly lower back, right side worse than the left.  He states he had a flare of pain before [place] due to weather.  Function rated 8. At best, pain rating 3/10 and at worst 7/10, on average pain rated 4/10.  Radiation of pain: buttocks, hips, legs intermittently.    Gait disturbance: None reported.  Denies recent falls or changes in balance.  Uses AD.  Exacerbating factors: Activities, weather changes if cold or damp, winter months, walking on uneven ground, standing, sitting too long  Alleviating factors: Rest, warm pool, pain medications, SCS implant, injections, warmer weather, reclining/laying down  Associated symptoms: Numbness in right foot, weakness in right leg, night pain. Denies bowel or bladder incontinence, fever/chills, unexplained weight loss.  Adverse effects of medications: Constipation.  Amitiza two times a day which is working.  He still uses miralax powder prn a couple times per week.  Has a bowel movement every 2-3 days. Does report some drowsiness related to medications and depression.   Functional Symptoms: Sleep, walking, ADLs, relationships/social, sexual health, mood (depressed, frustrated).  Current treatment efficacy: Fair; Morphine ER 15 mg every 8 hours and Vicodin 10/325 mg BID prn. Continues Gabapentin 400 mg bedtime and Cymbalta 120 mg daily without change.   Current treatment  "compliance: Good.  Attending scheduled appointments and taking medication as prescribed. Using SCS daily. Continues to see Dr. Frank monthly for pain psychology.  He states not too much exercise.  He has treadmill at home.      Denies any new diagnostic testing, medications, new medical conditions, any ER/urgent care visits since last seen. He takes  mg since his open heart surgery for anticoagulant. Saw Jada Jacobson DPM on 02/13/19:  \"Impression:   ICD-10-CM   1. Onychomycosis of toenail B35.1 26742 Debrid Nail Any Method; Six/More   2. 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 Chart reviewed. We discussed mycotic nails today. We discussed nail filing, trimming techniques, homeopathic treatment options, prescription topical options, over-the-counter topical options, living with the condition and nail debridement. Nail debridement elected. 10 digital nails debrided with a nail nipper. Nail plates debrided underlying nail bed. This was well tolerated  Nail care discussed. Recheck as needed.\"    States pain hasn't changed but has been \"lousy\" with snow storms and cold weather.  He states he is not responsible for snow removal except back step to get dog in and out.  He states his son helps with the rest.     He states he fell a month ago as it was icy and fell and hit the back of his head and still a slight bump.  He was able get up by himself but unsure about LOC.  He states he doesn't remember the details.  Had slight dizziness and headache and pulled muscles in his shoulder, ribs.  Denies ED or urgent care and used ice until recovered.  He is using cane when he leaves the house.      He called nurse line on 01/15/19 related to Amantadine as he was worried about possible side effects and did not fill.  He was advised to see our [facility] pharmacist and he did not schedule this visit.  He declines starting " "it.    Reports pain as unchanged since last visit.      Had TFESI right L5-S1 on 10/02/18 and reports pain relief.  States it worked \"quite a bit\" but difficult to estimate a percentage.  He reports pain relief has lasted but starting to note it is wearing off. Denies post procedure complications.      Had SCS battery replaced on 09/09/16 with [employer].  He reports he has 2 reprogramming sessions with [employer].  Seems to work fairly good and covering correct areas of pain.  He is on high frequency setting and doesn't feel except sometimes on feet when laying down. States this seems to be better than traditional.  He is keeping SCS on around the clock, rechargeable battery having to charge every week taking 2-3 hours to charge fully.         Review of Systems  Constitutional: Fatigue. Sleep disturbance, sleeps during the day.  Wearing CPAP every night.  Denies fever, chills, night sweats, weight loss.  Musculoskeletal: Positive for back pain, leg pain/weakness.  Denies joint pain, joint swelling, recent falls.  Gastrointestional: Denies difficulty swallowing, change in appetite, abdominal pain, constipation, nausea, vomiting, diarrhea, GERD, fecal incontinence.  Genitourinary: Denies urinary incontinence, dysuria, hematuria, UTI, frequency, hesitancy  Neurologic:  Denies headaches, confusion, seizure, weakness, changes in balance, changes in speech.  Psychiatric: Positive for depression. Denies memory loss, psychoses, suicidal ideation, substance use/abuse.     Objective:     Vitals:    03/05/19 0951   BP: 116/65   Pulse: 65   Resp: 16   Weight: 200 lb (90.7 kg)   Height: 5' 9\" (1.753 m)   PainSc:   5     Physical Exam  Constitutional- General appearance: Well groomed, well developed, comfortable, overweight and appearance reflects stated age.  No acute distress or pain behaviors noted. Presents alone today.  Psychiatric- Judgment and insight: Normal.  Speech: Normal rhythm.  Thought process: Normal.  No abnormal " thoughts reported. Alert & Oriented to person, place, and time.  Recent and remote memory: Normal.  Observed mood: Quiet, flat affect.  Respiratory- Breathing is non-labored; normal rhythm and rate.  Cardiovascular- Extremities warm and well perfused, no peripheral edema or varicosities.  Dermatologic- Exposed skin is clean, dry, and intact to inspection and palpation.   Musculoskeletal- Gait and station: Abnormal.  Gait evaluation demonstrates ambulating with single point cane with a stiff, slightly antalgic gait.  Patient does have difficulty rising from a seated position.      *Opioid Universal Precautions:    UDS/Swab - 11/13/18, results as expected  Opioid Agreement - 04/03/18  Opioid consent - 04/03/18  Pharmacy- as documented     Reviewed - 03/05/19.  Last refill was on 02/21/19.  Pill Count - n/a  Psychological evaluation - monthly through Dr. Larry Frank  MME - 65  Pharmacogenetic testing - n/a  VICENTE 11/13/18     Imaging:  None reviewed today    Assessment:   Naveen Toro is a 74 y.o. male seen in clinic today for lumbar degeneration and lumbar radiculopathy, most severe at L5-S1.  He continues pain management with Morphine 15 mg every 8 hours and Vicodin 10/325 mg BID prn daily as needed for severe pain.  Reports OIC but failed Movantik, doing better with Amitiza.  He requests to repeat TFESI right L5-S1 as previous injection was over 3 months ago and relief has worn off.  He has tolerated steroid and injection without complication. He continues to use his SCS for pain control of radiculopathy symptoms with good coverage.      Plan:   Continue Morphine ER 15 mg every 8 hours.    You may take 1-2 Vicodin 10/325 mg a day as needed for severe breakthrough pain or in the morning as needed.    As your opioid dose remains stable, I will send electronic refills to the pharmacy for 2 subsequent months until your next appointment so you do not have to call our refill line.  The fill dates for your  medications are:  03/23/19 & 04/20/19  Check with your pharmacy that all prescriptions are on your profile. Call the pharmacy a week before you want to fill the prescription as stock may vary and the pharmacy may need to order the medication for you. I reserve the right to cancel the electronic prescription at the pharmacy in between appointments and would contact you with an explanation if this were to occur.  For constipation, continue Amitiza 24 mcg twice a day with meals.    Continue taking Cymbalta 120 mg at night   Continue Gabapentin 400 mg at bedtime   Continue Vitamin D 5,000 Units daily  Continue use of spinal cord stimulator  Schedule with Dr. Hoover for follow-up in April  You may repeat lumbar epidural injection as needed with Dr. Maier  Follow-up in 8 weeks with Brea.       Brea Sheets PA-C  [address]  [place] [address]  Ph: [phone]  Fax: [phone]

## 2021-06-24 NOTE — TELEPHONE ENCOUNTER
Received a fax from [email or web address], an [employer] company stating the patients work comp would like the patient to have Amitiza 24 mcg capsules filled via [facility] home [facility]. Fax has been scanned into the patients chart informing provider of this. Will have Brea discuss this with the patient at 4/30 follow up. Per pharmacist the pt picked up med on 3/7 #180. Will add [facility] pharmacy to list.

## 2021-06-25 NOTE — TELEPHONE ENCOUNTER
Spoke to patient, he states he would like to hold off for now on scheduling with Keanu. Patient also stated he has decided not to take the medication and will discuss this further with Brea at his next appointment.

## 2021-06-27 NOTE — PROGRESS NOTES
Progress Notes by Keanu Marvin PharmD at 6/27/2019  9:51 AM     Author: Keanu Marvin PharmD Service: -- Author Type: Pharmacist    Filed: 6/27/2019 11:09 AM Date of Service: 6/27/2019  9:51 AM Status: Signed    : Keanu Marvin PharmD (Pharmacist)       MT Initial Encounter  Assessment & Plan                                                     Medication Adherence/Access: No concerns noted     Back Pain: Partially improved- pt wanting to discuss additional options for pain control. Given sharp/burning nerve pains, may benefit from increased dose of Gabapentin. Per discussion with Brea Sheets PA-C, pt was previously on higher doses but was very sedated. This was around the time of SHERRIE and other high doses of medication, so may tolerate a dose increase at this time. Is still having some drowsiness during the day but this could be related to low mood. Would need close monitoring if gabapentin increase. Could also consider options like Ketamine which would be helpful as needed for pain and help with depressed mood. Other options to consider would be topical lidocaine for PRN relief and alpha lipoic acid as supplement, however I would recommend trial of gabapentin and ketamine first as these may be more beneficial.   -Consider increasing gabapentin to 400 mg two times a day   -Consider trial of Ketamine troches PRN   -Future considerations: trial of alpha lipoic acid 600 mg daily or topical lidocaine   PharmD to discuss above options with Brea Sheets PA-C       Mood: Needs improvement - continues to have depressed mood. Daytime drowsiness could be partly related to low mood or due to not sleeping well at night- would need to monitor if increasing gabapentin use. As noted above, may benefit from Ketamine to help with mood and pain.   -Consider Ketamine, as above     Constipation: Needs improvement - continues to have constipation with hard stools. May benefit from addition of gentle osmotic  laxative to help with BM frequency and to soften stools.   -Start Miralax 17 g daily     Diabetes:  Stable - A1C at goal <7%. Reported fasting sugars are above goal , although pt was not very sure of ranges so these may not be accurate.   -Continue current therapy       CAD: Needs improvement - appropriately on high intensity statin. Is having bruising, may benefit from reducing aspirin to 81 mg daily.   -Consider reducing aspirin to 81 mg daily (Pt to discuss with PCP. Provided info in AVS).        Hypertension: BP x 2 and at last visit were above goal <140/90. May benefit from increased dose of amlodipine.   -Consider increasing amlodipine to 5 mg daily (pt to discuss with PCP- provided info in AVS).       Allergies: Improved - allergy symptom are getting better per pt report.   -Continue current therapy     Follow Up  No follow-ups on file.   Schedule with PharmD as needed       Subjective & Objective                                                     Naveen Toro is a 74 y.o. male coming in for an initial visit for Medication Therapy Management. He was referred to me from   Brea Sheets PA-C.    Chief Complaint: Pain management -Coming in for a discussion of Ketamine and supplement use including CBD use. Looking to discuss options to help with baseline pain control and also with management of flares.      Medication Adherence/Access: Denies cost concerns. Denies missed doses.      Back Pain:  Currently using Morphine ER 15 mg Q8H, Hydrocodone-Acetaminophen  mg two times a day PRN - is typically using two times a day, Duloxetine 60 mg 2 caps daily, Gabapentin 400 mg at bedtime, Vitamin D3 5000 units daily. Reports intermittent burning, sharp pain in lower back and right buttock and hip. Sometimes down the thigh. Denies dizziness and drowsiness. Has been on higher doses of gabapentin in the past, pt unsure why it was reduced.         Had a fentanyl patch for a while but unclear if ever used  "lidocaine topically.      Wanting to talk to neuro provider - has a high frequency stimulator which he doesn't really feel so unclear if it's working. Wants to discuss trying a pulse stim to see if that is more effective.     Vitamin D, Total (25-Hydroxy)   Date Value Ref Range Status   09/30/2015 36.2 30.0 - 80.0 ng/mL Final             Mood: Using Duloxetine 60 mg 2 caps dailys. Mood has been \"so-so.\" Acknowledges that he's depressed. Continues to have poor sleep. Tired during the day. Still not very engaged in activities.        Constipation: Currently using Amitiza 24 mcg two times a day. Stool pattern has been 1 formed and hard stool(s) every other day. Defecation has been difficult and painful. Pain is not constant.       Diabetes:  Pt currently taking Metformin 500 mg two times a day.   SMBG: 3 times a week    Ranges (patient reported) : fasting sugars - doesn't remember the range - thinks 150-180.    Patient is not experiencing hypoglycemia   Recent symptoms of high blood sugar? Denies   ACEi/ARB:  Not currently using   Statin: Atorvastatin 40 mg daily   Aspirin: taking 325mg daily, denies bleeding. Some bruising - dark stools from ferrous sulfate           CAD: Using Aspirin 325 mg and atorvastatin 40 mg daily. Had open heart surgery about 10 years ago at [place], which is when high dose aspirin was started.      Blood Pressure: Using Metoprolol tartrate 25 mg two times a day and amlodipine 2.5 mg daily. Denies any edema in legs/ankles.     BP Readings from Last 3 Encounters:   06/27/19 150/80   06/04/19 142/69   04/30/19 136/76     Pulse Readings from Last 3 Encounters:   06/27/19 64   06/04/19 62   04/30/19 62     Allergies: Using Fluticasone 50 mcg spray 1 spray each nostril daily. Allergy symptoms are getting better.       PMH: reviewed in EPIC   Allergies/ADRs: reviewed in EPIC   Alcohol:   Social History     Substance and Sexual Activity   Alcohol Use No       Tobacco:   Social History     Tobacco Use " "  Smoking Status Former Smoker   ? Last attempt to quit: 1987   ? Years since quittin.8   Smokeless Tobacco Never Used     Today's Vitals:   Vitals:    19 0953 19 1047   BP: 148/80 150/80   Patient Site: Right Arm    Patient Position: Sitting    Pulse: 67 64   Resp: 16    Weight: 200 lb (90.7 kg)    Height: 5' 9\" (1.753 m)      ----------------    Much or all of the text in this note was generated through the use of Dragon Dictate voice-to-text software. Errors in spelling or words which seem out of context are unintentional. Sound alike errors, in particular, may have escaped editing.    The patient was given a summary of these recommendations as an after visit summary    I spent 30 minutes with this patient today.   All changes were made via collaborative practice agreement with   Brea Sheets PA-C. A copy of the visit note was provided to the patient's provider.     Keanu Marvin, IraD  Medication Therapy Management (MTM) Pharmacist  [facility]        Current Outpatient Medications   Medication Sig Dispense Refill   ? amLODIPine (NORVASC) 2.5 MG tablet Take 2.5 mg by mouth daily.     ? aspirin 325 MG tablet Take 325 mg by mouth daily.     ? atorvastatin (LIPITOR) 40 MG tablet Take 40 mg by mouth daily. As directed     ? cholecalciferol, vitamin D3, 5,000 unit capsule TAKE ONE CAPSULE BY MOUTH EVERY DAY 90 capsule 0   ? ferrous sulfate 325 (65 FE) MG tablet Take 1 tablet by mouth daily with breakfast.     ? fluticasone propionate (FLONASE) 50 mcg/actuation nasal spray Apply 1 spray into each nostril daily.     ? gabapentin (NEURONTIN) 400 MG capsule Take 1 capsule (400 mg total) by mouth at bedtime. 90 capsule 0   ? HYDROcodone-acetaminophen (NORCO )  mg per tablet Take 1 tablet by mouth 2 (two) times a day as needed for pain. 56 tablet 0   ? lubiprostone (AMITIZA) 24 MCG capsule TAKE ONE CAPSULE BY MOUTH TWICE A DAY WITH MEALS 180 capsule 0   ? " metFORMIN (GLUCOPHAGE) 500 MG tablet Take 500 mg by mouth 2 (two) times a day with meals.     ? metoprolol (LOPRESSOR) 25 MG tablet Take 25 mg by mouth 2 (two) times a day.     ? morphine (MS CONTIN) 15 MG 12 hr tablet Take 1 tablet (15 mg total) by mouth every 8 (eight) hours. 84 tablet 0   ? DULoxetine (CYMBALTA) 60 MG capsule Take 2 capsules (120 mg total) by mouth daily. 180 capsule 0     No current facility-administered medications for this encounter.

## 2021-06-28 NOTE — PROGRESS NOTES
"Progress Notes by Jojo Andrews CMA at 4/9/2020  9:40 AM     Author: Jojo Andrews CMA Service: -- Author Type: Certified Medical Assistant    Filed: 4/9/2020 10:31 AM Date of Service: 4/9/2020  9:40 AM Status: Signed    : Jojo Andrews CMA (Certified Medical Assistant)       Naveen Toro is a 75 y.o. male who is being evaluated via a billable telephone visit regarding back pain. Patient describes his pain as dull ache  . Patient's everyday functions effected by pain include: relationships, sleep, activities of daily living, mood.    The patient has been notified of following:     \"This telephone visit will be conducted via a call between you and your physician/provider. We have found that certain health care needs can be provided without the need for a physical exam.  This service lets us provide the care you need with a short phone conversation.  If a prescription is necessary we can send it directly to your pharmacy.  If lab work is needed we can place an order for that and you can then stop by our lab to have the test done at a later time. Telephone visits are billed at different rates depending on your insurance coverage. During this emergency period, for some insurers they may be billed the same as an in-person visit.  Please reach out to your insurance provider with any questions.\"    Patient has given verbal consent to a Telephone visit? Yes     Patient would like his AVS: submitted through My Chart.    Naveen Toro complains of    Chief Complaint   Patient presents with   ? Back Pain       I have reviewed and updated the patient's Past Medical History, Social History, Family History and Medication List.    ALLERGIES  Ace inhibitors    Jojo Andrews CMA           "

## 2021-06-28 NOTE — PROGRESS NOTES
Progress Notes by Keanu Marvin, Smita at 2/13/2020 10:15 AM     Author: Keanu Marvin, Smita Service: -- Author Type: Pharmacist    Filed: 2/13/2020 11:44 AM Date of Service: 2/13/2020 10:15 AM Status: Signed    : Keanu Marvin PharmD (Pharmacist)       [facility] Initial Encounter  Assessment & Plan                                                     Medication Adherence/Access: No concerns noted     Back Pain: Partially improved- Feels like pain is fairly well controlled with current Hysingla dose, but is having flares from 3-7 pm, likely not related to hysingla. Given that pain is low back, and doesn't seem to be neuropathic, likely not related to trough in gabapentin levels. May benefit from addition of topical NSAID as a as needed option to help prevent pain from flaring significantly. Given sharp/burning/electrical nerve pains, may benefit from addition of lamotrigine, which would also likely be beneficial for mood.  -Start diclofenac 1% gel-2 g up to 4 times daily as needed  -Start lamotrigine 25 mg daily x2 weeks, then 1 tab twice daily x2 weeks    Mood/Sleep: Needs improvement - continues to have depressed mood.  Suspect that daytime drowsiness is partly related to low mood and partly related she not sleeping well at night.  Discussed sleep hygiene points that patient can implement.  May also benefit from addition of melatonin to help reset pt's sleep/wake cycle. As noted above, addition of Lamotrigine may also be beneficial for mood and give pt more drive and energy to be active during the day.   -Patient to avoid watching TV in bed  -If unable to fall asleep for 15 to 20 minutes, patient to get out of bed, try relaxing activity, then return to bed  -Start melatonin 5 mg 30 minutes before bedtime  -Start lamotrigine, as above      Constipation: Needs improvement - continues to have constipation with hard stools and some pain and straining.  Would likely benefit from increasing MiraLAX use  -Patient to  "use MiraLAX 17 g daily      Hypertension: Improved-BP at goal of less than 130/80.  Pulse at goal of .  -Continue current therapy    Follow Up  No follow-ups on file.   Schedule with PharmD as needed       Subjective & Objective                                                     Naveen Toro is a 75 y.o. male coming in for an initial visit for Medication Therapy Management. He was referred to me from   Brea Sheets PA-C.    Chief Complaint: Pain management -Coming in for a discussion of Ketamine and supplement use including CBD use. Looking to discuss options to help with baseline pain control and also with management of flares.      Medication Adherence/Access: Denies cost concerns. Denies missed doses.      Back Pain:  Currently using Hysingla (Hydrocodone ER) 60 mg daily, Hydrocodone-Acetaminophen  mg four times a day PRN -, Duloxetine 60 mg 2 caps daily, Gabapentin 400 mg two times a day, Vitamin D3 5000 units daily, and Lidoderm patches.     Has had a few days recently where pain control worsens around 3 pm then improves around 6-7 pm. Describes a dull aching pain in the middle of the back through the buttocks, not much burning/tingling. Mostly increased dull, ache. Taking Gabapentin at 9 am and bedtime.     No longer using Hydrocodone-Acetaminophen - pt thinks this was stopped because of worsening renal function.     The cold is really flaring the pain. Mostly in the low back and in the hip.     Continues to have drowsiness during the day. This tends to happen around mid-afternoon. Not sleeping well at night. Seems like days and nights are turned around.     Does occasionally get sharp, tingling pain that goes through the foot \"kind of like electricity.\"       Vitamin D, Total (25-Hydroxy)   Date Value Ref Range Status   09/30/2015 36.2 30.0 - 80.0 ng/mL Final         Mood/Sleep: Using Duloxetine 60 mg 2 caps dailys. Mood \"probably not real good\". Don't do much day after day, just sit and " watch tv. Doesn't do activities that he would like to do. Moods not good with the wife.     Following monthly with a psychologist, Eliseo Larry Thorntonin at   [address],  [place] [address]    Continues to have poor sleep. Tired during the day. Still not very engaged in activities. Doesn't have any trouble with drowsiness when engaged in activities - going to the store, driving. Just when he sits and gets relaxed. Tired all the time.     Watching TV prior to bed. Goes to bed around 8 pm or 9 pm. PT watches TV in bed.     Caffeine only in the morning. In the past few months has limited to just one cup.    Does have sleep apnea, wears a CPAP. Pressures were recently adjusted (about 4-5 days ago) and that feels better. When falling asleep, feels more sound.     Constipation: Currently using Amitiza 24 mcg two times a day and Miralax 17 g daily. Only using Miralax as needed Stool pattern has been 1 formed and hard stool(s) every other day. Defecation has been difficult and painful. Pain is not constant.     Blood Pressure: Using Metoprolol tartrate 25 mg two times a day and amlodipine 2.5 mg daily. Denies any edema in legs/ankles.       BP Readings from Last 3 Encounters:   20 117/67   01/15/20 (!) 166/107   19 156/74     Pulse Readings from Last 3 Encounters:   20 70   01/15/20 69   19 83             PMH: reviewed in EPIC   Allergies/ADRs: reviewed in EPIC   Alcohol:   Social History     Substance and Sexual Activity   Alcohol Use No       Tobacco:   Social History     Tobacco Use   Smoking Status Former Smoker   ? Last attempt to quit: 1987   ? Years since quittin.4   Smokeless Tobacco Never Used     Today's Vitals:   Vitals:    20 1111   BP: 117/67   Pulse: 70     ----------------    Much or all of the text in this note was generated through the use of Dragon Dictate voice-to-text software. Errors in spelling or words which seem out of context are  unintentional. Sound alike errors, in particular, may have escaped editing.    The patient was given a summary of these recommendations as an after visit summary    I spent 30 minutes with this patient today.   All changes were made via collaborative practice agreement with   Brea Sheets PA-C. A copy of the visit note was provided to the patient's provider.     Keanu Marvin, PharmD  Medication Therapy Management (MTM) Pharmacist  [facility]        Current Outpatient Medications   Medication Sig Dispense Refill   ? amLODIPine (NORVASC) 2.5 MG tablet Take 2.5 mg by mouth daily.     ? aspirin 325 MG tablet Take 325 mg by mouth daily.     ? atorvastatin (LIPITOR) 40 MG tablet Take 40 mg by mouth daily. As directed     ? cholecalciferol, vitamin D3, 125 mcg (5,000 unit) capsule Take 1 capsule (5,000 Units total) by mouth daily. 90 capsule 0   ? diclofenac sodium (VOLTAREN) 1 % Gel Apply 2 g topically 4 (four) times a day. 100 g 1   ? DULoxetine (CYMBALTA) 60 MG capsule Take 2 capsules (120 mg total) by mouth daily. 180 capsule 0   ? ferrous sulfate 325 (65 FE) MG tablet Take 1 tablet by mouth daily with breakfast.     ? gabapentin (NEURONTIN) 400 MG capsule Take 1 capsule (400 mg total) by mouth 2 (two) times a day. 180 capsule 0   ? HYDROcodone bitartrate (HYSINGLA ER) 60 mg TP24 Take 60 mg by mouth daily. 30 each 0   ? HYDROcodone-acetaminophen (NORCO )  mg per tablet Take 1 tablet by mouth 4 (four) times a day as needed for pain. 28 tablet 0   ? hydrOXYzine pamoate (VISTARIL) 25 MG capsule TK 1 TO 2 CS PO TID PRA OR WITHDRAWAL     ? lamoTRIgine (LAMICTAL) 25 MG tablet Take 1 tablet (25 mg total) by mouth daily. X 2 weeks, then 1 tab two times a day x 2 weeks. 60 tablet 1   ? lidocaine (LIDODERM) 5 % UNWRAP AND APPLY 1 PATCH TO SKIN DAILY, REMOVE AND DISCARD PATCH WITHIN 12 HOURS OR AS DIRECTED BY DOCTOR 30 patch 1   ? lubiprostone (AMITIZA) 24 MCG capsule TAKE ONE CAPSULE BY  MOUTH TWICE A DAY WITH MEALS 180 capsule 0   ? melatonin 5 mg Tab tablet Take 1 tablet (5 mg total) by mouth at bedtime. 30 tablet 3   ? metFORMIN (GLUCOPHAGE) 500 MG tablet Take 1,000 mg by mouth 2 (two) times a day with meals.      ? metoprolol (LOPRESSOR) 25 MG tablet Take 25 mg by mouth 2 (two) times a day.     ? polyethylene glycol (GLYCOLAX) 17 gram/dose powder Take 17 g by mouth daily. 595 g 1     No current facility-administered medications for this encounter.

## 2021-06-29 NOTE — PROGRESS NOTES
"Progress Notes by Jojo Andrews CMA at 6/4/2020 11:00 AM     Author: Jojo Andrews CMA Service: -- Author Type: Certified Medical Assistant    Filed: 6/4/2020 12:36 PM Date of Service: 6/4/2020 11:00 AM Status: Signed    : Jojo Andrews CMA (Certified Medical Assistant)       Naveen Toro is a 75 y.o. male who is being evaluated via a billable telephone visit regarding back pain. Patient describes his pain as mild. Patient's everyday functions effected by pain include: walking, sleep, activities of daily living and mood.     The patient has been notified of following:     \"This telephone visit will be conducted via a call between you and your physician/provider. We have found that certain health care needs can be provided without the need for a physical exam.  This service lets us provide the care you need with a short phone conversation.  If a prescription is necessary we can send it directly to your pharmacy.  If lab work is needed we can place an order for that and you can then stop by our lab to have the test done at a later time. Telephone visits are billed at different rates depending on your insurance coverage. During this emergency period, for some insurers they may be billed the same as an in-person visit.  Please reach out to your insurance provider with any questions.\"    Patient has given verbal consent to a Telephone visit? Yes    What phone number would you like to be contacted at? Mobile    Patient would like to receive their AVS by AVS Preference: Ree.    Jojo Andrews CMA           "

## 2021-06-30 ENCOUNTER — HOSPITAL ENCOUNTER (OUTPATIENT)
Dept: PALLIATIVE MEDICINE | Facility: OTHER | Age: 76
Discharge: HOME | End: 2021-06-30
Attending: PHYSICIAN ASSISTANT
Payer: MEDICARE

## 2021-06-30 ENCOUNTER — TRANSFERRED RECORDS (OUTPATIENT)
Dept: HEALTH INFORMATION MANAGEMENT | Facility: CLINIC | Age: 76
End: 2021-06-30

## 2021-06-30 DIAGNOSIS — F11.90 CHRONIC, CONTINUOUS USE OF OPIOIDS: ICD-10-CM

## 2021-06-30 DIAGNOSIS — G89.4 CHRONIC PAIN SYNDROME: ICD-10-CM

## 2021-06-30 DIAGNOSIS — M54.15 RADICULOPATHY OF THORACOLUMBAR REGION: ICD-10-CM

## 2021-06-30 DIAGNOSIS — K59.03 THERAPEUTIC OPIOID-INDUCED CONSTIPATION (OIC): ICD-10-CM

## 2021-06-30 DIAGNOSIS — T40.2X5A THERAPEUTIC OPIOID-INDUCED CONSTIPATION (OIC): ICD-10-CM

## 2021-06-30 DIAGNOSIS — M96.1 POSTLAMINECTOMY SYNDROME, LUMBAR REGION: ICD-10-CM

## 2021-06-30 RX ORDER — HYDROCODONE BITARTRATE 40 MG/1
40 TABLET, EXTENDED RELEASE ORAL DAILY
Qty: 30 EACH | Refills: 0 | Status: SHIPPED | OUTPATIENT
Start: 2021-07-30 | End: 2021-08-18

## 2021-06-30 RX ORDER — GABAPENTIN 300 MG/1
300 CAPSULE ORAL 2 TIMES DAILY
Qty: 180 CAPSULE | Refills: 1 | Status: SHIPPED | OUTPATIENT
Start: 2021-06-30 | End: 2021-08-18

## 2021-06-30 RX ORDER — LIDOCAINE 50 MG/G
PATCH TOPICAL
Qty: 30 PATCH | Refills: 1 | Status: SHIPPED | OUTPATIENT
Start: 2021-06-30 | End: 2022-02-22

## 2021-06-30 RX ORDER — LUBIPROSTONE 24 UG/1
24 CAPSULE ORAL 2 TIMES DAILY WITH MEALS
Qty: 180 CAPSULE | Refills: 1 | Status: SHIPPED | OUTPATIENT
Start: 2021-06-30 | End: 2021-08-18

## 2021-06-30 RX ORDER — DULOXETIN HYDROCHLORIDE 60 MG/1
120 CAPSULE, DELAYED RELEASE ORAL DAILY
Qty: 180 CAPSULE | Refills: 1 | Status: SHIPPED | OUTPATIENT
Start: 2021-06-30 | End: 2021-08-18

## 2021-06-30 RX ORDER — HYDROCODONE BITARTRATE AND ACETAMINOPHEN 10; 325 MG/1; MG/1
0.5-1 TABLET ORAL 2 TIMES DAILY PRN
Qty: 40 TABLET | Refills: 0 | Status: SHIPPED | OUTPATIENT
Start: 2021-07-16 | End: 2021-08-15

## 2021-06-30 ASSESSMENT — MIFFLIN-ST. JEOR: SCORE: 1614.89

## 2021-07-03 NOTE — ADDENDUM NOTE
Addendum Note by Estefani Martines RN at 10/2/2018  2:28 PM     Author: Estefani Martines RN Service: -- Author Type: Registered Nurse    Filed: 10/2/2018  2:28 PM Date of Service: 10/2/2018  2:28 PM Status: Signed    : Estefani Martines RN (Registered Nurse)    Encounter addended by: Estefani Martines RN on: 10/2/2018  2:28 PM<BR>     Actions taken: Vitals modified

## 2021-07-03 NOTE — ADDENDUM NOTE
Addendum Note by Yahaira Wiley at 6/4/2020 11:00 AM     Author: Yahaira Wiley Service: -- Author Type: --    Filed: 6/10/2020 11:00 AM Date of Service: 6/4/2020 11:00 AM Status: Signed    : Yahaira Wiley    Encounter addended by: Yahaira Wiley on: 6/10/2020 11:00 AM      Actions taken: Charge Capture section accepted

## 2021-07-03 NOTE — ADDENDUM NOTE
Addendum Note by Jojo Andrews CMA at 11/18/2020 10:00 AM     Author: Jojo Andrews CMA Service: -- Author Type: Certified Medical Assistant    Filed: 11/18/2020  1:59 PM Date of Service: 11/18/2020 10:00 AM Status: Signed    : Jojo Andrews CMA (Certified Medical Assistant)    Encounter addended by: Jojo Andrews CMA on: 11/18/2020  1:59 PM      Actions taken: Visit diagnoses modified, Order list changed, Diagnosis   association updated

## 2021-07-03 NOTE — ADDENDUM NOTE
Addendum Note by Mica Malone RN at 11/12/2019  9:18 AM     Author: Mica Malone RN Service: -- Author Type: Registered Nurse    Filed: 11/12/2019  9:18 AM Encounter Date: 11/11/2019 Status: Signed    : Mica Malone RN (Registered Nurse)    Addended by: MICA MALONE on: 11/12/2019 09:18 AM        Modules accepted: Orders

## 2021-07-03 NOTE — ADDENDUM NOTE
Addendum Note by Lata Plascencia CMA at 1/23/2017 11:59 PM     Author: Lata Plascencia CMA Service: -- Author Type: Certified Medical Assistant    Filed: 11/10/2017  3:19 PM Date of Service: 1/23/2017 11:59 PM Status: Signed    : Lata Plascencia CMA (Certified Medical Assistant)    Encounter addended by: Laat Plascencia CMA on: 11/10/2017  3:19 PM<BR>     Actions taken: Charge Capture section accepted

## 2021-07-03 NOTE — ADDENDUM NOTE
Addendum Note by Minna Lowery LPN at 9/23/2020  8:00 AM     Author: Minna Lowery LPN Service: -- Author Type: Licensed Nurse    Filed: 9/23/2020  1:00 PM Date of Service: 9/23/2020  8:00 AM Status: Signed    : Minna Lowery LPN (Licensed Nurse)    Encounter addended by: Minna Lowery LPN on: 9/23/2020  1:00 PM      Actions taken: Charge Capture section accepted

## 2021-07-04 NOTE — PROGRESS NOTES
Progress Notes by Jojo Andrews CMA at 6/30/2021  9:40 AM     Author: Jojo Andrews CMA Service: -- Author Type: Certified Medical Assistant    Filed: 6/30/2021 12:36 PM Date of Service: 6/30/2021  9:40 AM Status: Signed    : Jojo Andrews CMA (Certified Medical Assistant)       Patient presents to the clinic today for a follow up with Brea Sheets PA-C.    Pain score: 4  intermittent  What does your pain feel like: aching, stabbing, mainly on right side  Does the pain interfere with:  Work: n/a  Walking/distance: yes  Sleep: yes  Daily activities: yes  Relationships/social life: no  Mood: no  F= 8

## 2021-07-04 NOTE — PATIENT INSTRUCTIONS - HE
Patient Instructions by Brea Sheets PA-C at 6/30/2021  9:40 AM     Author: Brea Sheets PA-C Service: -- Author Type: Physician Assistant    Filed: 6/30/2021 10:27 AM Date of Service: 6/30/2021  9:40 AM Status: Signed    : Brea Sheets PA-C (Physician Assistant)       Continue Hysingla (24 hour hydrocodone without tylenol) 40 mg daily.   Ok to fill today 06/30/21 for use on 07/05/21 & ok to fill 07/30/21 for use on 08/04/21  Also ok to take Vicodin 10/325 mg 1 tab at bedtime as needed for pain and added #10 tabs to use during the day as needed for increased pain.  #40 tabs to last 30 days  - refill sent for 07/16/2021  Continue lamictal to 50 mg twice a day.    Continue taking Cymbalta 120 mg at night.   Reduce Gabapentin to 300 mg two times a day for pain to see if contributing to daytime drowsiness.   Continue Lidoderm patch apply 1-3 patches to painful areas on 12 hours, off 12 hours.    May continue using voltaren gel to lower back 2 grams 4 times a day  Continue Vitamin D 5,000 Units daily   Continue use of spinal cord stimulator - discussed checking battery life in next 6 months to determine if a new battery is needed.  Intellis battery has features with charging and adaptive stimulation that would be an improvement from your current one.  May repeat ADNIAI with Dr. Maier in July as needed after 07/10.  There is a future order in the chart.  Recommend acupuncture trial for chronic low back pain.  Referral placed today - complete intake from and you can see Sydnee Lopez or Dori Akins here.  Follow-up in 8 weeks with Brea in clinic

## 2021-07-04 NOTE — PROGRESS NOTES
Progress Notes by Brea Sheets PA-C at 6/30/2021  9:40 AM     Author: Brea Sheets PA-C Service: -- Author Type: Physician Assistant    Filed: 6/30/2021 12:36 PM Date of Service: 6/30/2021  9:40 AM Status: Signed    : Brea Sheets PA-C (Physician Assistant)       PAIN CENTER PROGRESS NOTE    Subjective:   Naveen Toro is a 76 y.o. male who presents for evaluation of low back pain.  He has history of lumbar degeneration.  He has history of lumbar surgery, failed lumbar injections, and is currently using opioid management and implanted spinal cord stimulator to manage pain symptoms. Had SCS battery replaced on 09/09/16 with [employer].     Major issues:  1. Chronic, continuous use of opioids    2. Chronic pain syndrome    3. Therapeutic opioid-induced constipation (OIC)    4. Postlaminectomy Syndrome (Lumbar)    5. Radiculopathy of thoracolumbar region       Pain location and description: See CMA Note  Radiation of pain: buttocks, hips.    Gait disturbance: Denies recent falls or changes in balance.  Uses cane.    Exacerbating factors: Too much standing, walking, sitting prolonged (10-20 minutes), lifting/bending, cold or rainy weather changes  Alleviating factors: Rest, pain medications, SCS implant, injections, warmer weather, reclining/laying down, some walking.  Associated symptoms: Pain at night.  Numbness in right hip/leg to foot, weakness in right leg, night pain. Denies bowel or bladder incontinence, fever/chills, unexplained weight loss.  Adverse effects of medications: Constipation taking Amitiza prescribed two times a day which is working. Has a bowel movement every 2-3 days.  Uses miralax 2-3 times per month as needed.    Functional Symptoms: See CMA note  Current treatment efficacy: Good; see medication list.  Current treatment compliance: Good.  Attending scheduled appointments and taking medication as prescribed. Using SCS daily. Continues to see Dr. Frank monthly for  "pain psychology.  He states not too much exercise, he has a treadmill at home.      Since the last Pain Center visit, he had visit on 06/08/2021 with Dr. Hoover:  \"ASSESSMENT AND PLAN     ICD-10-CM   1. Type 2 diabetes mellitus without complication, without long-term current use of insulin (HRC) well controlled E11.9 Hgb A1C   Creatinine / GFR   metFORMIN (GLUCOPHAGE) 500 MG tablet   Diabetic Foot Check (Ep101)   TSH   2. Cold intolerance possibly related to hypothyroidism. No evidence of iron deficiency anemia. The TSH is very minimally elevated R68.89 CBC W PLT NO DIFF   TSH   3. Stage 3b chronic kidney disease (HRC) . I would like to get a Nephrology input on his progressive renal insufficiency N18.32 Creatinine / GFR   CBC W PLT NO DIFF   Nephrology Consult-Adults   4. Essential hypertension (HRC) controlled I10     Follow up Instructions: Pt to return to see me in six months.   Diabetes looks good.    I want you to see the kidney specialist to evaluate for any other testing or interventions needed    I will follow up with your other labs when done    Electronically Signed By:  Everton Hoover MD\"    Creatinine/GFR was 1.65/40 on 06/08/2021.  Creatinine clearance 48.774 ml/min.  Gabapentin is within renal adjusted dose recommendation of 400-1400 mg daily.  Scheduled with nephrologist on 08/02.    He states his lower back pain \"has been sore\" and states sometimes \"hasn't been good.\"  He states he tries to sleep quite a bit during the day.  He states if he sits in recliner to watch TV he will doze off.  He states he doesn't know what particular medication would cause this.  He states he is taking 1 tab of VIcodin at night at bedtime, has taken a couple times during the day for pain had a slight pain relief unsure how long its helpful for. Doesn't seem to make a difference with daytime drowsiness.  He is open to trying reduction of Gabapentin since his leg pain is controlled with lamictal 50 mg two times a day. "  He continues Hysingla 40 mg daily.     He is due to repeat LESI, discuss this may be why pain is a little worse - he is interested in repeating.      Had SCS battery replaced on 09/09/16 with [employer]. He has conventional setting.  He is using it 24 hours a day.  He states he doesn't feel it working unless he is in a certain position; laying down or reclining in recliner he can feel it work.  He states charging is ok.  He had x-ray to evaluate leads in 12/2017 and 06/2020. He had reprogramming recently and reports his pain is about the same.  He states he still feels he has to lean back to get the best coverage.  Discuss checking ERV as he has had this battery for almost 5 years and may be able to consider newer battery Intellis which may be better for the positional concerns due to adaptive stimulation.    Review of Systems  Constitutional: Fatigue. Sleep disturbance, sleeps during the day.  Wearing CPAP every night.  Denies fever, night sweats, weight loss.  Musculoskeletal: Positive for back pain, leg pain.  Denies neck pain. Denies recent falls.  Gastrointestional: Denies difficulty swallowing, change in appetite, abdominal pain, nausea, vomiting, diarrhea, GERD, fecal incontinence.  Genitourinary: Denies urinary incontinence, dysuria, hematuria, UTI, frequency, hesitancy  Neurologic:  Denies headaches, confusion, seizure, weakness, changes in balance, changes in speech.  Psychiatric: Positive for depression, anxiety. Denies memory loss, psychoses, suicidal ideation, substance     Objective:     Vitals:    06/30/21 0944   BP: 120/64   Pulse: (!) 58   Resp: 16     Physical Exam  Constitutional- General appearance: Normal.  Well developed, comfortable, overweight, and appearance reflects stated age.  No acute distress or pain behaviors noted. Presents alone today.  Psychiatric- Judgment and insight: Normal.  Speech: Normal rhythm.  Thought process: Normal.  No abnormal thoughts reported. Alert & Oriented to  person, place, and time.  Recent and remote memory: Normal.  Observed mood: appropriate  Respiratory- Breathing is non-labored; normal rhythm and rate.  Dermatologic- Exposed skin is clean, dry, and intact to inspection and palpation.  Musculoskeletal- Gait and station: Ambulates independently with antalgia.  Sit to stand with slight difficulty.    *Opioid [place] Precautions:    UDT/Blood - 11/18/20, results expected  Opioid Agreement - 08/04/20  Opioid consent - 08/04/20  Pharmacy- as documented     Reviewed - 06/30/2021 as expected  Pill Count - n/a  Psychological evaluation - monthly through Dr. Larry Frank  MME -  52  Pharmacogenetic testing - n/a    Imaging:  EXAM: XR THORACOLUMBAR 2 VWS  LOCATION: [facility]  DATE/TIME: 6/9/2020 11:21 AM     INDICATION: Spinal cord simulator lead position.  COMPARISON: 12/20/2017.     IMPRESSION:   Transitional lumbosacral anatomy with sacralized L5.     Spinal cord stimulator leads are not changed in position. Spinal cord simulator lead entering into the spinal canal at the level of T11-T12 terminates at the level of the T9 pedicles. Additional spinal cord stimulator lead entering into the spinal canal   at the level of T12-L1 terminates at the level of the infrapedicular T11 vertebral body.     Unchanged old compression deformities of the T12, L1, and L2 vertebrae. No new fracture. No aggressive osseous abnormality. No significant change in lumbar spondylosis.    Assessment:   Naveen Toro is a 76 y.o. male with visit today for lumbar degeneration and lumbar radiculopathy, most severe at L5-S1. He did have TFESI with 40% pain relief, lasts anywhere from a couple weeks to over 1 month.  Discuss repeating at 3 month intervals as needed.  Continues use of SCS with recent reprogramming 03/2021.  He will also continue adjunct duloxetine and gabapentin for nerve pain and mood and lamotrigine for pain and mood benefit - leg pain has improved and we discuss  considering a weaning of gabapentin as he may not need both medications and may make his daytime fatigue lessen.  He is to continue Vitamin D for chronic pain, sleep, and mood benefit.  He may continue topicals as needed, review use when he has more activity related pain.  Will continue dose of Hysingla and Vicodin 10/325 mg 1 tab in evening or bedtime to take for increased pain - discuss adjusting quantity to also have a dose to take during daytime when pain is severe - not to exceed 2 doses in 24 hours.      Plan:   Continue Hysingla (24 hour hydrocodone without tylenol) 40 mg daily.   Ok to fill today 06/30/21 for use on 07/05/21 & ok to fill 07/30/21 for use on 08/04/21  Also ok to take Vicodin 10/325 mg 1 tab at bedtime as needed for pain and added #10 tabs to use during the day as needed for increased pain.  #40 tabs to last 30 days  - refill sent for 07/16/2021  Continue lamictal to 50 mg twice a day.    Continue taking Cymbalta 120 mg at night.   Reduce Gabapentin to 300 mg two times a day for pain to see if contributing to daytime drowsiness.   Continue Lidoderm patch apply 1-3 patches to painful areas on 12 hours, off 12 hours.    May continue using voltaren gel to lower back 2 grams 4 times a day  Continue Vitamin D 5,000 Units daily   Continue use of spinal cord stimulator - discussed checking battery life in next 6 months to determine if a new battery is needed.  Intellis battery has features with charging and adaptive stimulation that would be an improvement from your current one.  May repeat DANIAI with Dr. Maier in July as needed after 07/10.  There is a future order in the chart.  Recommend acupuncture trial for chronic low back pain.  Referral placed today - complete intake from and you can see Sydnee Lopez or Dori Akins here.  Follow-up in 8 weeks with Brea in clinic     Brea Sheets PA-C  [facility]  [address]  [place] [address]  Ph:  [phone]  Fax: [phone]    35 minutes spent on the date of the encounter doing chart review, history and exam, documentation, and further activities.

## 2021-07-06 VITALS — WEIGHT: 195 LBS | BODY MASS INDEX: 28.88 KG/M2 | HEIGHT: 69 IN

## 2021-07-14 ENCOUNTER — TELEPHONE (OUTPATIENT)
Dept: PALLIATIVE MEDICINE | Facility: OTHER | Age: 76
End: 2021-07-14

## 2021-07-15 ENCOUNTER — ANCILLARY PROCEDURE (OUTPATIENT)
Dept: PALLIATIVE MEDICINE | Facility: OTHER | Age: 76
End: 2021-07-15
Payer: OTHER MISCELLANEOUS

## 2021-07-15 VITALS
DIASTOLIC BLOOD PRESSURE: 82 MMHG | WEIGHT: 200 LBS | SYSTOLIC BLOOD PRESSURE: 161 MMHG | TEMPERATURE: 97.3 F | RESPIRATION RATE: 16 BRPM | HEART RATE: 57 BPM | BODY MASS INDEX: 28.63 KG/M2 | HEIGHT: 70 IN

## 2021-07-15 DIAGNOSIS — M54.16 LUMBAR RADICULOPATHY: ICD-10-CM

## 2021-07-15 PROCEDURE — 250N000009 HC RX 250: Performed by: PAIN MEDICINE

## 2021-07-15 PROCEDURE — 255N000002 HC RX 255 OP 636: Performed by: PAIN MEDICINE

## 2021-07-15 PROCEDURE — 64483 NJX AA&/STRD TFRM EPI L/S 1: CPT | Mod: 50 | Performed by: PAIN MEDICINE

## 2021-07-15 PROCEDURE — 250N000011 HC RX IP 250 OP 636: Performed by: PAIN MEDICINE

## 2021-07-15 RX ORDER — METHYLPREDNISOLONE ACETATE 80 MG/ML
INJECTION, SUSPENSION INTRA-ARTICULAR; INTRALESIONAL; INTRAMUSCULAR; SOFT TISSUE
Status: COMPLETED | OUTPATIENT
Start: 2021-07-15 | End: 2021-07-15

## 2021-07-15 RX ORDER — BLOOD SUGAR DIAGNOSTIC
STRIP MISCELLANEOUS
COMMUNITY
Start: 2020-12-08

## 2021-07-15 RX ORDER — LIDOCAINE HYDROCHLORIDE 10 MG/ML
INJECTION, SOLUTION EPIDURAL; INFILTRATION; INTRACAUDAL; PERINEURAL
Status: COMPLETED | OUTPATIENT
Start: 2021-07-15 | End: 2021-07-15

## 2021-07-15 RX ORDER — LANCETS 30 GAUGE
1 EACH MISCELLANEOUS
COMMUNITY
Start: 2020-12-08

## 2021-07-15 RX ORDER — BUPIVACAINE HYDROCHLORIDE 2.5 MG/ML
INJECTION, SOLUTION EPIDURAL; INFILTRATION; INTRACAUDAL
Status: COMPLETED | OUTPATIENT
Start: 2021-07-15 | End: 2021-07-15

## 2021-07-15 RX ORDER — LANOLIN ALCOHOL/MO/W.PET/CERES
3 CREAM (GRAM) TOPICAL
COMMUNITY
End: 2022-02-22

## 2021-07-15 RX ADMIN — IOHEXOL 6 ML: 300 INJECTION, SOLUTION INTRAVENOUS at 11:26

## 2021-07-15 RX ADMIN — BUPIVACAINE HYDROCHLORIDE 3 ML: 2.5 INJECTION, SOLUTION EPIDURAL; INFILTRATION; INTRACAUDAL at 11:16

## 2021-07-15 RX ADMIN — METHYLPREDNISOLONE ACETATE 120 MG: 80 INJECTION, SUSPENSION INTRA-ARTICULAR; INTRALESIONAL; INTRAMUSCULAR; SOFT TISSUE at 11:18

## 2021-07-15 RX ADMIN — LIDOCAINE HYDROCHLORIDE 5 ML: 10 INJECTION, SOLUTION EPIDURAL; INFILTRATION; INTRACAUDAL; PERINEURAL at 11:16

## 2021-07-15 ASSESSMENT — MIFFLIN-ST. JEOR: SCORE: 1653.44

## 2021-07-15 ASSESSMENT — PAIN SCALES - GENERAL: PAINLEVEL: MODERATE PAIN (4)

## 2021-07-15 NOTE — PATIENT INSTRUCTIONS
.POST PROCEDURE INSTRUCTIONS  PROCEDURE DONE TODAY: Lumbar Epidural Steroid Injection    Rest today. Resume activity tomorrow as able.  Patient demonstrates the ability to ambulate independently with a steady gait at the time of discharge.      It is not unusual to have a temporary increase in your pain after a procedure. You can ice the area 24-48 hrs. 15 min at a time.      You received a steroid injection. This medication can take 2-7 days to take effect. Some temporary side effects include:    Heartburn    Flushed-red face    Insomnia-trouble sleeping-jitters    Diabetics may see a rise in blood sugar for a few days    Low back or SI joint (sacroiliac) injection: you may experience numbness in one or both legs. Please walk with help. This is temporary and will wear off in a few hours. Do not drive if you are tingling, numbness or weakness in your hands/arms or legs/feet.    Headache:  If you experience a headache after a lumbar epidural injection, lie down and drink fluids (especially caffeine). The headache will go away gradually. If it persists notify our clinic.    Fever: call if fever 100 or over, redness/warmth/swelling over the injection site.    No public hot tubs/pools for a couple days    Physical therapy: check with pain center provider regarding resuming therapy.    Monitor your response to the injection and report this at your next visit.    If you have been referred for a procedure only, please call your referring provider for a follow up.    IF YOU PLAN TO GET A FLU SHOT YOU MUST WAIT 2 WEEKS AFTER THIS INJECTION.      CALL IF ANY QUESTIONS [phone]

## 2021-08-06 ENCOUNTER — TELEPHONE (OUTPATIENT)
Dept: PALLIATIVE MEDICINE | Facility: OTHER | Age: 76
End: 2021-08-06

## 2021-08-06 NOTE — TELEPHONE ENCOUNTER
PA Initiation 8/6/2021    Medication: amitiza is non formulary  Insurance Company:  Medicare/[employer]  Pharmacy Filling the Rx:  [facility]  Filling Pharmacy Phone:  [phone]  Filling Pharmacy Fax: [phone]   Start Date:  unknown

## 2021-08-09 NOTE — TELEPHONE ENCOUNTER
Prior Authorization Not Needed per Insurance    Medication: amitiza  Insurance Company: [employer] - Phone [phone] Fax [phone]  Expected CoPay:      Pharmacy Filling the Rx: [facility] [place] [address]  Pharmacy Notified: Yes  Patient Notified: Yes    Called [facility] to inquire about rejection as this is a workers comp claim.  Per technician they already have processed drug through with a paid claim, patient already picked up.  Closing encounter.

## 2021-08-09 NOTE — TELEPHONE ENCOUNTER
Central Prior Authorization Team   Phone: [phone]    PA Initiation    Medication: amitiza  Insurance Company: [employer] - Phone [phone] Fax [phone]  Pharmacy Filling the Rx: [facility] - [place], [place] - [address]  Filling Pharmacy Phone: [phone]  Filling Pharmacy Fax:    Start Date: 8/9/2021

## 2021-08-10 DIAGNOSIS — F43.21 ADJUSTMENT DISORDER WITH DEPRESSED MOOD: ICD-10-CM

## 2021-08-10 DIAGNOSIS — G89.4 CHRONIC PAIN SYNDROME: ICD-10-CM

## 2021-08-10 RX ORDER — LAMOTRIGINE 25 MG/1
50 TABLET ORAL 2 TIMES DAILY
Qty: 360 TABLET | Refills: 0 | Status: SHIPPED | OUTPATIENT
Start: 2021-08-10 | End: 2021-11-11

## 2021-08-10 NOTE — TELEPHONE ENCOUNTER
Medication being requested: Lamictal  Last visit date: 6/30/21 Provider: MILDRED  Next visit date: 8/18/21 Provider: MILDRED  Expected follow up: 8 weeks  MTM visit (Pain Center) date: No  Pertinent between visit information about requested medication (telephone, mychart, prior authorization, concerns):   6/30/21 Plan  Continue lamictal to 50 mg twice a day.    Last date prescribed: 5/4/21 for use dates 5/4/21-8/2/21  Provider responsible: MILDRED  Spoke with patient: debra  Script being sent to provider - dates and quantity:   8/10/21-11/8/21  Pending Prescriptions:                       Disp   Refills    lamoTRIgine (LAMICTAL) 25 MG tablet [Phar*360 ta*0            Sig: Take 2 tablets (50 mg) by mouth 2 times daily    [facility]

## 2021-08-15 ENCOUNTER — HEALTH MAINTENANCE LETTER (OUTPATIENT)
Age: 76
End: 2021-08-15

## 2021-08-17 DIAGNOSIS — G89.4 CHRONIC PAIN SYNDROME: ICD-10-CM

## 2021-08-17 RX ORDER — LAMOTRIGINE 25 MG/1
50 TABLET ORAL 2 TIMES DAILY
Qty: 360 TABLET | Refills: 0 | OUTPATIENT
Start: 2021-08-17 | End: 2021-11-15

## 2021-08-17 NOTE — PROGRESS NOTES
"PAIN CENTER PROGRESS NOTE    Subjective:   Naveen Toro presents for evaluation of low back pain.  He has history of lumbar degeneration.  He has history of lumbar surgery, failed lumbar injections, and is currently using opioid management and implanted spinal cord stimulator to manage pain symptoms. Had SCS battery replaced on 09/09/16 with Darrion.     Major issues:  1. Chronic, continuous use of opioids    2. Chronic pain syndrome    3. Therapeutic opioid-induced constipation (OIC)    4. Postlaminectomy Syndrome (Lumbar)    5. Radiculopathy of thoracolumbar region       Pain location and description: See CMA Note  Radiation of pain: buttocks, hips.    Gait disturbance: Denies recent falls or changes in balance.  Uses cane.    Exacerbating factors: Too much standing, walking, sitting prolonged (10-20 minutes), lifting/bending, cold or rainy weather changes  Alleviating factors: Rest, pain medications, SCS implant, injections, warmer weather, reclining/laying down, some walking.  Associated symptoms: Pain at night.  Numbness in right hip/leg to foot, weakness in right leg, night pain. Denies bowel or bladder incontinence, fever/chills, unexplained weight loss.  Adverse effects of medications: Constipation taking Amitiza prescribed two times a day which is working. Has a bowel movement every 2-3 days.  Uses miralax 2-3 times per month as needed.    Functional Symptoms: See CMA note  Current treatment efficacy: Good; see medication list.  Current treatment compliance: Good.  Attending scheduled appointments and taking medication as prescribed. Using SCS daily. Continues to see Dr. Frank monthly for pain psychology.  He states not too much exercise, he has a treadmill at home.    Since the last Pain Center visit, he had bilateral L5-S1 TFESI on 07/15/2021 and reports this went \"alright\" and reports his pain was reduced by 50% for 3-4 weeks at least.  He states it takes a few weeks to take effects.  He denies any " complications from procedure but felt more pain during injection and felt it right down the leg.  He states maybe insomnia from steroids a few days.      Hasn't been sleeping well the past week, unsure if due to pain or other things. He takes Vicodin 10/325 mg 1 tab at night when in pain.  He does continue melatonin 3 mg at night. He thinks the lamictal helps with leg pain.  He states he has a different pain when he is standing or walking that feels like a muscle cramp and soreness like a bruise.  He feels in thighs and bilateral calves.  He has had this for a few days since Monday. He denies new activity.  He denies new medications.  He is taking atorvastatin 40 mg same dose for awhile. He has tried ice pack. He denies any weakness, fever/chills with this.    Discussed reduction of Gabapentin since his leg pain is controlled with lamictal 50 mg two times a day.  He is currently taking 300 mg BID instead of 400 mg BID and maybe helped daytime drowsiness a little.  He made this change over 1 month ago, doesn't think related to increase in leg pain recently.  He continues Hysingla 40 mg daily.     Had SCS battery replaced on 09/09/16 with Darrion. He has conventional setting.  He is using it 24 hours a day.  He states he doesn't feel it working unless he is in a certain position; laying down or reclining in recliner he can feel it work.  He states charging is ok.  He had x-ray to evaluate leads in 12/2017 and 06/2020. He had reprogramming recently and reports his pain is about the same.  He states he still feels he has to lean back to get the best coverage.  Discuss checking ERV as he has had this battery for almost 5 years and may be able to consider newer battery Intellis which may be better for the positional concerns due to adaptive stimulation.    Referral last visit for acupuncture and brings completed intake form today, interested in trying.    Review of Systems  Constitutional: Fatigue. Sleep disturbance,  "sleeps during the day.  Wearing CPAP every night.  Denies fever, night sweats, weight loss.  Musculoskeletal: Positive for back pain, leg pain.  Denies neck pain. Denies recent falls.  Gastrointestional: Denies difficulty swallowing, change in appetite, abdominal pain, nausea, vomiting, diarrhea, GERD, fecal incontinence.  Genitourinary: Denies urinary incontinence, dysuria, hematuria, UTI, frequency, hesitancy  Neurologic:  Denies headaches, confusion, seizure, weakness, changes in balance, changes in speech.  Psychiatric: Positive for depression, anxiety. Denies memory loss, psychoses, suicidal ideation, substance     Objective:   /74 (BP Location: Right arm, Patient Position: Left side)   Pulse 63   Resp 16   Ht 1.778 m (5' 10\")   Wt 90.7 kg (200 lb)   BMI 28.70 kg/m      Physical Exam  Constitutional- General appearance: Normal.  Well developed, comfortable, overweight, and appearance reflects stated age.  No acute distress or pain behaviors noted. Presents alone today.  Psychiatric- Judgment and insight: Normal.  Speech: Normal rhythm.  Thought process: Normal.  No abnormal thoughts reported. Alert & Oriented to person, place, and time.  Recent and remote memory: Normal.  Observed mood: appropriate  Respiratory- Breathing is non-labored; normal rhythm and rate.  Dermatologic- Exposed skin is clean, dry, and intact to inspection and palpation.  Musculoskeletal- Gait and station: Ambulates independently with antalgia, using cane.  Sit to stand with slight difficulty.    *Opioid [place] Precautions:    UDT/Blood - 11/18/20, results expected  Opioid Agreement - 08/18/2021  Pharmacy- as documented     Reviewed - 08/18/2021 as expected  Pill Count - n/a  Psychological evaluation - monthly through Dr. Larry Frank  MME -  50  Pharmacogenetic testing - n/a    Imaging:  EXAM: XR THORACOLUMBAR 2 VWS  LOCATION: [facility]  DATE/TIME: 6/9/2020 11:21 AM     INDICATION: Spinal cord simulator lead " position.  COMPARISON: 12/20/2017.     IMPRESSION:   Transitional lumbosacral anatomy with sacralized L5.     Spinal cord stimulator leads are not changed in position. Spinal cord simulator lead entering into the spinal canal at the level of T11-T12 terminates at the level of the T9 pedicles. Additional spinal cord stimulator lead entering into the spinal canal   at the level of T12-L1 terminates at the level of the infrapedicular T11 vertebral body.     Unchanged old compression deformities of the T12, L1, and L2 vertebrae. No new fracture. No aggressive osseous abnormality. No significant change in lumbar spondylosis.    Assessment:   Naveen Toro with visit today for lumbar degeneration and lumbar radiculopathy, most severe at L5-S1. He did have TFESI with 40% pain relief, lasts anywhere from a couple weeks to over 1 month.  Discuss repeating at 3 month intervals as needed.  Continues use of SCS with recent reprogramming 03/2021.  He will also continue adjunct duloxetine and gabapentin for nerve pain and mood and lamotrigine for pain and mood benefit - leg pain has improved and we discuss considering a weaning of gabapentin as he may not need both medications and may make his daytime fatigue lessen.  He is to continue Vitamin D for chronic pain, sleep, and mood benefit.  He may continue topicals as needed, review use when he has more activity related pain.  Will continue dose of Hysingla and Vicodin 10/325 mg 1 tab in evening or bedtime to take for increased pain - discuss adjusting quantity to also have a dose to take during daytime when pain is severe - not to exceed 2 doses in 24 hours.      Plan:   Continue Hysingla (24 hour hydrocodone without tylenol) 40 mg daily.   Ok to fill 08/25/2021 to start on 08/29/2021 & 9/24/2021 to start on 09/28/2021  Also ok to take Vicodin 10/325 mg 1 tab at bedtime as needed for pain and added #10 tabs to use during the day as needed for increased pain.  #40 tabs to last 30  days  - refill sent for 08/18/2021 & 09/17/2021  Continue lamictal 50 mg twice a day.    Continue taking Cymbalta 120 mg at night.   Continue Gabapentin 300 mg two times a day for pain - you may try and reduce to once a day as tolerated.  Continue Amitiza for constipation as prescribed   Continue Lidoderm patch apply 1-3 patches to painful areas on 12 hours, off 12 hours.    May continue using voltaren gel to lower back 2 grams 4 times a day  Continue Vitamin D 5,000 Units daily   Continue use of spinal cord stimulator - have discusseded checking battery life in next 6 months to determine if a new battery is needed.  Intellis battery has features with charging and adaptive stimulation that would be an improvement from your current one.  Recommend acupuncture trial for chronic low back pain.  Schedule with Sydnee Lopez or Dori Akins here.  Opioid agreement signed today and copy was offered.  Discussed bringing these copies and/or your most recent After Visit Summary (AVS) from our appointment to the pharmacy when you are refilling controlled medications to assist with any additional information the pharmacist may require.    Follow-up in 8 weeks with Brea in clinic     Brea Sheets PA-C  [facility]  [address]  [place] [address]  Ph: [phone]  Fax: [phone]    26 minutes spent on the date of the encounter doing chart review, history and exam, documentation, and further activities.

## 2021-08-18 ENCOUNTER — OFFICE VISIT (OUTPATIENT)
Dept: PALLIATIVE MEDICINE | Facility: OTHER | Age: 76
End: 2021-08-18
Payer: OTHER MISCELLANEOUS

## 2021-08-18 VITALS
DIASTOLIC BLOOD PRESSURE: 74 MMHG | WEIGHT: 200 LBS | HEART RATE: 63 BPM | RESPIRATION RATE: 16 BRPM | HEIGHT: 70 IN | SYSTOLIC BLOOD PRESSURE: 136 MMHG | BODY MASS INDEX: 28.63 KG/M2

## 2021-08-18 DIAGNOSIS — K59.03 THERAPEUTIC OPIOID-INDUCED CONSTIPATION (OIC): ICD-10-CM

## 2021-08-18 DIAGNOSIS — G89.4 CHRONIC PAIN SYNDROME: ICD-10-CM

## 2021-08-18 DIAGNOSIS — M54.15 RADICULOPATHY OF THORACOLUMBAR REGION: ICD-10-CM

## 2021-08-18 DIAGNOSIS — T40.2X5A THERAPEUTIC OPIOID-INDUCED CONSTIPATION (OIC): ICD-10-CM

## 2021-08-18 DIAGNOSIS — M51.379 DEGENERATION OF LUMBAR OR LUMBOSACRAL INTERVERTEBRAL DISC: ICD-10-CM

## 2021-08-18 DIAGNOSIS — F11.90 CHRONIC, CONTINUOUS USE OF OPIOIDS: Primary | ICD-10-CM

## 2021-08-18 PROCEDURE — G0463 HOSPITAL OUTPT CLINIC VISIT: HCPCS

## 2021-08-18 PROCEDURE — 99213 OFFICE O/P EST LOW 20 MIN: CPT | Performed by: PHYSICIAN ASSISTANT

## 2021-08-18 RX ORDER — HYDROCODONE BITARTRATE 40 MG/1
40 TABLET, EXTENDED RELEASE ORAL DAILY
Qty: 30 TABLET | Refills: 0 | Status: SHIPPED | OUTPATIENT
Start: 2021-09-28 | End: 2021-10-13

## 2021-08-18 RX ORDER — LUBIPROSTONE 24 UG/1
24 CAPSULE ORAL 2 TIMES DAILY WITH MEALS
Qty: 180 CAPSULE | Refills: 1 | Status: SHIPPED | OUTPATIENT
Start: 2021-08-18 | End: 2022-02-23

## 2021-08-18 RX ORDER — HYDROCODONE BITARTRATE AND ACETAMINOPHEN 10; 325 MG/1; MG/1
.5-1 TABLET ORAL 2 TIMES DAILY PRN
Qty: 40 TABLET | Refills: 0 | Status: SHIPPED | OUTPATIENT
Start: 2021-08-18 | End: 2021-10-13

## 2021-08-18 RX ORDER — HYDROCODONE BITARTRATE 40 MG/1
40 TABLET, EXTENDED RELEASE ORAL DAILY
Qty: 30 TABLET | Refills: 0 | Status: SHIPPED | OUTPATIENT
Start: 2021-08-29 | End: 2021-10-13

## 2021-08-18 RX ORDER — HYDROCODONE BITARTRATE AND ACETAMINOPHEN 10; 325 MG/1; MG/1
.5-1 TABLET ORAL
COMMUNITY
End: 2021-08-18

## 2021-08-18 RX ORDER — GABAPENTIN 300 MG/1
300 CAPSULE ORAL 2 TIMES DAILY
Qty: 180 CAPSULE | Refills: 1 | Status: SHIPPED | OUTPATIENT
Start: 2021-08-18 | End: 2021-12-09

## 2021-08-18 RX ORDER — DULOXETIN HYDROCHLORIDE 60 MG/1
120 CAPSULE, DELAYED RELEASE ORAL DAILY
Qty: 180 CAPSULE | Refills: 1 | Status: SHIPPED | OUTPATIENT
Start: 2021-08-18 | End: 2022-02-22

## 2021-08-18 RX ORDER — HYDROCODONE BITARTRATE AND ACETAMINOPHEN 10; 325 MG/1; MG/1
.5-1 TABLET ORAL 2 TIMES DAILY PRN
Qty: 40 TABLET | Refills: 0 | Status: SHIPPED | OUTPATIENT
Start: 2021-09-17 | End: 2021-10-13

## 2021-08-18 ASSESSMENT — PAIN SCALES - GENERAL: PAINLEVEL: SEVERE PAIN (6)

## 2021-08-18 ASSESSMENT — MIFFLIN-ST. JEOR: SCORE: 1653.44

## 2021-08-18 NOTE — LETTER
8/18/2021         RE: Naveen Toro  [address] [address]        Dear Colleague,    Thank you for referring your patient, Naveen Toro, to the [facility]. Please see a copy of my visit note below.    PAIN CENTER PROGRESS NOTE    Subjective:   Naveen Toro presents for evaluation of low back pain.  He has history of lumbar degeneration.  He has history of lumbar surgery, failed lumbar injections, and is currently using opioid management and implanted spinal cord stimulator to manage pain symptoms. Had SCS battery replaced on 09/09/16 with Darrion.     Major issues:  1. Chronic, continuous use of opioids    2. Chronic pain syndrome    3. Therapeutic opioid-induced constipation (OIC)    4. Postlaminectomy Syndrome (Lumbar)    5. Radiculopathy of thoracolumbar region       Pain location and description: See CMA Note  Radiation of pain: buttocks, hips.    Gait disturbance: Denies recent falls or changes in balance.  Uses cane.    Exacerbating factors: Too much standing, walking, sitting prolonged (10-20 minutes), lifting/bending, cold or rainy weather changes  Alleviating factors: Rest, pain medications, SCS implant, injections, warmer weather, reclining/laying down, some walking.  Associated symptoms: Pain at night.  Numbness in right hip/leg to foot, weakness in right leg, night pain. Denies bowel or bladder incontinence, fever/chills, unexplained weight loss.  Adverse effects of medications: Constipation taking Amitiza prescribed two times a day which is working. Has a bowel movement every 2-3 days.  Uses miralax 2-3 times per month as needed.    Functional Symptoms: See CMA note  Current treatment efficacy: Good; see medication list.  Current treatment compliance: Good.  Attending scheduled appointments and taking medication as prescribed. Using SCS daily. Continues to see Dr. Frank monthly for pain psychology.  He states not too much exercise, he has a treadmill at home.   "  Since the last Pain Center visit, he had bilateral L5-S1 TFESI on 07/15/2021 and reports this went \"alright\" and reports his pain was reduced by 50% for 3-4 weeks at least.  He states it takes a few weeks to take effects.  He denies any complications from procedure but felt more pain during injection and felt it right down the leg.  He states maybe insomnia from steroids a few days.      Hasn't been sleeping well the past week, unsure if due to pain or other things. He takes Vicodin 10/325 mg 1 tab at night when in pain.  He does continue melatonin 3 mg at night. He thinks the lamictal helps with leg pain.  He states he has a different pain when he is standing or walking that feels like a muscle cramp and soreness like a bruise.  He feels in thighs and bilateral calves.  He has had this for a few days since Monday. He denies new activity.  He denies new medications.  He is taking atorvastatin 40 mg same dose for awhile. He has tried ice pack. He denies any weakness, fever/chills with this.    Discussed reduction of Gabapentin since his leg pain is controlled with lamictal 50 mg two times a day.  He is currently taking 300 mg BID instead of 400 mg BID and maybe helped daytime drowsiness a little.  He made this change over 1 month ago, doesn't think related to increase in leg pain recently.  He continues Hysingla 40 mg daily.     Had SCS battery replaced on 09/09/16 with Darrion. He has conventional setting.  He is using it 24 hours a day.  He states he doesn't feel it working unless he is in a certain position; laying down or reclining in recliner he can feel it work.  He states charging is ok.  He had x-ray to evaluate leads in 12/2017 and 06/2020. He had reprogramming recently and reports his pain is about the same.  He states he still feels he has to lean back to get the best coverage.  Discuss checking ERV as he has had this battery for almost 5 years and may be able to consider newer battery Intellis which " "may be better for the positional concerns due to adaptive stimulation.    Referral last visit for acupuncture and brings completed intake form today, interested in trying.    Review of Systems  Constitutional: Fatigue. Sleep disturbance, sleeps during the day.  Wearing CPAP every night.  Denies fever, night sweats, weight loss.  Musculoskeletal: Positive for back pain, leg pain.  Denies neck pain. Denies recent falls.  Gastrointestional: Denies difficulty swallowing, change in appetite, abdominal pain, nausea, vomiting, diarrhea, GERD, fecal incontinence.  Genitourinary: Denies urinary incontinence, dysuria, hematuria, UTI, frequency, hesitancy  Neurologic:  Denies headaches, confusion, seizure, weakness, changes in balance, changes in speech.  Psychiatric: Positive for depression, anxiety. Denies memory loss, psychoses, suicidal ideation, substance     Objective:   /74 (BP Location: Right arm, Patient Position: Left side)   Pulse 63   Resp 16   Ht 1.778 m (5' 10\")   Wt 90.7 kg (200 lb)   BMI 28.70 kg/m      Physical Exam  Constitutional- General appearance: Normal.  Well developed, comfortable, overweight, and appearance reflects stated age.  No acute distress or pain behaviors noted. Presents alone today.  Psychiatric- Judgment and insight: Normal.  Speech: Normal rhythm.  Thought process: Normal.  No abnormal thoughts reported. Alert & Oriented to person, place, and time.  Recent and remote memory: Normal.  Observed mood: appropriate  Respiratory- Breathing is non-labored; normal rhythm and rate.  Dermatologic- Exposed skin is clean, dry, and intact to inspection and palpation.  Musculoskeletal- Gait and station: Ambulates independently with antalgia, using cane.  Sit to stand with slight difficulty.    *Opioid [place] Precautions:    UDT/Blood - 11/18/20, results expected  Opioid Agreement - 08/18/2021  Pharmacy- as documented     Reviewed - 08/18/2021 as expected  Pill Count - n/a  Psychological " evaluation - monthly through Dr. Larry Frank  MME -  50  Pharmacogenetic testing - n/a    Imaging:  EXAM: XR THORACOLUMBAR 2 VWS  LOCATION: [facility]  DATE/TIME: 6/9/2020 11:21 AM     INDICATION: Spinal cord simulator lead position.  COMPARISON: 12/20/2017.     IMPRESSION:   Transitional lumbosacral anatomy with sacralized L5.     Spinal cord stimulator leads are not changed in position. Spinal cord simulator lead entering into the spinal canal at the level of T11-T12 terminates at the level of the T9 pedicles. Additional spinal cord stimulator lead entering into the spinal canal   at the level of T12-L1 terminates at the level of the infrapedicular T11 vertebral body.     Unchanged old compression deformities of the T12, L1, and L2 vertebrae. No new fracture. No aggressive osseous abnormality. No significant change in lumbar spondylosis.    Assessment:   Naveen Toro with visit today for lumbar degeneration and lumbar radiculopathy, most severe at L5-S1. He did have TFESI with 40% pain relief, lasts anywhere from a couple weeks to over 1 month.  Discuss repeating at 3 month intervals as needed.  Continues use of SCS with recent reprogramming 03/2021.  He will also continue adjunct duloxetine and gabapentin for nerve pain and mood and lamotrigine for pain and mood benefit - leg pain has improved and we discuss considering a weaning of gabapentin as he may not need both medications and may make his daytime fatigue lessen.  He is to continue Vitamin D for chronic pain, sleep, and mood benefit.  He may continue topicals as needed, review use when he has more activity related pain.  Will continue dose of Hysingla and Vicodin 10/325 mg 1 tab in evening or bedtime to take for increased pain - discuss adjusting quantity to also have a dose to take during daytime when pain is severe - not to exceed 2 doses in 24 hours.      Plan:   Continue Hysingla (24 hour hydrocodone without tylenol) 40 mg daily.   Ok to  fill 08/25/2021 to start on 08/29/2021 & 9/24/2021 to start on 09/28/2021  Also ok to take Vicodin 10/325 mg 1 tab at bedtime as needed for pain and added #10 tabs to use during the day as needed for increased pain.  #40 tabs to last 30 days  - refill sent for 08/18/2021 & 09/17/2021  Continue lamictal 50 mg twice a day.    Continue taking Cymbalta 120 mg at night.   Continue Gabapentin 300 mg two times a day for pain - you may try and reduce to once a day as tolerated.  Continue Amitiza for constipation as prescribed   Continue Lidoderm patch apply 1-3 patches to painful areas on 12 hours, off 12 hours.    May continue using voltaren gel to lower back 2 grams 4 times a day  Continue Vitamin D 5,000 Units daily   Continue use of spinal cord stimulator - have discusseded checking battery life in next 6 months to determine if a new battery is needed.  Intellis battery has features with charging and adaptive stimulation that would be an improvement from your current one.  Recommend acupuncture trial for chronic low back pain.  Schedule with Sydnee Lopez or Dori Akins here.  Opioid agreement signed today and copy was offered.  Discussed bringing these copies and/or your most recent After Visit Summary (AVS) from our appointment to the pharmacy when you are refilling controlled medications to assist with any additional information the pharmacist may require.    Follow-up in 8 weeks with Brea in clinic     Brea Sheets PA-C  [facility]  [address]  [place] [address]  Ph: [phone]  Fax: [phone]    26 minutes spent on the date of the encounter doing chart review, history and exam, documentation, and further activities.            Patient presents to the clinic today for a follow up with Brea Sheets PA-C.     Pain score: 6  intermittent  What does your pain feel like: aching, radiates, mainly on right side  Does the pain interfere  with:  Work: n/a  Walking/distance: yes  Sleep: yes  Daily activities: yes  Relationships/social life: no  Mood: no  F= 6                 Again, thank you for allowing me to participate in the care of your patient.        Sincerely,        Brea Sheets PA-C

## 2021-08-18 NOTE — PATIENT INSTRUCTIONS
Continue Hysingla (24 hour hydrocodone without tylenol) 40 mg daily.   Ok to fill 08/25/2021 to start on 08/29/2021 & 9/24/2021 to start on 09/28/2021  Also ok to take Vicodin 10/325 mg 1 tab at bedtime as needed for pain and added #10 tabs to use during the day as needed for increased pain.  #40 tabs to last 30 days  - refill sent for 08/18/2021 & 09/17/2021  Continue lamictal 50 mg twice a day.    Continue taking Cymbalta 120 mg at night.   Continue Gabapentin 300 mg two times a day for pain - you may try and reduce to once a day as tolerated.   Continue Lidoderm patch apply 1-3 patches to painful areas on 12 hours, off 12 hours.    May continue using voltaren gel to lower back 2 grams 4 times a day  Continue Vitamin D 5,000 Units daily   Continue use of spinal cord stimulator - have discusseded checking battery life in next 6 months to determine if a new battery is needed.  Intellis battery has features with charging and adaptive stimulation that would be an improvement from your current one.  Recommend acupuncture trial for chronic low back pain.  Schedule with Sydnee Lopez or Dori Akins here.  Opioid agreement signed today and copy was offered.  Discussed bringing these copies and/or your most recent After Visit Summary (AVS) from our appointment to the pharmacy when you are refilling controlled medications to assist with any additional information the pharmacist may require.    Follow-up in 8 weeks with Brea in clinic

## 2021-08-18 NOTE — PROGRESS NOTES
Patient presents to the clinic today for a follow up with Brea Sheets PA-C.     Pain score: 6  intermittent  What does your pain feel like: aching, radiates, mainly on right side  Does the pain interfere with:  Work: n/a  Walking/distance: yes  Sleep: yes  Daily activities: yes  Relationships/social life: no  Mood: no  F= 6

## 2021-09-24 DIAGNOSIS — G89.4 CHRONIC PAIN SYNDROME: ICD-10-CM

## 2021-09-24 RX ORDER — HYDROCODONE BITARTRATE 40 MG/1
TABLET, EXTENDED RELEASE ORAL
Qty: 30 TABLET | Refills: 0 | OUTPATIENT
Start: 2021-09-24

## 2021-09-24 NOTE — TELEPHONE ENCOUNTER
After review, Hysingla was already sent on 8/18/21 (last visit) for a fill date of 9/24/21 to start 9/28/21.

## 2021-09-29 RX ORDER — HYDROCODONE BITARTRATE 40 MG/1
40 TABLET, EXTENDED RELEASE ORAL DAILY
Qty: 30 TABLET | Refills: 0 | Status: CANCELLED | OUTPATIENT
Start: 2021-10-28

## 2021-09-29 NOTE — TELEPHONE ENCOUNTER
Pharmacy calling back to report that the issue with this refill was due to brand vs generic and insurance, they have the prescription and this has gone through but for future refills will need to include GABINO in order to go through insurance.  No action required at this time but future refills will need GABINO.  Will cue next refill in order to avoid future confusion.  Pending Prescriptions:                       Disp   Refills    HYDROcodone Bitartrate ER (HYSINGLA) 40 M*30 tab*0            Sig: Take 1 tablet (40 mg) by mouth daily  Refused Prescriptions:                       Disp   Refills    HYSINGLA ER 40 MG ER Tablet [Pharmacy Med *30 tab*0        Sig: TAKE 1 TABLET (40 MG) BY MOUTH DAILY  Refused By: ALIYAH NICOLE  Reason for Refusal: OTHER  Reason for Refusal Comment: sent 8/18/21 to fill 9/24/21, start 9/28/21    [place]

## 2021-09-29 NOTE — TELEPHONE ENCOUNTER
I am seeing patient on 10/13/21 so I am not going to send next RX due on 10/28 but noted it needs GABINO.

## 2021-10-10 ENCOUNTER — HEALTH MAINTENANCE LETTER (OUTPATIENT)
Age: 76
End: 2021-10-10

## 2021-10-12 NOTE — PROGRESS NOTES
"PAIN CENTER PROGRESS NOTE    Subjective:   Naveen Toro presents for evaluation of low back pain.  He has history of lumbar degeneration.  He has history of lumbar surgery, failed lumbar injections, and is currently using opioid management and implanted spinal cord stimulator to manage pain symptoms. Had SCS battery replaced on 09/09/16 with [employer].     Major issues:  1. Chronic, continuous use of opioids    2. Chronic pain syndrome    3. Therapeutic opioid-induced constipation (OIC)    4. Postlaminectomy Syndrome (Lumbar)    5. Radiculopathy of thoracolumbar region       Pain location and description: See rooming note.  Moderate Pain (5)  Radiation of pain: buttocks, hips.    Gait disturbance: Denies recent falls or changes in balance.  Uses cane.    Exacerbating factors: Too much standing, walking, sitting prolonged (10-20 minutes), lifting/bending, cold or rainy weather changes  Alleviating factors: Rest, pain medications, SCS implant, injections, warmer weather, reclining/laying down, some walking.  Associated symptoms: Pain at night.  Numbness in right hip/leg to foot, weakness in right leg, night pain. Denies bowel or bladder incontinence, fever/chills, unexplained weight loss.  Adverse effects of medications: Constipation taking Amitiza prescribed two times a day which is working. Has a bowel movement every 2-3 days.  Uses miralax 2-3 times per month as needed.    Functional Symptoms: See rooming note  Current treatment efficacy: Good; see medication list.  Current treatment compliance: Good.  Attending scheduled appointments and taking medication as prescribed. Using SCS daily. Continues to see Dr. Frank monthly for pain psychology.  He states not too much exercise, he has a treadmill at home.      Since the last Pain Center visit, he had bilateral L5-S1 TFESI on 07/15/2021 and reports this went \"alright\" and reports his pain was reduced by 50% for 3-4 weeks at least.  He states it takes a few weeks to " "take effects.  He denies any complications from procedure but felt more pain during injection and felt it right down the leg.  He states maybe insomnia from steroids a few days.      Saw Dr. Echols, nephrologist, on 10/11/21:    \"ASSESSMENT    76 y.o. male with history of longstanding hypertension, type 2 diabetes mellitus, coronary artery disease status post CABG x1, hypertrophic CM s/p myectomy 2010, MC on CPAP, chronic low back pain (history of spinal surgery x2, history of spinal stimulator), depression, hyperlipidemia who also has chronic kidney disease     #. CKD stage 3. CKD appears to be due to hypertension, diabetes, vascular disease, age related decline in kidney function.   Does not have significant proteinuria. Urine protein to creatinine ratio 0.13 as of September 2021.  SPEP, immunofixation were okay . Imaging of the kidneys was okay    Serum creatinine appears to be in the region of 1.3-1.6. Has had progression of CKD over the years    Discussed at length. Discussed risk factor modification  Used to be on ACE-inhibitor. However it is listed in allergies as causing renal failure.    Discussed role of SGLT2 inhibitors. They will discuss this further with his primary care physician     #. Type 2 diabetes mellitus. On metformin. Fair control. Discussed that in the future, may have to discontinue metformin if kidney function were to worsen. There is small risk of lactic acidosis with metformin in the setting of CKD    They will discuss with primary care physician, substitution of metformin with SGLT2 inhibitor     #. Hypertension. Fair control. I have not made any changes to his regimen  Not on ACE-inhibitor or ARB due to prior history of acute kidney injury attributed to ACE-inhibitor use    Plan  No change in regimen  The plan to follow-up with PCP regarding consideration of switching metformin to SGLT2 inhibitor  Follow-up with me in 4 months    Labs prior to next appointment   Hemoglobin, BMP, albumin, " "urine protein to creatinine ratio     Ez Echols MD 10/11/2021, 12:44 PM\"      He was scheduled for acupuncture with Sydnee Lopez on 09/29/21 which was cancelled due to insurance. Approved now so he plans to reschedule.    He states his pain has been mostly in the center of his back the last 2 weeks.  He reports once in awhile it is down his right buttock.  Has been a dull aching pain.      Hasn't been sleeping well the past week, unsure if due to pain or other things. He takes Vicodin 10/325 mg 1 tab at night when in pain.  He does continue melatonin 3 mg at night. He reports the lamictal helps with leg pain.  He states he has a different pain when he is standing or walking that feels like a muscle cramp and soreness like a bruise.  He feels in thighs and bilateral calves.     Discussed reduction of Gabapentin since his leg pain is controlled with lamictal 50 mg two times a day.  He is currently taking 300 mg BID instead of 400 mg BID. Review reducing dose to 300 mg daily as he still reports drowsiness. He continues Hysingla 40 mg daily.     Had SCS battery replaced on 09/09/16 with [employer]. He has conventional setting.  He is using it 24 hours a day.  He states he doesn't feel it working unless he is in a certain position; laying down or reclining in recliner he can feel it work.  He states charging is ok.  He had x-ray to evaluate leads in 12/2017 and 06/2020. He had reprogramming recently and reports his pain is about the same.  He states he still feels he has to lean back to get the best coverage.  Discuss checking ERV as he has had this battery for almost 5 years and may be able to consider newer battery [employer] which may be better for the positional concerns due to adaptive stimulation.    Current Outpatient Medications   Medication Sig Dispense Refill     amLODIPine (NORVASC) 2.5 MG tablet [AMLODIPINE (NORVASC) 2.5 MG TABLET] Take 2.5 mg by mouth daily.       aspirin 325 MG tablet [ASPIRIN 325 MG " TABLET] Take 325 mg by mouth daily.       atorvastatin (LIPITOR) 40 MG tablet [ATORVASTATIN (LIPITOR) 40 MG TABLET] Take 40 mg by mouth daily. As directed       blood glucose (PRODIGY NO CODING BLOOD GLUC) test strip Use  to test daily. DX Code: E11.9, 90 day supply. Pharmacy dispense brand based on insurance.       diclofenac sodium (VOLTAREN) 1 % Gel [DICLOFENAC SODIUM (VOLTAREN) 1 % GEL] Apply 2 g topically 4 (four) times a day. 100 g 1     DULoxetine (CYMBALTA) 60 MG capsule Take 2 capsules (120 mg) by mouth daily 180 capsule 1     gabapentin (NEURONTIN) 300 MG capsule Take 1 capsule (300 mg) by mouth 2 times daily 180 capsule 1     [START ON 10/29/2021] HYDROcodone Bitartrate ER (HYSINGLA) 40 MG ER Tablet Take 1 tablet (40 mg) by mouth daily 30 tablet 0     [START ON 11/28/2021] HYDROcodone Bitartrate ER (HYSINGLA) 40 MG ER Tablet Take 1 tablet (40 mg) by mouth daily 30 tablet 0     [START ON 10/29/2021] HYDROcodone-acetaminophen (NORCO)  MG per tablet Take 0.5-1 tablets by mouth 2 times daily as needed for severe pain 40 tablet 0     [START ON 11/28/2021] HYDROcodone-acetaminophen (NORCO)  MG per tablet Take 0.5-1 tablets by mouth 2 times daily as needed for severe pain 40 tablet 0     lamoTRIgine (LAMICTAL) 25 MG tablet Take 2 tablets (50 mg) by mouth 2 times daily 360 tablet 0     Lancets MISC 1 each       lidocaine (LIDODERM) 5 % [LIDOCAINE (LIDODERM) 5 %] UNWRAP AND APPLY 1 PATCH TO SKIN DAILY, REMOVE AND DISCARD PATCH WITHIN 12 HOURS OR AS DIRECTED BY DOCTOR 30 patch 1     lubiprostone (AMITIZA) 24 MCG capsule Take 1 capsule (24 mcg) by mouth 2 times daily (with meals) 180 capsule 1     melatonin 3 MG tablet Take 3 mg by mouth       metFORMIN (GLUCOPHAGE) 500 MG tablet [METFORMIN (GLUCOPHAGE) 500 MG TABLET] Take 1,000 mg by mouth 2 (two) times a day with meals.        metoprolol (LOPRESSOR) 25 MG tablet [METOPROLOL (LOPRESSOR) 25 MG TABLET] Take 25 mg by mouth 2 (two) times a day.        naloxone (NARCAN) 4 MG/0.1ML nasal spray Spray 1 spray (4 mg) into one nostril alternating nostrils once as needed for opioid reversal every 2-3 minutes until assistance arrives 0.2 mL 0     polyethylene glycol (GLYCOLAX) 17 gram/dose powder [POLYETHYLENE GLYCOL (GLYCOLAX) 17 GRAM/DOSE POWDER] Take 17 g by mouth daily. 595 g 1     vitamin D3 (CHOLECALCIFEROL) 125 MCG (5000 UT) tablet Take 5,000 Units by mouth         Review of Systems  Constitutional: Fatigue. Sleep disturbance, sleeps during the day.  Wearing CPAP every night.  Denies fever, night sweats, weight loss.  Musculoskeletal: Positive for back pain, leg pain.  Denies neck pain. Denies recent falls.  Gastrointestional: Denies difficulty swallowing, change in appetite, abdominal pain, nausea, vomiting, diarrhea, GERD, fecal incontinence.  Genitourinary: Denies urinary incontinence, dysuria, hematuria, UTI, frequency, hesitancy  Neurologic:  Denies headaches, confusion, seizure, weakness, changes in balance, changes in speech.  Psychiatric: Positive for depression, anxiety. Denies memory loss, psychoses, suicidal ideation, substance     Objective:   BP (!) 142/77   Pulse 59   SpO2 96%     Physical Exam  Constitutional- General appearance: Normal.  Well developed, comfortable, overweight, and appearance reflects stated age.  No acute distress or pain behaviors noted. Presents alone today.  Psychiatric- Judgment and insight: Normal.  Speech: Normal rhythm.  Thought process: Normal.  No abnormal thoughts reported. Alert & Oriented to person, place, and time.  Recent and remote memory: Normal.  Observed mood: appropriate  Respiratory- Breathing is non-labored; normal rhythm and rate.  Dermatologic- Exposed skin is clean, dry, and intact to inspection and palpation.  Musculoskeletal- Gait and station: Ambulates independently with antalgia, using cane.  Sit to stand with slight difficulty.    *Opioid [place] Precautions:    UDT/Blood - 11/18/20, results  expected  Opioid Agreement - 08/18/2021  Pharmacy- as documented     Reviewed - 10/13/2021 as expected, last refills 09/29 for 30 days this was a delay from previous fill on 08/27 for 30 days.  Pill Count - n/a  Psychological evaluation - monthly through Dr. Larry Frank  MME -  50  Pharmacogenetic testing - n/a  ORT - 1 (low)    Imaging:  EXAM: XR THORACOLUMBAR 2 VWS  LOCATION: [facility]  DATE/TIME: 6/9/2020 11:21 AM     INDICATION: Spinal cord simulator lead position.  COMPARISON: 12/20/2017.     IMPRESSION:   Transitional lumbosacral anatomy with sacralized L5.     Spinal cord stimulator leads are not changed in position. Spinal cord simulator lead entering into the spinal canal at the level of T11-T12 terminates at the level of the T9 pedicles. Additional spinal cord stimulator lead entering into the spinal canal   at the level of T12-L1 terminates at the level of the infrapedicular T11 vertebral body.     Unchanged old compression deformities of the T12, L1, and L2 vertebrae. No new fracture. No aggressive osseous abnormality. No significant change in lumbar spondylosis.    Assessment:   Naveen Toro with visit today for lumbar degeneration and lumbar radiculopathy, most severe at L5-S1. He did have TFESI with 50% pain relief, lasts anywhere from a couple weeks to over 1 month.  Discuss repeating at 3 month intervals as needed.  Continues use of SCS with recent reprogramming 03/2021.  He will also continue adjunct duloxetine and gabapentin for nerve pain and mood and lamotrigine for pain and mood benefit - leg pain has improved and we discuss considering a weaning of gabapentin as he may not need both medications and may make his daytime fatigue lessen.  He is to continue Vitamin D for chronic pain, sleep, and mood benefit.  He may continue topicals as needed, review use when he has more activity related pain.  Will continue dose of Hysingla and Vicodin 10/325 mg 1 tab in evening or bedtime to  "take for increased pain - discuss adjusting quantity to also have a dose to take during daytime when pain is severe - not to exceed 2 doses in 24 hours.      Plan:   Continue Hysingla (24 hour hydrocodone without tylenol) 40 mg daily.   Ok to fill 10/27/21 to start on 10/29/21 & 11/26/21 to start on 11/28/21  Also ok to take Vicodin 10/325 mg 1 tab at bedtime as needed for pain and #10 tabs to use during the day as needed for increased pain.  #40 tabs to last 30 days  - refill sent for 10/29/21 & 11/28/21  Naloxone/\"Narcan\" is a medication being provided to you for safety. It is a medication that will decrease/eliminate the opioid narcotic medication in your body. Naloxone is a rescue medication in case of an overdose - either intentional or unintentional.  Symptoms of overdose may be breathing slow and shallow, erratic or not at all, pinpoint pupils, hallucinations, confusion, muscle jerks, slack muscles, extreme sleepiness or loss of alertness, awake but not able to talk, face pale or clammy, vomiting, for lighter skinned people, the skin tone turns bluish purple, for darker skinned people, it turns grayish or ashen.  If Naloxone is used 911 or the emergency medical system should be call, CPR may be needed and the person needs to be seen in an emergency department.  Continue lamictal 50 mg twice a day.    Continue taking Cymbalta 120 mg at night.   Continue Gabapentin 300 mg two times a day for pain - you may try and reduce to once a day as tolerated and see if this helps with grogginess.  Continue Amitiza for constipation as prescribed   Continue Lidoderm patch apply 1-3 patches to painful areas on 12 hours, off 12 hours.    May continue using voltaren gel to lower back 2 grams 4 times a day  Continue Vitamin D 5,000 Units daily   Continue use of spinal cord stimulator - have discusseded checking battery life in next 6 months to determine if a new battery is needed.  [employer] battery has features with charging " and adaptive stimulation that would be an improvement from your current one.  Reschedule acupuncture here with Sydnee Meadows  Follow-up in 8 weeks with Brea in clinic     BERNABE Mendoza [facility]  [address] [place] [address]. [address]  [place] [address]  Ph: [phone]  Fax: [phone]    23 minutes spent on the date of the encounter doing chart review, history and exam, documentation, and further activities.

## 2021-10-13 ENCOUNTER — OFFICE VISIT (OUTPATIENT)
Dept: PALLIATIVE MEDICINE | Facility: OTHER | Age: 76
End: 2021-10-13
Payer: OTHER MISCELLANEOUS

## 2021-10-13 VITALS — DIASTOLIC BLOOD PRESSURE: 77 MMHG | OXYGEN SATURATION: 96 % | HEART RATE: 59 BPM | SYSTOLIC BLOOD PRESSURE: 142 MMHG

## 2021-10-13 DIAGNOSIS — M96.1 POSTLAMINECTOMY SYNDROME, LUMBAR REGION: ICD-10-CM

## 2021-10-13 DIAGNOSIS — G89.4 CHRONIC PAIN SYNDROME: ICD-10-CM

## 2021-10-13 DIAGNOSIS — F11.90 CHRONIC, CONTINUOUS USE OF OPIOIDS: Primary | ICD-10-CM

## 2021-10-13 DIAGNOSIS — M54.15 RADICULOPATHY OF THORACOLUMBAR REGION: ICD-10-CM

## 2021-10-13 PROCEDURE — 99213 OFFICE O/P EST LOW 20 MIN: CPT | Performed by: PHYSICIAN ASSISTANT

## 2021-10-13 PROCEDURE — G0463 HOSPITAL OUTPT CLINIC VISIT: HCPCS

## 2021-10-13 RX ORDER — HYDROCODONE BITARTRATE AND ACETAMINOPHEN 10; 325 MG/1; MG/1
.5-1 TABLET ORAL 2 TIMES DAILY PRN
Qty: 40 TABLET | Refills: 0 | Status: SHIPPED | OUTPATIENT
Start: 2021-11-28 | End: 2021-12-09

## 2021-10-13 RX ORDER — HYDROCODONE BITARTRATE AND ACETAMINOPHEN 10; 325 MG/1; MG/1
.5-1 TABLET ORAL 2 TIMES DAILY PRN
Qty: 40 TABLET | Refills: 0 | Status: SHIPPED | OUTPATIENT
Start: 2021-10-29 | End: 2021-12-09

## 2021-10-13 RX ORDER — HYDROCODONE BITARTRATE 40 MG/1
40 TABLET, EXTENDED RELEASE ORAL DAILY
Qty: 30 TABLET | Refills: 0 | Status: SHIPPED | OUTPATIENT
Start: 2021-10-29 | End: 2021-12-09

## 2021-10-13 RX ORDER — HYDROCODONE BITARTRATE 40 MG/1
40 TABLET, EXTENDED RELEASE ORAL DAILY
Qty: 30 TABLET | Refills: 0 | Status: SHIPPED | OUTPATIENT
Start: 2021-11-28 | End: 2021-12-09

## 2021-10-13 ASSESSMENT — PAIN SCALES - GENERAL: PAINLEVEL: MODERATE PAIN (5)

## 2021-10-13 NOTE — PROGRESS NOTES
Patient presents to the clinic today for a follow up with Brea Sheets PA-C regarding  Pain Management     Patient describes their pain as Lower back-  Dull ache  Right leg and right butt- When move its a sharp pain with a constant dull grind feeling.     Her/His function score is 7.    Pain score: 5    Constant     Does the pain interfere with:    Work: NA    Walking/distance: Y    Sleep: Y    Daily activities: Y    Relationships/social life: y    Mood: Y        Last dose of scheduled medication:    HYDROcodone Bitartrate ER (HYSINGLA) 40 MG ER Tablet on 10/12/2021 @ 8:30 pm      Lata Gates MA  [facility]

## 2021-10-13 NOTE — PATIENT INSTRUCTIONS
"Continue Hysingla (24 hour hydrocodone without tylenol) 40 mg daily.   Ok to fill 10/27/21 to start on 10/29/21 & 11/26/21 to start on 11/28/21  Also ok to take Vicodin 10/325 mg 1 tab at bedtime as needed for pain and #10 tabs to use during the day as needed for increased pain.  #40 tabs to last 30 days  - refill sent for 10/29/21 & 11/28/21  Naloxone/\"Narcan\" is a medication being provided to you for safety. It is a medication that will decrease/eliminate the opioid narcotic medication in your body. Naloxone is a rescue medication in case of an overdose - either intentional or unintentional.  Symptoms of overdose may be breathing slow and shallow, erratic or not at all, pinpoint pupils, hallucinations, confusion, muscle jerks, slack muscles, extreme sleepiness or loss of alertness, awake but not able to talk, face pale or clammy, vomiting, for lighter skinned people, the skin tone turns bluish purple, for darker skinned people, it turns grayish or ashen.  If Naloxone is used 911 or the emergency medical system should be call, CPR may be needed and the person needs to be seen in an emergency department.  Continue lamictal 50 mg twice a day.    Continue taking Cymbalta 120 mg at night.   Continue Gabapentin 300 mg two times a day for pain - you may try and reduce to once a day as tolerated and see if this helps with grogginess.  Continue Amitiza for constipation as prescribed   Continue Lidoderm patch apply 1-3 patches to painful areas on 12 hours, off 12 hours.    May continue using voltaren gel to lower back 2 grams 4 times a day  Continue Vitamin D 5,000 Units daily   Continue use of spinal cord stimulator - have discusseded checking battery life in next 6 months to determine if a new battery is needed.  [employer] battery has features with charging and adaptive stimulation that would be an improvement from your current one.  Reschedule acupuncture here with Sydnee Meadows  Follow-up in 8 weeks with Brea in " clinic

## 2021-10-13 NOTE — LETTER
10/13/2021         RE: Naveen Toro  [address] [address]        Dear Colleague,    Thank you for referring your patient, Naveen Toro, to the [facility]. Please see a copy of my visit note below.    PAIN CENTER PROGRESS NOTE    Subjective:   Naveen Toro presents for evaluation of low back pain.  He has history of lumbar degeneration.  He has history of lumbar surgery, failed lumbar injections, and is currently using opioid management and implanted spinal cord stimulator to manage pain symptoms. Had SCS battery replaced on 09/09/16 with [employer].     Major issues:  1. Chronic, continuous use of opioids    2. Chronic pain syndrome    3. Therapeutic opioid-induced constipation (OIC)    4. Postlaminectomy Syndrome (Lumbar)    5. Radiculopathy of thoracolumbar region       Pain location and description: See rooming note.  Moderate Pain (5)  Radiation of pain: buttocks, hips.    Gait disturbance: Denies recent falls or changes in balance.  Uses cane.    Exacerbating factors: Too much standing, walking, sitting prolonged (10-20 minutes), lifting/bending, cold or rainy weather changes  Alleviating factors: Rest, pain medications, SCS implant, injections, warmer weather, reclining/laying down, some walking.  Associated symptoms: Pain at night.  Numbness in right hip/leg to foot, weakness in right leg, night pain. Denies bowel or bladder incontinence, fever/chills, unexplained weight loss.  Adverse effects of medications: Constipation taking Amitiza prescribed two times a day which is working. Has a bowel movement every 2-3 days.  Uses miralax 2-3 times per month as needed.    Functional Symptoms: See rooming note  Current treatment efficacy: Good; see medication list.  Current treatment compliance: Good.  Attending scheduled appointments and taking medication as prescribed. Using SCS daily. Continues to see Dr. Frank monthly for pain psychology.  He states not too much exercise,  "he has a treadmill at home.      Since the last Pain Center visit, he had bilateral L5-S1 TFESI on 07/15/2021 and reports this went \"alright\" and reports his pain was reduced by 50% for 3-4 weeks at least.  He states it takes a few weeks to take effects.  He denies any complications from procedure but felt more pain during injection and felt it right down the leg.  He states maybe insomnia from steroids a few days.      Saw Dr. Echols, nephrologist, on 10/11/21:    \"ASSESSMENT    76 y.o. male with history of longstanding hypertension, type 2 diabetes mellitus, coronary artery disease status post CABG x1, hypertrophic CM s/p myectomy 2010, MC on CPAP, chronic low back pain (history of spinal surgery x2, history of spinal stimulator), depression, hyperlipidemia who also has chronic kidney disease     #. CKD stage 3. CKD appears to be due to hypertension, diabetes, vascular disease, age related decline in kidney function.   Does not have significant proteinuria. Urine protein to creatinine ratio 0.13 as of September 2021.  SPEP, immunofixation were okay . Imaging of the kidneys was okay    Serum creatinine appears to be in the region of 1.3-1.6. Has had progression of CKD over the years    Discussed at length. Discussed risk factor modification  Used to be on ACE-inhibitor. However it is listed in allergies as causing renal failure.    Discussed role of SGLT2 inhibitors. They will discuss this further with his primary care physician     #. Type 2 diabetes mellitus. On metformin. Fair control. Discussed that in the future, may have to discontinue metformin if kidney function were to worsen. There is small risk of lactic acidosis with metformin in the setting of CKD    They will discuss with primary care physician, substitution of metformin with SGLT2 inhibitor     #. Hypertension. Fair control. I have not made any changes to his regimen  Not on ACE-inhibitor or ARB due to prior history of acute kidney injury attributed " "to ACE-inhibitor use    Plan  No change in regimen  The plan to follow-up with PCP regarding consideration of switching metformin to SGLT2 inhibitor  Follow-up with me in 4 months    Labs prior to next appointment   Hemoglobin, BMP, albumin, urine protein to creatinine ratio     Ez Echols MD 10/11/2021, 12:44 PM\"      He was scheduled for acupuncture with Sydnee Lopez on 09/29/21 which was cancelled due to insurance. Approved now so he plans to reschedule.    He states his pain has been mostly in the center of his back the last 2 weeks.  He reports once in awhile it is down his right buttock.  Has been a dull aching pain.      Hasn't been sleeping well the past week, unsure if due to pain or other things. He takes Vicodin 10/325 mg 1 tab at night when in pain.  He does continue melatonin 3 mg at night. He reports the lamictal helps with leg pain.  He states he has a different pain when he is standing or walking that feels like a muscle cramp and soreness like a bruise.  He feels in thighs and bilateral calves.     Discussed reduction of Gabapentin since his leg pain is controlled with lamictal 50 mg two times a day.  He is currently taking 300 mg BID instead of 400 mg BID. Review reducing dose to 300 mg daily as he still reports drowsiness. He continues Hysingla 40 mg daily.     Had SCS battery replaced on 09/09/16 with [employer]. He has conventional setting.  He is using it 24 hours a day.  He states he doesn't feel it working unless he is in a certain position; laying down or reclining in recliner he can feel it work.  He states charging is ok.  He had x-ray to evaluate leads in 12/2017 and 06/2020. He had reprogramming recently and reports his pain is about the same.  He states he still feels he has to lean back to get the best coverage.  Discuss checking ERV as he has had this battery for almost 5 years and may be able to consider newer battery Intellis which may be better for the positional concerns due to " adaptive stimulation.    Current Outpatient Medications   Medication Sig Dispense Refill     amLODIPine (NORVASC) 2.5 MG tablet [AMLODIPINE (NORVASC) 2.5 MG TABLET] Take 2.5 mg by mouth daily.       aspirin 325 MG tablet [ASPIRIN 325 MG TABLET] Take 325 mg by mouth daily.       atorvastatin (LIPITOR) 40 MG tablet [ATORVASTATIN (LIPITOR) 40 MG TABLET] Take 40 mg by mouth daily. As directed       blood glucose (PRODIGY NO CODING BLOOD GLUC) test strip Use  to test daily. DX Code: E11.9, 90 day supply. Pharmacy dispense brand based on insurance.       diclofenac sodium (VOLTAREN) 1 % Gel [DICLOFENAC SODIUM (VOLTAREN) 1 % GEL] Apply 2 g topically 4 (four) times a day. 100 g 1     DULoxetine (CYMBALTA) 60 MG capsule Take 2 capsules (120 mg) by mouth daily 180 capsule 1     gabapentin (NEURONTIN) 300 MG capsule Take 1 capsule (300 mg) by mouth 2 times daily 180 capsule 1     [START ON 10/29/2021] HYDROcodone Bitartrate ER (HYSINGLA) 40 MG ER Tablet Take 1 tablet (40 mg) by mouth daily 30 tablet 0     [START ON 11/28/2021] HYDROcodone Bitartrate ER (HYSINGLA) 40 MG ER Tablet Take 1 tablet (40 mg) by mouth daily 30 tablet 0     [START ON 10/29/2021] HYDROcodone-acetaminophen (NORCO)  MG per tablet Take 0.5-1 tablets by mouth 2 times daily as needed for severe pain 40 tablet 0     [START ON 11/28/2021] HYDROcodone-acetaminophen (NORCO)  MG per tablet Take 0.5-1 tablets by mouth 2 times daily as needed for severe pain 40 tablet 0     lamoTRIgine (LAMICTAL) 25 MG tablet Take 2 tablets (50 mg) by mouth 2 times daily 360 tablet 0     Lancets MISC 1 each       lidocaine (LIDODERM) 5 % [LIDOCAINE (LIDODERM) 5 %] UNWRAP AND APPLY 1 PATCH TO SKIN DAILY, REMOVE AND DISCARD PATCH WITHIN 12 HOURS OR AS DIRECTED BY DOCTOR 30 patch 1     lubiprostone (AMITIZA) 24 MCG capsule Take 1 capsule (24 mcg) by mouth 2 times daily (with meals) 180 capsule 1     melatonin 3 MG tablet Take 3 mg by mouth       metFORMIN (GLUCOPHAGE) 500  MG tablet [METFORMIN (GLUCOPHAGE) 500 MG TABLET] Take 1,000 mg by mouth 2 (two) times a day with meals.        metoprolol (LOPRESSOR) 25 MG tablet [METOPROLOL (LOPRESSOR) 25 MG TABLET] Take 25 mg by mouth 2 (two) times a day.       naloxone (NARCAN) 4 MG/0.1ML nasal spray Spray 1 spray (4 mg) into one nostril alternating nostrils once as needed for opioid reversal every 2-3 minutes until assistance arrives 0.2 mL 0     polyethylene glycol (GLYCOLAX) 17 gram/dose powder [POLYETHYLENE GLYCOL (GLYCOLAX) 17 GRAM/DOSE POWDER] Take 17 g by mouth daily. 595 g 1     vitamin D3 (CHOLECALCIFEROL) 125 MCG (5000 UT) tablet Take 5,000 Units by mouth         Review of Systems  Constitutional: Fatigue. Sleep disturbance, sleeps during the day.  Wearing CPAP every night.  Denies fever, night sweats, weight loss.  Musculoskeletal: Positive for back pain, leg pain.  Denies neck pain. Denies recent falls.  Gastrointestional: Denies difficulty swallowing, change in appetite, abdominal pain, nausea, vomiting, diarrhea, GERD, fecal incontinence.  Genitourinary: Denies urinary incontinence, dysuria, hematuria, UTI, frequency, hesitancy  Neurologic:  Denies headaches, confusion, seizure, weakness, changes in balance, changes in speech.  Psychiatric: Positive for depression, anxiety. Denies memory loss, psychoses, suicidal ideation, substance     Objective:   BP (!) 142/77   Pulse 59   SpO2 96%     Physical Exam  Constitutional- General appearance: Normal.  Well developed, comfortable, overweight, and appearance reflects stated age.  No acute distress or pain behaviors noted. Presents alone today.  Psychiatric- Judgment and insight: Normal.  Speech: Normal rhythm.  Thought process: Normal.  No abnormal thoughts reported. Alert & Oriented to person, place, and time.  Recent and remote memory: Normal.  Observed mood: appropriate  Respiratory- Breathing is non-labored; normal rhythm and rate.  Dermatologic- Exposed skin is clean, dry, and  intact to inspection and palpation.  Musculoskeletal- Gait and station: Ambulates independently with antalgia, using cane.  Sit to stand with slight difficulty.    *Opioid [place] Precautions:    UDT/Blood - 11/18/20, results expected  Opioid Agreement - 08/18/2021  Pharmacy- as documented     Reviewed - 10/13/2021 as expected, last refills 09/29 for 30 days this was a delay from previous fill on 08/27 for 30 days.  Pill Count - n/a  Psychological evaluation - monthly through Dr. Larry Frank  MME -  50  Pharmacogenetic testing - n/a  ORT - 1 (low)    Imaging:  EXAM: XR THORACOLUMBAR 2 VWS  LOCATION: [facility]  DATE/TIME: 6/9/2020 11:21 AM     INDICATION: Spinal cord simulator lead position.  COMPARISON: 12/20/2017.     IMPRESSION:   Transitional lumbosacral anatomy with sacralized L5.     Spinal cord stimulator leads are not changed in position. Spinal cord simulator lead entering into the spinal canal at the level of T11-T12 terminates at the level of the T9 pedicles. Additional spinal cord stimulator lead entering into the spinal canal   at the level of T12-L1 terminates at the level of the infrapedicular T11 vertebral body.     Unchanged old compression deformities of the T12, L1, and L2 vertebrae. No new fracture. No aggressive osseous abnormality. No significant change in lumbar spondylosis.    Assessment:   Naveen Toro with visit today for lumbar degeneration and lumbar radiculopathy, most severe at L5-S1. He did have TFESI with 50% pain relief, lasts anywhere from a couple weeks to over 1 month.  Discuss repeating at 3 month intervals as needed.  Continues use of SCS with recent reprogramming 03/2021.  He will also continue adjunct duloxetine and gabapentin for nerve pain and mood and lamotrigine for pain and mood benefit - leg pain has improved and we discuss considering a weaning of gabapentin as he may not need both medications and may make his daytime fatigue lessen.  He is to  "continue Vitamin D for chronic pain, sleep, and mood benefit.  He may continue topicals as needed, review use when he has more activity related pain.  Will continue dose of Hysingla and Vicodin 10/325 mg 1 tab in evening or bedtime to take for increased pain - discuss adjusting quantity to also have a dose to take during daytime when pain is severe - not to exceed 2 doses in 24 hours.      Plan:   Continue Hysingla (24 hour hydrocodone without tylenol) 40 mg daily.   Ok to fill 10/27/21 to start on 10/29/21 & 11/26/21 to start on 11/28/21  Also ok to take Vicodin 10/325 mg 1 tab at bedtime as needed for pain and #10 tabs to use during the day as needed for increased pain.  #40 tabs to last 30 days  - refill sent for 10/29/21 & 11/28/21  Naloxone/\"Narcan\" is a medication being provided to you for safety. It is a medication that will decrease/eliminate the opioid narcotic medication in your body. Naloxone is a rescue medication in case of an overdose - either intentional or unintentional.  Symptoms of overdose may be breathing slow and shallow, erratic or not at all, pinpoint pupils, hallucinations, confusion, muscle jerks, slack muscles, extreme sleepiness or loss of alertness, awake but not able to talk, face pale or clammy, vomiting, for lighter skinned people, the skin tone turns bluish purple, for darker skinned people, it turns grayish or ashen.  If Naloxone is used 911 or the emergency medical system should be call, CPR may be needed and the person needs to be seen in an emergency department.  Continue lamictal 50 mg twice a day.    Continue taking Cymbalta 120 mg at night.   Continue Gabapentin 300 mg two times a day for pain - you may try and reduce to once a day as tolerated and see if this helps with grogginess.  Continue Amitiza for constipation as prescribed   Continue Lidoderm patch apply 1-3 patches to painful areas on 12 hours, off 12 hours.    May continue using voltaren gel to lower back 2 grams 4 " times a day  Continue Vitamin D 5,000 Units daily   Continue use of spinal cord stimulator - have discusseded checking battery life in next 6 months to determine if a new battery is needed.  [employer] battery has features with charging and adaptive stimulation that would be an improvement from your current one.  Reschedule acupuncture here with Sydnee Meadows  Follow-up in 8 weeks with Brea in clinic     BERNABE Mendoza [facility]  [address]  [place] [address]  Ph: [phone]  Fax: [phone]    23 minutes spent on the date of the encounter doing chart review, history and exam, documentation, and further activities.            Patient presents to the clinic today for a follow up with Brea Sheets PA-C regarding  Pain Management     Patient describes their pain as Lower back-  Dull ache  Right leg and right butt- When move its a sharp pain with a constant dull grind feeling.     Her/His function score is 7.    Pain score: 5    Constant     Does the pain interfere with:    Work: NA    Walking/distance: Y    Sleep: Y    Daily activities: Y    Relationships/social life: y    Mood: Y        Last dose of scheduled medication:    HYDROcodone Bitartrate ER (HYSINGLA) 40 MG ER Tablet on 10/12/2021 @ 8:30 pm      DEN[facility]rrut[facility]       Again, thank you for allowing me to participate in the care of your patient.        Sincerely,        Brea Sheets PA-C

## 2021-10-20 ENCOUNTER — TELEPHONE (OUTPATIENT)
Dept: PALLIATIVE MEDICINE | Facility: OTHER | Age: 76
End: 2021-10-20

## 2021-10-20 DIAGNOSIS — G89.4 CHRONIC PAIN SYNDROME: Primary | ICD-10-CM

## 2021-10-20 RX ORDER — HYDROCODONE BITARTRATE 40 MG/1
40 TABLET, EXTENDED RELEASE ORAL DAILY
Qty: 30 TABLET | Refills: 0 | Status: SHIPPED | OUTPATIENT
Start: 2021-10-29 | End: 2021-12-09

## 2021-10-20 RX ORDER — HYDROCODONE BITARTRATE 40 MG/1
40 TABLET, EXTENDED RELEASE ORAL DAILY
Qty: 30 TABLET | Refills: 0 | Status: SHIPPED | OUTPATIENT
Start: 2021-11-28 | End: 2022-01-28

## 2021-10-20 NOTE — TELEPHONE ENCOUNTER
Received fax from pharmacy that they are requesting the Hysingla RX to be for BRAND with a DAW1.  Requesting to please cancel the 2 RXs in the electronic profile and to send new prescriptions for Hysingla ER 40 mg DAW1.  I have tried to fix this, please call pharmacy to make sure it is how they have requested and other orders are cancelled. Thanks.

## 2021-11-10 ENCOUNTER — PROCEDURE ONLY VISIT (OUTPATIENT)
Dept: PALLIATIVE MEDICINE | Facility: OTHER | Age: 76
End: 2021-11-10
Attending: PHYSICIAN ASSISTANT
Payer: OTHER MISCELLANEOUS

## 2021-11-10 DIAGNOSIS — G89.4 CHRONIC PAIN SYNDROME: ICD-10-CM

## 2021-11-10 DIAGNOSIS — M54.50 CHRONIC BILATERAL LOW BACK PAIN, UNSPECIFIED WHETHER SCIATICA PRESENT: ICD-10-CM

## 2021-11-10 DIAGNOSIS — M96.1 POSTLAMINECTOMY SYNDROME, LUMBAR REGION: Primary | ICD-10-CM

## 2021-11-10 DIAGNOSIS — G89.29 CHRONIC BILATERAL LOW BACK PAIN, UNSPECIFIED WHETHER SCIATICA PRESENT: ICD-10-CM

## 2021-11-10 PROCEDURE — 97811 ACUP 1/> W/O ESTIM EA ADD 15: CPT | Performed by: ACUPUNCTURIST

## 2021-11-10 PROCEDURE — 97810 ACUP 1/> WO ESTIM 1ST 15 MIN: CPT | Performed by: ACUPUNCTURIST

## 2021-11-10 NOTE — PROGRESS NOTES
ACUPUNCTURIST TREATMENT NOTE      Naveen Toro, a 76 year old male, is here today for Initial exam. Patient is referred by Sudeep.    HPI  Main Complaint: Chronic low back pain: Patient with long history of chronic low back pain due to work injuries several years ago. Reports history of lumbar surgery in 1979 and 1982. Over time low back pain has remained and has been getting worse and affecting his daily life more and more. Pain is reported to be bilateral from L2 area to sacrum, and will sometimes radiate into right buttock, hip and leg. Occasionally gets sharp pain down RLE to foot. Does report numbness/tingling in feet that comes and goes. He states he can do his daily life activities, but it can be difficult. Bending over is especially difficult. He rates his pain level 5/10 today. This can vary quite a bit depending upon his positioning and activity. Sitting too long and walking can aggravate his pain. He is most comfortable in his recliner. His sleep is affected by pain and he thinks he averages 5 hours per night. He does feel that cold, damp weather will worsen his pain.    In the past he reports doing chiropractic which he did find helpful. Reports he has not gone in years. He has done PT over the years and states he felt like it always aggravated his pain and did not do much to help. He states he is not very active recently due to pain. He uses ice at home and finds that to be helpful sometimes.      Past Medical History  Past Medical History Reviewed: Yes   has a past medical history of Acute renal failure (H), Cardiomyopathy (H), Chronic low back pain, Constipation, Coronary artery disease, Diabetes mellitus (H), HLD (hyperlipidemia), Hypercholesterolemia, Hypertension, Lumbar radiculopathy, Post laminectomy syndrome, Postlaminectomy syndrome, lumbar region, Sigmoid diverticulitis, and Sleep apnea.    Objective  Basic Exam Completed:   Physical Exam: Normal  Body Areas: Normal  Head: Normal  Neck:  Normal  Chest: Normal  Back: Abnormal - Tenderness lateral to L1-sacrum bilaterally  Constitutional: Well Groomed and Normal Weight and Normal Appearance  Musculoskeletal: Abnormal - painful low back, right buttock    TCM Exam Completed: Yes   Energy: Low  Sleep: restless due to pain  Limbs/Back: Right Lower Extremity and Lower Back  Digestion: no current symptoms  Stools: Constipation    Tongue/Pulse Exam Completed: No    Patient Assessment  Patient Type: Pain  Patient Complaint: Chronic low back pain with occasional pain radiating into right buttock and leg    Acupuncture 11/10/2021   Intervention Reason Pain   Pain Location Low back   Pre-session Pain Rating 5   Post-session Pain Rating 3       TCM Diagnosis: Other Qi and blood stasis due to trauma; channel obstruction; damp cold bi syndrome; kidney yang and yin deficiencies    Treatment Principle: Move qi and blood; dredge meridians, tonify kidney yin and yang    TCM / Acupuncture Treatment  Acupuncture Points:       Initial insertions: Shiqizhuixia, HTJJ L4-L5, Ub25, Ub26. Right: Ub27, Ub31.       Second insertions: Right: Ub23, Ub24, Yaoyan, Ub52, Gb29, Jiankua, Ub53, Ub54       Additional insertions: Baihui. Right: Gb41, Ub62, Ub60, Ub57, Gb34            Accessory Techniques 11/10/2021   Accessory Techniques TDP Heat Lamp; Tuina; Topicals   TDP Heat Lamp location used low back   Tuina location used low back/right hip   Topicals Po Sum On   Topicals location used low back/right hip             Assessment and Plan  Treatment Observations: Patient fell asleep and relaxed well during treatment. Reported pain was improved post-treatment.  Acupuncture Treatment Recommendations: Continue weekly treatment 4 more weeks then re-evaluate.       It is my recommendation that this patient seek advice from their Primary Care Provider about active symptoms not addressed during this visit. The risks and benefits of acupuncture were reviewed and the patient stated  understanding. The patient's questions were answered to their satisfaction. Consent was provided for treatment. We thank you for the referral and opportunity to treat this patient.    Time Spent with Patient:   I spent a total of 40 minutes face-to-face with Naveen Toro during today's office visit.     Sydnee Lopez L.Ac.  11/10/21  1:40 PM

## 2021-11-11 RX ORDER — LAMOTRIGINE 25 MG/1
50 TABLET ORAL 2 TIMES DAILY
Qty: 360 TABLET | Refills: 0 | Status: SHIPPED | OUTPATIENT
Start: 2021-11-11 | End: 2022-02-15

## 2021-11-11 NOTE — TELEPHONE ENCOUNTER
Received fax request from  Lamotrigine 25 mg    Last refilled on 8/10/21-90 days    Pt last seen on 10/13/21  Next appt scheduled for 12/09/21    Pending Prescriptions:                       Disp   Refills    lamoTRIgine (LAMICTAL) 25 MG tablet [Phar*360 ta*0            Sig: TAKE 2 TABLETS (50 MG) BY MOUTH 2 TIMES DAILY    [facility]

## 2021-11-17 ENCOUNTER — PROCEDURE ONLY VISIT (OUTPATIENT)
Dept: PALLIATIVE MEDICINE | Facility: OTHER | Age: 76
End: 2021-11-17
Payer: OTHER MISCELLANEOUS

## 2021-11-17 DIAGNOSIS — G89.29 CHRONIC BILATERAL LOW BACK PAIN, UNSPECIFIED WHETHER SCIATICA PRESENT: ICD-10-CM

## 2021-11-17 DIAGNOSIS — M54.50 CHRONIC BILATERAL LOW BACK PAIN, UNSPECIFIED WHETHER SCIATICA PRESENT: ICD-10-CM

## 2021-11-17 DIAGNOSIS — M96.1 POSTLAMINECTOMY SYNDROME, LUMBAR REGION: ICD-10-CM

## 2021-11-17 DIAGNOSIS — G89.4 CHRONIC PAIN SYNDROME: Primary | ICD-10-CM

## 2021-11-17 PROCEDURE — 97810 ACUP 1/> WO ESTIM 1ST 15 MIN: CPT | Performed by: ACUPUNCTURIST

## 2021-11-17 PROCEDURE — 97811 ACUP 1/> W/O ESTIM EA ADD 15: CPT | Performed by: ACUPUNCTURIST

## 2021-11-17 NOTE — PROGRESS NOTES
ACUPUNCTURIST TREATMENT NOTE      Naveen Toro, a 76 year old male, is here today for Follow - Up exam. Patient is referred by No ref. provider found. 2nd treatment.    HPI  Main Complaint: Chronic low back pain radiating into right buttock: Patient reports after last treatment he felt improvement in pain for a few days, then pain levels started to return to higher levels again. He states that he has not had pain down RLE since last treatment, but does continue to have pain radiating into right buttock. Continues to have restless sleep due to pain. Feels pain is worse in the colder weather now. Always feels cold hands and feet, hard to warm up.        Past Medical History  Past Medical History Reviewed: Yes   has a past medical history of Acute renal failure (H), Cardiomyopathy (H), Chronic low back pain, Constipation, Coronary artery disease, Diabetes mellitus (H), HLD (hyperlipidemia), Hypercholesterolemia, Hypertension, Lumbar radiculopathy, Post laminectomy syndrome, Postlaminectomy syndrome, lumbar region, Sigmoid diverticulitis, and Sleep apnea.    Objective  Basic Exam Completed:   No    TCM Exam Completed: Yes   Energy: Medium  Sleep: restless due to pain  Limbs/Back: Lower Back    Tongue/Pulse Exam Completed: No    Patient Assessment  Patient Type: Pain  Patient Complaint: Chronic low back pain with occasional pain radiating into right buttock    Acupuncture 11/10/2021 11/17/2021   Intervention Reason Pain Pain   Pain Location Low back Low back   Pre-session Pain Rating 5 4   Post-session Pain Rating 3 3       TCM Diagnosis: Other Qi and blood stasis due to trauma; channel obstruction; damp cold bi syndrome; kidney yang and yin deficiencies    Treatment Principle: Move qi and blood; dredge meridians, tonify kidney yin and yang    TCM / Acupuncture Treatment  Acupuncture Points:       Initial insertions: HTJJ L2-L5, Ur15-Jq80, Yw41-Gy40       Second insertions: Shiqizhuixia, Right: Ub23, Ub24, Yaoyan, Gb29,  Ub53       Additional insertions: Sima, Ub62, Ki7, Ub57, Gb34            Accessory Techniques 11/10/2021 11/17/2021   Accessory Techniques TDP Heat Lamp; Tuina; Topicals TDP Heat Lamp; Tuina; Topicals   TDP Heat Lamp location used low back low back   Tuina location used low back/right hip low back   Topicals Po Sum On Po Sum On   Topicals location used low back/right hip low back          Assessment and Plan  Treatment Observations: Patient relaxed well during treatment, reported improvement post-treatment.  Acupuncture Treatment Recommendations:         It is my recommendation that this patient seek advice from their Primary Care Provider about active symptoms not addressed during this visit. The risks and benefits of acupuncture were reviewed and the patient stated understanding. The patient's questions were answered to their satisfaction. Consent was provided for treatment. We thank you for the referral and opportunity to treat this patient.    Time Spent with Patient:   I spent a total of 40 minutes face-to-face with Naveen Toro during today's office visit.     Sydnee Lopez

## 2021-11-24 ENCOUNTER — PROCEDURE ONLY VISIT (OUTPATIENT)
Dept: PALLIATIVE MEDICINE | Facility: OTHER | Age: 76
End: 2021-11-24
Payer: OTHER MISCELLANEOUS

## 2021-11-24 DIAGNOSIS — M54.50 CHRONIC BILATERAL LOW BACK PAIN, UNSPECIFIED WHETHER SCIATICA PRESENT: ICD-10-CM

## 2021-11-24 DIAGNOSIS — G89.29 CHRONIC BILATERAL LOW BACK PAIN, UNSPECIFIED WHETHER SCIATICA PRESENT: ICD-10-CM

## 2021-11-24 DIAGNOSIS — M96.1 POSTLAMINECTOMY SYNDROME, LUMBAR REGION: Primary | ICD-10-CM

## 2021-11-24 PROCEDURE — 97810 ACUP 1/> WO ESTIM 1ST 15 MIN: CPT | Performed by: ACUPUNCTURIST

## 2021-11-24 PROCEDURE — 97811 ACUP 1/> W/O ESTIM EA ADD 15: CPT | Performed by: ACUPUNCTURIST

## 2021-11-24 NOTE — PROGRESS NOTES
ACUPUNCTURIST TREATMENT NOTE      Naveen Toro, a 76 year old male, is here today for Follow - Up exam. Patient is referred by No ref. provider found. Treatment 3.    HPI  Main Complaint: Chronic low back pain: Patient reports after last treatment he felt improved, then began to feel some increase in soreness later that evening that persisted for a few days. Pain is now mainly on right side of low back lateral to sacrum and into buttocks. Pain is improved in left side of low back and upper lumbar areas. No pain down RLE, only into buttock.       Past Medical History  Past Medical History Reviewed: Yes   has a past medical history of Acute renal failure (H), Cardiomyopathy (H), Chronic low back pain, Constipation, Coronary artery disease, Diabetes mellitus (H), HLD (hyperlipidemia), Hypercholesterolemia, Hypertension, Lumbar radiculopathy, Post laminectomy syndrome, Postlaminectomy syndrome, lumbar region, Sigmoid diverticulitis, and Sleep apnea.    Objective  Basic Exam Completed:   No    TCM Exam Completed: Yes   Energy: Medium  Sleep: restless due to pain  Limbs/Back: Lower Back    Tongue/Pulse Exam Completed: No    Patient Assessment  Patient Type: Pain  Patient Complaint: Chronic low back pain, right sided into right buttock    Acupuncture 11/17/2021 11/24/2021   Intervention Reason Pain Pain   Pain Location Low back Low back   Pre-session Pain Rating 4 5   Post-session Pain Rating 3 4       TCM Diagnosis: Other Qi and blood stasis due to trauma; channel obstruction; damp cold bi syndrome; kidney yang and yin deficiencies    Treatment Principle: Move qi and blood; dredge meridians, tonify kidney yin and yang    TCM / Acupuncture Treatment  Acupuncture Points:       Initial insertions: HTJJ L2-L5. Right: [id number].       Second insertions: Shiqizhuixia. Right: Yaoyan, Ub53, Ub54, Gb29, Gluteus Med MP x1       Additional insertions: Baihui, Ub62, Ub60, Ki3, Sp6, Ub57, Gb34            Accessory Techniques  11/17/2021 11/24/2021   Accessory Techniques TDP Heat Lamp; Tuina; Topicals TDP Heat Lamp; Tuina; Topicals   TDP Heat Lamp location used low back low back   Tuina location used low back low back   Topicals Po Sum On Po Sum On   Topicals location used low back low back          Assessment and Plan  Treatment Observations: Patient relaxed well during treatment.  Acupuncture Treatment Recommendations:         It is my recommendation that this patient seek advice from their Primary Care Provider about active symptoms not addressed during this visit. The risks and benefits of acupuncture were reviewed and the patient stated understanding. The patient's questions were answered to their satisfaction. Consent was provided for treatment. We thank you for the referral and opportunity to treat this patient.    Time Spent with Patient:   I spent a total of 40 minutes face-to-face with Naveen Toro during today's office visit.     Sydnee Lopez L.Ac.  11/24/21  11:39 AM

## 2021-12-08 NOTE — PROGRESS NOTES
PAIN CENTER PROGRESS NOTE    Subjective:   Naveen Toro presents for evaluation of low back pain.  He has history of lumbar degeneration.  He has history of lumbar surgery, failed lumbar injections, and is currently using opioid management and implanted spinal cord stimulator to manage pain symptoms. Had SCS battery replaced on 09/09/16 with [employer].     Major issues:  1. Chronic, continuous use of opioids    2. Chronic pain syndrome    3. Therapeutic opioid-induced constipation (OIC)    4. Postlaminectomy Syndrome (Lumbar)    5. Radiculopathy of thoracolumbar region       Pain location and description: See rooming note.  Severe Pain (6)  Radiation of pain: buttocks, hips.    Gait disturbance: Denies recent falls or changes in balance.  Uses cane.    Exacerbating factors: Too much standing, walking, sitting prolonged (10-20 minutes), lifting/bending, cold or rainy weather changes  Alleviating factors: Rest, pain medications, SCS implant, injections, warmer weather, reclining/laying down, some walking.  Associated symptoms: Pain at night.  Numbness in right hip/leg to foot, weakness in right leg, night pain. Denies bowel or bladder incontinence, fever/chills, unexplained weight loss.  Adverse effects of medications: Constipation taking Amitiza prescribed two times a day which is working. Has a bowel movement every 2-3 days.  Uses miralax 2-3 times per month as needed.    Functional Symptoms: See rooming note  Current treatment efficacy: Good; see medication list.  Current treatment compliance: Good.  Attending scheduled appointments and taking medication as prescribed. Using SCS daily. Continues to see Dr. Frank monthly for pain psychology.  He states not too much exercise, he has a treadmill at home.      Since the last Pain Center visit, he started acupuncture with Sydnee Lopez and has had several visits, reports so far he isn't sure how it is going as he had a recent pain flare.    States 1.5 weeks ago went  to get out of bed and he felt stabbing pain in his lower back.  He states it went down his right buttock and took his breath away.  He states he laid there awhile, went to get up and happened again.  Has been occurring intermittent usually in the morning when he first gets up.  Started to improve a couple of days ago but today feels increased symptoms again.  He denies left sided pain.  He denies triggers or injury.  He states he has been using ice and topical and states he tried lidocaine patch which doesn't help much.  He states he is not taking OTCs, doesn't take NSAIDs due to CKD.  Resting more states once he is reclining he is doing better.  He is using his SCS but hasn't changed settings during flare.  He states the jab and electrical pain down his leg is a 10/10 pain when it happens.  He denies fever/chills unexplained weight loss, saddle anesthesia, weakness or numbness that has progressed in legs.    He takes Vicodin 10/325 mg 1 tab at night when in pain.  He does continue melatonin 3 mg at night. He reports the lamictal 50 mg BID helps with leg pain.  He reduced Gabapentin to 300 mg BID instead of 400 mg BID. Review reducing dose to 300 mg daily as he still reports drowsiness. He continues Hysingla 40 mg daily.  Taking as prescribed, denies missing doses.    Had SCS battery replaced on 09/09/16 with [employer]. He has conventional setting.  He is using it 24 hours a day.  He states he doesn't feel it working unless he is in a certain position; laying down or reclining in recliner he can feel it work.  He states charging is ok.  He had x-ray to evaluate leads in 12/2017 and 06/2020. He had reprogramming recently and reports his pain is about the same.  He states he still feels he has to lean back to get the best coverage.  Discuss checking ERV as he has had this battery for almost 5 years and may be able to consider newer battery [employer] which may be better for the positional concerns due to adaptive  stimulation.      His last TFESI was 07/2021, has noted relief worn off at this point.    Current Outpatient Medications   Medication Sig Dispense Refill     amLODIPine (NORVASC) 2.5 MG tablet [AMLODIPINE (NORVASC) 2.5 MG TABLET] Take 2.5 mg by mouth daily.       aspirin 325 MG tablet [ASPIRIN 325 MG TABLET] Take 325 mg by mouth daily.       atorvastatin (LIPITOR) 40 MG tablet [ATORVASTATIN (LIPITOR) 40 MG TABLET] Take 40 mg by mouth daily. As directed       blood glucose (PRODIGY NO CODING BLOOD GLUC) test strip Use  to test daily. DX Code: E11.9, 90 day supply. Pharmacy dispense brand based on insurance.       diclofenac sodium (VOLTAREN) 1 % Gel [DICLOFENAC SODIUM (VOLTAREN) 1 % GEL] Apply 2 g topically 4 (four) times a day. 100 g 1     DULoxetine (CYMBALTA) 60 MG capsule Take 2 capsules (120 mg) by mouth daily 180 capsule 1     gabapentin (NEURONTIN) 300 MG capsule Take 1 capsule (300 mg) by mouth 2 times daily 180 capsule 1     HYDROcodone Bitartrate ER (HYSINGLA) 40 MG ER Tablet Take 1 tablet (40 mg) by mouth daily 30 tablet 0     HYDROcodone Bitartrate ER (HYSINGLA) 40 MG ER Tablet Take 1 tablet (40 mg) by mouth daily 30 tablet 0     HYDROcodone-acetaminophen (NORCO)  MG per tablet Take 0.5-1 tablets by mouth 2 times daily as needed for severe pain 40 tablet 0     HYDROcodone-acetaminophen (NORCO)  MG per tablet Take 0.5-1 tablets by mouth 2 times daily as needed for severe pain 40 tablet 0     HYSINGLA ER 40 MG ER Tablet Take 1 tablet (40 mg) by mouth daily 30 tablet 0     HYSINGLA ER 40 MG ER Tablet Take 1 tablet (40 mg) by mouth daily 30 tablet 0     lamoTRIgine (LAMICTAL) 25 MG tablet TAKE 2 TABLETS (50 MG) BY MOUTH 2 TIMES DAILY 360 tablet 0     Lancets MISC 1 each       lidocaine (LIDODERM) 5 % [LIDOCAINE (LIDODERM) 5 %] UNWRAP AND APPLY 1 PATCH TO SKIN DAILY, REMOVE AND DISCARD PATCH WITHIN 12 HOURS OR AS DIRECTED BY DOCTOR 30 patch 1     lubiprostone (AMITIZA) 24 MCG capsule Take 1 capsule  (24 mcg) by mouth 2 times daily (with meals) 180 capsule 1     melatonin 3 MG tablet Take 3 mg by mouth       metFORMIN (GLUCOPHAGE) 500 MG tablet [METFORMIN (GLUCOPHAGE) 500 MG TABLET] Take 1,000 mg by mouth 2 (two) times a day with meals.        metoprolol (LOPRESSOR) 25 MG tablet [METOPROLOL (LOPRESSOR) 25 MG TABLET] Take 25 mg by mouth 2 (two) times a day.       naloxone (NARCAN) 4 MG/0.1ML nasal spray Spray 1 spray (4 mg) into one nostril alternating nostrils once as needed for opioid reversal every 2-3 minutes until assistance arrives 0.2 mL 0     polyethylene glycol (GLYCOLAX) 17 gram/dose powder [POLYETHYLENE GLYCOL (GLYCOLAX) 17 GRAM/DOSE POWDER] Take 17 g by mouth daily. 595 g 1     vitamin D3 (CHOLECALCIFEROL) 125 MCG (5000 UT) tablet Take 5,000 Units by mouth         Review of Systems  Constitutional: Fatigue. Sleep disturbance, sleeps during the day.  Wearing CPAP every night.  Denies fever, night sweats, weight loss.  Musculoskeletal: Positive for back pain, leg pain.  Denies neck pain. Denies recent falls.  Gastrointestional: Denies difficulty swallowing, change in appetite, abdominal pain, nausea, vomiting, diarrhea, GERD, fecal incontinence.  Genitourinary: Denies urinary incontinence, dysuria, hematuria, UTI, frequency, hesitancy  Neurologic:  Denies headaches, confusion, seizure, weakness, changes in balance, changes in speech.  Psychiatric: Positive for depression, anxiety. Denies memory loss, psychoses, suicidal ideation, substance     Objective:   /70 (BP Location: Left arm, Patient Position: Sitting, Cuff Size: Adult Regular)   Pulse 66     Physical Exam  Constitutional- General appearance: Well developed, uncomfortable shifting position in chair, overweight, and appearance reflects stated age.  No acute distress or pain behaviors noted. Presents alone today.  Psychiatric- Judgment and insight: Normal.  Speech: Normal rhythm.  Thought process: Normal.  No abnormal thoughts reported.  Alert & Oriented to person, place, and time.  Recent and remote memory: Normal.  Observed mood: appropriate  Respiratory- Breathing is non-labored; normal rhythm and rate.  Dermatologic- Exposed skin is clean, dry, and intact to inspection and palpation.  Musculoskeletal- Gait and station: Ambulates independently with antalgia, using cane.  Sit to stand with moderate difficulty.    *Opioid Bivins Precautions:    UDT/Blood - Collected 12/09/2021, results pending  Opioid Agreement - 08/18/2021  Pharmacy- as documented     Reviewed - 12/09/2021 as expected  Pill Count - n/a  Psychological evaluation - monthly through Dr. Larry Frank  MME -  50  Pharmacogenetic testing - n/a  ORT - 1 (low)    Imaging:  EXAM: XR THORACOLUMBAR 2 VWS  LOCATION: [facility]  DATE/TIME: 6/9/2020 11:21 AM     INDICATION: Spinal cord simulator lead position.  COMPARISON: 12/20/2017.     IMPRESSION:   Transitional lumbosacral anatomy with sacralized L5.     Spinal cord stimulator leads are not changed in position. Spinal cord simulator lead entering into the spinal canal at the level of T11-T12 terminates at the level of the T9 pedicles. Additional spinal cord stimulator lead entering into the spinal canal   at the level of T12-L1 terminates at the level of the infrapedicular T11 vertebral body.     Unchanged old compression deformities of the T12, L1, and L2 vertebrae. No new fracture. No aggressive osseous abnormality. No significant change in lumbar spondylosis.    Assessment:   Naveen Toro with visit today for lumbar degeneration and lumbar radiculopathy, most severe at L5-S1. He had TFESI with 50% pain relief, lasts anywhere from a couple weeks to over 1 month.  He reports increased pain in right lower back and right leg x 1.5 weeks without new injury or trigger, denies red flag symptoms. Discuss repeating injection at 3 month intervals as needed, last was done 07/2021 and may repeat as needed.  Continues use of SCS  with recent reprogramming 03/2021.  He will also continue adjunct duloxetine and increase dose of gabapentin to 600 mg BID for nerve pain (temporarily) lamotrigine for pain and mood benefit.  Also adding in a medrol dose pack today for flare of pain as he is unable to take other oral antiinflammatories due to CKD.  He is to continue Vitamin D for chronic pain, sleep, and mood benefit.  He may continue topicals as needed and ice, also discuss OTC tylenol doses today.  Will continue dose of Hysingla and Vicodin 10/325 mg 1 tab in evening or bedtime to take for increased pain - discuss adjusting quantity to also have a dose to take during daytime when pain is severe - not to exceed 2 doses in 24 hours.   He is encouraged to continue with acupuncture - has a visit today which may also help address the increase in pain.    We review this provider is leaving the pain center with last day on 12/29/21.  Because pain is worsened at today's visit, recommend a 4 week visit with Dr. Maier and then resume 8 week visits with any pain provider in the [place] location.    Plan:   Continue Hysingla (24 hour hydrocodone without tylenol) 40 mg daily.   Ok to fill 12/27/21 to start on 12/29/21  Also ok to take Vicodin 10/325 mg 1 tab at bedtime as needed for pain and #10 tabs to use during the day as needed for increased pain.  #40 tabs to last 30 days  - refill sent for 12/27/21 to start 12/29/21  Continue lamictal 50 mg twice a day.    Continue taking Cymbalta 120 mg at night.   Incresae Gabapentin to 600 mg two times a day for increased nerve/leg pain   Take Medrol dose pack as instructed until gone - monitor for steroid side effects reviewed today  Continue Amitiza for constipation as prescribed   Continue Lidoderm patch apply 1-3 patches to painful areas on 12 hours, off 12 hours.    May continue using voltaren gel to lower back 2 grams 4 times a day  Continue Vitamin D 5,000 Units daily   Continue use of spinal cord  stimulator - may adjust programming for increased pain or call [employer] rep if needed.  You may meet them here in the office any time.  May consider repeating TFESI with Dr. Maier if no improvement in symptoms.  Continue acupuncture as scheduled with Sydnee - visit today  Diagnostics: UDT/Blood collected today results are pending.  Patient required a random Drug Test due to the need to comply with [employer] Policy Guidelines and CDC Guideline for the use of any controlled substances. Reasons for definitive testing include:  -Identify specific medication(s) or drug(s) in a class (e.g. Benzodiazepines, barbiturates, opioids, antidepressants)  -Definitive concentration of medication(s), drug(s), and/or metabolites needed to guide management  -Identify non-prescribed medication or illicit drug use for ongoing safe prescribing of controlled substances  -Identify substances that may present high risk for additive or synergistic interaction with prescription medication (e.g. Alcohol).  Patient is either being considered for or taking a controlled substance. Unexpected findings will be discussed and treatment decision may be adjusted. Testing is being implemented w/o bias related to age, race, gender, socioeconomic status or [employer] affiliation.   Test results will be available through [employer] approximately 1 week from collection date.    Follow-up in 4 weeks with Dr. Maier in office as needed, 8 weeks with any pain provider in office.  My last day here is 12/29/21.    Brea Sheets PA-C  [facility]  [address]  [place] [address]  Ph: [phone]  Fax: [phone]    32 minutes spent on the date of the encounter doing chart review, history and exam, documentation, and further activities.

## 2021-12-09 ENCOUNTER — PROCEDURE ONLY VISIT (OUTPATIENT)
Dept: PALLIATIVE MEDICINE | Facility: OTHER | Age: 76
End: 2021-12-09
Payer: OTHER MISCELLANEOUS

## 2021-12-09 ENCOUNTER — OFFICE VISIT (OUTPATIENT)
Dept: PALLIATIVE MEDICINE | Facility: OTHER | Age: 76
End: 2021-12-09
Payer: OTHER MISCELLANEOUS

## 2021-12-09 VITALS — HEART RATE: 66 BPM | SYSTOLIC BLOOD PRESSURE: 137 MMHG | DIASTOLIC BLOOD PRESSURE: 70 MMHG

## 2021-12-09 DIAGNOSIS — M96.1 POSTLAMINECTOMY SYNDROME, LUMBAR REGION: ICD-10-CM

## 2021-12-09 DIAGNOSIS — G89.29 CHRONIC BILATERAL LOW BACK PAIN, UNSPECIFIED WHETHER SCIATICA PRESENT: Primary | ICD-10-CM

## 2021-12-09 DIAGNOSIS — G89.4 CHRONIC PAIN SYNDROME: ICD-10-CM

## 2021-12-09 DIAGNOSIS — G89.4 CHRONIC PAIN SYNDROME: Primary | ICD-10-CM

## 2021-12-09 DIAGNOSIS — M54.15 RADICULOPATHY OF THORACOLUMBAR REGION: ICD-10-CM

## 2021-12-09 DIAGNOSIS — F11.90 CHRONIC, CONTINUOUS USE OF OPIOIDS: ICD-10-CM

## 2021-12-09 DIAGNOSIS — M54.50 CHRONIC BILATERAL LOW BACK PAIN, UNSPECIFIED WHETHER SCIATICA PRESENT: Primary | ICD-10-CM

## 2021-12-09 PROCEDURE — 97811 ACUP 1/> W/O ESTIM EA ADD 15: CPT | Performed by: ACUPUNCTURIST

## 2021-12-09 PROCEDURE — 80307 DRUG TEST PRSMV CHEM ANLYZR: CPT | Performed by: PHYSICIAN ASSISTANT

## 2021-12-09 PROCEDURE — 99214 OFFICE O/P EST MOD 30 MIN: CPT | Performed by: PHYSICIAN ASSISTANT

## 2021-12-09 PROCEDURE — 80320 DRUG SCREEN QUANTALCOHOLS: CPT | Performed by: PHYSICIAN ASSISTANT

## 2021-12-09 PROCEDURE — 97810 ACUP 1/> WO ESTIM 1ST 15 MIN: CPT | Performed by: ACUPUNCTURIST

## 2021-12-09 PROCEDURE — G0463 HOSPITAL OUTPT CLINIC VISIT: HCPCS

## 2021-12-09 RX ORDER — LUBIPROSTONE 24 UG/1
24 CAPSULE ORAL
COMMUNITY
Start: 2021-06-30 | End: 2022-02-22

## 2021-12-09 RX ORDER — HYDROCODONE BITARTRATE 40 MG/1
40 TABLET, EXTENDED RELEASE ORAL DAILY
Qty: 30 TABLET | Refills: 0 | Status: SHIPPED | OUTPATIENT
Start: 2021-12-29 | End: 2022-01-06

## 2021-12-09 RX ORDER — METHYLPREDNISOLONE 4 MG
TABLET, DOSE PACK ORAL
Qty: 21 TABLET | Refills: 0 | Status: SHIPPED | OUTPATIENT
Start: 2021-12-09 | End: 2022-02-22

## 2021-12-09 RX ORDER — GABAPENTIN 300 MG/1
600 CAPSULE ORAL 2 TIMES DAILY
Qty: 120 CAPSULE | Refills: 1 | Status: SHIPPED | OUTPATIENT
Start: 2021-12-09 | End: 2022-01-28

## 2021-12-09 RX ORDER — AMLODIPINE BESYLATE 2.5 MG/1
1 TABLET ORAL DAILY
COMMUNITY
Start: 2021-06-28

## 2021-12-09 RX ORDER — HYDROCODONE BITARTRATE AND ACETAMINOPHEN 10; 325 MG/1; MG/1
.5-1 TABLET ORAL 2 TIMES DAILY PRN
Qty: 40 TABLET | Refills: 0 | Status: SHIPPED | OUTPATIENT
Start: 2021-12-29 | End: 2022-01-06

## 2021-12-09 ASSESSMENT — PAIN SCALES - GENERAL: PAINLEVEL: SEVERE PAIN (6)

## 2021-12-09 NOTE — LETTER
12/9/2021         RE: Naveen Toro  [address] [facility] [address]        Dear Colleague,    Thank you for referring your patient, Naveen Toro, to the [facility]. Please see a copy of my visit note below.    PAIN CENTER PROGRESS NOTE    Subjective:   Naveen Toro presents for evaluation of low back pain.  He has history of lumbar degeneration.  He has history of lumbar surgery, failed lumbar injections, and is currently using opioid management and implanted spinal cord stimulator to manage pain symptoms. Had SCS battery replaced on 09/09/16 with [employer].     Major issues:  1. Chronic, continuous use of opioids    2. Chronic pain syndrome    3. Therapeutic opioid-induced constipation (OIC)    4. Postlaminectomy Syndrome (Lumbar)    5. Radiculopathy of thoracolumbar region       Pain location and description: See rooming note.  Severe Pain (6)  Radiation of pain: buttocks, hips.    Gait disturbance: Denies recent falls or changes in balance.  Uses cane.    Exacerbating factors: Too much standing, walking, sitting prolonged (10-20 minutes), lifting/bending, cold or rainy weather changes  Alleviating factors: Rest, pain medications, SCS implant, injections, warmer weather, reclining/laying down, some walking.  Associated symptoms: Pain at night.  Numbness in right hip/leg to foot, weakness in right leg, night pain. Denies bowel or bladder incontinence, fever/chills, unexplained weight loss.  Adverse effects of medications: Constipation taking Amitiza prescribed two times a day which is working. Has a bowel movement every 2-3 days.  Uses miralax 2-3 times per month as needed.    Functional Symptoms: See rooming note  Current treatment efficacy: Good; see medication list.  Current treatment compliance: Good.  Attending scheduled appointments and taking medication as prescribed. Using SCS daily. Continues to see Dr. Frank monthly for pain psychology.  He states not too much exercise, he  has a treadmill at home.      Since the last Pain Center visit, he started acupuncture with Sydnee Lopez and has had several visits, reports so far he isn't sure how it is going as he had a recent pain flare.    States 1.5 weeks ago went to get out of bed and he felt stabbing pain in his lower back.  He states it went down his right buttock and took his breath away.  He states he laid there awhile, went to get up and happened again.  Has been occurring intermittent usually in the morning when he first gets up.  Started to improve a couple of days ago but today feels increased symptoms again.  He denies left sided pain.  He denies triggers or injury.  He states he has been using ice and topical and states he tried lidocaine patch which doesn't help much.  He states he is not taking OTCs, doesn't take NSAIDs due to CKD.  Resting more states once he is reclining he is doing better.  He is using his SCS but hasn't changed settings during flare.  He states the jab and electrical pain down his leg is a 10/10 pain when it happens.  He denies fever/chills unexplained weight loss, saddle anesthesia, weakness or numbness that has progressed in legs.    He takes Vicodin 10/325 mg 1 tab at night when in pain.  He does continue melatonin 3 mg at night. He reports the lamictal 50 mg BID helps with leg pain.  He reduced Gabapentin to 300 mg BID instead of 400 mg BID. Review reducing dose to 300 mg daily as he still reports drowsiness. He continues Hysingla 40 mg daily.  Taking as prescribed, denies missing doses.    Had SCS battery replaced on 09/09/16 with [employer]. He has conventional setting.  He is using it 24 hours a day.  He states he doesn't feel it working unless he is in a certain position; laying down or reclining in recliner he can feel it work.  He states charging is ok.  He had x-ray to evaluate leads in 12/2017 and 06/2020. He had reprogramming recently and reports his pain is about the same.  He states he still  feels he has to lean back to get the best coverage.  Discuss checking ERV as he has had this battery for almost 5 years and may be able to consider newer battery [employer] which may be better for the positional concerns due to adaptive stimulation.      His last TFESI was 07/2021, has noted relief worn off at this point.    Current Outpatient Medications   Medication Sig Dispense Refill     amLODIPine (NORVASC) 2.5 MG tablet [AMLODIPINE (NORVASC) 2.5 MG TABLET] Take 2.5 mg by mouth daily.       aspirin 325 MG tablet [ASPIRIN 325 MG TABLET] Take 325 mg by mouth daily.       atorvastatin (LIPITOR) 40 MG tablet [ATORVASTATIN (LIPITOR) 40 MG TABLET] Take 40 mg by mouth daily. As directed       blood glucose (PRODIGY NO CODING BLOOD GLUC) test strip Use  to test daily. DX Code: E11.9, 90 day supply. Pharmacy dispense brand based on insurance.       diclofenac sodium (VOLTAREN) 1 % Gel [DICLOFENAC SODIUM (VOLTAREN) 1 % GEL] Apply 2 g topically 4 (four) times a day. 100 g 1     DULoxetine (CYMBALTA) 60 MG capsule Take 2 capsules (120 mg) by mouth daily 180 capsule 1     gabapentin (NEURONTIN) 300 MG capsule Take 1 capsule (300 mg) by mouth 2 times daily 180 capsule 1     HYDROcodone Bitartrate ER (HYSINGLA) 40 MG ER Tablet Take 1 tablet (40 mg) by mouth daily 30 tablet 0     HYDROcodone Bitartrate ER (HYSINGLA) 40 MG ER Tablet Take 1 tablet (40 mg) by mouth daily 30 tablet 0     HYDROcodone-acetaminophen (NORCO)  MG per tablet Take 0.5-1 tablets by mouth 2 times daily as needed for severe pain 40 tablet 0     HYDROcodone-acetaminophen (NORCO)  MG per tablet Take 0.5-1 tablets by mouth 2 times daily as needed for severe pain 40 tablet 0     HYSINGLA ER 40 MG ER Tablet Take 1 tablet (40 mg) by mouth daily 30 tablet 0     HYSINGLA ER 40 MG ER Tablet Take 1 tablet (40 mg) by mouth daily 30 tablet 0     lamoTRIgine (LAMICTAL) 25 MG tablet TAKE 2 TABLETS (50 MG) BY MOUTH 2 TIMES DAILY 360 tablet 0     Lancets MISC 1  each       lidocaine (LIDODERM) 5 % [LIDOCAINE (LIDODERM) 5 %] UNWRAP AND APPLY 1 PATCH TO SKIN DAILY, REMOVE AND DISCARD PATCH WITHIN 12 HOURS OR AS DIRECTED BY DOCTOR 30 patch 1     lubiprostone (AMITIZA) 24 MCG capsule Take 1 capsule (24 mcg) by mouth 2 times daily (with meals) 180 capsule 1     melatonin 3 MG tablet Take 3 mg by mouth       metFORMIN (GLUCOPHAGE) 500 MG tablet [METFORMIN (GLUCOPHAGE) 500 MG TABLET] Take 1,000 mg by mouth 2 (two) times a day with meals.        metoprolol (LOPRESSOR) 25 MG tablet [METOPROLOL (LOPRESSOR) 25 MG TABLET] Take 25 mg by mouth 2 (two) times a day.       naloxone (NARCAN) 4 MG/0.1ML nasal spray Spray 1 spray (4 mg) into one nostril alternating nostrils once as needed for opioid reversal every 2-3 minutes until assistance arrives 0.2 mL 0     polyethylene glycol (GLYCOLAX) 17 gram/dose powder [POLYETHYLENE GLYCOL (GLYCOLAX) 17 GRAM/DOSE POWDER] Take 17 g by mouth daily. 595 g 1     vitamin D3 (CHOLECALCIFEROL) 125 MCG (5000 UT) tablet Take 5,000 Units by mouth         Review of Systems  Constitutional: Fatigue. Sleep disturbance, sleeps during the day.  Wearing CPAP every night.  Denies fever, night sweats, weight loss.  Musculoskeletal: Positive for back pain, leg pain.  Denies neck pain. Denies recent falls.  Gastrointestional: Denies difficulty swallowing, change in appetite, abdominal pain, nausea, vomiting, diarrhea, GERD, fecal incontinence.  Genitourinary: Denies urinary incontinence, dysuria, hematuria, UTI, frequency, hesitancy  Neurologic:  Denies headaches, confusion, seizure, weakness, changes in balance, changes in speech.  Psychiatric: Positive for depression, anxiety. Denies memory loss, psychoses, suicidal ideation, substance     Objective:   /70 (BP Location: Left arm, Patient Position: Sitting, Cuff Size: Adult Regular)   Pulse 66     Physical Exam  Constitutional- General appearance: Well developed, uncomfortable shifting position in chair,  overweight, and appearance reflects stated age.  No acute distress or pain behaviors noted. Presents alone today.  Psychiatric- Judgment and insight: Normal.  Speech: Normal rhythm.  Thought process: Normal.  No abnormal thoughts reported. Alert & Oriented to person, place, and time.  Recent and remote memory: Normal.  Observed mood: appropriate  Respiratory- Breathing is non-labored; normal rhythm and rate.  Dermatologic- Exposed skin is clean, dry, and intact to inspection and palpation.  Musculoskeletal- Gait and station: Ambulates independently with antalgia, using cane.  Sit to stand with moderate difficulty.    *Opioid [place] Precautions:    UDT/Blood - Collected 12/09/2021, results pending  Opioid Agreement - 08/18/2021  Pharmacy- as documented     Reviewed - 12/09/2021 as expected  Pill Count - n/a  Psychological evaluation - monthly through Dr. Larry Frank  MME -  50  Pharmacogenetic testing - n/a  ORT - 1 (low)    Imaging:  EXAM: XR THORACOLUMBAR 2 VWS  LOCATION: [facility]  DATE/TIME: 6/9/2020 11:21 AM     INDICATION: Spinal cord simulator lead position.  COMPARISON: 12/20/2017.     IMPRESSION:   Transitional lumbosacral anatomy with sacralized L5.     Spinal cord stimulator leads are not changed in position. Spinal cord simulator lead entering into the spinal canal at the level of T11-T12 terminates at the level of the T9 pedicles. Additional spinal cord stimulator lead entering into the spinal canal   at the level of T12-L1 terminates at the level of the infrapedicular T11 vertebral body.     Unchanged old compression deformities of the T12, L1, and L2 vertebrae. No new fracture. No aggressive osseous abnormality. No significant change in lumbar spondylosis.    Assessment:   Naveen Toro with visit today for lumbar degeneration and lumbar radiculopathy, most severe at L5-S1. He had TFESI with 50% pain relief, lasts anywhere from a couple weeks to over 1 month.  He reports increased  pain in right lower back and right leg x 1.5 weeks without new injury or trigger, denies red flag symptoms. Discuss repeating injection at 3 month intervals as needed, last was done 07/2021 and may repeat as needed.  Continues use of SCS with recent reprogramming 03/2021.  He will also continue adjunct duloxetine and increase dose of gabapentin to 600 mg BID for nerve pain (temporarily) lamotrigine for pain and mood benefit.  Also adding in a medrol dose pack today for flare of pain as he is unable to take other oral antiinflammatories due to CKD.  He is to continue Vitamin D for chronic pain, sleep, and mood benefit.  He may continue topicals as needed and ice, also discuss OTC tylenol doses today.  Will continue dose of Hysingla and Vicodin 10/325 mg 1 tab in evening or bedtime to take for increased pain - discuss adjusting quantity to also have a dose to take during daytime when pain is severe - not to exceed 2 doses in 24 hours.   He is encouraged to continue with acupuncture - has a visit today which may also help address the increase in pain.    We review this provider is leaving the pain center with last day on 12/29/21.  Because pain is worsened at today's visit, recommend a 4 week visit with Dr. Maier and then resume 8 week visits with any pain provider in the [place] location.    Plan:   Continue Hysingla (24 hour hydrocodone without tylenol) 40 mg daily.   Ok to fill 12/27/21 to start on 12/29/21  Also ok to take Vicodin 10/325 mg 1 tab at bedtime as needed for pain and #10 tabs to use during the day as needed for increased pain.  #40 tabs to last 30 days  - refill sent for 12/27/21 to start 12/29/21  Continue lamictal 50 mg twice a day.    Continue taking Cymbalta 120 mg at night.   Incresae Gabapentin to 600 mg two times a day for increased nerve/leg pain   Take Medrol dose pack as instructed until gone - monitor for steroid side effects reviewed today  Continue Amitiza for constipation as  prescribed   Continue Lidoderm patch apply 1-3 patches to painful areas on 12 hours, off 12 hours.    May continue using voltaren gel to lower back 2 grams 4 times a day  Continue Vitamin D 5,000 Units daily   Continue use of spinal cord stimulator - may adjust programming for increased pain or call [facility] if needed.  You may meet them here in the office any time.  May consider repeating TFESI with Dr. Maier if no improvement in symptoms.  Continue acupuncture as scheduled with Sydnee - visit today  Diagnostics: UDT/Blood collected today results are pending.  Patient required a random Drug Test due to the need to comply with [facility] Model Policy Guidelines and CDC Guideline for the use of any controlled substances. Reasons for definitive testing include:  -Identify specific medication(s) or drug(s) in a class (e.g. Benzodiazepines, barbiturates, opioids, antidepressants)  -Definitive concentration of medication(s), drug(s), and/or metabolites needed to guide management  -Identify non-prescribed medication or illicit drug use for ongoing safe prescribing of controlled substances  -Identify substances that may present high risk for additive or synergistic interaction with prescription medication (e.g. Alcohol).  Patient is either being considered for or taking a controlled substance. Unexpected findings will be discussed and treatment decision may be adjusted. Testing is being implemented w/o bias related to age, race, gender, socioeconomic status or Mormon affiliation.   Test results will be available through [employer] approximately 1 week from collection date.    Follow-up in 4 weeks with Dr. Maier in office as needed, 8 weeks with any pain provider in office.  My last day here is 12/29/21.    Brea Sheets PA-C  [facility]  [address]  [place] [address]  Ph: [phone]  Fax: [phone]    32 minutes spent on the date of the encounter  doing chart review, history and exam, documentation, and further activities.            UDT- 11/2020   CSA- 08/2021, MV     Needs UDT      hydrocodone at 2000 last pm    hysingla at 0900 this am           12/09/21 0917   PEG: A Thee-Item Scale Assessing Pain Intensity and Interference        0 = No pain / No interference    10 = Pain as bad as you can imagine / Completely interferes   What number best describes your pain on average in the past week? 5   What number best describes how, during the past week, pain has interfered with your enjoyment of life? 5   What number best describes how, during the past week, pain has interfered with your general activity? 5   PEG Total Score 5       Again, thank you for allowing me to participate in the care of your patient.        Sincerely,        Brea Sheets PA-C

## 2021-12-09 NOTE — PROGRESS NOTES
ACUPUNCTURIST TREATMENT NOTE      Naveen Toro, a 76 year old male, is here today for Follow - Up exam. Patient is referred by No ref. provider found.    HPI  Main Complaint: Chronic low back pain: Patient reports about 1.5 weeks ago he rolled over onto his side in bed and had a shooting pain into his right side low back and right buttock that was severe. He reports since that time he has had increase in right side low back pain radiating into right hip and buttock. Today he reports pain is in right side low back and radiating across iliac crest toward right hip. Pain level is 5/10, but with certain movements he will get a shooting pain that is much higher.        Past Medical History  Past Medical History Reviewed: Yes   has a past medical history of Acute renal failure (H), Cardiomyopathy (H), Chronic low back pain, Constipation, Coronary artery disease, Diabetes mellitus (H), HLD (hyperlipidemia), Hypercholesterolemia, Hypertension, Lumbar radiculopathy, Post laminectomy syndrome, Postlaminectomy syndrome, lumbar region, Sigmoid diverticulitis, and Sleep apnea.    Objective  Basic Exam Completed:   No    TCM Exam Completed: Yes   Sleep: restless due to pain  Limbs/Back: Lower Back and right buttock/hip    Tongue/Pulse Exam Completed: No    Patient Assessment  Patient Type: Pain  Patient Complaint: Chronic low back pain, right sided into right buttock    Acupuncture 11/24/2021 12/9/2021   Intervention Reason Pain Pain   Pain Location Low back Low back   Pre-session Pain Rating 5 5   Post-session Pain Rating 4 4       TCM Diagnosis: Other Qi and blood stasis due to trauma; channel obstruction; damp cold bi syndrome; kidney yang and yin deficiencies    Treatment Principle: Move qi and blood; dredge meridians, tonify kidney yin and yang    TCM / Acupuncture Treatment  Acupuncture Points:       Initial insertions: [id number]. Right: HTJJ L2-L5, Wn92-Bd15, Ub52       Second insertions: Baihui, Shiqizhuixia, Ub62, Ub60,  Ki7. Right: Tati, Ub53, Ub54, Gb29, Michel, Jocelyne gluteus medius TP x3, (R) ear lumbago                    Accessory Techniques 11/24/2021 12/9/2021   Accessory Techniques TDP Heat Lamp; Tuina; Topicals TDP Heat Lamp; Tuina; Topicals   TDP Heat Lamp location used low back low back   Tuina location used low back low back   Topicals Po Sum On Po Sum On   Topicals location used low back low back          Assessment and Plan  Treatment Observations: Patient relaxed well during treatment.   Acupuncture Treatment Recommendations: Patient to schedule 4-6 more weekly treatments, then re-evaluate.  Additional Recommendations: Recommended patient use heat or alternating heat and ice at home     It is my recommendation that this patient seek advice from their Primary Care Provider about active symptoms not addressed during this visit. The risks and benefits of acupuncture were reviewed and the patient stated understanding. The patient's questions were answered to their satisfaction. Consent was provided for treatment. We thank you for the referral and opportunity to treat this patient.    Time Spent with Patient:   I spent a total of 30 minutes face-to-face with Naveen Toro during today's office visit.     Sydnee Lopez L.Ac.  12/09/21  11:20 AM

## 2021-12-09 NOTE — PROGRESS NOTES
12/09/21 0917   PEG: A Thee-Item Scale Assessing Pain Intensity and Interference        0 = No pain / No interference    10 = Pain as bad as you can imagine / Completely interferes   What number best describes your pain on average in the past week? 5   What number best describes how, during the past week, pain has interfered with your enjoyment of life? 5   What number best describes how, during the past week, pain has interfered with your general activity? 5   PEG Total Score 5

## 2021-12-09 NOTE — PATIENT INSTRUCTIONS
Continue Hysingla (24 hour hydrocodone without tylenol) 40 mg daily.   Ok to fill 12/27/21 to start on 12/29/21  Also ok to take Vicodin 10/325 mg 1 tab at bedtime as needed for pain and #10 tabs to use during the day as needed for increased pain.  #40 tabs to last 30 days  - refill sent for 12/27/21 to start 12/29/21  Continue lamictal 50 mg twice a day.    Continue taking Cymbalta 120 mg at night.   Incresae Gabapentin to 600 mg two times a day for increased nerve/leg pain   Take Medrol dose pack as instructed until gone - monitor for steroid side effects reviewed today  Continue Amitiza for constipation as prescribed   Continue Lidoderm patch apply 1-3 patches to painful areas on 12 hours, off 12 hours.    May continue using voltaren gel to lower back 2 grams 4 times a day  Continue Vitamin D 5,000 Units daily   Continue use of spinal cord stimulator - may adjust programming for increased pain or call [employer] if needed.  You may meet them here in the office any time.  May consider repeating TFESI with Dr. Maier if no improvement in symptoms.  Continue acupuncture as scheduled with Sydnee - visit today  Diagnostics: UDT/Blood collected today results are pending.  Patient required a random Drug Test due to the need to comply with [employer] Model Policy Guidelines and CDC Guideline for the use of any controlled substances. Reasons for definitive testing include:  -Identify specific medication(s) or drug(s) in a class (e.g. Benzodiazepines, barbiturates, opioids, antidepressants)  -Definitive concentration of medication(s), drug(s), and/or metabolites needed to guide management  -Identify non-prescribed medication or illicit drug use for ongoing safe prescribing of controlled substances  -Identify substances that may present high risk for additive or synergistic interaction with prescription medication (e.g. Alcohol).  Patient is either being considered for or taking a controlled substance. Unexpected  findings will be discussed and treatment decision may be adjusted. Testing is being implemented w/o bias related to age, race, gender, socioeconomic status or Spiritism affiliation.   Test results will be available through [employer] approximately 1 week from collection date.    Follow-up in 4 weeks with Dr. Maier in office as needed, 8 weeks with any pain provider in office.  My last day here is 12/29/21.          [facility] Number:  [phone]    Call with any questions about your care and for scheduling assistance.     Calls are returned Monday through Friday between 8 AM and 4:30 PM. We usually get back to you within 2 business days depending on the issue/request.    If we are prescribing your medications:    For opioid medication refills, call the clinic or send a [employer] message 7 days in advance.  Please include:    Name of requested medication    Name of the pharmacy.    For non-opioid medications, call your pharmacy directly to request a refill. Please allow 3-4 days to be processed.     Per [place] State Law:    All controlled substance prescriptions must be filled within 30 days of being written.      For those controlled substances allowing refills, pickup must occur within 30 days of last fill.      We believe regular attendance is key to your success in our program!      Any time you are unable to keep your appointment we ask that you call us at least 24 hours in advance to cancel.This will allow us to offer the appointment time to another patient.     Multiple missed appointments may lead to dismissal from the clinic.

## 2021-12-09 NOTE — PROGRESS NOTES
UDT- 11/2020   CSA- 08/2021, MV     Needs UDT      hydrocodone at 2000 last pm    hysingla at 0900 this am

## 2021-12-10 LAB
BARBITURATES UR QL: NORMAL
CANNABINOIDS UR QL SCN: NORMAL
CREAT UR-MCNC: 364 MG/DL
CREAT UR-MCNC: 381 MG/DL
CREAT UR-MCNC: 381 MG/DL
ETHANOL UR QL SCN: NORMAL

## 2021-12-14 LAB
DHC UR CFM-MCNC: >5000 NG/ML
DHC/CREAT UR: ABNORMAL NG/MG{CREAT}
GABAPENTIN UR QL CFM: PRESENT
HYDROCODONE UR CFM-MCNC: >7636 NG/ML
HYDROMORPHONE UR CFM-MCNC: 2240 NG/ML
HYDROMORPHONE/CREAT UR: 588 NG/MG {CREAT}

## 2021-12-16 LAB
ETHANOL UR QL CFM: NEGATIVE
ETHYL GLUCURONIDE UR QL SCN: NOT DETECTED NG/MG CREAT
ETHYL SULFATE/CREAT UR: NOT DETECTED NG/MG CREAT
LEVEL OF DETECTION (ETHANOL): NORMAL

## 2021-12-28 ENCOUNTER — PROCEDURE ONLY VISIT (OUTPATIENT)
Dept: PALLIATIVE MEDICINE | Facility: OTHER | Age: 76
End: 2021-12-28
Payer: OTHER MISCELLANEOUS

## 2021-12-28 DIAGNOSIS — M54.50 CHRONIC BILATERAL LOW BACK PAIN, UNSPECIFIED WHETHER SCIATICA PRESENT: Primary | ICD-10-CM

## 2021-12-28 DIAGNOSIS — G89.4 CHRONIC PAIN SYNDROME: ICD-10-CM

## 2021-12-28 DIAGNOSIS — M96.1 POSTLAMINECTOMY SYNDROME, LUMBAR REGION: ICD-10-CM

## 2021-12-28 DIAGNOSIS — G89.29 CHRONIC BILATERAL LOW BACK PAIN, UNSPECIFIED WHETHER SCIATICA PRESENT: Primary | ICD-10-CM

## 2021-12-28 PROCEDURE — 97811 ACUP 1/> W/O ESTIM EA ADD 15: CPT | Performed by: ACUPUNCTURIST

## 2021-12-28 PROCEDURE — 97810 ACUP 1/> WO ESTIM 1ST 15 MIN: CPT | Performed by: ACUPUNCTURIST

## 2021-12-28 NOTE — PROGRESS NOTES
ACUPUNCTURIST TREATMENT NOTE      Naveen Toro, a 76 year old male, is here today for Follow - Up exam. Patient is referred by No ref. provider found.    HPI  Main Complaint: Chronic low back pain: Patient reports after last treatment he was feeling improvement in pain. However, yesterday he slipped and fell on the ice and landed on his back and now has increased pain again. Pain is in center of low back and radiating into right buttock and hip. Pain is interfering with sleep. Patient also mentions he has been having some dizziness recently that comes and goes.         Past Medical History  Past Medical History Reviewed: Yes   has a past medical history of Acute renal failure (H), Cardiomyopathy (H), Chronic low back pain, Constipation, Coronary artery disease, Diabetes mellitus (H), HLD (hyperlipidemia), Hypercholesterolemia, Hypertension, Lumbar radiculopathy, Post laminectomy syndrome, Postlaminectomy syndrome, lumbar region, Sigmoid diverticulitis, and Sleep apnea.    Objective  Basic Exam Completed:   No    TCM Exam Completed: Yes   Sleep: restless due to pain  Limbs/Back: Lower Back    Tongue/Pulse Exam Completed: No    Patient Assessment  Patient Type: Pain  Patient Complaint: Chronic low back pain radiating into right buttock and hip    Acupuncture 12/9/2021 12/28/2021   Intervention Reason Pain Pain   Pain Location Low back Low back   Pre-session Pain Rating 5 5   Post-session Pain Rating 4 4       TCM Diagnosis: Other Qi and blood stasis due to trauma; channel obstruction; damp cold bi syndrome; kidney yang and yin deficiencies    Treatment Principle: Move qi and blood; dredge meridians, tonify kidney yin and yang    TCM / Acupuncture Treatment  Acupuncture Points:       Initial insertions: J03-Tj04. Right: HTJJ L2-L5, Et33-Yf59, Ub52       Second insertions: Baihui, Shiqizhuixia, Ub62, Ub60, Ki7, Ub57, Gb34. Right: Yaoyan, Ub53, Ub54, Gb29, Jiankua, Jocelyne gluteus medius TP x3                    Accessory  Techniques 12/9/2021 12/28/2021   Accessory Techniques TDP Heat Lamp; [place]; Topicals TDP Heat Lamp; Tuina; Topicals   TDP Heat Lamp location used low back low back   Tuina location used low back low back   Topicals Po Sum On Po Sum On   Topicals location used low back low back          Assessment and Plan  Treatment Observations: Patient relaxed well during treatment.  Acupuncture Treatment Recommendations:         It is my recommendation that this patient seek advice from their Primary Care Provider about active symptoms not addressed during this visit. The risks and benefits of acupuncture were reviewed and the patient stated understanding. The patient's questions were answered to their satisfaction. Consent was provided for treatment. We thank you for the referral and opportunity to treat this patient.    Time Spent with Patient:   I spent a total of 28 minutes face-to-face with Naveen Toro during today's office visit.     Sydnee Lopez L.Ac.  12/28/21  1:44 PM

## 2022-01-04 ENCOUNTER — PROCEDURE ONLY VISIT (OUTPATIENT)
Dept: PALLIATIVE MEDICINE | Facility: OTHER | Age: 77
End: 2022-01-04
Payer: OTHER MISCELLANEOUS

## 2022-01-04 DIAGNOSIS — G89.29 CHRONIC BILATERAL LOW BACK PAIN, UNSPECIFIED WHETHER SCIATICA PRESENT: Primary | ICD-10-CM

## 2022-01-04 DIAGNOSIS — M96.1 POSTLAMINECTOMY SYNDROME, LUMBAR REGION: ICD-10-CM

## 2022-01-04 DIAGNOSIS — M54.50 CHRONIC BILATERAL LOW BACK PAIN, UNSPECIFIED WHETHER SCIATICA PRESENT: Primary | ICD-10-CM

## 2022-01-04 PROCEDURE — 97811 ACUP 1/> W/O ESTIM EA ADD 15: CPT | Performed by: ACUPUNCTURIST

## 2022-01-04 PROCEDURE — 97810 ACUP 1/> WO ESTIM 1ST 15 MIN: CPT | Performed by: ACUPUNCTURIST

## 2022-01-04 NOTE — PROGRESS NOTES
ACUPUNCTURIST TREATMENT NOTE      Naveen Toro, a 77 year old male, is here today for Follow - Up exam. Patient is referred by No ref. provider found. Treatment 6.    HPI  Main Complaint: Chronic low back pain into right buttock and hip: Patient reports he has been doing better over the past week with lower pain levels. He states he has been sleeping better as well.      Past Medical History  Past Medical History Reviewed: Yes   has a past medical history of Acute renal failure (H), Cardiomyopathy (H), Chronic low back pain, Constipation, Coronary artery disease, Diabetes mellitus (H), HLD (hyperlipidemia), Hypercholesterolemia, Hypertension, Lumbar radiculopathy, Post laminectomy syndrome, Postlaminectomy syndrome, lumbar region, Sigmoid diverticulitis, and Sleep apnea.    Objective  Basic Exam Completed:   No    TCM Exam Completed: Yes   Sleep: improved this last week  Limbs/Back: Lower Back    Tongue/Pulse Exam Completed: No    Patient Assessment  Patient Type: Pain  Patient Complaint: Chronic low back pain into right buttock, hip    Acupuncture 12/28/2021 1/4/2022   Intervention Reason Pain Pain   Pain Location Low back Low back   Pre-session Pain Rating 5 4   Post-session Pain Rating 4 3       TCM Diagnosis: Other Qi and blood stasis due to trauma; channel obstruction; damp cold bi syndrome; kidney yang and yin deficiencies    Treatment Principle: Move qi and blood; dredge meridians, tonify kidney yin and yang    TCM / Acupuncture Treatment  Acupuncture Points:       Initial insertions: HTJJ L2-L5, Jv11-Vq09. Right: Jj11-Nh14, Ub52       Second insertions: Baihui, Shiqizhuixia, Ub62, Ub60, Ki7, Sp6, Ub57, Gb34. Right: Yaoyan, Ub53, Ub54, Gb29, Jiankua, Jocelyne gluteus medius TP x3                    Accessory Techniques 12/28/2021 1/4/2022   Accessory Techniques TDP Heat Lamp; Tuina; Topicals TDP Heat Lamp; Tuina; Topicals   TDP Heat Lamp location used low back low back   Tuina location used low back low back    Topicals Po Sum On Po Sum On   Topicals location used low back low back          Assessment and Plan  Treatment Observations: Patient relaxed well during treatment.  Acupuncture Treatment Recommendations:         It is my recommendation that this patient seek advice from their Primary Care Provider about active symptoms not addressed during this visit. The risks and benefits of acupuncture were reviewed and the patient stated understanding. The patient's questions were answered to their satisfaction. Consent was provided for treatment. We thank you for the referral and opportunity to treat this patient.    Time Spent with Patient:   I spent a total of 30 minutes face-to-face with Naveen Toro during today's office visit.     Sydnee Lopez L.Ac.  01/04/22  3:03 PM

## 2022-01-06 ENCOUNTER — OFFICE VISIT (OUTPATIENT)
Dept: PALLIATIVE MEDICINE | Facility: OTHER | Age: 77
End: 2022-01-06
Payer: OTHER MISCELLANEOUS

## 2022-01-06 VITALS
DIASTOLIC BLOOD PRESSURE: 78 MMHG | TEMPERATURE: 96.9 F | HEART RATE: 64 BPM | SYSTOLIC BLOOD PRESSURE: 180 MMHG | BODY MASS INDEX: 27.29 KG/M2 | HEIGHT: 70 IN | WEIGHT: 190.6 LBS

## 2022-01-06 DIAGNOSIS — G89.4 CHRONIC PAIN SYNDROME: ICD-10-CM

## 2022-01-06 DIAGNOSIS — M51.379 DEGENERATION OF LUMBAR OR LUMBOSACRAL INTERVERTEBRAL DISC: Primary | ICD-10-CM

## 2022-01-06 PROCEDURE — G0463 HOSPITAL OUTPT CLINIC VISIT: HCPCS

## 2022-01-06 PROCEDURE — 99212 OFFICE O/P EST SF 10 MIN: CPT | Performed by: PAIN MEDICINE

## 2022-01-06 RX ORDER — HYDROCODONE BITARTRATE AND ACETAMINOPHEN 10; 325 MG/1; MG/1
.5-1 TABLET ORAL 2 TIMES DAILY PRN
Qty: 40 TABLET | Refills: 0 | Status: SHIPPED | OUTPATIENT
Start: 2022-01-26 | End: 2022-02-22

## 2022-01-06 RX ORDER — HYDROCODONE BITARTRATE 40 MG/1
40 TABLET, EXTENDED RELEASE ORAL DAILY
Qty: 30 TABLET | Refills: 0 | Status: SHIPPED | OUTPATIENT
Start: 2022-02-23 | End: 2022-02-22

## 2022-01-06 RX ORDER — HYDROCODONE BITARTRATE 40 MG/1
40 TABLET, EXTENDED RELEASE ORAL DAILY
Qty: 30 TABLET | Refills: 0 | Status: SHIPPED | OUTPATIENT
Start: 2022-01-26 | End: 2022-02-22

## 2022-01-06 RX ORDER — HYDROCODONE BITARTRATE AND ACETAMINOPHEN 10; 325 MG/1; MG/1
.5-1 TABLET ORAL 2 TIMES DAILY PRN
Qty: 40 TABLET | Refills: 0 | Status: SHIPPED | OUTPATIENT
Start: 2022-02-23 | End: 2022-02-22

## 2022-01-06 ASSESSMENT — PAIN SCALES - GENERAL: PAINLEVEL: MODERATE PAIN (4)

## 2022-01-06 ASSESSMENT — MIFFLIN-ST. JEOR: SCORE: 1605.81

## 2022-01-06 NOTE — PROGRESS NOTES
Pain Center Progress Note    ENCOUNTER DATE: 2022    Naveen Toro   1945  MRN # [id number]  PCP: Everton Hoover    IMPRESSION / PLAN:  Naveen Toro is a 77 year old man who has low back and leg pain.  He had a work-related injury in  and had a lumbar fusion with decompression in .  He did have a spinal cord stimulator placed in 2009.  He did require a new battery revision on 2016.  He still feels that the stimulator gives him some benefit.  He does continue, however, to have pain in the low back and down the right leg.  Walking standing or sitting for a prolonged time will increase his pain.  Medications, spinal cord stimulation and rest will help to decrease it.  He has had lumbar epidural injections in the past which initially were giving him quite a bit of relief.  More recently he has had diminishing returns and his last injection was 2021 and he only got a few days of relief with this.  Currently he is taking Hysingla ER 40 mg and Norco 10/325 for pain relief.  He usually takes 1 Norco at bedtime to help with the sleep.  He may take another during the day if he has a flareup.  He also is on duloxetine, gabapentin and lamotrigine.  These also do seem to be helping.  He does use Amitiza and GlycoLax for the constipation.  He takes vitamin D3.  He also has used Lidoderm patches and Voltaren gel occasionally.  He has been getting acupuncture and finds that the leg pain is significantly decreased and he is sleeping much better.    Since he is happy with the pain medication regimen we will continue the Hysingla ER and [place].  We will also recommend that he continue with the acupuncture as he is getting good symptomatic relief with this and better sleep.      Opioid [place] Precautions:    UDT/Blood - Collected 2021  Opioid Agreement - 2021   Reviewed - 2021 as expected  Psychological evaluation - monthly through Dr. Larry Frank  MME -   50    Past Medical History:   Diagnosis Date     Acute renal failure (H)     h/o     Cardiomyopathy (H)      Chronic low back pain      Constipation      Coronary artery disease      Diabetes mellitus (H)      HLD (hyperlipidemia)      Hypercholesterolemia      Hypertension      Lumbar radiculopathy      Post laminectomy syndrome      Postlaminectomy syndrome, lumbar region     Created by Conversion      Sigmoid diverticulitis      Sleep apnea     CPAP     Past Surgical History:   Procedure Laterality Date     BACK SURGERY       BYPASS GRAFT ARTERY CORONARY  2010    1 vessel     CARDIAC SURGERY       IR LUMBAR EPIDURAL STEROID INJECTION  10/4/2007     IR LUMBAR EPIDURAL STEROID INJECTION  5/7/2008     IR LUMBAR EPIDURAL STEROID INJECTION  1/30/2009     LAMINECTOMY FOR IMPLANTATION / PLACEMENT NEUROSTIMULATOR ELECTRODES       [facility] CARDIAC SURG PROCEDURE UNLIST      Description: Cardiovascular Surgery Cardiac Defect Repair;  Recorded: 10/24/2011;     [facility] LINDSEY W/O FACETEC FORAMOT/DSKC 1/2 VRT SEG, LUMBAR      Description: Laminectomy Decompressive Up To Two Lumbar Segments;  Recorded: 10/24/2011;     Allergies   Allergen Reactions     Ace Inhibitors Unknown     Renal failure per preop H&P     Current Outpatient Medications   Medication Sig Dispense Refill     amLODIPine (NORVASC) 2.5 MG tablet Take 1 tablet by mouth daily       amLODIPine (NORVASC) 2.5 MG tablet Take 2.5 mg by mouth daily        aspirin 325 MG tablet [ASPIRIN 325 MG TABLET] Take 325 mg by mouth daily.       atorvastatin (LIPITOR) 40 MG tablet [ATORVASTATIN (LIPITOR) 40 MG TABLET] Take 40 mg by mouth daily. As directed       blood glucose (PRODIGY NO CODING BLOOD GLUC) test strip Use  to test daily. DX Code: E11.9, 90 day supply. Pharmacy dispense brand based on insurance.       diclofenac sodium (VOLTAREN) 1 % Gel [DICLOFENAC SODIUM (VOLTAREN) 1 % GEL] Apply 2 g topically 4 (four) times a day. 100 g 1     DULoxetine (CYMBALTA) 60 MG capsule Take 2  capsules (120 mg) by mouth daily 180 capsule 1     gabapentin (NEURONTIN) 300 MG capsule Take 2 capsules (600 mg) by mouth 2 times daily 120 capsule 1     lamoTRIgine (LAMICTAL) 25 MG tablet TAKE 2 TABLETS (50 MG) BY MOUTH 2 TIMES DAILY 360 tablet 0     Lancets MISC 1 each       lidocaine (LIDODERM) 5 % [LIDOCAINE (LIDODERM) 5 %] UNWRAP AND APPLY 1 PATCH TO SKIN DAILY, REMOVE AND DISCARD PATCH WITHIN 12 HOURS OR AS DIRECTED BY DOCTOR 30 patch 1     lubiprostone (AMITIZA) 24 MCG capsule Take 24 mcg by mouth        lubiprostone (AMITIZA) 24 MCG capsule Take 1 capsule (24 mcg) by mouth 2 times daily (with meals) 180 capsule 1     metFORMIN (GLUCOPHAGE) 500 MG tablet [METFORMIN (GLUCOPHAGE) 500 MG TABLET] Take 1,000 mg by mouth 2 (two) times a day with meals.        metoprolol (LOPRESSOR) 25 MG tablet [METOPROLOL (LOPRESSOR) 25 MG TABLET] Take 25 mg by mouth 2 (two) times a day.       naloxone (NARCAN) 4 MG/0.1ML nasal spray Spray 1 spray (4 mg) into one nostril alternating nostrils once as needed for opioid reversal every 2-3 minutes until assistance arrives 0.2 mL 0     polyethylene glycol (GLYCOLAX) 17 gram/dose powder [POLYETHYLENE GLYCOL (GLYCOLAX) 17 GRAM/DOSE POWDER] Take 17 g by mouth daily. 595 g 1     vitamin D3 (CHOLECALCIFEROL) 125 MCG (5000 UT) tablet Take 5,000 Units by mouth       [START ON 1/26/2022] HYDROcodone Bitartrate ER (HYSINGLA) 40 MG ER Tablet Take 1 tablet (40 mg) by mouth daily 30 tablet 0     [START ON 2/23/2022] HYDROcodone Bitartrate ER (HYSINGLA) 40 MG ER Tablet Take 1 tablet (40 mg) by mouth daily 30 tablet 0     [START ON 1/26/2022] HYDROcodone-acetaminophen (NORCO)  MG per tablet Take 0.5-1 tablets by mouth 2 times daily as needed for severe pain 40 tablet 0     [START ON 2/23/2022] HYDROcodone-acetaminophen (NORCO)  MG per tablet Take 0.5-1 tablets by mouth 2 times daily as needed for severe pain 40 tablet 0     HYSINGLA ER 40 MG ER Tablet Take 1 tablet (40 mg) by mouth  daily 30 tablet 0     melatonin 3 MG tablet Take 3 mg by mouth (Patient not taking: Reported on 1/6/2022)       methylPREDNISolone (MEDROL DOSEPAK) 4 MG tablet therapy pack Follow Package Directions (Patient not taking: Reported on 1/6/2022) 21 tablet 0     Naveen Maier MD  [facility]

## 2022-01-06 NOTE — LETTER
2022         RE: Naveen Toro  [address] [address]        Dear Colleague,    Thank you for referring your patient, Naveen Toro, to the [facility]. Please see a copy of my visit note below.    Patient here for follow up consultation with Dr Maier for low back pain. Patient rates his pain a 4/10 and states he has been doing acupuncture and feels like it is helping his low back pain.      [facility] Number:  [phone]    Call with any questions about your care and for scheduling assistance.     Calls are returned Monday through Friday between 8 AM and 4:30 PM. We usually get back to you within 2 business days depending on the issue/request.    If we are prescribing your medications:    For opioid medication refills, call the clinic or send a [employer] message 7 days in advance.  Please include:    Name of requested medication    Name of the pharmacy.    For non-opioid medications, call your pharmacy directly to request a refill. Please allow 3-4 days to be processed.     Per [place] State Law:    All controlled substance prescriptions must be filled within 30 days of being written.      For those controlled substances allowing refills, pickup must occur within 30 days of last fill.      We believe regular attendance is key to your success in our program!      Any time you are unable to keep your appointment we ask that you call us at least 24 hours in advance to cancel.This will allow us to offer the appointment time to another patient.     Multiple missed appointments may lead to dismissal from the clinic.      Pain Center Progress Note    ENCOUNTER DATE: 2022    Naveen Toro   1945  MRN # [id number]  PCP: Everton Hoover    IMPRESSION / PLAN:  Naveen Toro is a 77 year old man who has low back and leg pain.  He had a work-related injury in  and had a lumbar fusion with decompression in .  He  did have a spinal cord stimulator placed in February 2009.  He did require a new battery revision on 9/9/2016.  He still feels that the stimulator gives him some benefit.  He does continue, however, to have pain in the low back and down the right leg.  Walking standing or sitting for a prolonged time will increase his pain.  Medications, spinal cord stimulation and rest will help to decrease it.  He has had lumbar epidural injections in the past which initially were giving him quite a bit of relief.  More recently he has had diminishing returns and his last injection was July 2021 and he only got a few days of relief with this.  Currently he is taking Hysingla ER 40 mg and Norco 10/325 for pain relief.  He usually takes 1 Norco at bedtime to help with the sleep.  He may take another during the day if he has a flareup.  He also is on duloxetine, gabapentin and lamotrigine.  These also do seem to be helping.  He does use Amitiza and GlycoLax for the constipation.  He takes vitamin D3.  He also has used Lidoderm patches and Voltaren gel occasionally.  He has been getting acupuncture and finds that the leg pain is significantly decreased and he is sleeping much better.    Since he is happy with the pain medication regimen we will continue the Hysingla ER and [place].  We will also recommend that he continue with the acupuncture as he is getting good symptomatic relief with this and better sleep.      Opioid [place] Precautions:    UDT/Blood - Collected 12/09/2021  Opioid Agreement - 08/18/2021   Reviewed - 12/09/2021 as expected  Psychological evaluation - monthly through Dr. Larry Frank  MME -  50    Past Medical History:   Diagnosis Date     Acute renal failure (H)     h/o     Cardiomyopathy (H)      Chronic low back pain      Constipation      Coronary artery disease      Diabetes mellitus (H)      HLD (hyperlipidemia)      Hypercholesterolemia      Hypertension      Lumbar radiculopathy      Post laminectomy  syndrome      Postlaminectomy syndrome, lumbar region     Created by Conversion      Sigmoid diverticulitis      Sleep apnea     CPAP     Past Surgical History:   Procedure Laterality Date     BACK SURGERY       BYPASS GRAFT ARTERY CORONARY  2010    1 vessel     CARDIAC SURGERY       IR LUMBAR EPIDURAL STEROID INJECTION  10/4/2007     IR LUMBAR EPIDURAL STEROID INJECTION  5/7/2008     IR LUMBAR EPIDURAL STEROID INJECTION  1/30/2009     LAMINECTOMY FOR IMPLANTATION / PLACEMENT NEUROSTIMULATOR ELECTRODES       [facility] CARDIAC SURG PROCEDURE UNLIST      Description: Cardiovascular Surgery Cardiac Defect Repair;  Recorded: 10/24/2011;     [facility] LINDSEY W/O FACETEC FORAMOT/DSKC 1/2 VRT SEG, LUMBAR      Description: Laminectomy Decompressive Up To Two Lumbar Segments;  Recorded: 10/24/2011;     Allergies   Allergen Reactions     Ace Inhibitors Unknown     Renal failure per preop H&P     Current Outpatient Medications   Medication Sig Dispense Refill     amLODIPine (NORVASC) 2.5 MG tablet Take 1 tablet by mouth daily       amLODIPine (NORVASC) 2.5 MG tablet Take 2.5 mg by mouth daily        aspirin 325 MG tablet [ASPIRIN 325 MG TABLET] Take 325 mg by mouth daily.       atorvastatin (LIPITOR) 40 MG tablet [ATORVASTATIN (LIPITOR) 40 MG TABLET] Take 40 mg by mouth daily. As directed       blood glucose (PRODIGY NO CODING BLOOD GLUC) test strip Use  to test daily. DX Code: E11.9, 90 day supply. Pharmacy dispense brand based on insurance.       diclofenac sodium (VOLTAREN) 1 % Gel [DICLOFENAC SODIUM (VOLTAREN) 1 % GEL] Apply 2 g topically 4 (four) times a day. 100 g 1     DULoxetine (CYMBALTA) 60 MG capsule Take 2 capsules (120 mg) by mouth daily 180 capsule 1     gabapentin (NEURONTIN) 300 MG capsule Take 2 capsules (600 mg) by mouth 2 times daily 120 capsule 1     lamoTRIgine (LAMICTAL) 25 MG tablet TAKE 2 TABLETS (50 MG) BY MOUTH 2 TIMES DAILY 360 tablet 0     Lancets MISC 1 each       lidocaine (LIDODERM) 5 % [LIDOCAINE (LIDODERM) 5  %] UNWRAP AND APPLY 1 PATCH TO SKIN DAILY, REMOVE AND DISCARD PATCH WITHIN 12 HOURS OR AS DIRECTED BY DOCTOR 30 patch 1     lubiprostone (AMITIZA) 24 MCG capsule Take 24 mcg by mouth        lubiprostone (AMITIZA) 24 MCG capsule Take 1 capsule (24 mcg) by mouth 2 times daily (with meals) 180 capsule 1     metFORMIN (GLUCOPHAGE) 500 MG tablet [METFORMIN (GLUCOPHAGE) 500 MG TABLET] Take 1,000 mg by mouth 2 (two) times a day with meals.        metoprolol (LOPRESSOR) 25 MG tablet [METOPROLOL (LOPRESSOR) 25 MG TABLET] Take 25 mg by mouth 2 (two) times a day.       naloxone (NARCAN) 4 MG/0.1ML nasal spray Spray 1 spray (4 mg) into one nostril alternating nostrils once as needed for opioid reversal every 2-3 minutes until assistance arrives 0.2 mL 0     polyethylene glycol (GLYCOLAX) 17 gram/dose powder [POLYETHYLENE GLYCOL (GLYCOLAX) 17 GRAM/DOSE POWDER] Take 17 g by mouth daily. 595 g 1     vitamin D3 (CHOLECALCIFEROL) 125 MCG (5000 UT) tablet Take 5,000 Units by mouth       [START ON 1/26/2022] HYDROcodone Bitartrate ER (HYSINGLA) 40 MG ER Tablet Take 1 tablet (40 mg) by mouth daily 30 tablet 0     [START ON 2/23/2022] HYDROcodone Bitartrate ER (HYSINGLA) 40 MG ER Tablet Take 1 tablet (40 mg) by mouth daily 30 tablet 0     [START ON 1/26/2022] HYDROcodone-acetaminophen (NORCO)  MG per tablet Take 0.5-1 tablets by mouth 2 times daily as needed for severe pain 40 tablet 0     [START ON 2/23/2022] HYDROcodone-acetaminophen (NORCO)  MG per tablet Take 0.5-1 tablets by mouth 2 times daily as needed for severe pain 40 tablet 0     HYSINGLA ER 40 MG ER Tablet Take 1 tablet (40 mg) by mouth daily 30 tablet 0     melatonin 3 MG tablet Take 3 mg by mouth (Patient not taking: Reported on 1/6/2022)       methylPREDNISolone (MEDROL DOSEPAK) 4 MG tablet therapy pack Follow Package Directions (Patient not taking: Reported on 1/6/2022) 21 tablet 0     Naveen Maier MD  [facility]        Again,  thank you for allowing me to participate in the care of your patient.        Sincerely,        Naveen GORE MD

## 2022-01-06 NOTE — PROGRESS NOTES
Patient here for follow up consultation with Dr Maier for low back pain. Patient rates his pain a 4/10 and states he has been doing acupuncture and feels like it is helping his low back pain.      [facility]    Clinic Number:  [phone]    Call with any questions about your care and for scheduling assistance.     Calls are returned Monday through Friday between 8 AM and 4:30 PM. We usually get back to you within 2 business days depending on the issue/request.    If we are prescribing your medications:    For opioid medication refills, call the clinic or send a [facility] message 7 days in advance.  Please include:    Name of requested medication    Name of the pharmacy.    For non-opioid medications, call your pharmacy directly to request a refill. Please allow 3-4 days to be processed.     Per [place] State Law:    All controlled substance prescriptions must be filled within 30 days of being written.      For those controlled substances allowing refills, pickup must occur within 30 days of last fill.      We believe regular attendance is key to your success in our program!      Any time you are unable to keep your appointment we ask that you call us at least 24 hours in advance to cancel.This will allow us to offer the appointment time to another patient.     Multiple missed appointments may lead to dismissal from the clinic.

## 2022-01-06 NOTE — PATIENT INSTRUCTIONS
[facility] Number:  [phone]    Call with any questions about your care and for scheduling assistance.     Calls are returned Monday through Friday between 8 AM and 4:30 PM. We usually get back to you within 2 business days depending on the issue/request.    If we are prescribing your medications:    For opioid medication refills, call the clinic or send a [employer] message 7 days in advance.  Please include:    Name of requested medication    Name of the pharmacy.    For non-opioid medications, call your pharmacy directly to request a refill. Please allow 3-4 days to be processed.     Per [place] State Law:    All controlled substance prescriptions must be filled within 30 days of being written.      For those controlled substances allowing refills, pickup must occur within 30 days of last fill.      We believe regular attendance is key to your success in our program!      Any time you are unable to keep your appointment we ask that you call us at least 24 hours in advance to cancel.This will allow us to offer the appointment time to another patient.     Multiple missed appointments may lead to dismissal from the clinic.    Dr. Maier has sent refills for 2 months

## 2022-01-11 ENCOUNTER — PROCEDURE ONLY VISIT (OUTPATIENT)
Dept: PALLIATIVE MEDICINE | Facility: OTHER | Age: 77
End: 2022-01-11
Payer: OTHER MISCELLANEOUS

## 2022-01-11 DIAGNOSIS — G89.4 CHRONIC PAIN SYNDROME: ICD-10-CM

## 2022-01-11 DIAGNOSIS — G89.29 CHRONIC BILATERAL LOW BACK PAIN, UNSPECIFIED WHETHER SCIATICA PRESENT: ICD-10-CM

## 2022-01-11 DIAGNOSIS — M54.50 CHRONIC BILATERAL LOW BACK PAIN, UNSPECIFIED WHETHER SCIATICA PRESENT: ICD-10-CM

## 2022-01-11 DIAGNOSIS — M51.379 DEGENERATION OF LUMBAR OR LUMBOSACRAL INTERVERTEBRAL DISC: Primary | ICD-10-CM

## 2022-01-11 PROCEDURE — 97810 ACUP 1/> WO ESTIM 1ST 15 MIN: CPT | Performed by: ACUPUNCTURIST

## 2022-01-11 PROCEDURE — 97811 ACUP 1/> W/O ESTIM EA ADD 15: CPT | Performed by: ACUPUNCTURIST

## 2022-01-11 NOTE — PROGRESS NOTES
ACUPUNCTURIST TREATMENT NOTE      Naveen Toro, a 77 year old male, is here today for Follow - Up exam. Patient is referred by No ref. provider found.    HPI  Main Complaint: Chronic low back pain: Patient reports he feels he continues to improve with chronic pain levels. He states sleep continues to improve as well. Pain remains mainly on right side low back radiating into right buttock and hip.    Secondary Complaints: Patient reports he has had night sweats off and on for quite some time. He states he has been told this may be a side effect of duloxetine. He states recently he has been having night sweats most nights, multiple times per night, where he will soak through the sheets and need to change clothes. He states he would like to address this as well along with depression symptoms. He is hopeful he can cut back on dose of his medication to improve night sweating.    Past Medical History  Past Medical History Reviewed: Yes   has a past medical history of Acute renal failure (H), Cardiomyopathy (H), Chronic low back pain, Constipation, Coronary artery disease, Diabetes mellitus (H), HLD (hyperlipidemia), Hypercholesterolemia, Hypertension, Lumbar radiculopathy, Post laminectomy syndrome, Postlaminectomy syndrome, lumbar region, Sigmoid diverticulitis, and Sleep apnea.    Objective  Basic Exam Completed:   No    TCM Exam Completed: Yes   Energy: Medium  Sleep: No concerns and Does wake with some night sweating  Emotions: Yes: Depression  Limbs/Back: Lower Back  Perspiration: nighttime    Tongue/Pulse Exam Completed: No    Patient Assessment  Patient Type: Pain  Patient Complaint: Chronic low back pain radiating into right buttock and hip    Acupuncture 1/4/2022 1/11/2022   Intervention Reason Pain Pain   Pain Location Low back Low back   Pre-session Pain Rating 4 4   Post-session Pain Rating 3 3       TCM Diagnosis: Other Qi and blood stasis due to trauma; channel obstruction; damp cold bi syndrome; kidney  lagos and yin deficiencies    Treatment Principle: Move qi and blood; dredge meridians, tonify kidney yin and yang    TCM / Acupuncture Treatment  Acupuncture Points:       Initial insertions: HTJJ L2-L5, Shiqizhuixia. Right: [id number], Td77-Yv27, Yaoyan, Gb29, Jiankua       Second insertions: Baihui, Ub62, Ub60, Ki3, Ki7, Sp6, Ub57                    Accessory Techniques 1/4/2022 1/11/2022   Accessory Techniques TDP Heat Lamp; Tuina; Topicals TDP Heat Lamp; Tuina; Topicals   TDP Heat Lamp location used low back low back   Tuina location used low back low back   Topicals Po Sum On Po Sum On   Topicals location used low back low back          Assessment and Plan  Treatment Observations: Patient relaxed well during treatment  Acupuncture Treatment Recommendations:         It is my recommendation that this patient seek advice from their Primary Care Provider about active symptoms not addressed during this visit. The risks and benefits of acupuncture were reviewed and the patient stated understanding. The patient's questions were answered to their satisfaction. Consent was provided for treatment. We thank you for the referral and opportunity to treat this patient.    Time Spent with Patient:   I spent a total of 30 minutes face-to-face with Naveen Toro during today's office visit.     Sydnee Lopez L.Ac.  01/11/22  1:51 PM

## 2022-01-21 ENCOUNTER — TELEPHONE (OUTPATIENT)
Dept: PALLIATIVE MEDICINE | Facility: OTHER | Age: 77
End: 2022-01-21
Payer: MEDICARE

## 2022-01-28 ENCOUNTER — OFFICE VISIT (OUTPATIENT)
Dept: PALLIATIVE MEDICINE | Facility: OTHER | Age: 77
End: 2022-01-28
Payer: OTHER MISCELLANEOUS

## 2022-01-28 VITALS — SYSTOLIC BLOOD PRESSURE: 138 MMHG | HEART RATE: 57 BPM | OXYGEN SATURATION: 99 % | DIASTOLIC BLOOD PRESSURE: 64 MMHG

## 2022-01-28 DIAGNOSIS — E78.00 PURE HYPERCHOLESTEROLEMIA: Primary | ICD-10-CM

## 2022-01-28 PROCEDURE — 99215 OFFICE O/P EST HI 40 MIN: CPT | Performed by: ANESTHESIOLOGY

## 2022-01-28 PROCEDURE — G0463 HOSPITAL OUTPT CLINIC VISIT: HCPCS

## 2022-01-28 RX ORDER — AMPICILLIN TRIHYDRATE 250 MG
1 CAPSULE ORAL DAILY
Qty: 30 CAPSULE | Refills: 3 | Status: SHIPPED | OUTPATIENT
Start: 2022-01-28

## 2022-01-28 ASSESSMENT — PAIN SCALES - GENERAL: PAINLEVEL: MODERATE PAIN (5)

## 2022-01-28 NOTE — PROGRESS NOTES
Patient presents to the clinic today for a follow up with KRYSTAL BALDERRAMA MD  regarding Pain Management.       PEG Score 12/9/2021 1/28/2022   PEG Total Score 5 7        UDT- 12/09/2021  CSA- 08/18/2021      QUESTIONS:  Medications question with the Hydrocodone.     Lata Gates MA  [facility] [place]

## 2022-01-28 NOTE — PROGRESS NOTES
"      Daysi Hodge is a 77 year old who presents for the following health issues     Transitioning care from [facility], with history of postlaminectomy syndrome, spinal cord stimulator placed.    77-year-old male living in [place] with his wife Lucía.  They have 3 children.  He is retired from working at [employer].    HPI     Patient and wife review concern patient was hospitalized 2 weeks ago with episode of confusion.  He was discontinued from his gabapentin and duloxetine decreased from 1 to 20 mg to 60 mg.  Metformin was stopped.  There is concerned about his kidneys and he is appointment next week.  As I reviewed the record there was concern whether he had taken extra dose of his Hysingla 40 mg daily.  Wife notes \"absolutely not\", as she administers it very closely.    They cannot identify any particular medical situations or medication changes prior to him going to hospital.    There was an episode 7 years ago confusion, at that time he had some surgeries, large doses of ibuprofen and not resolved.    He reviews that his baseline back pain, started after an injury at work pushing some heavy material.  He did have 2 surgeries a limited benefit.  Spinal cord stimulator placed with some benefit.  Injections have not helped.  The puncture helps a little.    He has been using the Hysingla 40 mg which has been very helpful.  If he misses a dose by few hours begins to have withdrawal.  Uses hydrocodone 10 mg 1 or 2 a day.  Has constipation with Amitiza helps.  Constipation was present before these medications.  Would usually get hydrocodone 40 a month.  Since discharge from the hospital the notes that 10 mg every 6 hours though she has not been doing that.    He had been on gabapentin for a long time.  Upon discontinuing it unclear if there is any changes in his wife notes in fact he seems to be grimacing and groaning less so it may be doing okay.    Reviewed discontinuation syndrome from the " "duloxetine unclear if there is been any problems.    Notes late sweats have been a problem over the last 6 weeks or more.  Saw the [facility] pharmacist and they wondered if it is also due to the duloxetine.  It may be somewhat improved the last couple nights.  He has lost perhaps 10 pounds.    Reviewing the record in December he was offered Medrol Dosepak.  States he did not get that.    Sleep has been a problem for years.  He has been fatigued for a long time.  Weight stable around 200.  Regarding his mood, wife notes he \"has as a days\".    He has been on lamotrigine 25 mg 2 tablets twice a day for years.  Unclear benefit.  Fatigue has been an issue, noted he is on a statin, could possibly correlate.    Has not tried CBD products or medical cannabis as lives in [place].    Reviewing the record troponins were mildly elevated, precalcitonin mildly elevated.    Vitamin D level was 71 this fall and is taking some replacement, unclear if it is elevated.    He has had TSH at 1 time mildly elevated, wife notes he is always cold yet that have the sweats at night.    Substance use history does not use alcohol or cigarettes.      Current Outpatient Medications:      amLODIPine (NORVASC) 2.5 MG tablet, Take 1 tablet by mouth daily, Disp: , Rfl:      amLODIPine (NORVASC) 2.5 MG tablet, Take 2.5 mg by mouth daily , Disp: , Rfl:      aspirin 325 MG tablet, [ASPIRIN 325 MG TABLET] Take 325 mg by mouth daily., Disp: , Rfl:      atorvastatin (LIPITOR) 40 MG tablet, [ATORVASTATIN (LIPITOR) 40 MG TABLET] Take 40 mg by mouth daily. As directed, Disp: , Rfl:      blood glucose (PRODIGY NO CODING BLOOD GLUC) test strip, Use  to test daily. DX Code: E11.9, 90 day supply. Pharmacy dispense brand based on insurance., Disp: , Rfl:      Coenzyme Q10 (COQ10) 200 MG CAPS, Take 1 capsule by mouth daily, Disp: 30 capsule, Rfl: 3     diclofenac sodium (VOLTAREN) 1 % Gel, [DICLOFENAC SODIUM (VOLTAREN) 1 % GEL] Apply 2 g topically 4 (four) times a " day., Disp: 100 g, Rfl: 1     DULoxetine (CYMBALTA) 60 MG capsule, Take 2 capsules (120 mg) by mouth daily, Disp: 180 capsule, Rfl: 1     HYDROcodone Bitartrate ER (HYSINGLA) 40 MG ER Tablet, Take 1 tablet (40 mg) by mouth daily, Disp: 30 tablet, Rfl: 0     [START ON 2/23/2022] HYDROcodone Bitartrate ER (HYSINGLA) 40 MG ER Tablet, Take 1 tablet (40 mg) by mouth daily, Disp: 30 tablet, Rfl: 0     HYDROcodone-acetaminophen (NORCO)  MG per tablet, Take 0.5-1 tablets by mouth 2 times daily as needed for severe pain, Disp: 40 tablet, Rfl: 0     [START ON 2/23/2022] HYDROcodone-acetaminophen (NORCO)  MG per tablet, Take 0.5-1 tablets by mouth 2 times daily as needed for severe pain, Disp: 40 tablet, Rfl: 0     lamoTRIgine (LAMICTAL) 25 MG tablet, TAKE 2 TABLETS (50 MG) BY MOUTH 2 TIMES DAILY, Disp: 360 tablet, Rfl: 0     Lancets MISC, 1 each, Disp: , Rfl:      lidocaine (LIDODERM) 5 %, [LIDOCAINE (LIDODERM) 5 %] UNWRAP AND APPLY 1 PATCH TO SKIN DAILY, REMOVE AND DISCARD PATCH WITHIN 12 HOURS OR AS DIRECTED BY DOCTOR, Disp: 30 patch, Rfl: 1     lubiprostone (AMITIZA) 24 MCG capsule, Take 24 mcg by mouth , Disp: , Rfl:      lubiprostone (AMITIZA) 24 MCG capsule, Take 1 capsule (24 mcg) by mouth 2 times daily (with meals), Disp: 180 capsule, Rfl: 1     melatonin 3 MG tablet, Take 3 mg by mouth , Disp: , Rfl:      methylPREDNISolone (MEDROL DOSEPAK) 4 MG tablet therapy pack, Follow Package Directions, Disp: 21 tablet, Rfl: 0     metoprolol (LOPRESSOR) 25 MG tablet, [METOPROLOL (LOPRESSOR) 25 MG TABLET] Take 25 mg by mouth 2 (two) times a day., Disp: , Rfl:      naloxone (NARCAN) 4 MG/0.1ML nasal spray, Spray 1 spray (4 mg) into one nostril alternating nostrils once as needed for opioid reversal every 2-3 minutes until assistance arrives, Disp: 0.2 mL, Rfl: 0     polyethylene glycol (GLYCOLAX) 17 gram/dose powder, [POLYETHYLENE GLYCOL (GLYCOLAX) 17 GRAM/DOSE POWDER] Take 17 g by mouth daily., Disp: 595 g, Rfl: 1      vitamin D3 (CHOLECALCIFEROL) 125 MCG (5000 UT) tablet, Take 5,000 Units by mouth, Disp: , Rfl:      metFORMIN (GLUCOPHAGE) 500 MG tablet, [METFORMIN (GLUCOPHAGE) 500 MG TABLET] Take 1,000 mg by mouth 2 (two) times a day with meals.  (Patient not taking: Reported on 1/28/2022), Disp: , Rfl:   Elemental history raised in [place] having older sister.  Parents were farm hands.  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    Used to enjoy [place] fishing.  He is not involved in Yazidi.    He has poor eye contact, unclear if anxious about meeting a new person after long history with Brea, versus recent hospitalization with delirium.  He does attend to the interview and thought process is logical.    Wife is good historian.    He does not have respiratory distress.      Review of Systems         Objective    /64   Pulse 57   SpO2 99%   There is no height or weight on file to calculate BMI.  Physical Exam         Assessment: Patient with a history of postlaminectomy syndrome, had been apparently stable on the Hysingla 40 mg daily and hydrocodone 10 mg 1, occasionally 2 at bedtime.    Recent hospitalization with encephalopathy.  There was concern for kidney damage.  Noted he has had history of sweats over the last several weeks, some weight loss, temperature dysregulation.    Plan: I asked them to obtain some laboratories when they obtain other labs today for the nephrologist including thyroid function with a reverse T3, rechecking PTH to been elevated, vitamin D to make sure is not too high.  Recommend total and free testosterone as he has been on opioids long-term and is here with fatigue.    As he has been on a statin, discussed the use of coenzyme Q 10.      Total time more than 50 minutes

## 2022-01-28 NOTE — LETTER
"    1/28/2022         RE: Naveen Toro  [address] [address]        Dear Colleague,    Thank you for referring your patient, Naveen Toro, to the [facility]. Please see a copy of my visit note below.    Patient presents to the clinic today for a follow up with KRYSTAL BALDERRAMA MD  regarding Pain Management.       PEG Score 12/9/2021 1/28/2022   PEG Total Score 5 7        UDT- 12/09/2021  CSA- 08/18/2021      QUESTIONS:  Medications question with the Hydrocodone.     Lata Gates MA  [facility]           Subjective   Naveen is a 77 year old who presents for the following health issues     Transitioning care from [facility], with history of postlaminectomy syndrome, spinal cord stimulator placed.    77-year-old male living in [place] with his wife Lucía.  They have 3 children.  He is retired from working at [employer].    HPI     Patient and wife review concern patient was hospitalized 2 weeks ago with episode of confusion.  He was discontinued from his gabapentin and duloxetine decreased from 1 to 20 mg to 60 mg.  Metformin was stopped.  There is concerned about his kidneys and he is appointment next week.  As I reviewed the record there was concern whether he had taken extra dose of his Hysingla 40 mg daily.  Wife notes \"absolutely not\", as she administers it very closely.    They cannot identify any particular medical situations or medication changes prior to him going to hospital.    There was an episode 7 years ago confusion, at that time he had some surgeries, large doses of ibuprofen and not resolved.    He reviews that his baseline back pain, started after an injury at work pushing some heavy material.  He did have 2 surgeries a limited benefit.  Spinal cord stimulator placed with some benefit.  Injections have not helped.  The puncture helps a little.    He has been using the Hysingla 40 mg which has been very helpful.  If he misses a " "dose by few hours begins to have withdrawal.  Uses hydrocodone 10 mg 1 or 2 a day.  Has constipation with Amitiza helps.  Constipation was present before these medications.  Would usually get hydrocodone 40 a month.  Since discharge from the hospital the notes that 10 mg every 6 hours though she has not been doing that.    He had been on gabapentin for a long time.  Upon discontinuing it unclear if there is any changes in his wife notes in fact he seems to be grimacing and groaning less so it may be doing okay.    Reviewed discontinuation syndrome from the duloxetine unclear if there is been any problems.    Notes late sweats have been a problem over the last 6 weeks or more.  Saw the [facility] pharmacist and they wondered if it is also due to the duloxetine.  It may be somewhat improved the last couple nights.  He has lost perhaps 10 pounds.    Reviewing the record in December he was offered Medrol Dosepak.  States he did not get that.    Sleep has been a problem for years.  He has been fatigued for a long time.  Weight stable around 200.  Regarding his mood, wife notes he \"has as a days\".    He has been on lamotrigine 25 mg 2 tablets twice a day for years.  Unclear benefit.  Fatigue has been an issue, noted he is on a statin, could possibly correlate.    Has not tried CBD products or medical cannabis as lives in [place].    Reviewing the record troponins were mildly elevated, precalcitonin mildly elevated.    Vitamin D level was 71 this fall and is taking some replacement, unclear if it is elevated.    He has had TSH at 1 time mildly elevated, wife notes he is always cold yet that have the sweats at night.    Substance use history does not use alcohol or cigarettes.      Current Outpatient Medications:      amLODIPine (NORVASC) 2.5 MG tablet, Take 1 tablet by mouth daily, Disp: , Rfl:      amLODIPine (NORVASC) 2.5 MG tablet, Take 2.5 mg by mouth daily , Disp: , Rfl:      aspirin 325 MG tablet, [ASPIRIN 325 MG TABLET] " Take 325 mg by mouth daily., Disp: , Rfl:      atorvastatin (LIPITOR) 40 MG tablet, [ATORVASTATIN (LIPITOR) 40 MG TABLET] Take 40 mg by mouth daily. As directed, Disp: , Rfl:      blood glucose (PRODIGY NO CODING BLOOD GLUC) test strip, Use  to test daily. DX Code: E11.9, 90 day supply. Pharmacy dispense brand based on insurance., Disp: , Rfl:      Coenzyme Q10 (COQ10) 200 MG CAPS, Take 1 capsule by mouth daily, Disp: 30 capsule, Rfl: 3     diclofenac sodium (VOLTAREN) 1 % Gel, [DICLOFENAC SODIUM (VOLTAREN) 1 % GEL] Apply 2 g topically 4 (four) times a day., Disp: 100 g, Rfl: 1     DULoxetine (CYMBALTA) 60 MG capsule, Take 2 capsules (120 mg) by mouth daily, Disp: 180 capsule, Rfl: 1     HYDROcodone Bitartrate ER (HYSINGLA) 40 MG ER Tablet, Take 1 tablet (40 mg) by mouth daily, Disp: 30 tablet, Rfl: 0     [START ON 2/23/2022] HYDROcodone Bitartrate ER (HYSINGLA) 40 MG ER Tablet, Take 1 tablet (40 mg) by mouth daily, Disp: 30 tablet, Rfl: 0     HYDROcodone-acetaminophen (NORCO)  MG per tablet, Take 0.5-1 tablets by mouth 2 times daily as needed for severe pain, Disp: 40 tablet, Rfl: 0     [START ON 2/23/2022] HYDROcodone-acetaminophen (NORCO)  MG per tablet, Take 0.5-1 tablets by mouth 2 times daily as needed for severe pain, Disp: 40 tablet, Rfl: 0     lamoTRIgine (LAMICTAL) 25 MG tablet, TAKE 2 TABLETS (50 MG) BY MOUTH 2 TIMES DAILY, Disp: 360 tablet, Rfl: 0     Lancets MISC, 1 each, Disp: , Rfl:      lidocaine (LIDODERM) 5 %, [LIDOCAINE (LIDODERM) 5 %] UNWRAP AND APPLY 1 PATCH TO SKIN DAILY, REMOVE AND DISCARD PATCH WITHIN 12 HOURS OR AS DIRECTED BY DOCTOR, Disp: 30 patch, Rfl: 1     lubiprostone (AMITIZA) 24 MCG capsule, Take 24 mcg by mouth , Disp: , Rfl:      lubiprostone (AMITIZA) 24 MCG capsule, Take 1 capsule (24 mcg) by mouth 2 times daily (with meals), Disp: 180 capsule, Rfl: 1     melatonin 3 MG tablet, Take 3 mg by mouth , Disp: , Rfl:      methylPREDNISolone (MEDROL DOSEPAK) 4 MG tablet  therapy pack, Follow Package Directions, Disp: 21 tablet, Rfl: 0     metoprolol (LOPRESSOR) 25 MG tablet, [METOPROLOL (LOPRESSOR) 25 MG TABLET] Take 25 mg by mouth 2 (two) times a day., Disp: , Rfl:      naloxone (NARCAN) 4 MG/0.1ML nasal spray, Spray 1 spray (4 mg) into one nostril alternating nostrils once as needed for opioid reversal every 2-3 minutes until assistance arrives, Disp: 0.2 mL, Rfl: 0     polyethylene glycol (GLYCOLAX) 17 gram/dose powder, [POLYETHYLENE GLYCOL (GLYCOLAX) 17 GRAM/DOSE POWDER] Take 17 g by mouth daily., Disp: 595 g, Rfl: 1     vitamin D3 (CHOLECALCIFEROL) 125 MCG (5000 UT) tablet, Take 5,000 Units by mouth, Disp: , Rfl:      metFORMIN (GLUCOPHAGE) 500 MG tablet, [METFORMIN (GLUCOPHAGE) 500 MG TABLET] Take 1,000 mg by mouth 2 (two) times a day with meals.  (Patient not taking: Reported on 1/28/2022), Disp: , Rfl:   Elemental history raised in [place] having older sister.  Parents were farm hands.  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    Used to enjoy woodworking fishing.  He is not involved in Oriental orthodox.    He has poor eye contact, unclear if anxious about meeting a new person after long history with Brea, versus recent hospitalization with delirium.  He does attend to the interview and thought process is logical.    Wife is good historian.    He does not have respiratory distress.      Review of Systems         Objective    /64   Pulse 57   SpO2 99%   There is no height or weight on file to calculate BMI.  Physical Exam         Assessment: Patient with a history of postlaminectomy syndrome, had been apparently stable on the Hysingla 40 mg daily and hydrocodone 10 mg 1, occasionally 2 at bedtime.    Recent hospitalization with encephalopathy.  There was concern for kidney damage.  Noted he has had history of sweats over the last several weeks, some weight loss, temperature dysregulation.    Plan: I asked them to obtain some  laboratories when they obtain other labs today for the nephrologist including thyroid function with a reverse T3, rechecking PTH to been elevated, vitamin D to make sure is not too high.  Recommend total and free testosterone as he has been on opioids long-term and is here with fatigue.    As he has been on a statin, discussed the use of coenzyme Q 10.      Total time more than 50 minutes        Again, thank you for allowing me to participate in the care of your patient.        Sincerely,        KRYSTAL BALDERRAMA MD

## 2022-01-28 NOTE — PATIENT INSTRUCTIONS
[facility] Number:  [phone]    Call with any questions about your care and for scheduling assistance.     Calls are returned Monday through Friday between 8 AM and 4:30 PM. We usually get back to you within 2 business days depending on the issue/request.    If we are prescribing your medications:    For opioid medication refills, call the clinic or send a [employer] message 7 days in advance.  Please include:    Name of requested medication    Name of the pharmacy.    For non-opioid medications, call your pharmacy directly to request a refill. Please allow 3-4 days to be processed.     Per [place] State Law:    All controlled substance prescriptions must be filled within 30 days of being written.      For those controlled substances allowing refills, pickup must occur within 30 days of last fill.      We believe regular attendance is key to your success in our program!      Any time you are unable to keep your appointment we ask that you call us at least 24 hours in advance to cancel.This will allow us to offer the appointment time to another patient.     Multiple missed appointments may lead to dismissal from the clinic      PLAN:  Add these labs to labs you will have drawn today: Vitamin D level, testosterone free and total, TSH T3, reverse T3, T4.  PTH.  Call Dr. Parker if orders are needed.    Coenzyme Q 10 200 mg tablet 1 daily as you are on a statin.    Continue the Hysingla 40 mg 1 daily.    Continue hydrocodone 10 mg tablets 1-2 daily.    Continue the duloxetine 60 mg daily, your nephrologist will review if dosage changes are needed.    Follow-up with Dr. Parker in 4 weeks.  May cancel appointment with Miri Sands NP

## 2022-02-01 ENCOUNTER — PROCEDURE ONLY VISIT (OUTPATIENT)
Dept: PALLIATIVE MEDICINE | Facility: OTHER | Age: 77
End: 2022-02-01
Payer: OTHER MISCELLANEOUS

## 2022-02-01 DIAGNOSIS — G89.29 CHRONIC BILATERAL LOW BACK PAIN, UNSPECIFIED WHETHER SCIATICA PRESENT: Primary | ICD-10-CM

## 2022-02-01 DIAGNOSIS — M51.379 DEGENERATION OF LUMBAR OR LUMBOSACRAL INTERVERTEBRAL DISC: ICD-10-CM

## 2022-02-01 DIAGNOSIS — M54.50 CHRONIC BILATERAL LOW BACK PAIN, UNSPECIFIED WHETHER SCIATICA PRESENT: Primary | ICD-10-CM

## 2022-02-01 PROCEDURE — 97811 ACUP 1/> W/O ESTIM EA ADD 15: CPT | Performed by: ACUPUNCTURIST

## 2022-02-01 PROCEDURE — 97810 ACUP 1/> WO ESTIM 1ST 15 MIN: CPT | Performed by: ACUPUNCTURIST

## 2022-02-01 NOTE — PROGRESS NOTES
ACUPUNCTURIST TREATMENT NOTE      Naveen Toro, a 77 year old male, is here today for Follow - Up exam. Patient is referred by No ref. provider found.    HPI  Main Complaint: Chronic low back pain: Patient tells me he has recently had some medication changes, including discontinuing gabapentin. He was recently hospitalized due to confusion. He states he expected pain to worsen after medication changes, but states it has been pretty steady. Right sided into right hip/buttock    Secondary Complaints: Night sweats: Reports waking with sweating maybe 1-2 times per night. States it may be a little improved recently. Reports duloxetine was decreased recently after hospitalization.    Depression: Reports he feels no motivation to do anything, low energy, low appetite. He feels his medication does help somewhat. He reports he does see a therapist.    Past Medical History  Past Medical History Reviewed: Yes   has a past medical history of Acute renal failure (H), Cardiomyopathy (H), Chronic low back pain, Constipation, Coronary artery disease, Diabetes mellitus (H), HLD (hyperlipidemia), Hypercholesterolemia, Hypertension, Lumbar radiculopathy, Post laminectomy syndrome, Postlaminectomy syndrome, lumbar region, Sigmoid diverticulitis, and Sleep apnea.    Objective  Basic Exam Completed:   No    TCM Exam Completed: Yes   Energy: Low  Sleep: frequent awakening and restless due to pain  Emotions: Yes: Depression  Limbs/Back: Lower Back  Digestion: no current symptoms  Stools: Reports he can sometimes have constipation  Perspiration: nighttime    Tongue/Pulse Exam Completed: No    Patient Assessment  Patient Type: Pain  Patient Complaint: Chronic low back pain radiating into right buttock and hip    Acupuncture 1/11/2022 2/1/2022   Intervention Reason Pain Pain; Other   Pain Location Low back Low back   Pre-session Pain Rating 4 4   Post-session Pain Rating 3 -       TCM Diagnosis: Other Qi and blood stasis due to trauma;  "channel obstruction; damp cold bi syndrome; kidney yang and yin deficiencies; Liver Qi Stagnation    Treatment Principle: Move qi and blood; dredge meridians, tonify kidney yin and yang; Smooth Liver Qi    TCM / Acupuncture Treatment  Acupuncture Points:       Initial insertions: Ub18, HTJJ L2-L5, Shiqizhuixia. Right: [id number], Pn58-Ay14, Yaoyan, Gb29       Second insertions: Baihui, Sishencong, Anmian, ear shenmen, Ht6, Ub62, Ub60, Liv3, Ki3, Ki7, Sp6, Ub57                    Accessory Techniques 1/11/2022 2/1/2022   Accessory Techniques TDP Heat Lamp; Tuina; Topicals TDP Heat Lamp; Tuina; Topicals; Auricular   TDP Heat Lamp location used low back low back   Tuina location used low back low back   Topicals Po Sum On Other (see comment)   Topicals location used low back low back   Auricular application - (L) ear lumbar, shenmen          Assessment and Plan  Treatment Observations: Patient relaxed well during treatment. Stated he felt \"pretty good\" post treatment, did not provide follow up score today.  Acupuncture Treatment Recommendations: Provided patient hand out on ear seeds       It is my recommendation that this patient seek advice from their Primary Care Provider about active symptoms not addressed during this visit. The risks and benefits of acupuncture were reviewed and the patient stated understanding. The patient's questions were answered to their satisfaction. Consent was provided for treatment. We thank you for the referral and opportunity to treat this patient.    Time Spent with Patient:   I spent a total of 30 minutes face-to-face with Naveen Toro during today's office visit.     Sydnee Lopez L.Ac.  02/01/22  11:42 AM    "

## 2022-02-08 ENCOUNTER — PROCEDURE ONLY VISIT (OUTPATIENT)
Dept: PALLIATIVE MEDICINE | Facility: OTHER | Age: 77
End: 2022-02-08
Payer: OTHER MISCELLANEOUS

## 2022-02-08 DIAGNOSIS — G89.29 CHRONIC BILATERAL LOW BACK PAIN, UNSPECIFIED WHETHER SCIATICA PRESENT: Primary | ICD-10-CM

## 2022-02-08 DIAGNOSIS — M51.379 DEGENERATION OF LUMBAR OR LUMBOSACRAL INTERVERTEBRAL DISC: ICD-10-CM

## 2022-02-08 DIAGNOSIS — M54.50 CHRONIC BILATERAL LOW BACK PAIN, UNSPECIFIED WHETHER SCIATICA PRESENT: Primary | ICD-10-CM

## 2022-02-08 PROCEDURE — 97810 ACUP 1/> WO ESTIM 1ST 15 MIN: CPT | Performed by: ACUPUNCTURIST

## 2022-02-08 PROCEDURE — 97811 ACUP 1/> W/O ESTIM EA ADD 15: CPT | Performed by: ACUPUNCTURIST

## 2022-02-08 NOTE — PROGRESS NOTES
ACUPUNCTURIST TREATMENT NOTE      Naveen Toro, a 77 year old male, is here today for Follow - Up exam. Patient is referred by No ref. provider found.    HPI  Main Complaint: Chronic low back pain radiating into right hip: Patient reports pain has felt a little worse the past few days than it has been feeling. Pain is slightly shifted - not as much into right buttock; pain radiating mostly across right side low back toward hip above buttock.    Secondary Complaints: Night sweats: Patient reports he has not noticed any further changes with night sweats. Patient is asked how many times per night he wakes up sweating and he states he doesn't know.     Depression: Continues to feel the same    Past Medical History  Past Medical History Reviewed: Yes   has a past medical history of Acute renal failure (H), Cardiomyopathy (H), Chronic low back pain, Constipation, Coronary artery disease, Diabetes mellitus (H), HLD (hyperlipidemia), Hypercholesterolemia, Hypertension, Lumbar radiculopathy, Post laminectomy syndrome, Postlaminectomy syndrome, lumbar region, Sigmoid diverticulitis, and Sleep apnea.    Objective  Basic Exam Completed:   No    TCM Exam Completed: Yes   Energy: Low  Sleep: frequent awakening and restless due to pain  Emotions: Yes: Depression  Limbs/Back: Lower Back  Perspiration: nighttime    Tongue/Pulse Exam Completed: No    Patient Assessment  Patient Type: Pain  Patient Complaint: Chronic low back pain radiating into right hip    Acupuncture 2/1/2022 2/8/2022   Intervention Reason Pain; Other Pain; Other   Pain Location Low back Low back   Pre-session Pain Rating 4 5   Post-session Pain Rating - -       TCM Diagnosis: Other Qi and blood stasis due to trauma; channel obstruction; damp cold bi syndrome; kidney yang and yin deficiencies; Liver Qi Stagnation; Liver Qi Stagnation    Treatment Principle: Move qi and blood; dredge meridians, tonify kidney yin and yang; Smooth Liver Qi    TCM / Acupuncture  Treatment  Acupuncture Points:       Initial insertions: HTJJ L20L5, Shiqizhuixia. Right: [id number]-Tw60, Qs16-Iv23, Yaoyan, Gb29, jiankua       Second insertions: Baihui, Sishencong, Anmian, Liv3, Ub60, Ub62, Ki7, Sp6, Ub57, Ht6, (R) Gb41, (L) Tb5                    Accessory Techniques 2/1/2022 2/8/2022   Accessory Techniques TDP Heat Lamp; Tuina; Topicals; Auricular TDP Heat Lamp; Tuina; Topicals   TDP Heat Lamp location used low back low back   Tuina location used low back low back   Topicals Other (see comment) Po Sum On   Topicals location used low back low back   Auricular application (L) ear lumbar, shenmen -          Assessment and Plan  Treatment Observations: Patient relaxed well during treatment.  Acupuncture Treatment Recommendations:         It is my recommendation that this patient seek advice from their Primary Care Provider about active symptoms not addressed during this visit. The risks and benefits of acupuncture were reviewed and the patient stated understanding. The patient's questions were answered to their satisfaction. Consent was provided for treatment. We thank you for the referral and opportunity to treat this patient.    Time Spent with Patient:   I spent a total of 30 minutes face-to-face with Naveen Toro during today's office visit.     Sydnee Lopez L.Ac.  02/08/22  10:55 AM

## 2022-02-15 ENCOUNTER — PROCEDURE ONLY VISIT (OUTPATIENT)
Dept: PALLIATIVE MEDICINE | Facility: OTHER | Age: 77
End: 2022-02-15
Payer: OTHER MISCELLANEOUS

## 2022-02-15 DIAGNOSIS — M54.50 CHRONIC BILATERAL LOW BACK PAIN, UNSPECIFIED WHETHER SCIATICA PRESENT: Primary | ICD-10-CM

## 2022-02-15 DIAGNOSIS — G89.29 CHRONIC BILATERAL LOW BACK PAIN, UNSPECIFIED WHETHER SCIATICA PRESENT: Primary | ICD-10-CM

## 2022-02-15 DIAGNOSIS — G89.4 CHRONIC PAIN SYNDROME: ICD-10-CM

## 2022-02-15 DIAGNOSIS — M51.379 DEGENERATION OF LUMBAR OR LUMBOSACRAL INTERVERTEBRAL DISC: ICD-10-CM

## 2022-02-15 PROCEDURE — 97810 ACUP 1/> WO ESTIM 1ST 15 MIN: CPT | Performed by: ACUPUNCTURIST

## 2022-02-15 PROCEDURE — 97811 ACUP 1/> W/O ESTIM EA ADD 15: CPT | Performed by: ACUPUNCTURIST

## 2022-02-15 RX ORDER — LAMOTRIGINE 25 MG/1
50 TABLET ORAL 2 TIMES DAILY
Qty: 360 TABLET | Refills: 0 | Status: SHIPPED | OUTPATIENT
Start: 2022-02-15 | End: 2022-05-18

## 2022-02-15 NOTE — TELEPHONE ENCOUNTER
Received call from patient requesting refill(s) of Lamictal     Last dispensed from pharmacy on 11/12/21 (90 day supply) RW    Patient's last office/virtual visit by prescribing provider on 1/28/22 BE  Next office/virtual appointment scheduled for 2/22/22 BE    E-prescribe to [facility]  Pending Prescriptions:                       Disp   Refills    lamoTRIgine (LAMICTAL) 25 MG tablet [Phar*360 ta*0            Sig: Take 2 tablets (50 mg) by mouth 2 times daily

## 2022-02-15 NOTE — PROGRESS NOTES
ACUPUNCTURIST TREATMENT NOTE      Naveen Toro, a 77 year old male, is here today for Follow - Up exam. Patient is referred by No ref. provider found.    HPI  Main Complaint: Chronic right sided low back pain: Patient reports over the past three days his pain has increased a little and he is not sure why. Pain is right sided and ranges from lateral to L2 to SI joint. Has not had as much pain into buttock or hip.    Secondary Complaints: Patient reports he continues with symptoms of night sweating and depression. Has not noticed much change.    Past Medical History  Past Medical History Reviewed: Yes   has a past medical history of Acute renal failure (H), Cardiomyopathy (H), Chronic low back pain, Constipation, Coronary artery disease, Diabetes mellitus (H), HLD (hyperlipidemia), Hypercholesterolemia, Hypertension, Lumbar radiculopathy, Post laminectomy syndrome, Postlaminectomy syndrome, lumbar region, Sigmoid diverticulitis, and Sleep apnea.    Objective  Basic Exam Completed:   No    TCM Exam Completed: Yes   Sleep: frequent awakening and restless due to pain  Emotions: Yes: Depression  Limbs/Back: Lower Back  Perspiration: nighttime    Tongue/Pulse Exam Completed: No    Patient Assessment  Patient Type: Pain  Patient Complaint: Chronic low back pain, right sided    Acupuncture 2/8/2022 2/15/2022   Intervention Reason Pain; Other Pain; Other   Pain Location Low back Low back   Pre-session Pain Rating 5 5   Post-session Pain Rating - -       TCM Diagnosis: Other Qi and blood stasis due to trauma; channel obstruction; damp cold bi syndrome; kidney yang and yin deficiencies; Liver Qi Stagnation; Liver Qi Stagnation    Treatment Principle: Move qi and blood; dredge meridians, tonify kidney yin and yang; Smooth Liver Qi    TCM / Acupuncture Treatment  Acupuncture Points:       Initial insertions: HTJJ L2-L5, Shiqizhuixia. Right: Nw70-Iu11, Ub31, Ub32, Ub52, Ub53, Yaoyan, Gb29       Second insertions: Sima,  Sishencong, Ht6, Si3, Liv3, Ub62, Ub60, Ki7, Sp6, Ub57, ear: shenmen, (R) lumbar                    Accessory Techniques 2/8/2022 2/15/2022   Accessory Techniques TDP Heat Lamp; Tuina; Topicals TDP Heat Lamp; Tuina; Topicals   TDP Heat Lamp location used low back low back   Tuina location used low back low back   Topicals Po Sum On Po Sum On   Topicals location used low back low back   Auricular application - -          Assessment and Plan  Treatment Observations: Patient relaxed well during treatment.  Acupuncture Treatment Recommendations:         It is my recommendation that this patient seek advice from their Primary Care Provider about active symptoms not addressed during this visit. The risks and benefits of acupuncture were reviewed and the patient stated understanding. The patient's questions were answered to their satisfaction. Consent was provided for treatment. We thank you for the referral and opportunity to treat this patient.    Time Spent with Patient:   I spent a total of 30 minutes face-to-face with Naveen Toro during today's office visit.     Sydnee Lopez L.Ac.  02/15/22  11:40 AM

## 2022-02-22 ENCOUNTER — OFFICE VISIT (OUTPATIENT)
Dept: PALLIATIVE MEDICINE | Facility: OTHER | Age: 77
End: 2022-02-22
Payer: OTHER MISCELLANEOUS

## 2022-02-22 VITALS
HEART RATE: 57 BPM | WEIGHT: 184 LBS | BODY MASS INDEX: 26.34 KG/M2 | DIASTOLIC BLOOD PRESSURE: 65 MMHG | SYSTOLIC BLOOD PRESSURE: 122 MMHG | HEIGHT: 70 IN

## 2022-02-22 DIAGNOSIS — G89.4 CHRONIC PAIN SYNDROME: Primary | ICD-10-CM

## 2022-02-22 PROCEDURE — G0463 HOSPITAL OUTPT CLINIC VISIT: HCPCS

## 2022-02-22 PROCEDURE — 99214 OFFICE O/P EST MOD 30 MIN: CPT | Performed by: ANESTHESIOLOGY

## 2022-02-22 RX ORDER — LUBIPROSTONE 24 UG/1
24 CAPSULE ORAL
Qty: 30 CAPSULE | Refills: 4 | Status: SHIPPED | OUTPATIENT
Start: 2022-02-22 | End: 2022-07-25

## 2022-02-22 RX ORDER — HYDROCODONE BITARTRATE 40 MG/1
40 TABLET, EXTENDED RELEASE ORAL DAILY
Qty: 30 TABLET | Refills: 0 | Status: SHIPPED | OUTPATIENT
Start: 2022-03-25 | End: 2022-04-18

## 2022-02-22 RX ORDER — DULOXETIN HYDROCHLORIDE 60 MG/1
60 CAPSULE, DELAYED RELEASE ORAL DAILY
Qty: 90 CAPSULE | Refills: 1 | Status: SHIPPED | OUTPATIENT
Start: 2022-02-22 | End: 2022-06-21

## 2022-02-22 RX ORDER — HYDROCODONE BITARTRATE AND ACETAMINOPHEN 10; 325 MG/1; MG/1
.5-1 TABLET ORAL 2 TIMES DAILY PRN
Qty: 60 TABLET | Refills: 0 | Status: SHIPPED | OUTPATIENT
Start: 2022-03-25 | End: 2022-04-18

## 2022-02-22 ASSESSMENT — PAIN SCALES - GENERAL: PAINLEVEL: MODERATE PAIN (4)

## 2022-02-22 NOTE — LETTER
2/22/2022         RE: Naveen Toro  [address] [facility] [address]      Pt is scheduled for follow-up for his lower back and right buttock pain.    PEG Score 1/28/2022 2/22/2022 2/22/2022   PEG Total Score 7 6.67 6.67     UDT- 12/09/2021  CSA- 08/2021, ALISHA     [facility] - Office Visit    ASSESSMENT & PLAN     Naveen was seen today for back pain.    Diagnoses and all orders for this visit:    Chronic pain syndrome  -     DULoxetine (CYMBALTA) 60 MG capsule; Take 1 capsule (60 mg) by mouth daily  -     HYDROcodone Bitartrate ER (HYSINGLA) 40 MG ER Tablet; Take 1 tablet (40 mg) by mouth daily  -     HYDROcodone-acetaminophen (NORCO)  MG per tablet; Take 0.5-1 tablets by mouth 2 times daily as needed for severe pain  -     lubiprostone (AMITIZA) 24 MCG capsule; Take 1 capsule (24 mcg) by mouth daily (with breakfast)        Patient Instructions     [facility] Number:  [phone]    Call with any questions about your care and for scheduling assistance.     Calls are returned Monday through Friday between 8 AM and 4:30 PM. We usually get back to you within 2 business days depending on the issue/request.    If we are prescribing your medications:    For opioid medication refills, call the clinic or send a [employer] message 7 days in advance.  Please include:    Name of requested medication    Name of the pharmacy.    For non-opioid medications, call your pharmacy directly to request a refill. Please allow 3-4 days to be processed.     Per [place] State Law:    All controlled substance prescriptions must be filled within 30 days of being written.      For those controlled substances allowing refills, pickup must occur within 30 days of last fill.      We believe regular attendance is key to your success in our program!      Any time you are unable to keep your appointment we ask that you call us at least 24 hours in advance to cancel.This will  "allow us to offer the appointment time to another patient.     Multiple missed appointments may lead to dismissal from the clinic.        PLAN:  Continue the Hysingla 40 mg daily.    Continue hydrocodone 10 mg 1 twice a day.    Dr. Balderrama to speak to Dr. Hoover about laboratories, and adding coenzyme Q 10 as you are on a statin.    Discussed the supplement \"thyroid pain\" to see if helps with your experience of feeling cold and fatigued.    Follow-up with Dr. Balderrama in 8 weeks        -----  KRYSTAL BALDERRAMA MD  [facility]       SUBJECTIVE      Naveen Toro is a 77 year old year old male who presents to clinic today for the following:     Seen for post laminectomy syndrome.    Reviewing the record he is seen in nephrology, they felt his stage III kidney disease improved somewhat.  He continues on the duloxetine 60 mg daily.    Did see his primary care provider in January, commented on concern for fatigue and feeling cold.  Laboratories that I had recommended he indicates would not likely be covered by his insurance as I ordered them for Worker's Comp.    He was seen in hematology in January, the pancytopenia is improving somewhat.  Her hemoglobin was 12.    Date he reviews he is about \"the same\".  He does not think there is been any other episodes of encephalopathy.  Wife notes she is hypervigilant for any concerns and has not seen any encephalopathy.  She does not see any worsening of pain behavior such as groaning.  She is concerned that fatigue is an issue.  He is complaining more about being cold.  This appeared to started mid January, again having sweats at night, then covered with blankets getting warm and going to sleep.  He cannot account for any other medication changes or any other medical conditions at that time and I did review with her doctors.    The duloxetine is decreased to 60 mg and he is off gabapentin without clear changes in pain.  Continues on the Hysingla 40 mg daily " which she notes its essential him to take daily, did not get the hospital had a problem.  Using hydrocodone 10 mg twice a day.  Last urine drug test in December.   reviewed, fills in [place]    I had recommended coenzyme Q 10 as he is on a statin.  They do not everything that comes from my office as did be related to Worker's Comp. and because I added Ativan while he is on a statin it is not covered.  They note the labs I had recommended, had a diagnosis code for which Dr. Hoover could not order.  When I suggested we do the labs again today at [facility] they were again concerned whether insurance would cover here as they would not necessarily all be related to his pain.  Patient does recall I had talked about testosterone which could be a side effect related to opioids.      Current Outpatient Medications:      amLODIPine (NORVASC) 2.5 MG tablet, Take 1 tablet by mouth daily, Disp: , Rfl:      aspirin 325 MG tablet, [ASPIRIN 325 MG TABLET] Take 325 mg by mouth daily., Disp: , Rfl:      atorvastatin (LIPITOR) 40 MG tablet, [ATORVASTATIN (LIPITOR) 40 MG TABLET] Take 40 mg by mouth daily. As directed, Disp: , Rfl:      blood glucose (PRODIGY NO CODING BLOOD GLUC) test strip, Use  to test daily. DX Code: E11.9, 90 day supply. Pharmacy dispense brand based on insurance., Disp: , Rfl:      diclofenac sodium (VOLTAREN) 1 % Gel, [DICLOFENAC SODIUM (VOLTAREN) 1 % GEL] Apply 2 g topically 4 (four) times a day., Disp: 100 g, Rfl: 1     DULoxetine (CYMBALTA) 60 MG capsule, Take 1 capsule (60 mg) by mouth daily, Disp: 90 capsule, Rfl: 1     [START ON 3/25/2022] HYDROcodone Bitartrate ER (HYSINGLA) 40 MG ER Tablet, Take 1 tablet (40 mg) by mouth daily, Disp: 30 tablet, Rfl: 0     [START ON 3/25/2022] HYDROcodone-acetaminophen (NORCO)  MG per tablet, Take 0.5-1 tablets by mouth 2 times daily as needed for severe pain, Disp: 60 tablet, Rfl: 0     lamoTRIgine (LAMICTAL) 25 MG tablet, Take 2 tablets (50 mg) by mouth 2  times daily, Disp: 360 tablet, Rfl: 0     Lancets MISC, 1 each, Disp: , Rfl:      lubiprostone (AMITIZA) 24 MCG capsule, Take 1 capsule (24 mcg) by mouth daily (with breakfast), Disp: 30 capsule, Rfl: 4     lubiprostone (AMITIZA) 24 MCG capsule, Take 1 capsule (24 mcg) by mouth 2 times daily (with meals), Disp: 180 capsule, Rfl: 1     metoprolol (LOPRESSOR) 25 MG tablet, [METOPROLOL (LOPRESSOR) 25 MG TABLET] Take 25 mg by mouth 2 (two) times a day., Disp: , Rfl:      naloxone (NARCAN) 4 MG/0.1ML nasal spray, Spray 1 spray (4 mg) into one nostril alternating nostrils once as needed for opioid reversal every 2-3 minutes until assistance arrives, Disp: 0.2 mL, Rfl: 0     polyethylene glycol (GLYCOLAX) 17 gram/dose powder, [POLYETHYLENE GLYCOL (GLYCOLAX) 17 GRAM/DOSE POWDER] Take 17 g by mouth daily., Disp: 595 g, Rfl: 1     vitamin D3 (CHOLECALCIFEROL) 125 MCG (5000 UT) tablet, Take 5,000 Units by mouth, Disp: , Rfl:      Coenzyme Q10 (COQ10) 200 MG CAPS, Take 1 capsule by mouth daily (Patient not taking: Reported on 2/22/2022), Disp: 30 capsule, Rfl: 3     metFORMIN (GLUCOPHAGE) 500 MG tablet, [METFORMIN (GLUCOPHAGE) 500 MG TABLET] Take 1,000 mg by mouth 2 (two) times a day with meals.  (Patient not taking: Reported on 1/28/2022), Disp: , Rfl:         Review of Systems   General, psych, musculoskeletal, bowels and bladder otherwise normal other than above.          OBJECTIVE   BP (!) 171/102   Pulse 106   Ht 1.829 m (6')   Wt 103.8 kg (228 lb 14.4 oz)   SpO2 97%   BMI 31.04 kg/m        Physical Exam  General:  Normal appearance, no apparent distress, appropriately groomed.  Cardiovascular: Normal rate  Lungs: Pulmonary effort is normal, speaking in full sentences  MSK: normal muscle bulk and tone, ROM, equal strength in all extremities  Skin: Warm and dry. No concerning rashes or lesions.,  Holding his hands they did but did not appear cold to the touch  Neurologic: No focal deficit, alert and oriented  x3  Psychiatric: Somewhat constricted affect, thought process logical neurosensorium.    Assessment: History of back pain from postlaminectomy syndrome, has been maintained on the long-acting hydrocodone and so short acting hydrocodone.  There was an episode of encephalopathy, etiology unclear.    He continues to complain of constitutional symptoms of cold and fatigue.    As apparently distinction between Worker's Comp. management and other medical care I will try to coordinate with his primary care provider.      Total time spent: 40 minutes          KRYSTAL BALDERRAMA MD

## 2022-02-22 NOTE — PROGRESS NOTES
Pt is scheduled for follow-up for his lower back and right buttock pain.    PEG Score 1/28/2022 2/22/2022 2/22/2022   PEG Total Score 7 6.67 6.67     UDT- 12/09/2021  CSA- 08/2021, AILSHA

## 2022-02-22 NOTE — PATIENT INSTRUCTIONS
"[facility] Number:  [phone]    Call with any questions about your care and for scheduling assistance.     Calls are returned Monday through Friday between 8 AM and 4:30 PM. We usually get back to you within 2 business days depending on the issue/request.    If we are prescribing your medications:    For opioid medication refills, call the clinic or send a [employer] message 7 days in advance.  Please include:    Name of requested medication    Name of the pharmacy.    For non-opioid medications, call your pharmacy directly to request a refill. Please allow 3-4 days to be processed.     Per [place] State Law:    All controlled substance prescriptions must be filled within 30 days of being written.      For those controlled substances allowing refills, pickup must occur within 30 days of last fill.      We believe regular attendance is key to your success in our program!      Any time you are unable to keep your appointment we ask that you call us at least 24 hours in advance to cancel.This will allow us to offer the appointment time to another patient.     Multiple missed appointments may lead to dismissal from the clinic.        PLAN:  Continue the Hysingla 40 mg daily.    Continue hydrocodone 10 mg 1 twice a day.    Dr. Parker to speak to Dr. Hoover about laboratories, and adding coenzyme Q 10 as you are on a statin.    Discussed the supplement \"Thyrotain\" to see if helps with your experience of feeling cold and fatigued.    Follow-up with Dr. Parker in 8 weeks  "

## 2022-02-22 NOTE — PROGRESS NOTES
[facility] - Office Visit    ASSESSMENT & PLAN     Naveen was seen today for back pain.    Diagnoses and all orders for this visit:    Chronic pain syndrome  -     DULoxetine (CYMBALTA) 60 MG capsule; Take 1 capsule (60 mg) by mouth daily  -     HYDROcodone Bitartrate ER (HYSINGLA) 40 MG ER Tablet; Take 1 tablet (40 mg) by mouth daily  -     HYDROcodone-acetaminophen (NORCO)  MG per tablet; Take 0.5-1 tablets by mouth 2 times daily as needed for severe pain  -     lubiprostone (AMITIZA) 24 MCG capsule; Take 1 capsule (24 mcg) by mouth daily (with breakfast)        Patient Instructions     [facility] Number:  [phone]    Call with any questions about your care and for scheduling assistance.     Calls are returned Monday through Friday between 8 AM and 4:30 PM. We usually get back to you within 2 business days depending on the issue/request.    If we are prescribing your medications:    For opioid medication refills, call the clinic or send a [employer] message 7 days in advance.  Please include:    Name of requested medication    Name of the pharmacy.    For non-opioid medications, call your pharmacy directly to request a refill. Please allow 3-4 days to be processed.     Per [place] State Law:    All controlled substance prescriptions must be filled within 30 days of being written.      For those controlled substances allowing refills, pickup must occur within 30 days of last fill.      We believe regular attendance is key to your success in our program!      Any time you are unable to keep your appointment we ask that you call us at least 24 hours in advance to cancel.This will allow us to offer the appointment time to another patient.     Multiple missed appointments may lead to dismissal from the clinic.        PLAN:  Continue the Hysingla 40 mg daily.    Continue hydrocodone 10 mg 1 twice a day.    Dr. Parker to speak to Dr. Hoover about  "laboratories, and adding coenzyme Q 10 as you are on a statin.    Discussed the supplement \"thyroid pain\" to see if helps with your experience of feeling cold and fatigued.    Follow-up with Dr. Balderrama in 8 weeks        -----  KRYSTAL BALDERRAMA MD  [facility]       SUBJECTIVE      Naveen Toro is a 77 year old year old male who presents to clinic today for the following:     Seen for post laminectomy syndrome.    Reviewing the record he is seen in nephrology, they felt his stage III kidney disease improved somewhat.  He continues on the duloxetine 60 mg daily.    Did see his primary care provider in January, commented on concern for fatigue and feeling cold.  Laboratories that I had recommended he indicates would not likely be covered by his insurance as I ordered them for Worker's Comp.    He was seen in hematology in January, the pancytopenia is improving somewhat.  Her hemoglobin was 12.    Date he reviews he is about \"the same\".  He does not think there is been any other episodes of encephalopathy.  Wife notes she is hypervigilant for any concerns and has not seen any encephalopathy.  She does not see any worsening of pain behavior such as groaning.  She is concerned that fatigue is an issue.  He is complaining more about being cold.  This appeared to started mid January, again having sweats at night, then covered with blankets getting warm and going to sleep.  He cannot account for any other medication changes or any other medical conditions at that time and I did review with her doctors.    The duloxetine is decreased to 60 mg and he is off gabapentin without clear changes in pain.  Continues on the Hysingla 40 mg daily which she notes its essential him to take daily, did not get the hospital had a problem.  Using hydrocodone 10 mg twice a day.  Last urine drug test in December.   reviewed, fills in [place]    I had recommended coenzyme Q 10 as he is on a statin.  They do not " everything that comes from my office as did be related to Worker's Comp. and because I added Ativan while he is on a statin it is not covered.  They note the labs I had recommended, had a diagnosis code for which Dr. Hoover could not order.  When I suggested we do the labs again today at [facility] they were again concerned whether insurance would cover here as they would not necessarily all be related to his pain.  Patient does recall I had talked about testosterone which could be a side effect related to opioids.      Current Outpatient Medications:      amLODIPine (NORVASC) 2.5 MG tablet, Take 1 tablet by mouth daily, Disp: , Rfl:      aspirin 325 MG tablet, [ASPIRIN 325 MG TABLET] Take 325 mg by mouth daily., Disp: , Rfl:      atorvastatin (LIPITOR) 40 MG tablet, [ATORVASTATIN (LIPITOR) 40 MG TABLET] Take 40 mg by mouth daily. As directed, Disp: , Rfl:      blood glucose (PRODIGY NO CODING BLOOD GLUC) test strip, Use  to test daily. DX Code: E11.9, 90 day supply. Pharmacy dispense brand based on insurance., Disp: , Rfl:      diclofenac sodium (VOLTAREN) 1 % Gel, [DICLOFENAC SODIUM (VOLTAREN) 1 % GEL] Apply 2 g topically 4 (four) times a day., Disp: 100 g, Rfl: 1     DULoxetine (CYMBALTA) 60 MG capsule, Take 1 capsule (60 mg) by mouth daily, Disp: 90 capsule, Rfl: 1     [START ON 3/25/2022] HYDROcodone Bitartrate ER (HYSINGLA) 40 MG ER Tablet, Take 1 tablet (40 mg) by mouth daily, Disp: 30 tablet, Rfl: 0     [START ON 3/25/2022] HYDROcodone-acetaminophen (NORCO)  MG per tablet, Take 0.5-1 tablets by mouth 2 times daily as needed for severe pain, Disp: 60 tablet, Rfl: 0     lamoTRIgine (LAMICTAL) 25 MG tablet, Take 2 tablets (50 mg) by mouth 2 times daily, Disp: 360 tablet, Rfl: 0     Lancets MISC, 1 each, Disp: , Rfl:      lubiprostone (AMITIZA) 24 MCG capsule, Take 1 capsule (24 mcg) by mouth daily (with breakfast), Disp: 30 capsule, Rfl: 4     lubiprostone (AMITIZA) 24 MCG capsule, Take 1 capsule (24  mcg) by mouth 2 times daily (with meals), Disp: 180 capsule, Rfl: 1     metoprolol (LOPRESSOR) 25 MG tablet, [METOPROLOL (LOPRESSOR) 25 MG TABLET] Take 25 mg by mouth 2 (two) times a day., Disp: , Rfl:      naloxone (NARCAN) 4 MG/0.1ML nasal spray, Spray 1 spray (4 mg) into one nostril alternating nostrils once as needed for opioid reversal every 2-3 minutes until assistance arrives, Disp: 0.2 mL, Rfl: 0     polyethylene glycol (GLYCOLAX) 17 gram/dose powder, [POLYETHYLENE GLYCOL (GLYCOLAX) 17 GRAM/DOSE POWDER] Take 17 g by mouth daily., Disp: 595 g, Rfl: 1     vitamin D3 (CHOLECALCIFEROL) 125 MCG (5000 UT) tablet, Take 5,000 Units by mouth, Disp: , Rfl:      Coenzyme Q10 (COQ10) 200 MG CAPS, Take 1 capsule by mouth daily (Patient not taking: Reported on 2/22/2022), Disp: 30 capsule, Rfl: 3     metFORMIN (GLUCOPHAGE) 500 MG tablet, [METFORMIN (GLUCOPHAGE) 500 MG TABLET] Take 1,000 mg by mouth 2 (two) times a day with meals.  (Patient not taking: Reported on 1/28/2022), Disp: , Rfl:         Review of Systems   General, psych, musculoskeletal, bowels and bladder otherwise normal other than above.          OBJECTIVE   BP (!) 171/102   Pulse 106   Ht 1.829 m (6')   Wt 103.8 kg (228 lb 14.4 oz)   SpO2 97%   BMI 31.04 kg/m        Physical Exam  General:  Normal appearance, no apparent distress, appropriately groomed.  Cardiovascular: Normal rate  Lungs: Pulmonary effort is normal, speaking in full sentences  MSK: normal muscle bulk and tone, ROM, equal strength in all extremities  Skin: Warm and dry. No concerning rashes or lesions.,  Holding his hands they did but did not appear cold to the touch  Neurologic: No focal deficit, alert and oriented x3  Psychiatric: Somewhat constricted affect, thought process logical neurosensorium.    Assessment: History of back pain from postlaminectomy syndrome, has been maintained on the long-acting hydrocodone and so short acting hydrocodone.  There was an episode of  encephalopathy, etiology unclear.    He continues to complain of constitutional symptoms of cold and fatigue.    As apparently distinction between Worker's Comp. management and other medical care I will try to coordinate with his primary care provider.      Total time spent: 40 minutes

## 2022-02-22 NOTE — LETTER
2/22/2022         RE: Naveen Toro  [address] [place] [address]        Dear Colleague,    Thank you for referring your patient, Naveen Toro, to the [facility]. Please see a copy of my visit note below.    Pt is scheduled for follow-up for his lower back and right buttock pain.    PEG Score 1/28/2022 2/22/2022 2/22/2022   PEG Total Score 7 6.67 6.67     UDT- 12/09/2021  CSA- 08/2021, ALISHA     [facility] - Office Visit    ASSESSMENT & PLAN     Naveen was seen today for back pain.    Diagnoses and all orders for this visit:    Chronic pain syndrome  -     DULoxetine (CYMBALTA) 60 MG capsule; Take 1 capsule (60 mg) by mouth daily  -     HYDROcodone Bitartrate ER (HYSINGLA) 40 MG ER Tablet; Take 1 tablet (40 mg) by mouth daily  -     HYDROcodone-acetaminophen (NORCO)  MG per tablet; Take 0.5-1 tablets by mouth 2 times daily as needed for severe pain  -     lubiprostone (AMITIZA) 24 MCG capsule; Take 1 capsule (24 mcg) by mouth daily (with breakfast)        Patient Instructions     [facility] Number:  [phone]    Call with any questions about your care and for scheduling assistance.     Calls are returned Monday through Friday between 8 AM and 4:30 PM. We usually get back to you within 2 business days depending on the issue/request.    If we are prescribing your medications:    For opioid medication refills, call the clinic or send a [employer] message 7 days in advance.  Please include:    Name of requested medication    Name of the pharmacy.    For non-opioid medications, call your pharmacy directly to request a refill. Please allow 3-4 days to be processed.     Per [place] State Law:    All controlled substance prescriptions must be filled within 30 days of being written.      For those controlled substances allowing refills, pickup must occur within 30 days of last fill.      We believe regular attendance is key to  "your success in our program!      Any time you are unable to keep your appointment we ask that you call us at least 24 hours in advance to cancel.This will allow us to offer the appointment time to another patient.     Multiple missed appointments may lead to dismissal from the clinic.        PLAN:  Continue the Hysingla 40 mg daily.    Continue hydrocodone 10 mg 1 twice a day.    Dr. Balderrama to speak to Dr. Hoover about laboratories, and adding coenzyme Q 10 as you are on a statin.    Discussed the supplement \"thyroid pain\" to see if helps with your experience of feeling cold and fatigued.    Follow-up with Dr. Balderrama in 8 weeks        -----  KRYSTAL BALDERRAMA MD  [facility]       SUBJECTIVE      Naveen Toro is a 77 year old year old male who presents to clinic today for the following:     Seen for post laminectomy syndrome.    Reviewing the record he is seen in nephrology, they felt his stage III kidney disease improved somewhat.  He continues on the duloxetine 60 mg daily.    Did see his primary care provider in January, commented on concern for fatigue and feeling cold.  Laboratories that I had recommended he indicates would not likely be covered by his insurance as I ordered them for Worker's Comp.    He was seen in hematology in January, the pancytopenia is improving somewhat.  Her hemoglobin was 12.    Date he reviews he is about \"the same\".  He does not think there is been any other episodes of encephalopathy.  Wife notes she is hypervigilant for any concerns and has not seen any encephalopathy.  She does not see any worsening of pain behavior such as groaning.  She is concerned that fatigue is an issue.  He is complaining more about being cold.  This appeared to started mid January, again having sweats at night, then covered with blankets getting warm and going to sleep.  He cannot account for any other medication changes or any other medical conditions at that time and I did " review with her doctors.    The duloxetine is decreased to 60 mg and he is off gabapentin without clear changes in pain.  Continues on the Hysingla 40 mg daily which she notes its essential him to take daily, did not get the hospital had a problem.  Using hydrocodone 10 mg twice a day.  Last urine drug test in December.   reviewed, fills in [place]    I had recommended coenzyme Q 10 as he is on a statin.  They do not everything that comes from my office as did be related to Worker's Comp. and because I added Ativan while he is on a statin it is not covered.  They note the labs I had recommended, had a diagnosis code for which Dr. Hoover could not order.  When I suggested we do the labs again today at [facility] they were again concerned whether insurance would cover here as they would not necessarily all be related to his pain.  Patient does recall I had talked about testosterone which could be a side effect related to opioids.      Current Outpatient Medications:      amLODIPine (NORVASC) 2.5 MG tablet, Take 1 tablet by mouth daily, Disp: , Rfl:      aspirin 325 MG tablet, [ASPIRIN 325 MG TABLET] Take 325 mg by mouth daily., Disp: , Rfl:      atorvastatin (LIPITOR) 40 MG tablet, [ATORVASTATIN (LIPITOR) 40 MG TABLET] Take 40 mg by mouth daily. As directed, Disp: , Rfl:      blood glucose (PRODIGY NO CODING BLOOD GLUC) test strip, Use  to test daily. DX Code: E11.9, 90 day supply. Pharmacy dispense brand based on insurance., Disp: , Rfl:      diclofenac sodium (VOLTAREN) 1 % Gel, [DICLOFENAC SODIUM (VOLTAREN) 1 % GEL] Apply 2 g topically 4 (four) times a day., Disp: 100 g, Rfl: 1     DULoxetine (CYMBALTA) 60 MG capsule, Take 1 capsule (60 mg) by mouth daily, Disp: 90 capsule, Rfl: 1     [START ON 3/25/2022] HYDROcodone Bitartrate ER (HYSINGLA) 40 MG ER Tablet, Take 1 tablet (40 mg) by mouth daily, Disp: 30 tablet, Rfl: 0     [START ON 3/25/2022] HYDROcodone-acetaminophen (NORCO)  MG per tablet, Take 0.5-1  tablets by mouth 2 times daily as needed for severe pain, Disp: 60 tablet, Rfl: 0     lamoTRIgine (LAMICTAL) 25 MG tablet, Take 2 tablets (50 mg) by mouth 2 times daily, Disp: 360 tablet, Rfl: 0     Lancets MISC, 1 each, Disp: , Rfl:      lubiprostone (AMITIZA) 24 MCG capsule, Take 1 capsule (24 mcg) by mouth daily (with breakfast), Disp: 30 capsule, Rfl: 4     lubiprostone (AMITIZA) 24 MCG capsule, Take 1 capsule (24 mcg) by mouth 2 times daily (with meals), Disp: 180 capsule, Rfl: 1     metoprolol (LOPRESSOR) 25 MG tablet, [METOPROLOL (LOPRESSOR) 25 MG TABLET] Take 25 mg by mouth 2 (two) times a day., Disp: , Rfl:      naloxone (NARCAN) 4 MG/0.1ML nasal spray, Spray 1 spray (4 mg) into one nostril alternating nostrils once as needed for opioid reversal every 2-3 minutes until assistance arrives, Disp: 0.2 mL, Rfl: 0     polyethylene glycol (GLYCOLAX) 17 gram/dose powder, [POLYETHYLENE GLYCOL (GLYCOLAX) 17 GRAM/DOSE POWDER] Take 17 g by mouth daily., Disp: 595 g, Rfl: 1     vitamin D3 (CHOLECALCIFEROL) 125 MCG (5000 UT) tablet, Take 5,000 Units by mouth, Disp: , Rfl:      Coenzyme Q10 (COQ10) 200 MG CAPS, Take 1 capsule by mouth daily (Patient not taking: Reported on 2/22/2022), Disp: 30 capsule, Rfl: 3     metFORMIN (GLUCOPHAGE) 500 MG tablet, [METFORMIN (GLUCOPHAGE) 500 MG TABLET] Take 1,000 mg by mouth 2 (two) times a day with meals.  (Patient not taking: Reported on 1/28/2022), Disp: , Rfl:         Review of Systems   General, psych, musculoskeletal, bowels and bladder otherwise normal other than above.          OBJECTIVE   BP (!) 171/102   Pulse 106   Ht 1.829 m (6')   Wt 103.8 kg (228 lb 14.4 oz)   SpO2 97%   BMI 31.04 kg/m        Physical Exam  General:  Normal appearance, no apparent distress, appropriately groomed.  Cardiovascular: Normal rate  Lungs: Pulmonary effort is normal, speaking in full sentences  MSK: normal muscle bulk and tone, ROM, equal strength in all extremities  Skin: Warm and dry. No  concerning rashes or lesions.,  Holding his hands they did but did not appear cold to the touch  Neurologic: No focal deficit, alert and oriented x3  Psychiatric: Somewhat constricted affect, thought process logical neurosensorium.    Assessment: History of back pain from postlaminectomy syndrome, has been maintained on the long-acting hydrocodone and so short acting hydrocodone.  There was an episode of encephalopathy, etiology unclear.    He continues to complain of constitutional symptoms of cold and fatigue.    As apparently distinction between Worker's Comp. management and other medical care I will try to coordinate with his primary care provider.      Total time spent: 40 minutes        Again, thank you for allowing me to participate in the care of your patient.        Sincerely,        KRYSTAL BALDERRAMA MD

## 2022-02-23 ENCOUNTER — TELEPHONE (OUTPATIENT)
Dept: PALLIATIVE MEDICINE | Facility: OTHER | Age: 77
End: 2022-02-23
Payer: MEDICARE

## 2022-02-23 NOTE — TELEPHONE ENCOUNTER
Faxed request to process PA:   Disp Refills Start End GABINO   lubiprostone (AMITIZA) 24 MCG capsule 30 capsule 4 2/22/2022  No   Sig - Route: Take 1 capsule (24 mcg) by mouth daily (with breakfast) - Oral   Sent to pharmacy as: Lubiprostone 24 MCG Oral Capsule (AMITIZA)   Class: E-Prescribe   Order: [id number]   E-Prescribing Status: Receipt confirmed by pharmacy (2/22/2022 11:01 AM CST)

## 2022-03-01 ENCOUNTER — PROCEDURE ONLY VISIT (OUTPATIENT)
Dept: PALLIATIVE MEDICINE | Facility: OTHER | Age: 77
End: 2022-03-01
Payer: OTHER MISCELLANEOUS

## 2022-03-01 DIAGNOSIS — G89.4 CHRONIC PAIN SYNDROME: ICD-10-CM

## 2022-03-01 DIAGNOSIS — M54.50 CHRONIC BILATERAL LOW BACK PAIN, UNSPECIFIED WHETHER SCIATICA PRESENT: Primary | ICD-10-CM

## 2022-03-01 DIAGNOSIS — M51.379 DEGENERATION OF LUMBAR OR LUMBOSACRAL INTERVERTEBRAL DISC: ICD-10-CM

## 2022-03-01 DIAGNOSIS — G89.29 CHRONIC BILATERAL LOW BACK PAIN, UNSPECIFIED WHETHER SCIATICA PRESENT: Primary | ICD-10-CM

## 2022-03-01 PROCEDURE — 97810 ACUP 1/> WO ESTIM 1ST 15 MIN: CPT | Performed by: ACUPUNCTURIST

## 2022-03-01 PROCEDURE — 97811 ACUP 1/> W/O ESTIM EA ADD 15: CPT | Performed by: ACUPUNCTURIST

## 2022-03-01 NOTE — PROGRESS NOTES
ACUPUNCTURIST TREATMENT NOTE      Naveen Toro, a 77 year old male, is here today for Follow - Up exam. Patient is referred by No ref. provider found.    HPI  Main Complaint: Chronic low back pain: Patient reports pain has been better in his right side low back into right hip area. However, he woke up yesterday with pain in his left side low back radiating into left hip. He does not recall doing any activity that may have caused left side to be painful.    Secondary Complaints: Night sweating, depression: Patient reports no changes    Past Medical History  Past Medical History Reviewed: Yes   has a past medical history of Acute renal failure (H), Cardiomyopathy (H), Chronic low back pain, Constipation, Coronary artery disease, Diabetes mellitus (H), HLD (hyperlipidemia), Hypercholesterolemia, Hypertension, Lumbar radiculopathy, Post laminectomy syndrome, Postlaminectomy syndrome, lumbar region, Sigmoid diverticulitis, and Sleep apnea.    Objective  Basic Exam Completed:   No    TCM Exam Completed: Yes   Emotions: Yes: Depression  Limbs/Back: Lower Back and left hip  Perspiration: nighttime    Tongue/Pulse Exam Completed: No    Patient Assessment  Patient Type: Pain  Patient Complaint: Chronic low back pain, left sided into left hip; night sweating; depression    Acupuncture 2/15/2022 3/1/2022   Intervention Reason Pain; Other Pain; Other   Pain Location Low back Low back   Pre-session Pain Rating 5 6   Post-session Pain Rating - -       TCM Diagnosis: Other Qi and blood stasis due to trauma; channel obstruction; damp cold bi syndrome; kidney yang and yin deficiencies; Liver Qi Stagnation; Liver Qi Stagnation    Treatment Principle: Move qi and blood; dredge meridians, tonify kidney yin and yang; Smooth Liver Qi    TCM / Acupuncture Treatment  Acupuncture Points:       Initial insertions: HTJJ L2-L5, Shiqizhuixia, Fe28-Ms94, Io85-Nn04, Gb29, Jiankua. Right: Ub23, Ub52. Left: Ub25       Second insertions: Sima  "[place], Ht6, Si3, Liv3, Ub62, Ub60, Ki3, Ki7, Sp6, Ub57, ear: shenmen, lumbar                    Accessory Techniques 2/15/2022 3/1/2022   Accessory Techniques TDP Heat Lamp; Tuina; Topicals TDP Heat Lamp; Tuina; Topicals   TDP Heat Lamp location used low back low back   Tuina location used low back low back   Topicals Po Sum On Po Sum On   Topicals location used low back low back   Auricular application - -          Assessment and Plan  Treatment Observations: Patient relaxed well during treatment. Patient reported his muscles still felt \"tight\" post-treatment. Score not provided.  Acupuncture Treatment Recommendations:         It is my recommendation that this patient seek advice from their Primary Care Provider about active symptoms not addressed during this visit. The risks and benefits of acupuncture were reviewed and the patient stated understanding. The patient's questions were answered to their satisfaction. Consent was provided for treatment. We thank you for the referral and opportunity to treat this patient.    Time Spent with Patient:   I spent a total of 30 minutes face-to-face with Naveen oTro during today's office visit.     Sydnee Lopez L.Ac.  03/01/22  12:15 PM    "

## 2022-03-02 NOTE — TELEPHONE ENCOUNTER
Prior Authorization Not Needed per Insurance    Medication: lubiprostone (AMITIZA) 24 MCG   Insurance Company: [employer] - Phone [phone] Fax [phone]  Expected CoPay:      Pharmacy Filling the Rx: [facility] - [place], [place] - [address]  Pharmacy Notified: Yes  Patient Notified: Yes    Pharmacy was processing under patient's workers comp.  Pharmacy now has paid claim through workers compy. Closing encounter.

## 2022-03-08 ENCOUNTER — PROCEDURE ONLY VISIT (OUTPATIENT)
Dept: PALLIATIVE MEDICINE | Facility: OTHER | Age: 77
End: 2022-03-08
Payer: OTHER MISCELLANEOUS

## 2022-03-08 DIAGNOSIS — M51.379 DEGENERATION OF LUMBAR OR LUMBOSACRAL INTERVERTEBRAL DISC: ICD-10-CM

## 2022-03-08 DIAGNOSIS — M54.50 CHRONIC BILATERAL LOW BACK PAIN, UNSPECIFIED WHETHER SCIATICA PRESENT: Primary | ICD-10-CM

## 2022-03-08 DIAGNOSIS — G89.4 CHRONIC PAIN SYNDROME: ICD-10-CM

## 2022-03-08 DIAGNOSIS — G89.29 CHRONIC BILATERAL LOW BACK PAIN, UNSPECIFIED WHETHER SCIATICA PRESENT: Primary | ICD-10-CM

## 2022-03-08 PROCEDURE — 97811 ACUP 1/> W/O ESTIM EA ADD 15: CPT | Performed by: ACUPUNCTURIST

## 2022-03-08 PROCEDURE — 97810 ACUP 1/> WO ESTIM 1ST 15 MIN: CPT | Performed by: ACUPUNCTURIST

## 2022-03-08 NOTE — PROGRESS NOTES
ACUPUNCTURIST TREATMENT NOTE      Naveen Toro, a 77 year old male, is here today for Follow - Up exam. Patient is referred by No ref. provider found.    HPI  Main Complaint: Chronic low back pain: Patient reports left sided pain has resolved. Pain is back to usual area in right side, lateral to L2-L4. Pain is not radiating into buttock or hip.     Secondary Complaints: Night sweating and depression: Patient reports no changes. Night sweating was increased last night, he is not sure why.    Past Medical History  Past Medical History Reviewed: Yes   has a past medical history of Acute renal failure (H), Cardiomyopathy (H), Chronic low back pain, Constipation, Coronary artery disease, Diabetes mellitus (H), HLD (hyperlipidemia), Hypercholesterolemia, Hypertension, Lumbar radiculopathy, Post laminectomy syndrome, Postlaminectomy syndrome, lumbar region, Sigmoid diverticulitis, and Sleep apnea.    Objective  Basic Exam Completed:   No    TCM Exam Completed: Yes   Sleep: restless due to pain  Limbs/Back: Lower Back  Perspiration: nighttime    Tongue/Pulse Exam Completed: No    Patient Assessment  Patient Type: Pain  Patient Complaint: Chronic lwo back pain radiating into right side; night sweating; depression    Acupuncture 3/1/2022 3/8/2022   Intervention Reason Pain; Other Pain; Other   Pain Location Low back Low back   Pre-session Pain Rating 6 4   Post-session Pain Rating - 3       TCM Diagnosis: Other Qi and blood stasis due to trauma; channel obstruction; damp cold bi syndrome; kidney yang and yin deficiencies; Liver Qi Stagnation; Liver Qi Stagnation    Treatment Principle: Move qi and blood; dredge meridians, tonify kidney yin and yang; Smooth Liver Qi    TCM / Acupuncture Treatment  Acupuncture Points:       Initial insertions: HTJJ L2-L5, Shiqizhuixia. Right: [id number], Gb29. Right: Ub52, Ub53, Ub54.         Second insertions: Baihui, Sishencong, Ht6, Si3, Liv3, Ub62, Ub60, Ki3, Ki7, Sp6, Ub57, ear: shenmen,  lumbar                    Accessory Techniques 3/1/2022 3/8/2022   Accessory Techniques TDP Heat Lamp; Tuina; Topicals TDP Heat Lamp; Tuina; Topicals   TDP Heat Lamp location used low back low back   Tuina location used low back low back   Topicals Po Sum On Po Sum On   Topicals location used low back low back   Auricular application - -          Assessment and Plan  Treatment Observations: Patient relaxed well during treatment.  Acupuncture Treatment Recommendations:         It is my recommendation that this patient seek advice from their Primary Care Provider about active symptoms not addressed during this visit. The risks and benefits of acupuncture were reviewed and the patient stated understanding. The patient's questions were answered to their satisfaction. Consent was provided for treatment. We thank you for the referral and opportunity to treat this patient.    Time Spent with Patient:   I spent a total of 30 minutes face-to-face with Naveen Toro during today's office visit.     Sydnee Lopez L.Ac.  03/08/22  12:33 PM

## 2022-03-16 ENCOUNTER — PROCEDURE ONLY VISIT (OUTPATIENT)
Dept: PALLIATIVE MEDICINE | Facility: OTHER | Age: 77
End: 2022-03-16
Payer: OTHER MISCELLANEOUS

## 2022-03-16 DIAGNOSIS — M51.379 DEGENERATION OF LUMBAR OR LUMBOSACRAL INTERVERTEBRAL DISC: ICD-10-CM

## 2022-03-16 DIAGNOSIS — M54.50 CHRONIC BILATERAL LOW BACK PAIN, UNSPECIFIED WHETHER SCIATICA PRESENT: Primary | ICD-10-CM

## 2022-03-16 DIAGNOSIS — G89.29 CHRONIC BILATERAL LOW BACK PAIN, UNSPECIFIED WHETHER SCIATICA PRESENT: Primary | ICD-10-CM

## 2022-03-16 PROCEDURE — 97811 ACUP 1/> W/O ESTIM EA ADD 15: CPT | Performed by: ACUPUNCTURIST

## 2022-03-16 PROCEDURE — 97810 ACUP 1/> WO ESTIM 1ST 15 MIN: CPT | Performed by: ACUPUNCTURIST

## 2022-03-16 NOTE — PROGRESS NOTES
"ACUPUNCTURIST TREATMENT NOTE      Naveen Toro, a 77 year old male, is here today for Follow - Up exam. Patient is referred by Darrion.    HPI  Main Complaint: Chronic low back pain, right sided: Patient reports doing \"okay.\" Pain is rated 4/10. Pain is right sided lateral to L4/L5 area. Denies pain radiating into buttock, hip or RLE.    Secondary Complaints: Night sweating, depression: Patient reports no changes. Patient is not sure how many times he wake per night with sweating.    Past Medical History  Past Medical History Reviewed: Yes   has a past medical history of Acute renal failure (H), Cardiomyopathy (H), Chronic low back pain, Constipation, Coronary artery disease, Diabetes mellitus (H), HLD (hyperlipidemia), Hypercholesterolemia, Hypertension, Lumbar radiculopathy, Post laminectomy syndrome, Postlaminectomy syndrome, lumbar region, Sigmoid diverticulitis, and Sleep apnea.    Objective  Basic Exam Completed:   No    TCM Exam Completed: Yes   Sleep: frequent awakening and restless due to pain  Limbs/Back: Lower Back  Perspiration: nighttime    Tongue/Pulse Exam Completed: No    Patient Assessment  Patient Type: Pain  Patient Complaint: chronic low back pain, right sided    Acupuncture 3/8/2022 3/16/2022   Intervention Reason Pain; Other Pain; Other   Pain Location Low back Low back   Pre-session Pain Rating 4 4   Post-session Pain Rating 3 -       TCM Diagnosis: Other Qi and blood stasis due to trauma; channel obstruction; damp cold bi syndrome; kidney yang and yin deficiencies; Liver Qi Stagnation; Liver Qi Stagnation    Treatment Principle: Move qi and blood; dredge meridians, tonify kidney yin and yang; Smooth Liver Qi    TCM / Acupuncture Treatment  Acupuncture Points:       Initial insertions: HTJJ L2-L5, Shiqizhuixia. Right: [id number], Ub31-32, Gb29. Right: Ub52, Ub53, Yaoyan.       Second insertions: Baihui, Sishencong, Ht6, Si3, Liv3, Ub62, Ub60, Ki3, Ki7, Sp6, Ub57, ear: shenmen, lumbar        "             Accessory Techniques 3/8/2022 3/16/2022   Accessory Techniques TDP Heat Lamp; Tuina; Topicals TDP Heat Lamp; Tuina; Topicals   TDP Heat Lamp location used low back low back   Tuina location used low back low back   Topicals Po Sum On Po Sum On   Topicals location used low back low back   Auricular application - -          Assessment and Plan  Treatment Observations: Patient relaxed well during treatment.  Acupuncture Treatment Recommendations:         It is my recommendation that this patient seek advice from their Primary Care Provider about active symptoms not addressed during this visit. The risks and benefits of acupuncture were reviewed and the patient stated understanding. The patient's questions were answered to their satisfaction. Consent was provided for treatment. We thank you for the referral and opportunity to treat this patient.    Time Spent with Patient:   I spent a total of 30 minutes face-to-face with Naveen Toro during today's office visit.     Sydnee Lopez L.Ac.  03/16/22  1:31 PM

## 2022-03-27 ENCOUNTER — HEALTH MAINTENANCE LETTER (OUTPATIENT)
Age: 77
End: 2022-03-27

## 2022-03-31 ENCOUNTER — PROCEDURE ONLY VISIT (OUTPATIENT)
Dept: PALLIATIVE MEDICINE | Facility: OTHER | Age: 77
End: 2022-03-31
Payer: OTHER MISCELLANEOUS

## 2022-03-31 DIAGNOSIS — M51.379 DEGENERATION OF LUMBAR OR LUMBOSACRAL INTERVERTEBRAL DISC: Primary | ICD-10-CM

## 2022-03-31 DIAGNOSIS — M54.50 CHRONIC BILATERAL LOW BACK PAIN, UNSPECIFIED WHETHER SCIATICA PRESENT: ICD-10-CM

## 2022-03-31 DIAGNOSIS — G89.29 CHRONIC BILATERAL LOW BACK PAIN, UNSPECIFIED WHETHER SCIATICA PRESENT: ICD-10-CM

## 2022-03-31 PROCEDURE — 97811 ACUP 1/> W/O ESTIM EA ADD 15: CPT | Performed by: ACUPUNCTURIST

## 2022-03-31 PROCEDURE — 97810 ACUP 1/> WO ESTIM 1ST 15 MIN: CPT | Performed by: ACUPUNCTURIST

## 2022-03-31 NOTE — PROGRESS NOTES
"ACUPUNCTURIST TREATMENT NOTE      Naveen Toro, a 77 year old male, is here today for Follow - Up exam. Patient is referred by Darrion.    HPI  Main Complaint: Chronic low back pain: Patient reports his pain has been \"not too bad.\" Pain continues to be mostly right sided, although he states she sometimes will get occasional shooting pains into left side as well. He remarks that he is surprised he does not feel worse since discontinuing Gabapentin.    Secondary Complaints: Continues to have night sweating. He plans to discuss with provider about medication side effects to see if any changes can be made that may help with night sweating.    Past Medical History  Past Medical History Reviewed: Yes   has a past medical history of Acute renal failure (H), Cardiomyopathy (H), Chronic low back pain, Constipation, Coronary artery disease, Diabetes mellitus (H), HLD (hyperlipidemia), Hypercholesterolemia, Hypertension, Lumbar radiculopathy, Post laminectomy syndrome, Postlaminectomy syndrome, lumbar region, Sigmoid diverticulitis, and Sleep apnea.    Objective  Basic Exam Completed:   No    TCM Exam Completed: Yes   Emotions: Yes: Depression  Limbs/Back: Lower Back  Perspiration: nighttime    Tongue/Pulse Exam Completed: No    Patient Assessment  Patient Type: Pain  Patient Complaint: Chronic low back pain; night sweating; depression    Acupuncture 3/16/2022 3/31/2022   Intervention Reason Pain; Other Pain; Other   Pain Location Low back Low back   Pre-session Pain Rating 4 4   Post-session Pain Rating - -       TCM Diagnosis: Other Qi and blood stasis due to trauma; channel obstruction; damp cold bi syndrome; kidney yang and yin deficiencies; Liver Qi Stagnation; Liver Qi Stagnation    Treatment Principle: Move qi and blood; dredge meridians, tonify kidney yin and yang; Smooth Liver Qi    TCM / Acupuncture Treatment  Acupuncture Points:       Initial insertions: HTJJ L2-L5, Navidia. Right: [id number], Gb29, Ub52, " Ub53, Tati.       Second insertions: Baihui, Sishencong, Ht6, Si3, Liv3, Ub62, Ub60, Ki3, Ki7, Sp6, Ub57, ear: shenmen, lumbar                    Accessory Techniques 3/16/2022 3/31/2022   Accessory Techniques TDP Heat Lamp; Tuina; Topicals TDP Heat Lamp; Tuina; Topicals   TDP Heat Lamp location used low back low back   Tuina location used low back low back   Topicals Po Sum On Other (see comment)   Topicals location used low back low back   Auricular application - -          Assessment and Plan  Treatment Observations: Patient relaxed well during treatment.  Acupuncture Treatment Recommendations:         It is my recommendation that this patient seek advice from their Primary Care Provider about active symptoms not addressed during this visit. The risks and benefits of acupuncture were reviewed and the patient stated understanding. The patient's questions were answered to their satisfaction. Consent was provided for treatment. We thank you for the referral and opportunity to treat this patient.    Time Spent with Patient:   I spent a total of 30 minutes face-to-face with Naveen Toro during today's office visit.     Sydnee Lopez L.Ac.  03/31/22  11:11 AM

## 2022-04-12 ENCOUNTER — PROCEDURE ONLY VISIT (OUTPATIENT)
Dept: PALLIATIVE MEDICINE | Facility: OTHER | Age: 77
End: 2022-04-12
Payer: OTHER MISCELLANEOUS

## 2022-04-12 DIAGNOSIS — M51.379 DEGENERATION OF LUMBAR OR LUMBOSACRAL INTERVERTEBRAL DISC: Primary | ICD-10-CM

## 2022-04-12 DIAGNOSIS — M54.50 CHRONIC BILATERAL LOW BACK PAIN, UNSPECIFIED WHETHER SCIATICA PRESENT: ICD-10-CM

## 2022-04-12 DIAGNOSIS — G89.29 CHRONIC BILATERAL LOW BACK PAIN, UNSPECIFIED WHETHER SCIATICA PRESENT: ICD-10-CM

## 2022-04-12 PROCEDURE — 97811 ACUP 1/> W/O ESTIM EA ADD 15: CPT | Performed by: ACUPUNCTURIST

## 2022-04-12 PROCEDURE — 97810 ACUP 1/> WO ESTIM 1ST 15 MIN: CPT | Performed by: ACUPUNCTURIST

## 2022-04-12 NOTE — PROGRESS NOTES
"ACUPUNCTURIST TREATMENT NOTE      Naveen Toro, a 77 year old male, is here today for Follow - Up exam. Patient is referred by Darrion.    HPI  Main Complaint: Chronic low back pain: Patient reports low back pain \"not too bad.\" Has been varying from left to right side of low back. No radiating pain into buttocks or legs. He reports pain and stiffness tends to increase in damp and cooler weather.    Secondary Complaints: Night sweating: Reports no changes    Past Medical History  Past Medical History Reviewed: Yes   has a past medical history of Acute renal failure (H), Cardiomyopathy (H), Chronic low back pain, Constipation, Coronary artery disease, Diabetes mellitus (H), HLD (hyperlipidemia), Hypercholesterolemia, Hypertension, Lumbar radiculopathy, Post laminectomy syndrome, Postlaminectomy syndrome, lumbar region, Sigmoid diverticulitis, and Sleep apnea.    Objective  Basic Exam Completed:   No    TCM Exam Completed: Yes   Energy: Medium  Sleep: restless due to pain  Emotions: Yes: Depression  Limbs/Back: Lower Back  Perspiration: nighttime    Tongue/Pulse Exam Completed: No    Patient Assessment  Patient Type: Pain  Patient Complaint: Chronic low back pain    Acupuncture 3/31/2022 4/12/2022   Intervention Reason Pain; Other Pain; Other   Pain Location Low back Low back   Pre-session Pain Rating 4 4   Post-session Pain Rating - 3       TCM Diagnosis: Other Qi and blood stasis due to trauma; channel obstruction; damp cold bi syndrome; kidney yang and yin deficiencies; Liver Qi Stagnation; Liver Qi Stagnation    Treatment Principle: Move qi and blood; dredge meridians, tonify kidney yin and yang; Smooth Liver Qi    TCM / Acupuncture Treatment  Acupuncture Points:       Initial insertions: HTJJ L2-L5, Shiqizhuixia, [id number], Ko30-Ej48, Ub53, Ub54, Yaoyan. Right: Ub23, Ub24       Second insertions: Baihui, Ht6, Si3, Liv3, Ub62, Ub60, Ki3, Ki7, Sp6, Ub57, ear: shenmen, lumbar                    Accessory Techniques " 3/31/2022 4/12/2022   Accessory Techniques TDP Heat Lamp; Tuina; Topicals TDP Heat Lamp; Tuina; Topicals   TDP Heat Lamp location used low back low back   Tuina location used low back low back   Topicals Other (see comment) Po Sum On   Topicals location used low back low back   Auricular application - -          Assessment and Plan  Treatment Observations: Patient relaxed well during treatment. Reported he felt pain was improved post treatment.  Acupuncture Treatment Recommendations:         It is my recommendation that this patient seek advice from their Primary Care Provider about active symptoms not addressed during this visit. The risks and benefits of acupuncture were reviewed and the patient stated understanding. The patient's questions were answered to their satisfaction. Consent was provided for treatment. We thank you for the referral and opportunity to treat this patient.    Time Spent with Patient:   I spent a total of 30 minutes face-to-face with Naveen Toro during today's office visit.     Sydnee Lopez L.Ac.  04/12/22  12:39 PM

## 2022-04-18 ENCOUNTER — OFFICE VISIT (OUTPATIENT)
Dept: PALLIATIVE MEDICINE | Facility: OTHER | Age: 77
End: 2022-04-18
Payer: OTHER MISCELLANEOUS

## 2022-04-18 VITALS
DIASTOLIC BLOOD PRESSURE: 61 MMHG | BODY MASS INDEX: 27.86 KG/M2 | HEART RATE: 61 BPM | WEIGHT: 194.6 LBS | SYSTOLIC BLOOD PRESSURE: 128 MMHG | HEIGHT: 70 IN

## 2022-04-18 DIAGNOSIS — G89.4 CHRONIC PAIN SYNDROME: ICD-10-CM

## 2022-04-18 PROCEDURE — 99213 OFFICE O/P EST LOW 20 MIN: CPT | Performed by: ANESTHESIOLOGY

## 2022-04-18 PROCEDURE — G0463 HOSPITAL OUTPT CLINIC VISIT: HCPCS

## 2022-04-18 RX ORDER — HYDROCODONE BITARTRATE AND ACETAMINOPHEN 10; 325 MG/1; MG/1
.5-1 TABLET ORAL 2 TIMES DAILY PRN
Qty: 60 TABLET | Refills: 0 | Status: SHIPPED | OUTPATIENT
Start: 2022-04-24 | End: 2022-05-20

## 2022-04-18 RX ORDER — HYDROCODONE BITARTRATE 40 MG/1
40 TABLET, EXTENDED RELEASE ORAL DAILY
Qty: 30 TABLET | Refills: 0 | Status: SHIPPED | OUTPATIENT
Start: 2022-04-24 | End: 2022-05-20

## 2022-04-18 ASSESSMENT — PAIN SCALES - GENERAL: PAINLEVEL: MODERATE PAIN (4)

## 2022-04-18 NOTE — PATIENT INSTRUCTIONS
PLAN:    Laboratories recommended by Dr. Parker to be added to laboratories next week including vitamin D level, testosterone free and total, TSH T3, T3 reverse, T4.    Continue with the Hysingla ER 40 mg daily and the hydrocodone 10 mg twice a day.    Follow-up with Dr. Parker in the office in 2 to 3 months

## 2022-04-18 NOTE — PROGRESS NOTES
[facility] - Office Visit    ASSESSMENT & PLAN     Naveen was seen today for pain.    Diagnoses and all orders for this visit:    Chronic pain syndrome  -     HYDROcodone Bitartrate ER (HYSINGLA) 40 MG ER Tablet; Take 1 tablet (40 mg) by mouth daily  -     HYDROcodone-acetaminophen (NORCO)  MG per tablet; Take 0.5-1 tablets by mouth 2 times daily as needed for severe pain        Patient Instructions   PLAN:    Laboratories recommended by Dr. Balderrama to be added to laboratories next week including vitamin D level, testosterone free and total, TSH T3, T3 reverse, T4.    Continue with the Hysingla ER 40 mg daily and the hydrocodone 10 mg twice a day.    Follow-up with Dr. Balderrama in the office in 2 to 3 months        -----  KRYSTAL BALDERRAMA MD  [facility]       SUBJECTIVE      Naveen Toro is a 77 year old year old male who presents to clinic today for the following:     Follow-up for postlaminectomy syndrome.  Has spinal cord stimulator placed.    Seen today with his wife.  Continues on the Hydro codon 10 mg twice a day and Hysingla ER 40 mg daily.  Reviews the regimen doing fairly well for his back.  Has some constipation taking Amitiza.    When seen last week we talked about temperature dysregulation.  I had wanted him to get some laboratories.  He will get those next week through Dr. Hoover office.  We discussed taking a thyroid supplement as he has cold feeling during the day and sweats at night.  He is taking it a month without benefit.    Takes the coenzyme Q not daily.    We reviewed opioids can cause temperature dysregulation.  Wife senses that he had been on this combination for quite some time, and it was more related to the hospitalization with encephalopathy that this has been noted.  The duloxetine has been decreased to 60 mg daily from twice a day without a real change.    He had been on morphine until he developed kidney problems.  Previously did not  tolerate OxyContin and oxymorphone.  He would not like to make significant changes tell further medical assessment.  Last urine drug test in December as expected.      Current Outpatient Medications:      amLODIPine (NORVASC) 2.5 MG tablet, Take 1 tablet by mouth daily, Disp: , Rfl:      aspirin 325 MG tablet, [ASPIRIN 325 MG TABLET] Take 325 mg by mouth daily., Disp: , Rfl:      atorvastatin (LIPITOR) 40 MG tablet, [ATORVASTATIN (LIPITOR) 40 MG TABLET] Take 40 mg by mouth daily. As directed, Disp: , Rfl:      Coenzyme Q10 (COQ10) 200 MG CAPS, Take 1 capsule by mouth daily, Disp: 30 capsule, Rfl: 3     diclofenac sodium (VOLTAREN) 1 % Gel, [DICLOFENAC SODIUM (VOLTAREN) 1 % GEL] Apply 2 g topically 4 (four) times a day., Disp: 100 g, Rfl: 1     DULoxetine (CYMBALTA) 60 MG capsule, Take 1 capsule (60 mg) by mouth daily, Disp: 90 capsule, Rfl: 1     [START ON 4/24/2022] HYDROcodone Bitartrate ER (HYSINGLA) 40 MG ER Tablet, Take 1 tablet (40 mg) by mouth daily, Disp: 30 tablet, Rfl: 0     [START ON 4/24/2022] HYDROcodone-acetaminophen (NORCO)  MG per tablet, Take 0.5-1 tablets by mouth 2 times daily as needed for severe pain, Disp: 60 tablet, Rfl: 0     lamoTRIgine (LAMICTAL) 25 MG tablet, Take 2 tablets (50 mg) by mouth 2 times daily, Disp: 360 tablet, Rfl: 0     lubiprostone (AMITIZA) 24 MCG capsule, Take 1 capsule (24 mcg) by mouth daily (with breakfast), Disp: 30 capsule, Rfl: 4     metoprolol (LOPRESSOR) 25 MG tablet, [METOPROLOL (LOPRESSOR) 25 MG TABLET] Take 25 mg by mouth 2 (two) times a day., Disp: , Rfl:      naloxone (NARCAN) 4 MG/0.1ML nasal spray, Spray 1 spray (4 mg) into one nostril alternating nostrils once as needed for opioid reversal every 2-3 minutes until assistance arrives, Disp: 0.2 mL, Rfl: 0     polyethylene glycol (GLYCOLAX) 17 gram/dose powder, [POLYETHYLENE GLYCOL (GLYCOLAX) 17 GRAM/DOSE POWDER] Take 17 g by mouth daily., Disp: 595 g, Rfl: 1     vitamin D3 (CHOLECALCIFEROL) 125 MCG  (5000 UT) tablet, Take 5,000 Units by mouth, Disp: , Rfl:      blood glucose (PRODIGY NO CODING BLOOD GLUC) test strip, Use  to test daily. DX Code: E11.9, 90 day supply. Pharmacy dispense brand based on insurance. (Patient not taking: Reported on 4/18/2022), Disp: , Rfl:      Lancets MISC, 1 each (Patient not taking: Reported on 4/18/2022), Disp: , Rfl:      metFORMIN (GLUCOPHAGE) 500 MG tablet, [METFORMIN (GLUCOPHAGE) 500 MG TABLET] Take 1,000 mg by mouth 2 (two) times a day with meals.  (Patient not taking: No sig reported), Disp: , Rfl:         Review of Systems   General, psych, musculoskeletal, bowels and bladder otherwise normal other than above.          OBJECTIVE   BP (!) 171/102   Pulse 106   Ht 1.829 m (6')   Wt 103.8 kg (228 lb 14.4 oz)   SpO2 97%   BMI 31.04 kg/m        Physical Exam  General:  Normal appearance, ambulates with cane.  Clear sensorium  Cardiovascular: Normal rate  Lungs: Pulmonary effort is normal, speaking in full sentences  MSK:   Skin:. No concerning rashes or lesions.  Neurologic: No focal deficit, alert and oriented x3  Psychiatric: Somewhat constricted affect.      Assessment: Back pain attributed to postlaminectomy syndrome, has been relatively stable with a spinal cord stimulator and these opioids.    Noted episode of encephalopathy, etiology unclear since that time more difficulties with thermoregulation.    Would like some laboratories including thyroid function, testosterone levels.  We will assess these before making changes in opioids.      Time more than 20 minutes.

## 2022-04-18 NOTE — PROGRESS NOTES
04/18/22 1422   PEG: A Thee-Item Scale Assessing Pain Intensity and Interference        0 = No pain / No interference    10 = Pain as bad as you can imagine / Completely interferes   What number best describes your pain on average in the past week? 5   What number best describes how, during the past week, pain has interfered with your enjoyment of life? 7   What number best describes how, during the past week, pain has interfered with your general activity? 5   PEG Total Score 5.67

## 2022-04-18 NOTE — PROGRESS NOTES
PEG Score 2/22/2022 2/22/2022 4/18/2022   PEG Total Score 6.67 6.67 5.67     [facility] Number:  [phone]    Call with any questions about your care and for scheduling assistance.     Calls are returned Monday through Friday between 8 AM and 4:30 PM. We usually get back to you within 2 business days depending on the issue/request.    If we are prescribing your medications:    For opioid medication refills, call the clinic or send a [employer] message 7 days in advance.  Please include:    Name of requested medication    Name of the pharmacy.    For non-opioid medications, call your pharmacy directly to request a refill. Please allow 3-4 days to be processed.     Per [place] State Law:    All controlled substance prescriptions must be filled within 30 days of being written.      For those controlled substances allowing refills, pickup must occur within 30 days of last fill.      We believe regular attendance is key to your success in our program!      Any time you are unable to keep your appointment we ask that you call us at least 24 hours in advance to cancel.This will allow us to offer the appointment time to another patient.     Multiple missed appointments may lead to dismissal from the clinic.

## 2022-04-18 NOTE — LETTER
4/18/2022         RE: Naveen Toro  [address] [facility] [address]        Dear Colleague,    Thank you for referring your patient, Naveen Toro, to the [facility]. Please see a copy of my visit note below.      PEG Score 2/22/2022 2/22/2022 4/18/2022   PEG Total Score 6.67 6.67 5.67     [facility] Number:  [phone]    Call with any questions about your care and for scheduling assistance.     Calls are returned Monday through Friday between 8 AM and 4:30 PM. We usually get back to you within 2 business days depending on the issue/request.    If we are prescribing your medications:    For opioid medication refills, call the clinic or send a [employer] message 7 days in advance.  Please include:    Name of requested medication    Name of the pharmacy.    For non-opioid medications, call your pharmacy directly to request a refill. Please allow 3-4 days to be processed.     Per [place] State Law:    All controlled substance prescriptions must be filled within 30 days of being written.      For those controlled substances allowing refills, pickup must occur within 30 days of last fill.      We believe regular attendance is key to your success in our program!      Any time you are unable to keep your appointment we ask that you call us at least 24 hours in advance to cancel.This will allow us to offer the appointment time to another patient.     Multiple missed appointments may lead to dismissal from the clinic.    [facility] - Office Visit    ASSESSMENT & PLAN     Naveen was seen today for pain.    Diagnoses and all orders for this visit:    Chronic pain syndrome  -     HYDROcodone Bitartrate ER (HYSINGLA) 40 MG ER Tablet; Take 1 tablet (40 mg) by mouth daily  -     HYDROcodone-acetaminophen (NORCO)  MG per tablet; Take 0.5-1 tablets by mouth 2 times daily as needed for severe pain        Patient Instructions    PLAN:    Laboratories recommended by Dr. Balderrama to be added to laboratories next week including vitamin D level, testosterone free and total, TSH T3, T3 reverse, T4.    Continue with the Hysingla ER 40 mg daily and the hydrocodone 10 mg twice a day.    Follow-up with Dr. Balderrama in the office in 2 to 3 months        -----  KRYSTAL BALDERRAMA MD  [facility]       SUBJECTIVE      Naveen Toro is a 77 year old year old male who presents to clinic today for the following:     Follow-up for postlaminectomy syndrome.  Has spinal cord stimulator placed.    Seen today with his wife.  Continues on the Hydro codon 10 mg twice a day and Hysingla ER 40 mg daily.  Reviews the regimen doing fairly well for his back.  Has some constipation taking Amitiza.    When seen last week we talked about temperature dysregulation.  I had wanted him to get some laboratories.  He will get those next week through Dr. Hoover office.  We discussed taking a thyroid supplement as he has cold feeling during the day and sweats at night.  He is taking it a month without benefit.    Takes the coenzyme Q not daily.    We reviewed opioids can cause temperature dysregulation.  Wife senses that he had been on this combination for quite some time, and it was more related to the hospitalization with encephalopathy that this has been noted.  The duloxetine has been decreased to 60 mg daily from twice a day without a real change.    He had been on morphine until he developed kidney problems.  Previously did not tolerate OxyContin and oxymorphone.  He would not like to make significant changes tell further medical assessment.  Last urine drug test in December as expected.      Current Outpatient Medications:      amLODIPine (NORVASC) 2.5 MG tablet, Take 1 tablet by mouth daily, Disp: , Rfl:      aspirin 325 MG tablet, [ASPIRIN 325 MG TABLET] Take 325 mg by mouth daily., Disp: , Rfl:      atorvastatin (LIPITOR) 40 MG tablet,  [ATORVASTATIN (LIPITOR) 40 MG TABLET] Take 40 mg by mouth daily. As directed, Disp: , Rfl:      Coenzyme Q10 (COQ10) 200 MG CAPS, Take 1 capsule by mouth daily, Disp: 30 capsule, Rfl: 3     diclofenac sodium (VOLTAREN) 1 % Gel, [DICLOFENAC SODIUM (VOLTAREN) 1 % GEL] Apply 2 g topically 4 (four) times a day., Disp: 100 g, Rfl: 1     DULoxetine (CYMBALTA) 60 MG capsule, Take 1 capsule (60 mg) by mouth daily, Disp: 90 capsule, Rfl: 1     [START ON 4/24/2022] HYDROcodone Bitartrate ER (HYSINGLA) 40 MG ER Tablet, Take 1 tablet (40 mg) by mouth daily, Disp: 30 tablet, Rfl: 0     [START ON 4/24/2022] HYDROcodone-acetaminophen (NORCO)  MG per tablet, Take 0.5-1 tablets by mouth 2 times daily as needed for severe pain, Disp: 60 tablet, Rfl: 0     lamoTRIgine (LAMICTAL) 25 MG tablet, Take 2 tablets (50 mg) by mouth 2 times daily, Disp: 360 tablet, Rfl: 0     lubiprostone (AMITIZA) 24 MCG capsule, Take 1 capsule (24 mcg) by mouth daily (with breakfast), Disp: 30 capsule, Rfl: 4     metoprolol (LOPRESSOR) 25 MG tablet, [METOPROLOL (LOPRESSOR) 25 MG TABLET] Take 25 mg by mouth 2 (two) times a day., Disp: , Rfl:      naloxone (NARCAN) 4 MG/0.1ML nasal spray, Spray 1 spray (4 mg) into one nostril alternating nostrils once as needed for opioid reversal every 2-3 minutes until assistance arrives, Disp: 0.2 mL, Rfl: 0     polyethylene glycol (GLYCOLAX) 17 gram/dose powder, [POLYETHYLENE GLYCOL (GLYCOLAX) 17 GRAM/DOSE POWDER] Take 17 g by mouth daily., Disp: 595 g, Rfl: 1     vitamin D3 (CHOLECALCIFEROL) 125 MCG (5000 UT) tablet, Take 5,000 Units by mouth, Disp: , Rfl:      blood glucose (PRODIGY NO CODING BLOOD GLUC) test strip, Use  to test daily. DX Code: E11.9, 90 day supply. Pharmacy dispense brand based on insurance. (Patient not taking: Reported on 4/18/2022), Disp: , Rfl:      Lancets MISC, 1 each (Patient not taking: Reported on 4/18/2022), Disp: , Rfl:      metFORMIN (GLUCOPHAGE) 500 MG tablet, [METFORMIN (GLUCOPHAGE)  500 MG TABLET] Take 1,000 mg by mouth 2 (two) times a day with meals.  (Patient not taking: No sig reported), Disp: , Rfl:         Review of Systems   General, psych, musculoskeletal, bowels and bladder otherwise normal other than above.          OBJECTIVE   BP (!) 171/102   Pulse 106   Ht 1.829 m (6')   Wt 103.8 kg (228 lb 14.4 oz)   SpO2 97%   BMI 31.04 kg/m        Physical Exam  General:  Normal appearance, ambulates with cane.  Clear sensorium  Cardiovascular: Normal rate  Lungs: Pulmonary effort is normal, speaking in full sentences  MSK:   Skin:. No concerning rashes or lesions.  Neurologic: No focal deficit, alert and oriented x3  Psychiatric: Somewhat constricted affect.      Assessment: Back pain attributed to postlaminectomy syndrome, has been relatively stable with a spinal cord stimulator and these opioids.    Noted episode of encephalopathy, etiology unclear since that time more difficulties with thermoregulation.    Would like some laboratories including thyroid function, testosterone levels.  We will assess these before making changes in opioids.      Time more than 20 minutes.       04/18/22 1422   PEG: A Thee-Item Scale Assessing Pain Intensity and Interference        0 = No pain / No interference    10 = Pain as bad as you can imagine / Completely interferes   What number best describes your pain on average in the past week? 5   What number best describes how, during the past week, pain has interfered with your enjoyment of life? 7   What number best describes how, during the past week, pain has interfered with your general activity? 5   PEG Total Score 5.67       Again, thank you for allowing me to participate in the care of your patient.        Sincerely,        KRYSTAL BALDERRAMA MD

## 2022-04-20 ENCOUNTER — PROCEDURE ONLY VISIT (OUTPATIENT)
Dept: PALLIATIVE MEDICINE | Facility: OTHER | Age: 77
End: 2022-04-20
Payer: OTHER MISCELLANEOUS

## 2022-04-20 DIAGNOSIS — G89.29 CHRONIC BILATERAL LOW BACK PAIN, UNSPECIFIED WHETHER SCIATICA PRESENT: ICD-10-CM

## 2022-04-20 DIAGNOSIS — M54.50 CHRONIC BILATERAL LOW BACK PAIN, UNSPECIFIED WHETHER SCIATICA PRESENT: ICD-10-CM

## 2022-04-20 DIAGNOSIS — M51.379 DEGENERATION OF LUMBAR OR LUMBOSACRAL INTERVERTEBRAL DISC: ICD-10-CM

## 2022-04-20 DIAGNOSIS — G89.4 CHRONIC PAIN SYNDROME: Primary | ICD-10-CM

## 2022-04-20 PROCEDURE — 97811 ACUP 1/> W/O ESTIM EA ADD 15: CPT | Performed by: ACUPUNCTURIST

## 2022-04-20 PROCEDURE — 97810 ACUP 1/> WO ESTIM 1ST 15 MIN: CPT | Performed by: ACUPUNCTURIST

## 2022-04-20 NOTE — PROGRESS NOTES
ACUPUNCTURIST TREATMENT NOTE      Naveen Toro, a 77 year old male, is here today for Follow - Up exam. Patient is referred by Darrion.    HPI  Main Complaint: Chronic low back pain: Patient reports increased pain and stiffness today in right side low back, starting in center and radiating lateral to L2-L4 area. He states that the weather changes and damp, cooler weather has been increasing his pain recently. Denies pain radiating into left side or buttocks since last treatment.    Secondary Complaints: Night sweating, depression: Denies changes since last treatment.    Past Medical History  Past Medical History Reviewed: Yes   has a past medical history of Acute renal failure (H), Cardiomyopathy (H), Chronic low back pain, Constipation, Coronary artery disease, Diabetes mellitus (H), HLD (hyperlipidemia), Hypercholesterolemia, Hypertension, Lumbar radiculopathy, Post laminectomy syndrome, Postlaminectomy syndrome, lumbar region, Sigmoid diverticulitis, and Sleep apnea.    Objective  Basic Exam Completed:   No    TCM Exam Completed: Yes   Energy: Medium  Sleep: frequent awakening  Emotions: Yes: Depression  Limbs/Back: Lower Back  Perspiration: nighttime    Tongue/Pulse Exam Completed: No    Patient Assessment  Patient Type: Pain  Patient Complaint: Chronic low back pain; night sweating; depression    Acupuncture 4/12/2022 4/20/2022   Intervention Reason Pain; Other Pain; Other   Pain Location Low back Low back   Pre-session Pain Rating 4 5   Post-session Pain Rating 3 -       TCM Diagnosis: Other Qi and blood stasis due to trauma; channel obstruction; damp cold bi syndrome; kidney yang and yin deficiencies; Liver Qi Stagnation; Liver Qi Stagnation    Treatment Principle: Move qi and blood; dredge meridians, tonify kidney yin and yang; Smooth Liver Qi    TCM / Acupuncture Treatment  Acupuncture Points:       Initial insertions: HTJJ L2-L5, Shital, Tati. Right: [id number], [id number]       Second insertions:  Baihui, Ht6, Si3, Li4, Liv3, Ub62, Ub60, Ki3, Ki7, Sp6, Ub57, ear: shenmen, lumbar                    Accessory Techniques 4/12/2022 4/20/2022   Accessory Techniques TDP Heat Lamp; Tuina; Topicals TDP Heat Lamp; Tuina; Topicals   TDP Heat Lamp location used low back low back   Tuina location used low back low back   Topicals Po Sum On Po Sum On   Topicals location used low back low back   Auricular application - -          Assessment and Plan  Treatment Observations: Patient relaxed well during treatment. Did not provide follow up scores today.  Acupuncture Treatment Recommendations:         It is my recommendation that this patient seek advice from their Primary Care Provider about active symptoms not addressed during this visit. The risks and benefits of acupuncture were reviewed and the patient stated understanding. The patient's questions were answered to their satisfaction. Consent was provided for treatment. We thank you for the referral and opportunity to treat this patient.    Time Spent with Patient:   I spent a total of 30 minutes face-to-face with Naveen Toro during today's office visit.     Sydnee Lopez L.Ac.  04/20/22  12:39 PM

## 2022-04-25 ENCOUNTER — TELEPHONE (OUTPATIENT)
Dept: PALLIATIVE MEDICINE | Facility: OTHER | Age: 77
End: 2022-04-25
Payer: MEDICARE

## 2022-04-25 NOTE — TELEPHONE ENCOUNTER
Patient requesting refill lubiprostone (AMITIZA) 24 MCG capsule      [facility] [address] [facility]    Fax from Pharmacy    Rejected claim. Please call insurance to update this prior Authorization         Aleta GARCES [employer] Patient Facilitator

## 2022-04-26 ENCOUNTER — PROCEDURE ONLY VISIT (OUTPATIENT)
Dept: PALLIATIVE MEDICINE | Facility: OTHER | Age: 77
End: 2022-04-26
Payer: OTHER MISCELLANEOUS

## 2022-04-26 DIAGNOSIS — M54.50 CHRONIC BILATERAL LOW BACK PAIN, UNSPECIFIED WHETHER SCIATICA PRESENT: ICD-10-CM

## 2022-04-26 DIAGNOSIS — G89.4 CHRONIC PAIN SYNDROME: Primary | ICD-10-CM

## 2022-04-26 DIAGNOSIS — G89.29 CHRONIC BILATERAL LOW BACK PAIN, UNSPECIFIED WHETHER SCIATICA PRESENT: ICD-10-CM

## 2022-04-26 DIAGNOSIS — M51.379 DEGENERATION OF LUMBAR OR LUMBOSACRAL INTERVERTEBRAL DISC: ICD-10-CM

## 2022-04-26 PROCEDURE — 97811 ACUP 1/> W/O ESTIM EA ADD 15: CPT | Performed by: ACUPUNCTURIST

## 2022-04-26 PROCEDURE — 97810 ACUP 1/> WO ESTIM 1ST 15 MIN: CPT | Performed by: ACUPUNCTURIST

## 2022-04-26 NOTE — PROGRESS NOTES
ACUPUNCTURIST TREATMENT NOTE      Naveen Toro, a 77 year old male, is here today for Follow - Up exam. Patient is referred by Darrion.    HPI  Main Complaint: Chronic low back pain: Patient reports continued right sided low back pain. Some pain radiating into buttock. No pain on left side. Patient reports pain improves after acupuncture for at least one day. Pain seems to be worse with the cold, damp weather. Patient is frustrated with the cold weather.    Secondary Complaints: no changes in night sweating or depression reported.    Past Medical History  Past Medical History Reviewed: Yes   has a past medical history of Acute renal failure (H), Cardiomyopathy (H), Chronic low back pain, Constipation, Coronary artery disease, Diabetes mellitus (H), HLD (hyperlipidemia), Hypercholesterolemia, Hypertension, Lumbar radiculopathy, Post laminectomy syndrome, Postlaminectomy syndrome, lumbar region, Sigmoid diverticulitis, and Sleep apnea.    Objective  Basic Exam Completed:   No    TCM Exam Completed: Yes   Energy: Medium  Emotions: Yes: Depression  Limbs/Back: Lower Back  Perspiration: nighttime    Tongue/Pulse Exam Completed: No    Patient Assessment  Patient Type: Pain  Patient Complaint: Chronic low back pain; night sweating; depression    Acupuncture 4/20/2022 4/26/2022   Intervention Reason Pain; Other Pain; Other   Pain Location Low back Low back   Pre-session Pain Rating 5 5   Post-session Pain Rating - -       TCM Diagnosis: Other Qi and blood stasis due to trauma; channel obstruction; damp cold bi syndrome; kidney yang and yin deficiencies; Liver Qi Stagnation; Liver Qi Stagnation    Treatment Principle: Move qi and blood; dredge meridians, tonify kidney yin and yang; Smooth Liver Qi    TCM / Acupuncture Treatment  Acupuncture Points:       Initial insertions: HTJJ L2-L5, Shiqizhuixia. Right: [id number], Yaoyan, Ub52, Ub53, Ub54, Gb29       Second insertions: Baihui, Sishencong, Ht6, Si3, Liv3, Ub62, Ub60,  Ki3, Ki7, Sp6, Ub57, ear: shenmen, lumbar                    Accessory Techniques 4/20/2022 4/26/2022   Accessory Techniques TDP Heat Lamp; Tuina; Topicals TDP Heat Lamp; Tuina; Topicals   TDP Heat Lamp location used low back low back   Tuina location used low back low back   Topicals Po Sum On Po Sum On   Topicals location used low back low back   Auricular application - -          Assessment and Plan  Treatment Observations: Patient relaxed well during treatment. Reported he felt improved pain post treatment, but did not provide follow up score.  Acupuncture Treatment Recommendations:         It is my recommendation that this patient seek advice from their Primary Care Provider about active symptoms not addressed during this visit. The risks and benefits of acupuncture were reviewed and the patient stated understanding. The patient's questions were answered to their satisfaction. Consent was provided for treatment. We thank you for the referral and opportunity to treat this patient.    Time Spent with Patient:   I spent a total of 30 minutes face-to-face with Naveen Toro during today's office visit.     Sydnee Lopez L.Ac.  04/26/22  10:58 AM

## 2022-05-04 ENCOUNTER — PROCEDURE ONLY VISIT (OUTPATIENT)
Dept: PALLIATIVE MEDICINE | Facility: OTHER | Age: 77
End: 2022-05-04
Payer: OTHER MISCELLANEOUS

## 2022-05-04 DIAGNOSIS — G89.4 CHRONIC PAIN SYNDROME: Primary | ICD-10-CM

## 2022-05-04 DIAGNOSIS — G89.29 CHRONIC BILATERAL LOW BACK PAIN, UNSPECIFIED WHETHER SCIATICA PRESENT: ICD-10-CM

## 2022-05-04 DIAGNOSIS — M51.379 DEGENERATION OF LUMBAR OR LUMBOSACRAL INTERVERTEBRAL DISC: ICD-10-CM

## 2022-05-04 DIAGNOSIS — M54.50 CHRONIC BILATERAL LOW BACK PAIN, UNSPECIFIED WHETHER SCIATICA PRESENT: ICD-10-CM

## 2022-05-04 PROCEDURE — 97811 ACUP 1/> W/O ESTIM EA ADD 15: CPT | Performed by: ACUPUNCTURIST

## 2022-05-04 PROCEDURE — 97810 ACUP 1/> WO ESTIM 1ST 15 MIN: CPT | Performed by: ACUPUNCTURIST

## 2022-05-04 NOTE — PROGRESS NOTES
ACUPUNCTURIST TREATMENT NOTE      Naveen Toro, a 77 year old male, is here today for Follow - Up exam. Patient is referred by Darrion.    HPI  Main Complaint: Chronic low back pain: Patient reports cLBP on right side. Not radiating into left. Occasionally radiating into R buttock. No pain into RLE. Pain rated at 4/10 today.    Secondary Complaints: Night sweats: Reports he continues to wake at night multiple times sweaty and feeling cold.    Past Medical History  Past Medical History Reviewed: Yes   has a past medical history of Acute renal failure (H), Cardiomyopathy (H), Chronic low back pain, Constipation, Coronary artery disease, Diabetes mellitus (H), HLD (hyperlipidemia), Hypercholesterolemia, Hypertension, Lumbar radiculopathy, Post laminectomy syndrome, Postlaminectomy syndrome, lumbar region, Sigmoid diverticulitis, and Sleep apnea.    Objective  Basic Exam Completed:   No    TCM Exam Completed: Yes   Energy: Medium  Sleep: frequent awakening  Limbs/Back: Lower Back  Perspiration: nighttime    Tongue/Pulse Exam Completed: No    Patient Assessment  Patient Type: Pain  Patient Complaint: Chronic low back pain    Acupuncture 4/26/2022 5/4/2022   Intervention Reason Pain; Other Pain; Other   Pain Location Low back Low back   Pre-session Pain Rating 5 4   Post-session Pain Rating - -       TCM Diagnosis: Other Qi and blood stasis due to trauma; channel obstruction; damp cold bi syndrome; kidney yang and yin deficiencies; Liver Qi Stagnation; Liver Qi Stagnation    Treatment Principle: Move qi and blood; dredge meridians, tonify kidney yin and yang; Smooth Liver Qi    TCM / Acupuncture Treatment  Acupuncture Points:       Initial insertions: HTJJ L2-L5, Shiqizhuixia. Right: [id number], Yaoyan, Ub52, Ub53, Ub54, Piriformis MP       Second insertions: Baihui, S[place]hen[place], Ht6, Si3, Liv3, Ub62, Ub60, Ki3, Ki7, Sp6, Ub57, ear: shenmen, lumbar                    Accessory Techniques 4/26/2022 5/4/2022   Accessory  "Techniques TDP Heat Lamp; Tuina; Topicals TDP Heat Lamp; Tuina; Topicals   TDP Heat Lamp location used low back low back   Tuina location used low back low back   Topicals Po Sum On Po Sum On   Topicals location used low back low back   Auricular application - -          Assessment and Plan  Treatment Observations: Patient relaxed well during treatment. Stated he felt \"pretty good\" post treatment, did not provide follow up score.  Acupuncture Treatment Recommendations:         It is my recommendation that this patient seek advice from their Primary Care Provider about active symptoms not addressed during this visit. The risks and benefits of acupuncture were reviewed and the patient stated understanding. The patient's questions were answered to their satisfaction. Consent was provided for treatment. We thank you for the referral and opportunity to treat this patient.    Time Spent with Patient:   I spent a total of 30 minutes face-to-face with Naveen Toro during today's office visit.     Sydnee Lopez L.Ac.  05/04/22  12:31 PM    "

## 2022-05-12 ENCOUNTER — PROCEDURE ONLY VISIT (OUTPATIENT)
Dept: PALLIATIVE MEDICINE | Facility: OTHER | Age: 77
End: 2022-05-12
Payer: OTHER MISCELLANEOUS

## 2022-05-12 DIAGNOSIS — M54.50 CHRONIC BILATERAL LOW BACK PAIN, UNSPECIFIED WHETHER SCIATICA PRESENT: ICD-10-CM

## 2022-05-12 DIAGNOSIS — G89.4 CHRONIC PAIN SYNDROME: Primary | ICD-10-CM

## 2022-05-12 DIAGNOSIS — G89.29 CHRONIC BILATERAL LOW BACK PAIN, UNSPECIFIED WHETHER SCIATICA PRESENT: ICD-10-CM

## 2022-05-12 DIAGNOSIS — M51.379 DEGENERATION OF LUMBAR OR LUMBOSACRAL INTERVERTEBRAL DISC: ICD-10-CM

## 2022-05-12 PROCEDURE — 97811 ACUP 1/> W/O ESTIM EA ADD 15: CPT | Performed by: ACUPUNCTURIST

## 2022-05-12 PROCEDURE — 97810 ACUP 1/> WO ESTIM 1ST 15 MIN: CPT | Performed by: ACUPUNCTURIST

## 2022-05-12 NOTE — PROGRESS NOTES
ACUPUNCTURIST TREATMENT NOTE      Naveen Toro, a 77 year old male, is here today for Follow - Up exam. Patient is referred by Darrion.    HPI  Main Complaint: Chronic low back pain: Continues, rated 4/10, radiating into right side. No pain into leg.     Secondary Complaints: Night sweats, depression: no changes    Past Medical History  Past Medical History Reviewed: Yes   has a past medical history of Acute renal failure (H), Cardiomyopathy (H), Chronic low back pain, Constipation, Coronary artery disease, Diabetes mellitus (H), HLD (hyperlipidemia), Hypercholesterolemia, Hypertension, Lumbar radiculopathy, Post laminectomy syndrome, Postlaminectomy syndrome, lumbar region, Sigmoid diverticulitis, and Sleep apnea.    Objective  Basic Exam Completed:   No    TCM Exam Completed: Yes   Sleep: restless due to pain  Limbs/Back: Lower Back  Perspiration: nighttime    Tongue/Pulse Exam Completed: No    Patient Assessment  Patient Type: Pain  Patient Complaint: Chronic low back pain; night sweating; depression    Acupuncture 5/4/2022 5/12/2022   Intervention Reason Pain; Other Pain; Other   Pain Location Low back Low back   Pre-session Pain Rating 4 4   Post-session Pain Rating - -       TCM Diagnosis: Other Qi and blood stasis due to trauma; channel obstruction; damp cold bi syndrome; kidney yang and yin deficiencies; Liver Qi Stagnation; Liver Qi Stagnation    Treatment Principle: Move qi and blood; dredge meridians, tonify kidney yin and yang; Smooth Liver Qi    TCM / Acupuncture Treatment  Acupuncture Points:       Initial insertions: HTJJ L2-L5, Shiqizhuixia. Right: [id number], Yaoyan, Ub52, Ub53, Ub54, Piriformis MP       Second insertions: Baihui, Sishencong, Anmian, Ht6, Si3, Liv3, Ub62, Ub60, Ki3, Ki7, Sp6, Ub57, (R) ear [facility]                    Accessory Techniques 5/4/2022 5/12/2022   Accessory Techniques TDP Heat Lamp; Tuina; Topicals TDP Heat Lamp; Tuina; Topicals   TDP Heat Lamp location used low back  "low back   Tuina location used low back low back   Topicals Po Sum On Po Sum On   Topicals location used low back low back   Auricular application - -          Assessment and Plan  Treatment Observations: Patient relaxed well during treatment. Stated pain was \"not too bad\" post treatment.  Acupuncture Treatment Recommendations:         It is my recommendation that this patient seek advice from their Primary Care Provider about active symptoms not addressed during this visit. The risks and benefits of acupuncture were reviewed and the patient stated understanding. The patient's questions were answered to their satisfaction. Consent was provided for treatment. We thank you for the referral and opportunity to treat this patient.    Time Spent with Patient:   I spent a total of 30 minutes face-to-face with Naveen Toro during today's office visit.     Sydnee Lopez L.Ac.  05/12/22  12:07 PM    "

## 2022-05-18 ENCOUNTER — PROCEDURE ONLY VISIT (OUTPATIENT)
Dept: PALLIATIVE MEDICINE | Facility: OTHER | Age: 77
End: 2022-05-18
Payer: OTHER MISCELLANEOUS

## 2022-05-18 DIAGNOSIS — G89.4 CHRONIC PAIN SYNDROME: ICD-10-CM

## 2022-05-18 DIAGNOSIS — M54.50 CHRONIC BILATERAL LOW BACK PAIN, UNSPECIFIED WHETHER SCIATICA PRESENT: ICD-10-CM

## 2022-05-18 DIAGNOSIS — G89.29 CHRONIC BILATERAL LOW BACK PAIN, UNSPECIFIED WHETHER SCIATICA PRESENT: ICD-10-CM

## 2022-05-18 DIAGNOSIS — M51.379 DEGENERATION OF LUMBAR OR LUMBOSACRAL INTERVERTEBRAL DISC: ICD-10-CM

## 2022-05-18 DIAGNOSIS — G89.4 CHRONIC PAIN SYNDROME: Primary | ICD-10-CM

## 2022-05-18 PROCEDURE — 97810 ACUP 1/> WO ESTIM 1ST 15 MIN: CPT | Performed by: ACUPUNCTURIST

## 2022-05-18 PROCEDURE — 97811 ACUP 1/> W/O ESTIM EA ADD 15: CPT | Performed by: ACUPUNCTURIST

## 2022-05-18 RX ORDER — LAMOTRIGINE 25 MG/1
50 TABLET ORAL 2 TIMES DAILY
Qty: 360 TABLET | Refills: 0 | Status: SHIPPED | OUTPATIENT
Start: 2022-05-18 | End: 2022-07-26

## 2022-05-18 NOTE — TELEPHONE ENCOUNTER
Received fax request from   Lamotrigine 25 mg  Last refilled on 2/15/22-90 days    Pt last seen on 4/18/22  Next appt scheduled for 6/21/22    Pending Prescriptions:                       Disp   Refills    lamoTRIgine (LAMICTAL) 25 MG tablet       360 ta*0            Sig: Take 2 tablets (50 mg) by mouth 2 times daily    [facility]

## 2022-05-18 NOTE — PROGRESS NOTES
"ACUPUNCTURIST TREATMENT NOTE      Naveen Toro, a 77 year old male, is here today for Follow - Up exam. Patient is referred by Darrion.    HPI  Main Complaint: Chronic low back pain: Patient reports low back pain remains \"about the same\" rating 4/10 on right side. Denies any pain radiating into RLE.     Secondary Complaints: Reports No changes in night sweating or depression.    Past Medical History  Past Medical History Reviewed: Yes   has a past medical history of Acute renal failure (H), Cardiomyopathy (H), Chronic low back pain, Constipation, Coronary artery disease, Diabetes mellitus (H), HLD (hyperlipidemia), Hypercholesterolemia, Hypertension, Lumbar radiculopathy, Post laminectomy syndrome, Postlaminectomy syndrome, lumbar region, Sigmoid diverticulitis, and Sleep apnea.    Objective  Basic Exam Completed:   No    TCM Exam Completed: Yes   Energy: Medium  Emotions: Yes: Depression  Limbs/Back: Lower Back  Perspiration: nighttime    Tongue/Pulse Exam Completed: No    Patient Assessment  Patient Type: Pain  Patient Complaint: Chronic low back pain; night sewating; depression    Acupuncture 5/12/2022 5/18/2022   Intervention Reason Pain; Other Pain; Other   Pain Location Low back Low back   Pre-session Pain Rating 4 4   Post-session Pain Rating - -       TCM Diagnosis: Other Qi and blood stasis due to trauma; channel obstruction; damp cold bi syndrome; kidney yang and yin deficiencies; Liver Qi Stagnation; Liver Qi Stagnation    Treatment Principle: Move qi and blood; dredge meridians, tonify kidney yin and yang; Smooth Liver Qi    TCM / Acupuncture Treatment  Acupuncture Points:       Initial insertions: HTJJ L2-L5, Shiqizhuixia. Right: [id number], Yaoyan, Ub52       Second insertions: Baihui, S[place]hen[place], Ht6, Si3, Liv3, Ub62, Ub60, Ki3, Ki7, Sp6, Ub57, ear: low back, lumbar                    Accessory Techniques 5/12/2022 5/18/2022   Accessory Techniques TDP Heat Lamp; Tuina; Topicals TDP Heat Lamp; Tuina; " Topicals   TDP Heat Lamp location used low back low back   Tuina location used low back low back   Topicals Po Sum On Po Sum On   Topicals location used low back low back   Auricular application - -          Assessment and Plan  Treatment Observations: Patient relaxed well during treatment; stated he felt more relaxed post treatment.  Acupuncture Treatment Recommendations:         It is my recommendation that this patient seek advice from their Primary Care Provider about active symptoms not addressed during this visit. The risks and benefits of acupuncture were reviewed and the patient stated understanding. The patient's questions were answered to their satisfaction. Consent was provided for treatment. We thank you for the referral and opportunity to treat this patient.    Time Spent with Patient:   I spent a total of 30 minutes face-to-face with Naveen Toro during today's office visit.     Sydnee Lopez L.Ac.  05/18/22  11:58 AM

## 2022-05-20 ENCOUNTER — OFFICE VISIT (OUTPATIENT)
Dept: PALLIATIVE MEDICINE | Facility: OTHER | Age: 77
End: 2022-05-20
Payer: OTHER MISCELLANEOUS

## 2022-05-20 VITALS
SYSTOLIC BLOOD PRESSURE: 116 MMHG | DIASTOLIC BLOOD PRESSURE: 59 MMHG | WEIGHT: 187 LBS | HEIGHT: 70 IN | BODY MASS INDEX: 26.77 KG/M2 | HEART RATE: 52 BPM

## 2022-05-20 DIAGNOSIS — G89.4 CHRONIC PAIN SYNDROME: ICD-10-CM

## 2022-05-20 DIAGNOSIS — Z79.891 LONG TERM (CURRENT) USE OF OPIATE ANALGESIC: ICD-10-CM

## 2022-05-20 PROCEDURE — G0463 HOSPITAL OUTPT CLINIC VISIT: HCPCS

## 2022-05-20 PROCEDURE — 99213 OFFICE O/P EST LOW 20 MIN: CPT | Performed by: ANESTHESIOLOGY

## 2022-05-20 RX ORDER — DULOXETIN HYDROCHLORIDE 30 MG/1
CAPSULE, DELAYED RELEASE ORAL
Qty: 12 CAPSULE | Refills: 3 | Status: SHIPPED | OUTPATIENT
Start: 2022-05-20 | End: 2022-06-21

## 2022-05-20 RX ORDER — HYDROCODONE BITARTRATE 40 MG/1
40 TABLET, EXTENDED RELEASE ORAL DAILY
Qty: 30 TABLET | Refills: 0 | Status: SHIPPED | OUTPATIENT
Start: 2022-05-24 | End: 2022-06-21

## 2022-05-20 RX ORDER — HYDROCODONE BITARTRATE AND ACETAMINOPHEN 10; 325 MG/1; MG/1
.5-1 TABLET ORAL 2 TIMES DAILY PRN
Qty: 60 TABLET | Refills: 0 | Status: SHIPPED | OUTPATIENT
Start: 2022-05-24 | End: 2022-06-21

## 2022-05-20 ASSESSMENT — PAIN SCALES - GENERAL: PAINLEVEL: MODERATE PAIN (5)

## 2022-05-20 NOTE — PROGRESS NOTES
PEG Score 2/22/2022 4/18/2022 5/20/2022   PEG Total Score 6.67 5.67 6.67     [facility] Number:  [phone]    Call with any questions about your care and for scheduling assistance.     Calls are returned Monday through Friday between 8 AM and 4:30 PM. We usually get back to you within 2 business days depending on the issue/request.    If we are prescribing your medications:    For opioid medication refills, call the clinic or send a [employer] message 7 days in advance.  Please include:    Name of requested medication    Name of the pharmacy.    For non-opioid medications, call your pharmacy directly to request a refill. Please allow 3-4 days to be processed.     Per [place] State Law:    All controlled substance prescriptions must be filled within 30 days of being written.      For those controlled substances allowing refills, pickup must occur within 30 days of last fill.      We believe regular attendance is key to your success in our program!      Any time you are unable to keep your appointment we ask that you call us at least 24 hours in advance to cancel.This will allow us to offer the appointment time to another patient.     Multiple missed appointments may lead to dismissal from the clinic.

## 2022-05-20 NOTE — PATIENT INSTRUCTIONS
PLAN:    Discussed approaches to see which medications are causing the sweating at night.    Change the duloxetine (Cymbalta) to a 30 mg tablet 1 daily for 4 days, then every other day for 4 doses and stop.  We will see if it relates to sweating.  If being off the duloxetine does not affect your pain do not use.  If you find your pain is worse and still sweating we can resume, call Dr. Parker to discuss.    If still sweating after 10 days change lamotrigine to 1 tablet daily for 1 week then stop, assess if causes sweating or helping pain    If still sweating after stopping these 2 medications we will need to change your Hysingla ER   andhydrocodone to a different opioid to see if causing sweating.  Call Dr. Parker to discuss.    Follow-up with Dr. Parker in the office in 2 months

## 2022-05-20 NOTE — PROGRESS NOTES
[facility] - Office Visit    ASSESSMENT & PLAN     Naveen was seen today for pain.    Diagnoses and all orders for this visit:    Long term (current) use of opiate analgesic    Chronic pain syndrome  -     HYDROcodone Bitartrate ER (HYSINGLA) 40 MG ER Tablet; Take 1 tablet (40 mg) by mouth daily  -     HYDROcodone-acetaminophen (NORCO)  MG per tablet; Take 0.5-1 tablets by mouth 2 times daily as needed for severe pain  -     DULoxetine (CYMBALTA) 30 MG capsule; One daily for 4 days, one every other day 4 doses and stop        Patient Instructions   PLAN:    Discussed approaches to see which medications are causing the sweating at night.    Change the duloxetine (Cymbalta) to a 30 mg tablet 1 daily for 4 days, then every other day for 4 doses and stop.  We will see if it relates to sweating.  If being off the duloxetine does not affect your pain do not use.  If you find your pain is worse and still sweating we can resume, call Dr. Balderrama to discuss.    If still sweating after 10 days change lamotrigine to 1 tablet daily for 1 week then stop, assess if causes sweating or helping pain    If still sweating after stopping these 2 medications we will need to change your Hysingla ER   andhydrocodone to a different opioid to see if causing sweating.  Call Dr. Balderrama to discuss.    Follow-up with Dr. Balderrama in the office in 2 months        -----  KRYSTAL BALDERRAMA MD  [facility]       SUBJECTIVE      Naveen Toro is a 77 year old year old male who presents to clinic today for the following:     Follow-up for history of postlaminectomy syndrome, with spinal cord stimulator.    Reviewed he saw his primary care provider earlier in the month.  Continues to have significant nighttime sweats, soaking the sheets.  They reviewed whether it could be the duloxetine or lamotrigine contributing to the sweats.  Laboratories to address other constitutional symptoms are unrevealing.    He  is decrease the duloxetine from 120 to 60 mg, no changes.  Is using lamotrigine 25 mg twice a day.    Continues on the Hysingla ER 40 mg daily and hydrocodone 10 mg twice a day.   reviewed.  Urine drug test in December.  Feels these are doing the same in terms of pain management, no particular side effects.    Review of Systems   General, psych, musculoskeletal, bowels and bladder otherwise normal other than above.          OBJECTIVE   BP (!) 171/102   Pulse 106   Ht 1.829 m (6')   Wt 103.8 kg (228 lb 14.4 oz)   SpO2 97%   BMI 31.04 kg/m        Physical Exam  General:  Normal appearance, no apparent distress, does not appear diaphoretic presently.  Ambulates with cane  Cardiovascular: Normal rate  Lungs: Pulmonary effort is normal, speaking in full sentences  MSK:  Skin: Warm and dry. No concerning rashes or lesions.  Neurologic: No focal deficit, alert and oriented x3  Psychiatric: normal mood and affect, cooperative      Assessment: Low back pain history of lumbar laminectomy.  We will review his medications to see if they related to the.  Troubling nighttime sweats.  Medical evaluations otherwise unrevealing.  He has not attributed to his opioids.    Will change the duloxetine to 30 mg tablet 1 daily for 4 days then every other day for 4 doses.  Discussed possible discontinuation syndrome.  Depending on that taper, would then taper the lamotrigine to 1 tablet daily for 5 days and stopping.    You will assess if either been helpful for pain and/or contributing to the nighttime sweats.  If not we would then need to look at rotating opioids.    Total time more than 20 minutes

## 2022-05-20 NOTE — PROGRESS NOTES
05/20/22 1046   PEG: A Thee-Item Scale Assessing Pain Intensity and Interference        0 = No pain / No interference    10 = Pain as bad as you can imagine / Completely interferes   What number best describes your pain on average in the past week? 4   What number best describes how, during the past week, pain has interfered with your enjoyment of life? 8   What number best describes how, during the past week, pain has interfered with your general activity? 8   PEG Total Score 6.67

## 2022-05-20 NOTE — LETTER
5/20/2022         RE: Naveen Toro  [address] [address]        Dear Colleague,    Thank you for referring your patient, Naveen Toro, to the [facility]. Please see a copy of my visit note below.      PEG Score 2/22/2022 4/18/2022 5/20/2022   PEG Total Score 6.67 5.67 6.67     [facility] Number:  [phone]    Call with any questions about your care and for scheduling assistance.     Calls are returned Monday through Friday between 8 AM and 4:30 PM. We usually get back to you within 2 business days depending on the issue/request.    If we are prescribing your medications:    For opioid medication refills, call the clinic or send a [employer] message 7 days in advance.  Please include:    Name of requested medication    Name of the pharmacy.    For non-opioid medications, call your pharmacy directly to request a refill. Please allow 3-4 days to be processed.     Per [place] State Law:    All controlled substance prescriptions must be filled within 30 days of being written.      For those controlled substances allowing refills, pickup must occur within 30 days of last fill.      We believe regular attendance is key to your success in our program!      Any time you are unable to keep your appointment we ask that you call us at least 24 hours in advance to cancel.This will allow us to offer the appointment time to another patient.     Multiple missed appointments may lead to dismissal from the clinic.    [facility] - Office Visit    ASSESSMENT & PLAN     Naveen was seen today for pain.    Diagnoses and all orders for this visit:    Long term (current) use of opiate analgesic    Chronic pain syndrome  -     HYDROcodone Bitartrate ER (HYSINGLA) 40 MG ER Tablet; Take 1 tablet (40 mg) by mouth daily  -     HYDROcodone-acetaminophen (NORCO)  MG per tablet; Take 0.5-1 tablets by mouth 2 times daily as needed for  severe pain  -     DULoxetine (CYMBALTA) 30 MG capsule; One daily for 4 days, one every other day 4 doses and stop        Patient Instructions   PLAN:    Discussed approaches to see which medications are causing the sweating at night.    Change the duloxetine (Cymbalta) to a 30 mg tablet 1 daily for 4 days, then every other day for 4 doses and stop.  We will see if it relates to sweating.  If being off the duloxetine does not affect your pain do not use.  If you find your pain is worse and still sweating we can resume, call Dr. Balderrama to discuss.    If still sweating after 10 days change lamotrigine to 1 tablet daily for 1 week then stop, assess if causes sweating or helping pain    If still sweating after stopping these 2 medications we will need to change your Hysingla ER   andhydrocodone to a different opioid to see if causing sweating.  Call Dr. Balderrama to discuss.    Follow-up with Dr. Balderrama in the office in 2 months        -----  KRYSTAL BALDERRAMA MD  [facility]       SUBJECTIVE      Naveen Toro is a 77 year old year old male who presents to clinic today for the following:     Follow-up for history of postlaminectomy syndrome, with spinal cord stimulator.    Reviewed he saw his primary care provider earlier in the month.  Continues to have significant nighttime sweats, soaking the sheets.  They reviewed whether it could be the duloxetine or lamotrigine contributing to the sweats.  Laboratories to address other constitutional symptoms are unrevealing.    He is decrease the duloxetine from 120 to 60 mg, no changes.  Is using lamotrigine 25 mg twice a day.    Continues on the Hysingla ER 40 mg daily and hydrocodone 10 mg twice a day.   reviewed.  Urine drug test in December.  Feels these are doing the same in terms of pain management, no particular side effects.    Review of Systems   General, psych, musculoskeletal, bowels and bladder otherwise normal other than above.           OBJECTIVE   BP (!) 171/102   Pulse 106   Ht 1.829 m (6')   Wt 103.8 kg (228 lb 14.4 oz)   SpO2 97%   BMI 31.04 kg/m        Physical Exam  General:  Normal appearance, no apparent distress, does not appear diaphoretic presently.  Ambulates with cane  Cardiovascular: Normal rate  Lungs: Pulmonary effort is normal, speaking in full sentences  MSK:  Skin: Warm and dry. No concerning rashes or lesions.  Neurologic: No focal deficit, alert and oriented x3  Psychiatric: normal mood and affect, cooperative      Assessment: Low back pain history of lumbar laminectomy.  We will review his medications to see if they related to the.  Troubling nighttime sweats.  Medical evaluations otherwise unrevealing.  He has not attributed to his opioids.    Will change the duloxetine to 30 mg tablet 1 daily for 4 days then every other day for 4 doses.  Discussed possible discontinuation syndrome.  Depending on that taper, would then taper the lamotrigine to 1 tablet daily for 5 days and stopping.    You will assess if either been helpful for pain and/or contributing to the nighttime sweats.  If not we would then need to look at rotating opioids.    Total time more than 20 minutes         05/20/22 1046   PEG: A Thee-Item Scale Assessing Pain Intensity and Interference        0 = No pain / No interference    10 = Pain as bad as you can imagine / Completely interferes   What number best describes your pain on average in the past week? 4   What number best describes how, during the past week, pain has interfered with your enjoyment of life? 8   What number best describes how, during the past week, pain has interfered with your general activity? 8   PEG Total Score 6.67       Again, thank you for allowing me to participate in the care of your patient.        Sincerely,        KRYSTAL BALDERRAMA MD

## 2022-05-24 ENCOUNTER — PROCEDURE ONLY VISIT (OUTPATIENT)
Dept: PALLIATIVE MEDICINE | Facility: OTHER | Age: 77
End: 2022-05-24
Payer: OTHER MISCELLANEOUS

## 2022-05-24 DIAGNOSIS — G89.4 CHRONIC PAIN SYNDROME: Primary | ICD-10-CM

## 2022-05-24 DIAGNOSIS — G89.29 CHRONIC BILATERAL LOW BACK PAIN, UNSPECIFIED WHETHER SCIATICA PRESENT: ICD-10-CM

## 2022-05-24 DIAGNOSIS — M54.50 CHRONIC BILATERAL LOW BACK PAIN, UNSPECIFIED WHETHER SCIATICA PRESENT: ICD-10-CM

## 2022-05-24 DIAGNOSIS — M51.379 DEGENERATION OF LUMBAR OR LUMBOSACRAL INTERVERTEBRAL DISC: ICD-10-CM

## 2022-05-24 PROCEDURE — 97810 ACUP 1/> WO ESTIM 1ST 15 MIN: CPT | Performed by: ACUPUNCTURIST

## 2022-05-24 PROCEDURE — 97811 ACUP 1/> W/O ESTIM EA ADD 15: CPT | Performed by: ACUPUNCTURIST

## 2022-05-24 NOTE — PROGRESS NOTES
ACUPUNCTURIST TREATMENT NOTE      Naveen Toro, a 77 year old male, is here today for Follow - Up exam. Patient is referred by Darrion.    HPI  Main Complaint: Chronic low back pain: Pain continues in right side of low back, right sided. Pain levels remaining around 4/10.    Secondary Complaints: Continues with night sweating and depression.      Past Medical History  Past Medical History Reviewed: Yes   has a past medical history of Acute renal failure (H), Cardiomyopathy (H), Chronic low back pain, Constipation, Coronary artery disease, Diabetes mellitus (H), HLD (hyperlipidemia), Hypercholesterolemia, Hypertension, Lumbar radiculopathy, Post laminectomy syndrome, Postlaminectomy syndrome, lumbar region, Sigmoid diverticulitis, and Sleep apnea.    Objective  Basic Exam Completed:   No    TCM Exam Completed: Yes   Energy: Medium  Sleep: frequent awakening  Emotions: Yes: Depression  Limbs/Back: Lower Back  Perspiration: nighttime    Tongue/Pulse Exam Completed: No    Patient Assessment  Patient Type: Pain  Patient Complaint: Chronic low back pain; night sweating; depression    Acupuncture 5/18/2022 5/24/2022   Intervention Reason Pain; Other Pain; Other   Pain Location Low back Low back   Pre-session Pain Rating 4 4   Post-session Pain Rating - -       TCM Diagnosis: Other Qi and blood stasis due to trauma; channel obstruction; damp cold bi syndrome; kidney yang and yin deficiencies; Liver Qi Stagnation; Liver Qi Stagnation    Treatment Principle: Move qi and blood; dredge meridians, tonify kidney yin and yang; Smooth Liver Qi    TCM / Acupuncture Treatment  Acupuncture Points:       Initial insertions: HTJJ L2-L5, Shiqizhuixia. Right: [id number], Yaoyan, Ub52, Ub53, Ub54, Piriformis MP       Second insertions: Baihui, Sishencong, Ht6, Si3, Liv3, Ub62, Ub63, Ub60, Ki3, Ki7, Sp6, Ub57, ear: low back, lumbar                    Accessory Techniques 5/18/2022 5/24/2022   Accessory Techniques TDP Heat Lamp; Tuina;  Topicals TDP Heat Lamp; Tuina; Topicals   TDP Heat Lamp location used low back low back   Tuina location used low back low back   Topicals Po Sum On Po Sum On   Topicals location used low back low back   Auricular application - -          Assessment and Plan  Treatment Observations: Patient relaxed well during treatment.  Acupuncture Treatment Recommendations:         It is my recommendation that this patient seek advice from their Primary Care Provider about active symptoms not addressed during this visit. The risks and benefits of acupuncture were reviewed and the patient stated understanding. The patient's questions were answered to their satisfaction. Consent was provided for treatment. We thank you for the referral and opportunity to treat this patient.    Time Spent with Patient:   I spent a total of 30 minutes face-to-face with Naveen Toro during today's office visit.     Sydnee Lopez L.Ac.  05/24/22  12:05 PM

## 2022-06-01 ENCOUNTER — PROCEDURE ONLY VISIT (OUTPATIENT)
Dept: PALLIATIVE MEDICINE | Facility: OTHER | Age: 77
End: 2022-06-01
Payer: OTHER MISCELLANEOUS

## 2022-06-01 DIAGNOSIS — M54.50 CHRONIC BILATERAL LOW BACK PAIN, UNSPECIFIED WHETHER SCIATICA PRESENT: ICD-10-CM

## 2022-06-01 DIAGNOSIS — M51.379 DEGENERATION OF LUMBAR OR LUMBOSACRAL INTERVERTEBRAL DISC: ICD-10-CM

## 2022-06-01 DIAGNOSIS — G89.4 CHRONIC PAIN SYNDROME: Primary | ICD-10-CM

## 2022-06-01 DIAGNOSIS — G89.29 CHRONIC BILATERAL LOW BACK PAIN, UNSPECIFIED WHETHER SCIATICA PRESENT: ICD-10-CM

## 2022-06-01 PROCEDURE — 97810 ACUP 1/> WO ESTIM 1ST 15 MIN: CPT | Performed by: ACUPUNCTURIST

## 2022-06-01 PROCEDURE — 97811 ACUP 1/> W/O ESTIM EA ADD 15: CPT | Performed by: ACUPUNCTURIST

## 2022-06-01 NOTE — PROGRESS NOTES
ACUPUNCTURIST TREATMENT NOTE      Naveen Toro, a 77 year old male, is here today for Follow - Up exam. Patient is referred by Darrion.    HPI  Main Complaint: Chronic low back pain: Patient reports pain levels remain about the same around 4/10 in right side low back. No pain into buttock or leg.    Secondary Complaints: No changes in night sweating or depression.    Past Medical History  Past Medical History Reviewed: Yes   has a past medical history of Acute renal failure (H), Cardiomyopathy (H), Chronic low back pain, Constipation, Coronary artery disease, Diabetes mellitus (H), HLD (hyperlipidemia), Hypercholesterolemia, Hypertension, Lumbar radiculopathy, Post laminectomy syndrome, Postlaminectomy syndrome, lumbar region, Sigmoid diverticulitis, and Sleep apnea.    Objective  Basic Exam Completed:   No    TCM Exam Completed: Yes   Energy: Medium  Sleep: restless due to pain and restless due to night sweating  Emotions: Yes: Depression  Limbs/Back: Lower Back  Perspiration: nighttime    Tongue/Pulse Exam Completed: No    Patient Assessment  Patient Type: Pain  Patient Complaint: Chronic right sided low back pain; night sweating; depression    Acupuncture 5/24/2022 6/1/2022   Intervention Reason Pain; Other Pain; Other   Pain Location Low back Low back   Pre-session Pain Rating 4 4   Post-session Pain Rating - -       TCM Diagnosis: Other Qi and blood stasis due to trauma; channel obstruction; damp cold bi syndrome; kidney yang and yin deficiencies; Liver Qi Stagnation; Liver Qi Stagnation    Treatment Principle: Move qi and blood; dredge meridians, tonify kidney yin and yang; Smooth Liver Qi    TCM / Acupuncture Treatment  Acupuncture Points:       Initial insertions: HTJJ L2-L5, Shiqikevinixia. Right: [id number], Yaoyan, Ub52, Ub53, Ub54       Second insertions: Baihui, Sishencong, Anmian, Ht6, Si3, Liv3, Ub62, Ub60, Ki3, Ki7, Sp6, Ub57, Gb34, (L) ear [facility]                    Accessory Techniques 5/24/2022  "6/1/2022   Accessory Techniques TDP Heat Lamp; Tuina; Topicals TDP Heat Lamp; Tuina; Topicals   TDP Heat Lamp location used low back low back   Tuina location used low back low back   Topicals Po Sum On Po Sum On   Topicals location used low back low back   Auricular application - -          Assessment and Plan  Treatment Observations: Patient stated he relaxed well during treatment and felt \"pretty good\" and \"more relaxed\" post treatment. Follow up score not provided.  Acupuncture Treatment Recommendations: Patient has one more treatment scheduled. Discussed pausing acupuncture for up to one month. Instructed to contact clinic if after 1 month patient notices pain levels increasing to discuss scheduling additional appointments.       It is my recommendation that this patient seek advice from their Primary Care Provider about active symptoms not addressed during this visit. The risks and benefits of acupuncture were reviewed and the patient stated understanding. The patient's questions were answered to their satisfaction. Consent was provided for treatment. We thank you for the referral and opportunity to treat this patient.    Time Spent with Patient:   I spent a total of 30 minutes face-to-face with Naveen Toro during today's office visit.     Sydnee Lopez L.Ac.  06/01/22  12:26 PM    "

## 2022-06-07 ENCOUNTER — PROCEDURE ONLY VISIT (OUTPATIENT)
Dept: PALLIATIVE MEDICINE | Facility: OTHER | Age: 77
End: 2022-06-07
Payer: OTHER MISCELLANEOUS

## 2022-06-07 DIAGNOSIS — G89.29 CHRONIC BILATERAL LOW BACK PAIN, UNSPECIFIED WHETHER SCIATICA PRESENT: ICD-10-CM

## 2022-06-07 DIAGNOSIS — M54.50 CHRONIC BILATERAL LOW BACK PAIN, UNSPECIFIED WHETHER SCIATICA PRESENT: ICD-10-CM

## 2022-06-07 DIAGNOSIS — M51.379 DEGENERATION OF LUMBAR OR LUMBOSACRAL INTERVERTEBRAL DISC: Primary | ICD-10-CM

## 2022-06-07 DIAGNOSIS — G89.4 CHRONIC PAIN SYNDROME: ICD-10-CM

## 2022-06-07 PROCEDURE — 97810 ACUP 1/> WO ESTIM 1ST 15 MIN: CPT | Performed by: ACUPUNCTURIST

## 2022-06-07 PROCEDURE — 97811 ACUP 1/> W/O ESTIM EA ADD 15: CPT | Performed by: ACUPUNCTURIST

## 2022-06-07 NOTE — PROGRESS NOTES
ACUPUNCTURIST TREATMENT NOTE      Naveen Toro, a 77 year old male, is here today for Follow - Up exam. Patient is referred by Darrion.    HPI  Main Complaint: Chronic low back pain: Patient reports pain remains in right side radiating into buttock. Rated 5/10 today.     Secondary Complaints: No changes in night sweating, depression.    Past Medical History  Past Medical History Reviewed: Yes   has a past medical history of Acute renal failure (H), Cardiomyopathy (H), Chronic low back pain, Constipation, Coronary artery disease, Diabetes mellitus (H), HLD (hyperlipidemia), Hypercholesterolemia, Hypertension, Lumbar radiculopathy, Post laminectomy syndrome, Postlaminectomy syndrome, lumbar region, Sigmoid diverticulitis, and Sleep apnea.    Objective  Basic Exam Completed:   No    TCM Exam Completed: Yes   Energy: Medium  Sleep: frequent awakening and restless due to pain  Emotions: Yes: Depression  Limbs/Back: Lower Back  Perspiration: nighttime    Tongue/Pulse Exam Completed: No    Patient Assessment  Patient Type: Pain  Patient Complaint: Chronic right sided low back pain; night sweating; depression    Acupuncture 6/1/2022 6/7/2022   Intervention Reason Pain; Other Pain; Other   Pain Location Low back Low back   Pre-session Pain Rating 4 5   Post-session Pain Rating - -       TCM Diagnosis: Other Qi and blood stasis due to trauma; channel obstruction; damp cold bi syndrome; kidney yang and yin deficiencies; Liver Qi Stagnation; Liver Qi Stagnation    Treatment Principle: Move qi and blood; dredge meridians, tonify kidney yin and yang; Smooth Liver Qi    TCM / Acupuncture Treatment  Acupuncture Points:       Initial insertions: HTJJ L2-L5, Shiqizhuixia, Du4. Right: [id number], Yaoyan, Ub52, Ub53, Ub54, Piriformis MP       Second insertions: Baihui, Sishencong, Anmian, Ht7, Si3, Liv3, Ub62, Ub60, Ki2, Ki7, Sp6, Ub57, Gb34, (R) ear [facility]                    Accessory Techniques 6/1/2022 6/7/2022   Accessory  "Techniques TDP Heat Lamp; Tuina; Topicals TDP Heat Lamp; Tuina; Topicals   TDP Heat Lamp location used low back low back   Tuina location used low back low back   Topicals Po Sum On Po Sum On   Topicals location used low back low back   Auricular application - -          Assessment and Plan  Treatment Observations: Patient stated he relaxed well during treatment. Stated he felt \"a little better\" post treatment.  Acupuncture Treatment Recommendations: Patient will request additional treatment if he finds that pain increases within the next month after cessation of treatment.  Additional Recommendations: Provided patient information on Tyrone Ji classes.    It is my recommendation that this patient seek advice from their Primary Care Provider about active symptoms not addressed during this visit. The risks and benefits of acupuncture were reviewed and the patient stated understanding. The patient's questions were answered to their satisfaction. Consent was provided for treatment. We thank you for the referral and opportunity to treat this patient.    Time Spent with Patient:   I spent a total of 30 minutes face-to-face with Naveen Toro during today's office visit.     Sydnee Lopez L.Ac.  06/07/22  12:39 PM    "

## 2022-06-21 ENCOUNTER — OFFICE VISIT (OUTPATIENT)
Dept: PALLIATIVE MEDICINE | Facility: OTHER | Age: 77
End: 2022-06-21
Payer: OTHER MISCELLANEOUS

## 2022-06-21 VITALS — DIASTOLIC BLOOD PRESSURE: 63 MMHG | OXYGEN SATURATION: 96 % | SYSTOLIC BLOOD PRESSURE: 117 MMHG | HEART RATE: 60 BPM

## 2022-06-21 DIAGNOSIS — G89.4 CHRONIC PAIN SYNDROME: ICD-10-CM

## 2022-06-21 PROCEDURE — 99214 OFFICE O/P EST MOD 30 MIN: CPT | Performed by: ANESTHESIOLOGY

## 2022-06-21 PROCEDURE — G0463 HOSPITAL OUTPT CLINIC VISIT: HCPCS

## 2022-06-21 RX ORDER — OXYCODONE HYDROCHLORIDE 5 MG/1
TABLET ORAL
Qty: 120 TABLET | Refills: 0 | Status: SHIPPED | OUTPATIENT
Start: 2022-06-21 | End: 2022-07-14

## 2022-06-21 RX ORDER — DULOXETIN HYDROCHLORIDE 60 MG/1
60 CAPSULE, DELAYED RELEASE ORAL DAILY
Qty: 90 CAPSULE | Refills: 1 | Status: SHIPPED | OUTPATIENT
Start: 2022-06-21 | End: 2023-01-17

## 2022-06-21 RX ORDER — HYDROCODONE BITARTRATE AND ACETAMINOPHEN 10; 325 MG/1; MG/1
1 TABLET ORAL 4 TIMES DAILY
Qty: 63 TABLET | Refills: 0 | Status: SHIPPED | OUTPATIENT
Start: 2022-06-21 | End: 2022-07-26

## 2022-06-21 ASSESSMENT — PAIN SCALES - GENERAL: PAINLEVEL: MODERATE PAIN (4)

## 2022-06-21 NOTE — LETTER
Opioid / Opioid Plus Controlled Substance Agreement    This is an agreement between you and your provider about the safe and appropriate use of controlled substance/opioids prescribed by your care team. Controlled substances are medicines that can cause physical and mental dependence (abuse).    There are strict laws about having and using these medicines. We here at [facility] are committing to working with you in your efforts to get better. To support you in this work, we ll help you schedule regular office appointments for medicine refills. If we must cancel or change your appointment for any reason, we ll make sure you have enough medicine to last until your next appointment.     As a Provider, I will:    Listen carefully to your concerns and treat you with respect.     Recommend a treatment plan that I believe is in your best interest. This plan may involve therapies other than opioid pain medication.     Talk with you often about the possible benefits, and the risk of harm of any medicine that we prescribe for you.     Provide a plan on how to taper (discontinue or go off) using this medicine if the decision is made to stop its use.    As a Patient, I understand that opioid(s):     Are a controlled substance prescribed by my care team to help me function or work and manage my condition(s).     Are strong medicines and can cause serious side effects such as:    Drowsiness, which can seriously affect my driving ability    A lower breathing rate, enough to cause death    Harm to my thinking ability     Depression     Abuse of and addiction to this medicine    Need to be taken exactly as prescribed. Combining opioids with certain medicines or chemicals (such as illegal drugs, sedatives, sleeping pills, and benzodiazepines) can be dangerous or even fatal. If I stop opioids suddenly, I may have severe withdrawal symptoms.    Do not work for all types of pain nor for all patients. If they re not helpful, I may  be asked to stop them.        The risks, benefits and side effects of these medicine(s) were explained to me. I agree that:  1. I will take part in other treatments as advised by my care team. This may be psychiatry or counseling, physical therapy, behavioral therapy, group treatment or a referral to a specialist.     2. I will keep all my appointments. I understand that this is part of the monitoring of opioids. My care team may require an office visit for EVERY opioid/controlled substance refill. If I miss appointments or don t follow instructions, my care team may stop my medicine.    3. I will take my medicines as prescribed. I will not change the dose or schedule unless my care team tells me to. There will be no refills if I run out early.     4. I may be asked to come to the clinic and complete a urine drug test or complete a pill count at any time. If I don t give a urine sample or participate in a pill count, the care team may stop my medicine.    5. I will only receive prescriptions from this clinic for chronic pain. If I am treated by another provider for acute pain issues, I will tell them that I am taking opioid pain medication for chronic pain and that I have a treatment agreement with this provider. I will inform my [facility] care team within one business day if I am given a prescription for any pain medication by another healthcare provider. My [facility] care team can contact other providers and pharmacists about my use of any medicines.    6. It is up to me to make sure that I don t run out of my medicines on weekends or holidays. If my care team is willing to refill my opioid prescription without a visit, I must request refills only during office hours. Refills may take up to 3 business days to process. I will use one pharmacy to fill all my opioid and other controlled substance prescriptions. I will notify the clinic about any changes to my insurance or medication  availability.    7. I am responsible for my prescriptions. If the medicine/prescription is lost, stolen or destroyed, it will not be replaced. I also agree not to share controlled substance medicines with anyone.    8. I am aware I should not use any illegal or recreational drugs. I agree not to drink alcohol unless my care team says I can.       9. If I enroll in the [place] Medical Cannabis program, I will tell my care team prior to my next refill.     10. I will tell my care team right away if I become pregnant, have a new medical problem treated outside of my regular clinic, or have a change in my medications.    11. I understand that this medicine can affect my thinking, judgment and reaction time. Alcohol and drugs affect the brain and body, which can affect the safety of my driving. Being under the influence of alcohol or drugs can affect my decision-making, behaviors, personal safety, and the safety of others. Driving while impaired (DWI) can occur if a person is driving, operating, or in physical control of a car, motorcycle, boat, snowmobile, ATV, motorbike, off-road vehicle, or any other motor vehicle ([place] Statute 169A.20). I understand the risk if I choose to drive or operate any vehicle or machinery.    I understand that if I do not follow any of the conditions above, my prescriptions or treatment may be stopped or changed.          Opioids  What You Need to Know    What are opioids?   Opioids are pain medicines that must be prescribed by a doctor. They are also known as narcotics.     Examples are:   1. morphine (MS Contin, Teresa)  2. oxycodone (Oxycontin)  3. oxycodone and acetaminophen (Percocet)  4. hydrocodone and acetaminophen (Vicodin, Norco)   5. fentanyl patch (Duragesic)   6. hydromorphone (Dilaudid)   7. methadone  8. codeine (Tylenol #3)     What do opioids do well?   Opioids are best for severe short-term pain such as after a surgery or injury. They may work well for cancer pain. They may  help some people with long-lasting (chronic) pain.     What do opioids NOT do well?   Opioids never get rid of pain entirely, and they don t work well for most patients with chronic pain. Opioids don t reduce swelling, one of the causes of pain.                                    Other ways to manage chronic pain and improve function include:       Treat the health problem that may be causing pain    Anti-inflammation medicines, which reduce swelling and tenderness, such as ibuprofen (Advil, Motrin) or naproxen (Aleve)    Acetaminophen (Tylenol)    Antidepressants and anti-seizure medicines, especially for nerve pain    Topical treatments such as patches or creams    Injections or nerve blocks    Chiropractic or osteopathic treatment    Acupuncture, massage, deep breathing, meditation, visual imagery, aromatherapy    Use heat or ice at the pain site    Physical therapy     Exercise    Stop smoking    Take part in therapy       Risks and side effects     Talk to your doctor before you start or decide to keep taking opioids. Possible side effects include:      Lowering your breathing rate enough to cause death    Overdose, including death, especially if taking higher than prescribed doses    Worse depression symptoms; less pleasure in things you usually enjoy    Feeling tired or sluggish    Slower thoughts or cloudy thinking    Being more sensitive to pain over time; pain is harder to control    Trouble sleeping or restless sleep    Changes in hormone levels (for example, less testosterone)    Changes in sex drive or ability to have sex    Constipation    Unsafe driving    Itching and sweating    Dizziness    Nausea, throwing up and dry mouth    What else should I know about opioids?    Opioids may lead to dependence, tolerance, or addiction.      Dependence means that if you stop or reduce the medicine too quickly, you will have withdrawal symptoms. These include loose poop (diarrhea), jitters, flu-like symptoms,  nervousness and tremors. Dependence is not the same as addiction.                       Tolerance means needing higher doses over time to get the same effect. This may increase the chance of serious side effects.      Addiction is when people improperly use a substance that harms their body, their mind or their relations with others. Use of opiates can cause a relapse of addiction if you have a history of drug or alcohol abuse.      People who have used opioids for a long time may have a lower quality of life, worse depression, higher levels of pain and more visits to doctors.    You can overdose on opioids. Take these steps to lower your risk of overdose:    1. Recognize the signs:  Signs of overdose include decrease or loss of consciousness (blackout), slowed breathing, trouble waking up and blue lips. If someone is worried about overdose, they should call 911.    2. Talk to your doctor about Narcan (naloxone).   If you are at risk for overdose, you may be given a prescription for Narcan. This medicine very quickly reverses the effects of opioids.   If you overdose, a friend or family member can give you Narcan while waiting for the ambulance. They need to know the signs of overdose and how to give Narcan.     3. Don't use alcohol or street drugs.   Taking them with opioids can cause death.    4. Do not take any of these medicines unless your doctor says it s OK. Taking these with opioids can cause death:    Benzodiazepines, such as lorazepam (Ativan), alprazolam (Xanax) or diazepam (Valium)    Muscle relaxers, such as cyclobenzaprine (Flexeril)    Sleeping pills like zolpidem (Ambien)     Other opioids      How to keep you and other people safe while taking opioids:    1. Never share your opioids with others.  Opioid medicines are regulated by the Drug Enforcement Agency (SIRIA). Selling or sharing medications is a criminal act.    2. Be sure to store opioids in a secure place, locked up if possible. Young children  can easily swallow them and overdose.    3. When you are traveling with your medicines, keep them in the original bottles. If you use a pill box, be sure you also carry a copy of your medicine list from your clinic or pharmacy.    4. Safe disposal of opioids    Most pharmacies have places to get rid of medicine, called disposal kiosks. Medicine disposal options are also available in every [facility]. Search your county and  medication disposal  to find more options. You can find more details at:  [email or web address]-[email or web address]-medications     I agree that my provider, clinic care team, and pharmacy may work with any city, state or federal law enforcement agency that investigates the misuse, sale, or other diversion of my controlled medicine. I will allow my provider to discuss my care with, or share a copy of, this agreement with any other treating provider, pharmacy or emergency room where I receive care.    I have read this agreement and have asked questions about anything I did not understand.    _______________________________________________________  Patient Signature - Naveen Toro _____________________                   Date     _______________________________________________________  Provider Signature - KRYSTAL BALDERRAMA MD   _____________________                   Date     _______________________________________________________  Witness Signature (required if provider not present while patient signing)   _____________________                   Date

## 2022-06-21 NOTE — PATIENT INSTRUCTIONS
[facility] Number:  [phone]  Call with any questions about your care and for scheduling assistance.   Calls are returned Monday through Friday between 8 AM and 4:30 PM. We usually get back to you within 2 business days depending on the issue/request.    If we are prescribing your medications:  For opioid medication refills, call the clinic or send a Bazingat message 7 days in advance.  Please include:  Name of requested medication  Name of the pharmacy.  For non-opioid medications, call your pharmacy directly to request a refill. Please allow 3-4 days to be processed.   Per [place] State Law:  All controlled substance prescriptions must be filled within 30 days of being written.    For those controlled substances allowing refills, pickup must occur within 30 days of last fill.      We believe regular attendance is key to your success in our program!    Any time you are unable to keep your appointment we ask that you call us at least 24 hours in advance to cancel.This will allow us to offer the appointment time to another patient.   Multiple missed appointments may lead to dismissal from the clinic.     PLAN:    Labs at your primary care provider's office including a CRP, vitamin D, testosterone free and total, CMP.    Resume duloxetine 60 mg daily to help with mood and pain.    Stop lamotrigine to see if related to sweating.    Change the hydrocodone and Hysingla ER to hydrocodone 10 mg 1 tablet 4 times a day for 1 week, 1 tablet 3 times a day for 1 week, 1 tablet twice a day for 1 week and stop.    Begin now oxycodone to see if helps with pain and does not cause sweating, 5 mg 3 times a day for 1 week, 5 mg 4 times a day for 1 week, then 5 mg 5 times a day.    Keep appoint with Dr. Parker in the next month

## 2022-06-21 NOTE — PROGRESS NOTES
[facility] - Office Visit    ASSESSMENT & PLAN     Naveen was seen today for pain.    Diagnoses and all orders for this visit:    Chronic pain syndrome  -     DULoxetine (CYMBALTA) 60 MG capsule; Take 1 capsule (60 mg) by mouth daily  -     HYDROcodone-acetaminophen (NORCO)  MG per tablet; Take 1 tablet by mouth 4 times daily For one week, one tab three times a day one week, twice a day one week and stop  -     oxyCODONE (ROXICODONE) 5 MG tablet; One tab 3 times a day 1 week, one 4 times a day one week, one 5 times a day        Patient Instructions     [facility] Number:  [phone]    Call with any questions about your care and for scheduling assistance.     Calls are returned Monday through Friday between 8 AM and 4:30 PM. We usually get back to you within 2 business days depending on the issue/request.    If we are prescribing your medications:    For opioid medication refills, call the clinic or send a Edgeio message 7 days in advance.  Please include:    Name of requested medication    Name of the pharmacy.    For non-opioid medications, call your pharmacy directly to request a refill. Please allow 3-4 days to be processed.     Per [place] State Law:    All controlled substance prescriptions must be filled within 30 days of being written.      For those controlled substances allowing refills, pickup must occur within 30 days of last fill.      We believe regular attendance is key to your success in our program!      Any time you are unable to keep your appointment we ask that you call us at least 24 hours in advance to cancel.This will allow us to offer the appointment time to another patient.     Multiple missed appointments may lead to dismissal from the clinic.     PLAN:    Labs at your primary care provider's office including a CRP, vitamin D, testosterone free and total, CMP.    Resume duloxetine 60 mg daily to help with mood and  "pain.    Stop lamotrigine to see if related to sweating.    Change the hydrocodone and Hysingla ER to hydrocodone 10 mg 1 tablet 4 times a day for 1 week, 1 tablet 3 times a day for 1 week, 1 tablet twice a day for 1 week and stop.    Begin now oxycodone to see if helps with pain and does not cause sweating, 5 mg 3 times a day for 1 week, 5 mg 4 times a day for 1 week, then 5 mg 5 times a day.    Keep appoint with Dr. Balderrama in the next month        -----  KRYSTAL BALDERRAMA MD  [facility]       SUBJECTIVE      Naveen Toro is a 77 year old year old male who presents to clinic today for the following:     Follow-up for postlaminectomy syndrome.    Seen with his wife today.  He notes there is not much change.  Still continues episodes where he can be cold and have sweats.  He is lost 10 or 15 pounds over last several months.  There is not a concern that he had a stroke when he was in the hospital.  He understands he is at evaluations for cancer work-up negative.    He did discontinue the duloxetine.  No change in sweating.  His mood and pain may be a bit worse.  He has been tapering now the lamotrigine and will be off in a few days.  Unclear if this made any difference in pain or mood.  Still sweating.    He did try thyroid supplementation for 6 weeks without any benefit.    We reviewed whether opioids could be affecting his thermoregulation.  He is unclear as he has been on the opioids for several years, attributes this episode after the hospital.    We discussed he had been on morphine for several years until his creatinine started to come up to 1.6, is still 1.3.  He did not like fentanyl.  He thinks he tolerated oxycodone.  He has not tried methadone.    They note if his high single 40 mg schedule at 9:00 does not take, by noon can have withdrawal.    Wife notes he had made the comment that his \"ring is loose\".  While he does not necessarily have depression, also size feels it is hard to be " motivated his wife notes if he did not have some activity would not be getting out of bed      Current Outpatient Medications:      amLODIPine (NORVASC) 2.5 MG tablet, Take 1 tablet by mouth daily, Disp: , Rfl:      aspirin 325 MG tablet, [ASPIRIN 325 MG TABLET] Take 325 mg by mouth daily., Disp: , Rfl:      atorvastatin (LIPITOR) 40 MG tablet, [ATORVASTATIN (LIPITOR) 40 MG TABLET] Take 40 mg by mouth daily. As directed, Disp: , Rfl:      blood glucose (PRODIGY NO CODING BLOOD GLUC) test strip, , Disp: , Rfl:      Coenzyme Q10 (COQ10) 200 MG CAPS, Take 1 capsule by mouth daily, Disp: 30 capsule, Rfl: 3     diclofenac sodium (VOLTAREN) 1 % Gel, [DICLOFENAC SODIUM (VOLTAREN) 1 % GEL] Apply 2 g topically 4 (four) times a day., Disp: 100 g, Rfl: 1     DULoxetine (CYMBALTA) 60 MG capsule, Take 1 capsule (60 mg) by mouth daily, Disp: 90 capsule, Rfl: 1     HYDROcodone-acetaminophen (NORCO)  MG per tablet, Take 1 tablet by mouth 4 times daily For one week, one tab three times a day one week, twice a day one week and stop, Disp: 63 tablet, Rfl: 0     lamoTRIgine (LAMICTAL) 25 MG tablet, Take 2 tablets (50 mg) by mouth 2 times daily, Disp: 360 tablet, Rfl: 0     Lancets MISC, 1 each, Disp: , Rfl:      lubiprostone (AMITIZA) 24 MCG capsule, Take 1 capsule (24 mcg) by mouth daily (with breakfast), Disp: 30 capsule, Rfl: 4     metoprolol (LOPRESSOR) 25 MG tablet, [METOPROLOL (LOPRESSOR) 25 MG TABLET] Take 25 mg by mouth 2 (two) times a day., Disp: , Rfl:      naloxone (NARCAN) 4 MG/0.1ML nasal spray, Spray 1 spray (4 mg) into one nostril alternating nostrils once as needed for opioid reversal every 2-3 minutes until assistance arrives, Disp: 0.2 mL, Rfl: 0     oxyCODONE (ROXICODONE) 5 MG tablet, One tab 3 times a day 1 week, one 4 times a day one week, one 5 times a day, Disp: 120 tablet, Rfl: 0     polyethylene glycol (GLYCOLAX) 17 gram/dose powder, [POLYETHYLENE GLYCOL (GLYCOLAX) 17 GRAM/DOSE POWDER] Take 17 g by mouth  daily., Disp: 595 g, Rfl: 1     vitamin D3 (CHOLECALCIFEROL) 125 MCG (5000 UT) tablet, Take 5,000 Units by mouth, Disp: , Rfl:      metFORMIN (GLUCOPHAGE) 500 MG tablet, [METFORMIN (GLUCOPHAGE) 500 MG TABLET] Take 1,000 mg by mouth 2 (two) times a day with meals.  (Patient not taking: No sig reported), Disp: , Rfl:       Review of Systems   General, psych, musculoskeletal, bowels and bladder otherwise normal other than above.          OBJECTIVE   BP (!) 171/102   Pulse 106   Ht 1.829 m (6')   Wt 103.8 kg (228 lb 14.4 oz)   SpO2 97%   BMI 31.04 kg/m        Physical Exam  General: Ambulates with cane, clear sensorium.  Is not diaphoretic presently.  Cardiovascular: Normal rate  Lungs: Pulmonary effort is normal, speaking in full sentences  MSK  Skin: Warm and dry. No concerning rashes or lesions.  Neurologic: No focal deficit, alert and oriented x3  Psychiatric: Affect constricted    Assessment: History of lumbar laminectomy, low back pain.  Of concern is been episodes of sweating and feeling cold and chilled.  Seems to have occurred more since a hospitalization with medical concerns and encephalopathy.    Has had laboratories and evaluation for cancer through primary care provider, his last noted May indicated concerns were Cymbalta were lamotrigine.    Has had a trial off the Cymbalta without benefit, decreasing lamotrigine without clear benefit.    Reviewed opioids can may cause thermal dysregulation though he had been stable on this dose it appears to the hospitalization.    I had recommended testosterone levels checked, has not had them over the last 2 years.    Plan as above.    We discussed rotating opioids and will transition from hydrocodone to oxycodone.    Total time more than 30 minutes

## 2022-06-21 NOTE — PROGRESS NOTES
Patient presents to the clinic today for a follow up with KRYSTAL BALDERRAMA MD  regarding Pain Management.            UDT- 12/09/2021  CSA- 08/2021, ALISHA Talley MA  [facility] [place]

## 2022-07-08 DIAGNOSIS — G89.4 CHRONIC PAIN SYNDROME: Primary | ICD-10-CM

## 2022-07-08 RX ORDER — HYDROXYZINE HYDROCHLORIDE 25 MG/1
TABLET, FILM COATED ORAL
Qty: 30 TABLET | Refills: 0 | Status: SHIPPED | OUTPATIENT
Start: 2022-07-08 | End: 2022-07-26

## 2022-07-08 NOTE — TELEPHONE ENCOUNTER
"Pt calls with complaint of withdrawal symptoms, nausea, intermittent \"chills and sweats.\" States he's tapering Hydrocodone and stopped Hysingla.   Is tapering up Oxycodone dose.    Will queue withdrawal meds per protocol.     Pending Prescriptions:                       Disp   Refills    tiZANidine (ZANAFLEX) 4 MG tablet         30 tab*0            Sig: Take 1 tablet every 8 hours as needed for muscle           aches, muscle spasms or pain.    hydrOXYzine (ATARAX) 25 MG tablet         30 tab*0            Sig: Take 1 tablet every 8 hours as needed for nausea,           insomnia or aggitation    "

## 2022-07-08 NOTE — TELEPHONE ENCOUNTER
Signed Prescriptions:                        Disp   Refills    tiZANidine (ZANAFLEX) 4 MG tablet          30 tab*0        Sig: Take 1 tablet every 8 hours as needed for muscle           aches, muscle spasms or pain.  Authorizing Provider: JULES MARIE    hydrOXYzine (ATARAX) 25 MG tablet          30 tab*0        Sig: Take 1 tablet every 8 hours as needed for nausea,           insomnia or aggitation  Authorizing Provider: JULES MARIE APRN, RN CNP, FNP  [facility]  [facility]

## 2022-07-13 ENCOUNTER — TELEPHONE (OUTPATIENT)
Dept: PALLIATIVE MEDICINE | Facility: OTHER | Age: 77
End: 2022-07-13

## 2022-07-13 DIAGNOSIS — G89.4 CHRONIC PAIN SYNDROME: ICD-10-CM

## 2022-07-13 NOTE — TELEPHONE ENCOUNTER
Pt and his wife call requesting a letter of medical necessity for Tizanidine and Hydroxyzine, ordered 7/8 for withdrawal symptoms.

## 2022-07-14 RX ORDER — OXYCODONE HYDROCHLORIDE 5 MG/1
TABLET ORAL
Qty: 120 TABLET | Refills: 0 | Status: SHIPPED | OUTPATIENT
Start: 2022-07-14 | End: 2022-10-17 | Stop reason: DRUGHIGH

## 2022-07-15 NOTE — TELEPHONE ENCOUNTER
Mailed letter to patients home address.  Minna Lowery LPN  [facility]

## 2022-07-25 DIAGNOSIS — G89.4 CHRONIC PAIN SYNDROME: ICD-10-CM

## 2022-07-25 RX ORDER — LUBIPROSTONE 24 UG/1
24 CAPSULE ORAL
Qty: 30 CAPSULE | Refills: 4 | Status: SHIPPED | OUTPATIENT
Start: 2022-07-25 | End: 2023-01-04

## 2022-07-25 NOTE — TELEPHONE ENCOUNTER
Received call from patient requesting refill(s) of Amitiza  Last dispensed from pharmacy on 6/24 for 30 days    Patient's last office/virtual visit by prescribing provider on 6/24  Next office/virtual appointment scheduled for tomorrow, 7/26  Pending Prescriptions:                       Disp   Refills    lubiprostone (AMITIZA) 24 MCG capsule     30 cap*4            Sig: Take 1 capsule (24 mcg) by mouth daily (with           breakfast)        E-prescribe to [facility]

## 2022-07-26 ENCOUNTER — OFFICE VISIT (OUTPATIENT)
Dept: PALLIATIVE MEDICINE | Facility: OTHER | Age: 77
End: 2022-07-26
Payer: OTHER MISCELLANEOUS

## 2022-07-26 VITALS
DIASTOLIC BLOOD PRESSURE: 73 MMHG | BODY MASS INDEX: 27.55 KG/M2 | HEART RATE: 59 BPM | WEIGHT: 192 LBS | SYSTOLIC BLOOD PRESSURE: 133 MMHG | OXYGEN SATURATION: 98 %

## 2022-07-26 DIAGNOSIS — M51.379 DEGENERATION OF LUMBAR OR LUMBOSACRAL INTERVERTEBRAL DISC: Primary | ICD-10-CM

## 2022-07-26 PROCEDURE — 99213 OFFICE O/P EST LOW 20 MIN: CPT | Performed by: ANESTHESIOLOGY

## 2022-07-26 PROCEDURE — G0463 HOSPITAL OUTPT CLINIC VISIT: HCPCS

## 2022-07-26 RX ORDER — OXYCODONE HYDROCHLORIDE 10 MG/1
10 TABLET ORAL 4 TIMES DAILY
Qty: 120 TABLET | Refills: 0 | Status: SHIPPED | OUTPATIENT
Start: 2022-07-26 | End: 2022-08-31

## 2022-07-26 ASSESSMENT — PAIN SCALES - GENERAL: PAINLEVEL: MODERATE PAIN (5)

## 2022-07-26 NOTE — PROGRESS NOTES
"[facility] - Office Visit    ASSESSMENT & PLAN     Naveen was seen today for pain.    Diagnoses and all orders for this visit:    Lumbar Disc Degeneration  -     oxyCODONE IR (ROXICODONE) 10 MG tablet; Take 1 tablet (10 mg) by mouth 4 times daily        Patient Instructions   PLAN:    May use oxycodone 5 mg 5 times a day for 4 days, then 2 tablets 3 times a day.  Will then change to oxycodone 10 mg 4 times a day observe for control of pain and the side effect of sweating.    You will call Dr. Hoover to look into laboratories to monitor sweating.    May consider adding back lamotrigine to help with pain when stable on oxycodone.    Follow-up with Dr. Balderrama in 3 months        -----  KRYSTAL BALDERRAMA MD  [facility]       SUBJECTIVE      Naveen Toro is a 77 year old year old male who presents to clinic today for the following:     Followed for lumbar laminectomy.    Chart reflects there was miscommunication around transitioning from hydrocodone to oxycodone, patient had withdrawal of the hydrocodone before the oxycodone was initiated.    Patient is seen with his wife today.  He noted in general there is been little change.  Wife is noted some days where it seems like in terms of sweating there just some \"sp his weight today was 190, has been stable, may have been 187 at 1 time.        Current Outpatient Medications:      amLODIPine (NORVASC) 2.5 MG tablet, Take 1 tablet by mouth daily, Disp: , Rfl:      aspirin 325 MG tablet, [ASPIRIN 325 MG TABLET] Take 325 mg by mouth daily., Disp: , Rfl:      atorvastatin (LIPITOR) 40 MG tablet, [ATORVASTATIN (LIPITOR) 40 MG TABLET] Take 40 mg by mouth daily. As directed, Disp: , Rfl:      blood glucose (PRODIGY NO CODING BLOOD GLUC) test strip, , Disp: , Rfl:      Coenzyme Q10 (COQ10) 200 MG CAPS, Take 1 capsule by mouth daily, Disp: 30 capsule, Rfl: 3     diclofenac sodium (VOLTAREN) 1 % Gel, [DICLOFENAC SODIUM (VOLTAREN) 1 % GEL] Apply " "2 g topically 4 (four) times a day., Disp: 100 g, Rfl: 1     DULoxetine (CYMBALTA) 60 MG capsule, Take 1 capsule (60 mg) by mouth daily, Disp: 90 capsule, Rfl: 1     Lancets MISC, 1 each, Disp: , Rfl:      lubiprostone (AMITIZA) 24 MCG capsule, Take 1 capsule (24 mcg) by mouth daily (with breakfast), Disp: 30 capsule, Rfl: 4     metoprolol (LOPRESSOR) 25 MG tablet, [METOPROLOL (LOPRESSOR) 25 MG TABLET] Take 25 mg by mouth 2 (two) times a day., Disp: , Rfl:      naloxone (NARCAN) 4 MG/0.1ML nasal spray, Spray 1 spray (4 mg) into one nostril alternating nostrils once as needed for opioid reversal every 2-3 minutes until assistance arrives, Disp: 0.2 mL, Rfl: 0     oxyCODONE (ROXICODONE) 5 MG tablet, One tab 3 times a day 1 week, one 4 times a day one week, one 5 times a day, Disp: 120 tablet, Rfl: 0     oxyCODONE IR (ROXICODONE) 10 MG tablet, Take 1 tablet (10 mg) by mouth 4 times daily, Disp: 120 tablet, Rfl: 0     polyethylene glycol (GLYCOLAX) 17 gram/dose powder, [POLYETHYLENE GLYCOL (GLYCOLAX) 17 GRAM/DOSE POWDER] Take 17 g by mouth daily., Disp: 595 g, Rfl: 1     vitamin D3 (CHOLECALCIFEROL) 125 MCG (5000 UT) tablet, Take 5,000 Units by mouth, Disp: , Rfl:   ots\", however yesterday was another day where the bed was soaked.    In terms of pain control may not be as good.  They had a family camping weekend in the midst of this withdrawal concern where he did not do as well.  However another day recently he got up and they wanted to look for use campers and were up much of the day.    Has been using oxycodone 5 mg 4 times a day, has not had as good of pain control yet.    He did not yet get the laboratories through his primary care provider.    He did come off lamotrigine, unclear if that related to sweating changes.  That may have been somewhat helpful for pain.        Review of Systems   General, psych, musculoskeletal, bowels and bladder otherwise normal other than above.          OBJECTIVE   /73   Pulse " 59   Wt 87.1 kg (192 lb)   SpO2 98%   BMI 27.55 kg/m        Physical Exam  General: Without diaphoresis, sitting with his left leg elevated on his cane  Cardiovascular: Normal rate  Lungs: Pulmonary effort is normal, speaking in full sentences  MSK: . No concerning rashes or lesions.  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  Focus has been on whether there is any medications related to excessive sweating.  Has had other medical evaluations with his primary care provider unrevealing.    Will continue transitioning from the hydrocodone, had been taking 60 mg total to oxycodone and observe for pain control and sweating.    May add back lamotrigine once stable.    Reviewed with changes in the [facility] staffing, he may need to change back to his primary care providers management or their system.    Total time more than 20 minutes

## 2022-07-26 NOTE — PATIENT INSTRUCTIONS
PLAN:    May use oxycodone 5 mg 5 times a day for 4 days, then 2 tablets 3 times a day.  Will then change to oxycodone 10 mg 4 times a day observe for control of pain and the side effect of sweating.    You will call Dr. Hoover to look into laboratories to monitor sweating.    May consider adding back lamotrigine to help with pain when stable on oxycodone.    Follow-up with Dr. Parker in 3 months

## 2022-07-26 NOTE — NURSING NOTE
Patient presents to the clinic today for a follow up with KRYSTAL BALDERRAMA MD regarding Pain Management.              UDS/CSA- 12/2021      Medications-  Oxycodone 8am      QUESTIONS:              Aleta GARCES [facility] Visit Facilitator

## 2022-07-28 ENCOUNTER — TELEPHONE (OUTPATIENT)
Dept: PALLIATIVE MEDICINE | Facility: OTHER | Age: 77
End: 2022-07-28

## 2022-08-31 DIAGNOSIS — M51.379 DEGENERATION OF LUMBAR OR LUMBOSACRAL INTERVERTEBRAL DISC: ICD-10-CM

## 2022-08-31 DIAGNOSIS — F11.90 CHRONIC, CONTINUOUS USE OF OPIOIDS: Primary | ICD-10-CM

## 2022-08-31 RX ORDER — OXYCODONE HYDROCHLORIDE 10 MG/1
10 TABLET ORAL 4 TIMES DAILY
Qty: 120 TABLET | Refills: 0 | Status: CANCELLED | OUTPATIENT
Start: 2022-08-31

## 2022-08-31 RX ORDER — OXYCODONE HYDROCHLORIDE 10 MG/1
10 TABLET ORAL 4 TIMES DAILY
Qty: 120 TABLET | Refills: 0 | Status: SHIPPED | OUTPATIENT
Start: 2022-08-31 | End: 2022-10-17

## 2022-08-31 NOTE — TELEPHONE ENCOUNTER
Received call from patient requesting refill  Oxycodone 10 mg     Last dispensed from pharmacy on 7/27/22-30 days    Patient's last office/virtual visit by prescribing provider on 7/26/22  Next office/virtual appointment scheduled for 10/17/22    UDT 12/2021  CSA = 06/21/2022    Pending Prescriptions:                       Disp   Refills    oxyCODONE IR (ROXICODONE) 10 MG tablet    120 ta*0            Sig: Take 1 tablet (10 mg) by mouth 4 times daily    [facility]

## 2022-08-31 NOTE — TELEPHONE ENCOUNTER
Signed Prescriptions:                        Disp   Refills    oxyCODONE IR (ROXICODONE) 10 MG tablet     120 ta*0        Sig: Take 1 tablet (10 mg) by mouth 4 times daily           Fill/begin 8/31/2022. 30 day script for chronic           pain  Authorizing Provider: ALETA MARIE    Reviewed [place] [facility] August 31, 2022- no concerning fills.  Signing for colleague who is out of office. Refill appears appropriate.     Aleta GARCIA, RN CNP, FNP  [facility]  [facility]

## 2022-09-07 ENCOUNTER — TELEPHONE (OUTPATIENT)
Dept: PALLIATIVE MEDICINE | Facility: OTHER | Age: 77
End: 2022-09-07

## 2022-09-07 DIAGNOSIS — M54.15 RADICULOPATHY OF THORACOLUMBAR REGION: Primary | ICD-10-CM

## 2022-09-07 RX ORDER — LAMOTRIGINE 25 MG/1
TABLET ORAL
Qty: 60 TABLET | Refills: 4 | Status: SHIPPED | OUTPATIENT
Start: 2022-09-07 | End: 2022-11-04

## 2022-09-07 NOTE — TELEPHONE ENCOUNTER
[facility] Call Center    Phone Message    May a detailed message be left on voicemail: yes     Reason for Call: Medication Refill Request    Has the patient contacted the pharmacy for the refill? Yes   Name of medication being requested: lamoTRIgine (LAMICTAL) 25 MG tablet   Provider who prescribed the medication: Keith  Pharmacy: [place] [address]    Date medication is needed: As soon as possible. Patient wants to go back on and only has a few pills left of his previous dose at home. The patient's spouse would like to know if its ok to go back on it.         Action Taken: Message routed to:  [facility] Pain    Travel Screening: Not Applicable

## 2022-09-13 NOTE — TELEPHONE ENCOUNTER
RN returned call and spoke to the patient regarding the Lamictal.  Patient confirms understand and agrees to the below plan.    lamoTRIgine (LAMICTAL) 25 MG tablet   4 ordered         Summary: One daily for 2 weeks, one twice a day 2 weeks, then 2 morning and one bedtime, Disp-60 tablet, R-4, E-Prescribe     Start: 9/7/2022    Ord/Sold: 9/7/2022 (O)      Report    Adh:     Taking:     Long-term:       Pharmacy: [address] Dose History    Change       Patient Sig: One daily for 2 weeks, one twice a day 2 weeks, then 2 morning and one bedtime       Ordered on: 9/7/2022       Authorized by: KRYSTAL BALDERRAMA       Dispense: 60 tablet       Admin Instructions: One daily for 2 weeks, one twice a day 2 weeks, then 2 morning and one bedtime

## 2022-09-13 NOTE — TELEPHONE ENCOUNTER
[facility] Call Center    Phone Message    May a detailed message be left on voicemail: yes     Reason for Call: Other: The patients wife is calling back, they are enquiring about him restarting Lamictal. They had not heard back so were still wondering if this was okay.     Writer did see a prescription sent to the pharmacy on the same day as her last call so she was given that information.    She would still like a call back just to make sure that this is okay and what the doctor intended for them to do.     Action Taken: Message routed to:  Other: [place] [facility]    Travel Screening: Not Applicable

## 2022-09-18 ENCOUNTER — HEALTH MAINTENANCE LETTER (OUTPATIENT)
Age: 77
End: 2022-09-18

## 2022-09-26 DIAGNOSIS — F11.90 CHRONIC, CONTINUOUS USE OF OPIOIDS: ICD-10-CM

## 2022-09-26 DIAGNOSIS — M51.379 DEGENERATION OF LUMBAR OR LUMBOSACRAL INTERVERTEBRAL DISC: ICD-10-CM

## 2022-09-26 RX ORDER — OXYCODONE HYDROCHLORIDE 10 MG/1
10 TABLET ORAL EVERY 6 HOURS PRN
Qty: 120 TABLET | Refills: 0 | Status: SHIPPED | OUTPATIENT
Start: 2022-09-26 | End: 2022-12-28

## 2022-09-26 RX ORDER — OXYCODONE HYDROCHLORIDE 10 MG/1
10 TABLET ORAL 4 TIMES DAILY
Qty: 120 TABLET | Refills: 0 | Status: CANCELLED | OUTPATIENT
Start: 2022-09-26

## 2022-09-26 NOTE — TELEPHONE ENCOUNTER
UDT 12/2021  CSA = 06/21/2022     Pending Prescriptions:                       Disp   Refills    oxyCODONE IR (ROXICODONE) 10 MG tablet    120 ta*0            Sig: Take 1 tablet (10 mg) by mouth 4 times daily Fill           9/28/22; start 9/30/22. 30 day script for chronic           pain  [facility]

## 2022-09-26 NOTE — TELEPHONE ENCOUNTER
Received fax from pharmacy requesting refill(s) for     oxyCODONE IR (ROXICODONE) 10 MG tablet    Date last filled 08.31.2022    Last Appt Date:07.26.2022 Keith    Next Appt scheduled: 10.17.2022 Keith    Pharmacy:   [facility] [address],    Will route to MA POOL for processing    Aleta Hargrove  [facility] Visit Facilitator

## 2022-09-26 NOTE — TELEPHONE ENCOUNTER
Script Eprescribed to pharmacy  [place] Prescription Monitoring Program checked    Will send this to MA team to notify patient.    Signed Prescriptions:                        Disp   Refills    oxyCODONE IR (ROXICODONE) 10 MG tablet     120 ta*0        Sig: Take 1 tablet (10 mg) by mouth every 6 hours as           needed for severe pain (max of #4/day) Okay to           fill 9/28/2022 and start 9/30/2022  Authorizing Provider: ALMA PINEDO MD

## 2022-10-17 ENCOUNTER — OFFICE VISIT (OUTPATIENT)
Dept: PALLIATIVE MEDICINE | Facility: OTHER | Age: 77
End: 2022-10-17
Payer: OTHER MISCELLANEOUS

## 2022-10-17 VITALS
HEART RATE: 63 BPM | WEIGHT: 203 LBS | BODY MASS INDEX: 29.13 KG/M2 | DIASTOLIC BLOOD PRESSURE: 74 MMHG | SYSTOLIC BLOOD PRESSURE: 158 MMHG

## 2022-10-17 DIAGNOSIS — Z79.891 ENCOUNTER FOR LONG-TERM OPIATE ANALGESIC USE: Primary | ICD-10-CM

## 2022-10-17 DIAGNOSIS — F11.90 CHRONIC, CONTINUOUS USE OF OPIOIDS: ICD-10-CM

## 2022-10-17 DIAGNOSIS — M51.379 DEGENERATION OF LUMBAR OR LUMBOSACRAL INTERVERTEBRAL DISC: ICD-10-CM

## 2022-10-17 PROCEDURE — 80307 DRUG TEST PRSMV CHEM ANLYZR: CPT | Performed by: ANESTHESIOLOGY

## 2022-10-17 PROCEDURE — 99214 OFFICE O/P EST MOD 30 MIN: CPT | Performed by: ANESTHESIOLOGY

## 2022-10-17 PROCEDURE — G0463 HOSPITAL OUTPT CLINIC VISIT: HCPCS

## 2022-10-17 RX ORDER — GLYCOPYRROLATE 1 MG/1
1 TABLET ORAL AT BEDTIME
Qty: 30 TABLET | Refills: 1 | Status: SHIPPED | OUTPATIENT
Start: 2022-10-17 | End: 2022-12-28

## 2022-10-17 RX ORDER — OXYCODONE HYDROCHLORIDE 10 MG/1
10 TABLET ORAL 4 TIMES DAILY
Qty: 120 TABLET | Refills: 0 | Status: SHIPPED | OUTPATIENT
Start: 2022-10-17 | End: 2022-11-30

## 2022-10-17 ASSESSMENT — PAIN SCALES - GENERAL: PAINLEVEL: MODERATE PAIN (4)

## 2022-10-17 NOTE — PROGRESS NOTES
Patient presents to the clinic today for a follow up with KRYSTAL BALDERRAMA MD regarding Pain Management.      PEG Score 4/18/2022 5/20/2022 10/17/2022   PEG Total Score 5.67 6.67 7           UDS/CSA-12.09.2021      Medications-Oxycodone (Roxicodone) 10.17.2022 @9am        QUESTIONS:              Aleta GARCES [facility] Visit Facilitator

## 2022-10-17 NOTE — PATIENT INSTRUCTIONS
PLAN:    You will discuss with your primary care provider whether your sweating at night or during naps in the day correlates with sleep apnea and may have a oxygen monitoring study.    Continue oxycodone 10 mg 4 times a day.    Continue the lamotrigine increase as scheduled, trying 25 mg 3 tablets daily for 2 weeks then may increase to 2 tablets twice a day to augment the oxycodone to help with pain.    A trial of glycopyrrolate 1 mg at bedtime to see if it helps decrease sweating.  Reviewed may exacerbate dry mouth or constipation.  You will review this with your primary care provider for ongoing use.  Discussed you still must continue evaluating other underlying medical causes for the sweating.    Follow-up with Dr. Parker in 3 months

## 2022-10-17 NOTE — PROGRESS NOTES
[facility] - Office Visit    ASSESSMENT & PLAN     Naveen was seen today for pain.    Diagnoses and all orders for this visit:    Encounter for long-term opiate analgesic use  -     Drug Confirmation Panel Urine with Creatinine; Future  -     Ethyl Glucuronide Screen with Reflex to Confirmation, Urine; Future  -     Drug Confirmation Panel Urine with Creatinine  -     Ethyl Glucuronide Screen with Reflex to Confirmation, Urine    Lumbar Disc Degeneration  -     oxyCODONE IR (ROXICODONE) 10 MG tablet; Take 1 tablet (10 mg) by mouth 4 times daily . 30 day script for chronic pain    Chronic, continuous use of opioids  -     oxyCODONE IR (ROXICODONE) 10 MG tablet; Take 1 tablet (10 mg) by mouth 4 times daily . 30 day script for chronic pain  -     glycopyrrolate (ROBINUL) 1 MG tablet; Take 1 tablet (1 mg) by mouth At Bedtime        Patient Instructions   PLAN:    You will discuss with your primary care provider whether your sweating at night or during naps in the day correlates with sleep apnea and may have a oxygen monitoring study.    Continue oxycodone 10 mg 4 times a day.    Continue the lamotrigine increase as scheduled, trying 25 mg 3 tablets daily for 2 weeks then may increase to 2 tablets twice a day to augment the oxycodone to help with pain.    A trial of glycopyrrolate 1 mg at bedtime to see if it helps decrease sweating.  Reviewed may exacerbate dry mouth or constipation.  You will review this with your primary care provider for ongoing use.  Discussed you still must continue evaluating other underlying medical causes for the sweating.    Follow-up with Dr. Balderrama in 3 months        -----  KRYSTAL BALDERRAMA MD  [facility]       SUBJECTIVE      Naveen Toro is a 77 year old year old male who presents to clinic today for the following:         Review of Systems   General, psych, musculoskeletal, bowels and bladder otherwise normal other than above.          OBJECTIVE    BP (!) 171/102   Pulse 106   Ht 1.829 m (6')   Wt 103.8 kg (228 lb 14.4 oz)   SpO2 97%   BMI 31.04 kg/m        Physical Exam  General:  Normal appearance, no apparent distress  Cardiovascular: Normal rate  Lungs: Pulmonary effort is normal, speaking in full sentences  MSK: normal muscle bulk and tone, ROM, equal strength in all extremities  Skin: Warm and dry. No concerning rashes or lesions.  Neurologic: No focal deficit, alert and oriented x3  Psychiatric: normal mood and affect, cooperative      Total time spent:  minutes  [facility] - Office Visit    ASSESSMENT & PLAN     Naveen was seen today for pain.    Diagnoses and all orders for this visit:    Encounter for long-term opiate analgesic use  -     Drug Confirmation Panel Urine with Creatinine; Future  -     Ethyl Glucuronide Screen with Reflex to Confirmation, Urine; Future  -     Drug Confirmation Panel Urine with Creatinine  -     Ethyl Glucuronide Screen with Reflex to Confirmation, Urine    Lumbar Disc Degeneration  -     oxyCODONE IR (ROXICODONE) 10 MG tablet; Take 1 tablet (10 mg) by mouth 4 times daily . 30 day script for chronic pain    Chronic, continuous use of opioids  -     oxyCODONE IR (ROXICODONE) 10 MG tablet; Take 1 tablet (10 mg) by mouth 4 times daily . 30 day script for chronic pain  -     glycopyrrolate (ROBINUL) 1 MG tablet; Take 1 tablet (1 mg) by mouth At Bedtime        Patient Instructions   PLAN:    You will discuss with your primary care provider whether your sweating at night or during naps in the day correlates with sleep apnea and may have a oxygen monitoring study.    Continue oxycodone 10 mg 4 times a day.    Continue the lamotrigine increase as scheduled, trying 25 mg 3 tablets daily for 2 weeks then may increase to 2 tablets twice a day to augment the oxycodone to help with pain.    A trial of glycopyrrolate 1 mg at bedtime to see if it helps decrease sweating.  Reviewed may exacerbate dry mouth or  constipation.  You will review this with your primary care provider for ongoing use.  Discussed you still must continue evaluating other underlying medical causes for the sweating.    Follow-up with Dr. Balderrama in 3 months        -----  KRYSTAL BALDERRAMA MD  [facility]       SUBJECTIVE      Naveen Toro is a 77 year old year old male who presents to clinic today for the following:     For lumbar laminectomy.    Reviewing the record seen in his primary care provider 9/29 assessment for continued night sweats.  Had did have abdominal CT negative.    Today seen with his wife.  Notes evaluations for sweating have not led to any answers.  He is to have a colonoscopy this week, then pending those results [facility] agosto bone marrow.  Apparently consider referral to [facility].    We did rotate from Hydro more phone to oxycodone.  3 nights ago his wife wondered whether this what he might of been less but now continues to be a problem.    Reviewed that is been going on approximately a year, she recalls he was hospitalized in January and she asked the nursing staff if they had to change her sheets at that time.    Seems to be worse at night, can also occur during the day, he notes sometimes sweating in his easy chair reclining.  This led to a discussion about the sleep apnea.  He does have a BiPAP which she uses.  Has not had this evaluated since COVID.  He does not use this when he sleeps in a recliner.  It seems to work occur soon after he goes to sleep at night rather than later in the evening where there might be withdrawal.  Is using the oxycodone at 9 in the morning 1 PM 5 PM and 9 at bedtime.  He can be sore in the morning but not having particular withdrawal.    When last seen we recommended lamotrigine, Worker's Comp. initially was not covering, is now been filled and back on a schedule to increase gradually to 25 mg 3 times a day.    Sweating continues quite distressing for him.  Has not been  losing weight if anything perhaps gaining.      Current Outpatient Medications:      amLODIPine (NORVASC) 2.5 MG tablet, Take 1 tablet by mouth daily, Disp: , Rfl:      aspirin 325 MG tablet, [ASPIRIN 325 MG TABLET] Take 325 mg by mouth daily., Disp: , Rfl:      atorvastatin (LIPITOR) 40 MG tablet, [ATORVASTATIN (LIPITOR) 40 MG TABLET] Take 40 mg by mouth daily. As directed, Disp: , Rfl:      blood glucose (PRODIGY NO CODING BLOOD GLUC) test strip, , Disp: , Rfl:      Coenzyme Q10 (COQ10) 200 MG CAPS, Take 1 capsule by mouth daily, Disp: 30 capsule, Rfl: 3     diclofenac sodium (VOLTAREN) 1 % Gel, [DICLOFENAC SODIUM (VOLTAREN) 1 % GEL] Apply 2 g topically 4 (four) times a day., Disp: 100 g, Rfl: 1     DULoxetine (CYMBALTA) 60 MG capsule, Take 1 capsule (60 mg) by mouth daily, Disp: 90 capsule, Rfl: 1     glycopyrrolate (ROBINUL) 1 MG tablet, Take 1 tablet (1 mg) by mouth At Bedtime, Disp: 30 tablet, Rfl: 1     lamoTRIgine (LAMICTAL) 25 MG tablet, One daily for 2 weeks, one twice a day 2 weeks, then 2 morning and one bedtime, Disp: 60 tablet, Rfl: 4     Lancets MISC, 1 each, Disp: , Rfl:      lubiprostone (AMITIZA) 24 MCG capsule, Take 1 capsule (24 mcg) by mouth daily (with breakfast), Disp: 30 capsule, Rfl: 4     metoprolol (LOPRESSOR) 25 MG tablet, [METOPROLOL (LOPRESSOR) 25 MG TABLET] Take 25 mg by mouth 2 (two) times a day., Disp: , Rfl:      naloxone (NARCAN) 4 MG/0.1ML nasal spray, Spray 1 spray (4 mg) into one nostril alternating nostrils once as needed for opioid reversal every 2-3 minutes until assistance arrives, Disp: 0.2 mL, Rfl: 0     oxyCODONE IR (ROXICODONE) 10 MG tablet, Take 1 tablet (10 mg) by mouth 4 times daily . 30 day script for chronic pain, Disp: 120 tablet, Rfl: 0     oxyCODONE IR (ROXICODONE) 10 MG tablet, Take 1 tablet (10 mg) by mouth every 6 hours as needed for severe pain (max of #4/day) Okay to fill 9/28/2022 and start 9/30/2022, Disp: 120 tablet, Rfl: 0     polyethylene glycol  (GLYCOLAX) 17 gram/dose powder, [POLYETHYLENE GLYCOL (GLYCOLAX) 17 GRAM/DOSE POWDER] Take 17 g by mouth daily., Disp: 595 g, Rfl: 1     vitamin D3 (CHOLECALCIFEROL) 125 MCG (5000 UT) tablet, Take 5,000 Units by mouth, Disp: , Rfl:     MP reviewed.  Last urine drug test in December  Review of Systems   General, psych, musculoskeletal, bowels and bladder otherwise normal other than above.          OBJECTIVE   BP (!) 158/74   Pulse 63   Wt 92.1 kg (203 lb)   BMI 29.13 kg/m         Physical Exam  General: Alert, clear sensorium not diaphoretic presently  Cardiovascular: Normal rate  Lungs: Pulmonary effort is normal, speaking in full sentences  MSK: normal muscle bulk and tone, ROM, .  Neurologic: No focal deficit, alert and oriented x3  Psychiatric: Affect constricted.      Assessment: Patient with lumbar laminectomy, we have been rotating opioids apart to see if there is contribution to his sweating which has been a concern.    Today discussed the possible correlation with sweating and his sleep, noting that patients with sleep apnea can have sympathetic arousal.  This may be why he experiences some of this also when sleeping in the recliner.    Recalls his initial sleep evaluation had been done the hospital across from his clinic in [place].  I recommend he work with his primary care provider to see if they could do an overnight oxygen saturation study as well as evaluating apnea and sympathetic arousal.    For the short-term I also discussed the use of glycopyrrolate for sweating when all other causes have been ruled out to see if this might provide him some symptomatic relief and he can follow-up with his provider next month as well.  Reviewed anticholinergic aspects of it.    Total time more than 29 minutes with moderate complexity decision making

## 2022-10-18 LAB
CANNABINOIDS UR QL SCN: NORMAL
CREAT UR-MCNC: 134 MG/DL

## 2022-10-19 LAB
ETHYL GLUCURONIDE UR QL SCN: NEGATIVE NG/ML
OXYCODONE UR CFM-MCNC: 3200 NG/ML
OXYCODONE/CREAT UR: 2388 NG/MG {CREAT}
OXYMORPHONE UR CFM-MCNC: 2340 NG/ML
OXYMORPHONE/CREAT UR: 1746 NG/MG {CREAT}

## 2022-11-28 DIAGNOSIS — M51.379 DEGENERATION OF LUMBAR OR LUMBOSACRAL INTERVERTEBRAL DISC: ICD-10-CM

## 2022-11-28 DIAGNOSIS — F11.90 CHRONIC, CONTINUOUS USE OF OPIOIDS: ICD-10-CM

## 2022-11-30 RX ORDER — OXYCODONE HYDROCHLORIDE 10 MG/1
10 TABLET ORAL 4 TIMES DAILY
Qty: 120 TABLET | Refills: 0 | Status: SHIPPED | OUTPATIENT
Start: 2022-11-30 | End: 2023-03-27 | Stop reason: DRUGHIGH

## 2022-11-30 NOTE — TELEPHONE ENCOUNTER
Received call from patient requesting refill(s) of Oxycodone     Last dispensed from pharmacy on 10/20 for 30 days    Patient's last office/virtual visit by prescribing provider on 10/17  Next office/virtual appointment scheduled for 1/17/23    UDT/CSA = 10/17/2022    Pending Prescriptions:                       Disp   Refills    oxyCODONE IR (ROXICODONE) 10 MG tablet    120 ta*0            Sig: Take 1 tablet (10 mg) by mouth 4 times daily May           fill/start 11/30/22. 30 day script for chronic           pain  [facility]

## 2022-12-14 NOTE — PATIENT INSTRUCTIONS - HE
Continue Morphine ER 15 mg every 8 hours.    You may take 1-2 Vicodin 10/325 mg a day as needed for severe breakthrough pain or in the morning as needed.    As your opioid dose remains stable, I will send electronic refills to the pharmacy for 2 subsequent months until your next appointment so you do not have to call our refill line.  The fill dates for your medications are:  01/23/19 & 02/20/19  Check with your pharmacy that all prescriptions are on your profile. Call the pharmacy a week before you want to fill the prescription as stock may vary and the pharmacy may need to order the medication for you. I reserve the right to cancel the electronic prescription at the pharmacy in between appointments and would contact you with an explanation if this were to occur.  For constipation, continue Amitiza 24 mcg twice a day with meals.    Continue taking Cymbalta 120 mg at night   Continue Gabapentin 400 mg at bedtime - refill sent in today.  This may be stopped if you see improvement with Amantadine for nerve pain.  Start Amantadine 100 mg in the morning x 7 days, then increase to 100 mg twice a day. Discuss this is used off label for pain and may give you more energy during the day.  Please make sure not to take too late in the day as it may keep you awake.  Continue Vitamin D 5,000 Units daily  Continue use of spinal cord stimulator  Follow-up in 8 weeks with Brea.      
no

## 2022-12-28 ENCOUNTER — TELEPHONE (OUTPATIENT)
Dept: PALLIATIVE MEDICINE | Facility: OTHER | Age: 77
End: 2022-12-28

## 2022-12-28 DIAGNOSIS — F11.90 CHRONIC, CONTINUOUS USE OF OPIOIDS: ICD-10-CM

## 2022-12-28 DIAGNOSIS — M51.379 DEGENERATION OF LUMBAR OR LUMBOSACRAL INTERVERTEBRAL DISC: ICD-10-CM

## 2022-12-28 RX ORDER — GLYCOPYRROLATE 1 MG/1
1 TABLET ORAL AT BEDTIME
Qty: 30 TABLET | Refills: 1 | Status: SHIPPED | OUTPATIENT
Start: 2022-12-28 | End: 2023-01-17

## 2022-12-28 RX ORDER — OXYCODONE HYDROCHLORIDE 10 MG/1
10 TABLET ORAL EVERY 6 HOURS PRN
Qty: 120 TABLET | Refills: 0 | Status: SHIPPED | OUTPATIENT
Start: 2022-12-28 | End: 2023-01-17

## 2022-12-28 NOTE — TELEPHONE ENCOUNTER
Pending Prescriptions:                       Disp   Refills    oxyCODONE IR (ROXICODONE) 10 MG tablet    120 ta*0            Sig: Take 1 tablet (10 mg) by mouth every 6 hours as           needed for severe pain (7-10) (max of #4/day)    [facility]

## 2022-12-28 NOTE — TELEPHONE ENCOUNTER
Received call from patient requesting refill(s) oxyCODONE IR (ROXICODONE) 10 MG tablet    Last dispensed from pharmacy on 11/30/2022    Patient's last office/virtual visit by prescribing provider on 10/17/2022  Next office/virtual appointment scheduled for 01/17/2022    Last urine drug screen date 10/17/2022  Current opioid agreement on file (completed within the last year) Yes Date of opioid agreement: 10/17/2022    E-prescribe to   [address], pharmacy    Will route to nursing Burns for review and preparation of prescription(s).     Lata Talley MA  [facility] [place]

## 2022-12-28 NOTE — TELEPHONE ENCOUNTER
Received fax request from [facility] – [place], [place] - [address], pharmacy requesting refill(s) for Refill Glycopyrrol 1 mg    Last refilled on 11/22/2022, 30 days, 30# per outside meds    Pt last seen on 10/17/2022  Next appt scheduled for 1/17/2023    Pending Prescriptions:                       Disp   Refills    glycopyrrolate (ROBINUL) 1 MG tablet      30 tab*1            Sig: Take 1 tablet (1 mg) by mouth At Bedtime

## 2023-01-04 DIAGNOSIS — G89.4 CHRONIC PAIN SYNDROME: ICD-10-CM

## 2023-01-04 RX ORDER — LUBIPROSTONE 24 UG/1
24 CAPSULE ORAL
Qty: 30 CAPSULE | Refills: 4 | Status: SHIPPED | OUTPATIENT
Start: 2023-01-04 | End: 2023-07-18

## 2023-01-04 NOTE — TELEPHONE ENCOUNTER
Received fax from pharmacy requesting refill(s) for     lubiprostone (AMITIZA) 24 MCG capsule    Date last filled 12.07.2022    Last Appt Date:10.17.2022    Next Appt scheduled: 01.17.2023    Pharmacy:   [facility] [address],    [facility] route for processing    Aleta Hargrove  [facility] Visit Facilitator

## 2023-01-17 ENCOUNTER — OFFICE VISIT (OUTPATIENT)
Dept: PALLIATIVE MEDICINE | Facility: OTHER | Age: 78
End: 2023-01-17
Payer: OTHER MISCELLANEOUS

## 2023-01-17 VITALS
WEIGHT: 220 LBS | SYSTOLIC BLOOD PRESSURE: 170 MMHG | OXYGEN SATURATION: 96 % | HEART RATE: 59 BPM | BODY MASS INDEX: 31.57 KG/M2 | DIASTOLIC BLOOD PRESSURE: 81 MMHG

## 2023-01-17 DIAGNOSIS — M51.379 DEGENERATION OF LUMBAR OR LUMBOSACRAL INTERVERTEBRAL DISC: ICD-10-CM

## 2023-01-17 DIAGNOSIS — M54.15 RADICULOPATHY OF THORACOLUMBAR REGION: ICD-10-CM

## 2023-01-17 DIAGNOSIS — G89.4 CHRONIC PAIN SYNDROME: ICD-10-CM

## 2023-01-17 DIAGNOSIS — F11.90 CHRONIC, CONTINUOUS USE OF OPIOIDS: ICD-10-CM

## 2023-01-17 PROCEDURE — G0463 HOSPITAL OUTPT CLINIC VISIT: HCPCS | Performed by: ANESTHESIOLOGY

## 2023-01-17 PROCEDURE — G0463 HOSPITAL OUTPT CLINIC VISIT: HCPCS

## 2023-01-17 PROCEDURE — 99213 OFFICE O/P EST LOW 20 MIN: CPT | Performed by: ANESTHESIOLOGY

## 2023-01-17 RX ORDER — DULOXETIN HYDROCHLORIDE 60 MG/1
60 CAPSULE, DELAYED RELEASE ORAL DAILY
Qty: 90 CAPSULE | Refills: 1 | Status: SHIPPED | OUTPATIENT
Start: 2023-01-17 | End: 2023-04-18

## 2023-01-17 RX ORDER — OXYCODONE HYDROCHLORIDE 15 MG/1
15 TABLET ORAL 4 TIMES DAILY
Qty: 112 TABLET | Refills: 0 | Status: SHIPPED | OUTPATIENT
Start: 2023-01-17 | End: 2023-02-24

## 2023-01-17 RX ORDER — LAMOTRIGINE 25 MG/1
50 TABLET ORAL 2 TIMES DAILY
Qty: 120 TABLET | Refills: 4 | Status: SHIPPED | OUTPATIENT
Start: 2023-01-17 | End: 2023-06-28

## 2023-01-17 RX ORDER — POLYETHYLENE GLYCOL 3350 17 G/17G
1 POWDER, FOR SOLUTION ORAL DAILY
COMMUNITY

## 2023-01-17 ASSESSMENT — PAIN SCALES - GENERAL: PAINLEVEL: SEVERE PAIN (7)

## 2023-01-17 NOTE — PROGRESS NOTES
[facility] - Office Visit    ASSESSMENT & PLAN     Naveen was seen today for pain.    Diagnoses and all orders for this visit:    Chronic pain syndrome  -     DULoxetine (CYMBALTA) 60 MG capsule; Take 1 capsule (60 mg) by mouth daily    Radiculopathy of thoracolumbar region  -     lamoTRIgine (LAMICTAL) 25 MG tablet; Take 2 tablets (50 mg) by mouth 2 times daily  -     oxyCODONE IR (ROXICODONE) 15 MG tablet; Take 1 tablet (15 mg) by mouth 4 times daily    Lumbar Disc Degeneration    Chronic, continuous use of opioids        Patient Instructions       ----------------------------------------------------------------  Clinic Number:  [phone]     Call with any questions about your care and for scheduling assistance.     Calls are returned Monday through Friday between 8 AM and 4:30 PM. We usually get back to you within 2 business days depending on the issue/request.    If we are prescribing your medications:    For opioid medication refills, call the clinic or send a Heckyl message 7 days in advance.  Please include:    Name of requested medication    Name of the pharmacy.    For non-opioid medications, call your pharmacy directly to request a refill. Please allow 3-4 days to be processed.     Per [place] State Law:    All controlled substance prescriptions must be filled within 30 days of being written.      For those controlled substances allowing refills, pickup must occur within 30 days of last fill.      We believe regular attendance is key to your success in our program!      Any time you are unable to keep your appointment we ask that you call us at least 24 hours in advance to cancel.This will allow us to offer the appointment time to another patient.     Multiple missed appointments may lead to dismissal from the clinic.     PLAN:    You will reschedule the sleep study to see if that relates to sweating problems.    Increase lamotrigine to 25 mg 2 capsules twice a day for pain    Continue  duloxetine 60 mg 1 daily for pain.    Increase oxycodone to 15 mg 4 times a day to help with pain.    Discussed the role of a grounding mat to help with pain and inflammation, resources provided.    Follow-up with Dr. Balderrama in 2 to 3 months        -----  KRYSTAL BALDERRAMA MD  [facility]       SUBJECTIVE      Naveen Toro is a 78 year old year old male who presents to clinic today for the following:     Follow-up for lumbar radiculopathy.  Seen with his wife.  He notes some days seem to be more pain than others.  His wife observed he seems to be doing worse for a long time.  She hears more grunting and noises, he appears more sedated.  Using oxycodone 10 mg 4 times a day.    They review evaluations for the sweating.  Dr. Hoover and primary care has done a number of tests.  We turned back to hematologist Dr. Patton, initially after the hospitalization year ago had been some elevation in some labs raising a concern for oncology, those labs normalized.  Continue normal.  Even did a bone marrow biopsy.    Sweating continues to be the same, particularly at bedtime and if he naps during the day can be seen.    He acknowledges the sleep doctors did call and he is to call back to make an appointment.    Trial of glycopyrrolate was not helpful.    Due to communication issues around the lamotrigine had been off of that for a few weeks without changes and sweating is now gone back on at 75 mg daily.  May have helped somewhat with the pain.    Similarly duloxetine had been out for a few weeks.  No change in sweating.  No clear discontinuation syndrome.    Last urine drug test in October.   reviewed.  Fills in [place].        Current Outpatient Medications:      amLODIPine (NORVASC) 2.5 MG tablet, Take 1 tablet by mouth daily, Disp: , Rfl:      aspirin 325 MG tablet, [ASPIRIN 325 MG TABLET] Take 325 mg by mouth daily., Disp: , Rfl:      atorvastatin (LIPITOR) 40 MG tablet, [ATORVASTATIN (LIPITOR)  40 MG TABLET] Take 40 mg by mouth daily. As directed, Disp: , Rfl:      blood glucose (PRODIGY NO CODING BLOOD GLUC) test strip, , Disp: , Rfl:      Coenzyme Q10 (COQ10) 200 MG CAPS, Take 1 capsule by mouth daily, Disp: 30 capsule, Rfl: 3     diclofenac sodium (VOLTAREN) 1 % Gel, [DICLOFENAC SODIUM (VOLTAREN) 1 % GEL] Apply 2 g topically 4 (four) times a day., Disp: 100 g, Rfl: 1     DULoxetine (CYMBALTA) 60 MG capsule, Take 1 capsule (60 mg) by mouth daily, Disp: 90 capsule, Rfl: 1     lamoTRIgine (LAMICTAL) 25 MG tablet, Take 2 tablets (50 mg) by mouth 2 times daily, Disp: 120 tablet, Rfl: 4     Lancets MISC, 1 each, Disp: , Rfl:      lubiprostone (AMITIZA) 24 MCG capsule, Take 1 capsule (24 mcg) by mouth daily (with breakfast), Disp: 30 capsule, Rfl: 4     metoprolol (LOPRESSOR) 25 MG tablet, [METOPROLOL (LOPRESSOR) 25 MG TABLET] Take 25 mg by mouth 2 (two) times a day., Disp: , Rfl:      naloxone (NARCAN) 4 MG/0.1ML nasal spray, Spray 1 spray (4 mg) into one nostril alternating nostrils once as needed for opioid reversal every 2-3 minutes until assistance arrives, Disp: 0.2 mL, Rfl: 0     oxyCODONE IR (ROXICODONE) 10 MG tablet, Take 1 tablet (10 mg) by mouth 4 times daily May fill/start 11/30/22. 30 day script for chronic pain, Disp: 120 tablet, Rfl: 0     oxyCODONE IR (ROXICODONE) 15 MG tablet, Take 1 tablet (15 mg) by mouth 4 times daily, Disp: 112 tablet, Rfl: 0     polyethylene glycol (MIRALAX) 17 g packet, Take 1 packet by mouth daily, Disp: , Rfl:      vitamin D3 (CHOLECALCIFEROL) 125 MCG (5000 UT) tablet, Take 5,000 Units by mouth, Disp: , Rfl:       Review of Systems   General, psych, musculoskeletal, bowels and bladder otherwise normal other than above.          OBJECTIVE   BP (!) 170/81   Pulse 59   Wt 99.8 kg (220 lb)   SpO2 96%   BMI 31.57 kg/m          Physical Exam  General: Alert, clear sensorium no pain behavior.  No respiratory distress.  No sweating noted today  Cardiovascular: Normal  rate  Lungs: Pulmonary effort is normal, speaking in full sentences  MSK:   Skin:. No concerning rashes or lesions.  Neurologic: No focal deficit, alert and oriented x3  Psychiatric: Affect constricted    Assessment: Lumbar degenerative changes, we have rotated different opioids in particular with concern for side effect of sweating.  On this regimen pain behavior seems particular noted by his wife.  We will increase oxycodone to 15 mg 4 times a day.    Reviewed again with the sweating associate with sleeping, the need to rule out sleep apnea, discussed the sympathetic response if this is the case and sweating may be a factor.  He will reschedule.    Total time more than 20 minutes.

## 2023-01-17 NOTE — PATIENT INSTRUCTIONS
----------------------------------------------------------------  Clinic Number:  [phone]   Call with any questions about your care and for scheduling assistance.   Calls are returned Monday through Friday between 8 AM and 4:30 PM. We usually get back to you within 2 business days depending on the issue/request.    If we are prescribing your medications:  For opioid medication refills, call the clinic or send a Meeting To You message 7 days in advance.  Please include:  Name of requested medication  Name of the pharmacy.  For non-opioid medications, call your pharmacy directly to request a refill. Please allow 3-4 days to be processed.   Per [place] State Law:  All controlled substance prescriptions must be filled within 30 days of being written.    For those controlled substances allowing refills, pickup must occur within 30 days of last fill.      We believe regular attendance is key to your success in our program!    Any time you are unable to keep your appointment we ask that you call us at least 24 hours in advance to cancel.This will allow us to offer the appointment time to another patient.   Multiple missed appointments may lead to dismissal from the clinic.     PLAN:    You will reschedule the sleep study to see if that relates to sweating problems.    Increase lamotrigine to 25 mg 2 capsules twice a day for pain    Continue duloxetine 60 mg 1 daily for pain.    Increase oxycodone to 15 mg 4 times a day to help with pain.    Discussed the role of a grounding mat to help with pain and inflammation, resources provided.    Follow-up with Dr. Parker in 2 to 3 months

## 2023-01-17 NOTE — PROGRESS NOTES
Patient presents to the clinic today for a follow up with KRYSTAL BALDERRAMA MD regarding Pain Management.      PEG Score 5/20/2022 10/17/2022 1/17/2023   PEG Total Score 6.67 7 7       UDS/CSA-10.17.2022    Medications-Oxycodone 10mg 01.17.2023 @9am    QUESTIONS:    Aleta GARCES [facility] Visit Facilitator

## 2023-01-29 ENCOUNTER — HEALTH MAINTENANCE LETTER (OUTPATIENT)
Age: 78
End: 2023-01-29

## 2023-02-24 ENCOUNTER — TELEPHONE (OUTPATIENT)
Dept: PALLIATIVE MEDICINE | Facility: OTHER | Age: 78
End: 2023-02-24
Payer: MEDICARE

## 2023-02-24 DIAGNOSIS — M54.15 RADICULOPATHY OF THORACOLUMBAR REGION: ICD-10-CM

## 2023-02-24 RX ORDER — OXYCODONE HYDROCHLORIDE 15 MG/1
15 TABLET ORAL 4 TIMES DAILY
Qty: 112 TABLET | Refills: 0 | Status: SHIPPED | OUTPATIENT
Start: 2023-02-24 | End: 2023-03-28

## 2023-02-24 NOTE — TELEPHONE ENCOUNTER
Received call from patient requesting refill(s) of oxyCODONE IR (ROXICODONE) 15 MG tablet    Last dispensed from pharmacy on 01.18.2023    Patient's last office/virtual visit by prescribing provider on 01.17.2023  Next office/virtual appointment scheduled for 04.18.2023    UDT/CSA 10.17.2022    E-prescribe to [address]pharmacy    Will route to [facility] for review and preparation of prescription(s).

## 2023-02-24 NOTE — TELEPHONE ENCOUNTER
[facility] Call Center    Phone Message    May a detailed message be left on voicemail: yes     Reason for Call: Other: Patient's spouse is calling on the status of the medication since she is still waiting on it. [place] call back when it has been sent over. They are out of medication.    Action Taken: Other: Pain    Travel Screening: Not Applicable

## 2023-02-24 NOTE — TELEPHONE ENCOUNTER
Pending Prescriptions:                       Disp   Refills    oxyCODONE IR (ROXICODONE) 15 MG tablet    112 ta*0            Sig: Take 1 tablet (15 mg) by mouth 4 times daily           Dispense/start 2/24/23    Wendy Savage

## 2023-04-18 ENCOUNTER — OFFICE VISIT (OUTPATIENT)
Dept: PALLIATIVE MEDICINE | Facility: OTHER | Age: 78
End: 2023-04-18
Payer: OTHER MISCELLANEOUS

## 2023-04-18 VITALS
DIASTOLIC BLOOD PRESSURE: 64 MMHG | WEIGHT: 215 LBS | SYSTOLIC BLOOD PRESSURE: 130 MMHG | HEART RATE: 55 BPM | OXYGEN SATURATION: 96 % | BODY MASS INDEX: 30.85 KG/M2

## 2023-04-18 DIAGNOSIS — M51.379 DEGENERATION OF LUMBAR OR LUMBOSACRAL INTERVERTEBRAL DISC: Primary | ICD-10-CM

## 2023-04-18 PROCEDURE — 99215 OFFICE O/P EST HI 40 MIN: CPT | Performed by: ANESTHESIOLOGY

## 2023-04-18 PROCEDURE — G0463 HOSPITAL OUTPT CLINIC VISIT: HCPCS

## 2023-04-18 RX ORDER — HYDROMORPHONE HYDROCHLORIDE 4 MG/1
4 TABLET ORAL
Qty: 112 TABLET | Refills: 0 | Status: SHIPPED | OUTPATIENT
Start: 2023-04-18 | End: 2023-04-28

## 2023-04-18 RX ORDER — DULOXETIN HYDROCHLORIDE 30 MG/1
30 CAPSULE, DELAYED RELEASE ORAL DAILY
Qty: 30 CAPSULE | Refills: 1 | Status: SHIPPED | OUTPATIENT
Start: 2023-04-18 | End: 2023-06-28

## 2023-04-18 ASSESSMENT — PAIN SCALES - GENERAL: PAINLEVEL: SEVERE PAIN (7)

## 2023-04-18 ASSESSMENT — PATIENT HEALTH QUESTIONNAIRE - PHQ9: SUM OF ALL RESPONSES TO PHQ QUESTIONS 1-9: 11

## 2023-04-18 NOTE — PATIENT INSTRUCTIONS
"    ----------------------------------------------------------------  [facility] Number:  [phone]   Call with any questions about your care and for scheduling assistance.   Calls are returned Monday through Friday between 8 AM and 4:30 PM. We usually get back to you within 2 business days depending on the issue/request.    If we are prescribing your medications:  For opioid medication refills, call the clinic or send a Personal On Demand message 7 days in advance.  Please include:  Name of requested medication  Name of the pharmacy.  For non-opioid medications, call your pharmacy directly to request a refill. Please allow 3-4 days to be processed.   Per [place] State Law:  All controlled substance prescriptions must be filled within 30 days of being written.    For those controlled substances allowing refills, pickup must occur within 30 days of last fill.      We believe regular attendance is key to your success in our program!    Any time you are unable to keep your appointment we ask that you call us at least 24 hours in advance to cancel.This will allow us to offer the appointment time to another patient.   Multiple missed appointments may lead to dismissal from the clinic  PLAN:    We will call the pulmonologist to get the CPAP or BiPAP machine.    Duloxetine changed from 60 to 30 mg daily.    May then begin the Wellbutrin 150 mg daily from Dr. Hoover.    Change the oxycodone to hydromorphone 4 mg every 4 hours for pain.    Discussed until you are eating better, taking [employer] green food supplement.    Discussed the concern for adrenal fatigue, taking the supplement \"adrenal all\" the orthomolecular labs.    Follow-up with Dr. Parker in the office in 4 weeks      "

## 2023-04-18 NOTE — PROGRESS NOTES
Patient presents to the clinic today for a follow up with KRYSTAL BALDERRAMA MD regarding Pain Management.          10/17/2022    11:31 AM 1/17/2023    11:01 AM 4/18/2023    10:03 AM   PEG Score   PEG Total Score 7 7 6.33       UDS/CSA- 10.17.2022    Medications- Oxycodone  Today at 845am    QUESTIONS:    Aleta GARCES [facility] Visit Facilitator

## 2023-04-18 NOTE — PROGRESS NOTES
[facility] - Office Visit    ASSESSMENT & PLAN     Naveen was seen today for pain.    Diagnoses and all orders for this visit:    Lumbar Disc Degeneration  -     DULoxetine (CYMBALTA) 30 MG capsule; Take 1 capsule (30 mg) by mouth daily  -     HYDROmorphone (DILAUDID) 4 MG tablet; Take 1 tablet (4 mg) by mouth 6 times daily        Patient Instructions       ----------------------------------------------------------------  Clinic Number:  [phone]     Call with any questions about your care and for scheduling assistance.     Calls are returned Monday through Friday between 8 AM and 4:30 PM. We usually get back to you within 2 business days depending on the issue/request.    If we are prescribing your medications:    For opioid medication refills, call the clinic or send a AppDirect message 7 days in advance.  Please include:    Name of requested medication    Name of the pharmacy.    For non-opioid medications, call your pharmacy directly to request a refill. Please allow 3-4 days to be processed.     Per [place] State Law:    All controlled substance prescriptions must be filled within 30 days of being written.      For those controlled substances allowing refills, pickup must occur within 30 days of last fill.      We believe regular attendance is key to your success in our program!      Any time you are unable to keep your appointment we ask that you call us at least 24 hours in advance to cancel.This will allow us to offer the appointment time to another patient.     Multiple missed appointments may lead to dismissal from the clinic  PLAN:    We will call the pulmonologist to get the CPAP or BiPAP machine.    Duloxetine changed from 60 to 30 mg daily.    May then begin the Wellbutrin 150 mg daily from Dr. Hoover.    Change the oxycodone to hydromorphone 4 mg every 4 hours for pain.    Discussed until you are eating better, taking superfoods green food supplement.    Discussed the concern for  "adrenal fatigue, taking the supplement \"adrenal all\" the orthomolecular labs.    Follow-up with Dr. Balderrama in the office in 4 weeks            -----  KRYSTAL BALDERRAMA MD  [facility]       SUBJECTIVE      Naveen Toro is a 78 year old year old male who presents to clinic today for the following:     Follow-up for lumbar radiculopathy.    Review the record did see his primary care provider.  They discussed depression.  He has been a lot of time in bed.  He is eating poorly, mostly sugary foods going off his diabetes.  He recommended adding Wellbutrin to his regimen that was concerned about the duloxetine.    Today indicates he is \"not good\".  He feels perhaps the medications are working as well for his pain.    Wife notes he seems to be spending from 9:00 at night till 3 the next day 17 to 18 hours in bed.  This may have been occurring over the last 3 months.  He is then not eating as well.  Has not lost weight perhaps even gained a little bit.,     He acknowledges there may be some element of depression, as well as the pain.    When last seen I recommended a sleep study, I was concerned he is having sleep apnea, the sweating relates to psychophysiologic arousal.  They did meet with a pulmonologist to treat somebody else in the family.  There was some discussion about a CPAP or BiPAP machine being ordered and they have not yet heard.  He continues to have problems with some sweating that she needs, other times feels like his temperature has cold reviewed with blankets.    He has had bone marrow biopsies and other problems to address the sweating which have not been found.    He does have stage III kidney disease as we docked about rotating opioids can use morphine.  Had been on hydrocodone in the past though was concerned with the amount of Tylenol needed so long-acting form.  Wife notes that that was forgotten there have been withdrawal.  Similarly I see there was discussion about using " hydromorphone in the past including a long-acting form.    He has been using duloxetine 60 mg, we discussed with the addition of Wellbutrin 150 that may be some noradrenergic load and could taper the duloxetine to 30 mg.  He does continue the lamotrigine.    We discussed need to have adequate nutrition for the bacteria in the stomach to be making the neurotransmitters, particularly cruciferous vegetables, polyphenol's.  Wife notes he is clearly not eating in that fashion.  We reviewed the use of supplements with the super green powders to provide some of these building blocks.    I also reviewed the concept of adrenal fatigue.  Reviewed as this possibly untreated sleep apnea has gone on this long that that may be a factor and we discussed supplements.    Last urine drug test in October.    Testosterone levels were checked last summer, low normal.      Current Outpatient Medications:      amLODIPine (NORVASC) 2.5 MG tablet, Take 1 tablet by mouth daily, Disp: , Rfl:      aspirin 325 MG tablet, [ASPIRIN 325 MG TABLET] Take 325 mg by mouth daily., Disp: , Rfl:      atorvastatin (LIPITOR) 40 MG tablet, [ATORVASTATIN (LIPITOR) 40 MG TABLET] Take 40 mg by mouth daily. As directed, Disp: , Rfl:      blood glucose (PRODIGY NO CODING BLOOD GLUC) test strip, , Disp: , Rfl:      Coenzyme Q10 (COQ10) 200 MG CAPS, Take 1 capsule by mouth daily, Disp: 30 capsule, Rfl: 3     diclofenac sodium (VOLTAREN) 1 % Gel, [DICLOFENAC SODIUM (VOLTAREN) 1 % GEL] Apply 2 g topically 4 (four) times a day., Disp: 100 g, Rfl: 1     DULoxetine (CYMBALTA) 30 MG capsule, Take 1 capsule (30 mg) by mouth daily, Disp: 30 capsule, Rfl: 1     HYDROmorphone (DILAUDID) 4 MG tablet, Take 1 tablet (4 mg) by mouth 6 times daily, Disp: 112 tablet, Rfl: 0     lamoTRIgine (LAMICTAL) 25 MG tablet, Take 2 tablets (50 mg) by mouth 2 times daily, Disp: 120 tablet, Rfl: 4     Lancets MISC, 1 each, Disp: , Rfl:      lubiprostone (AMITIZA) 24 MCG capsule, Take 1  capsule (24 mcg) by mouth daily (with breakfast), Disp: 30 capsule, Rfl: 4     metoprolol (LOPRESSOR) 25 MG tablet, [METOPROLOL (LOPRESSOR) 25 MG TABLET] Take 25 mg by mouth 2 (two) times a day., Disp: , Rfl:      naloxone (NARCAN) 4 MG/0.1ML nasal spray, Spray 1 spray (4 mg) into one nostril alternating nostrils once as needed for opioid reversal every 2-3 minutes until assistance arrives, Disp: 0.2 mL, Rfl: 0     polyethylene glycol (MIRALAX) 17 g packet, Take 1 packet by mouth daily, Disp: , Rfl:      vitamin D3 (CHOLECALCIFEROL) 125 MCG (5000 UT) tablet, Take 5,000 Units by mouth, Disp: , Rfl:       Review of Systems   General, psych, musculoskeletal, bowels and bladder otherwise normal other than above.          OBJECTIVE   /64   Pulse 55   Wt 97.5 kg (215 lb)   SpO2 96%   BMI 30.85 kg/m        Physical Exam  General: Alert, clear sensorium.  Ambulates with cane.  No respiratory distress.  No diaphoresis  Cardiovascular:   Lungs: Pulmonary effort is normal, speaking in full sentences  MSK: n  Skin: Warm and dry. No concerning rashes or lesions.  Neurologic: No focal deficit, alert and oriented x3  Psychiatric: Affect constricted    Assessment, has been followed for lumbar radiculopathy, different opioids, possibly tolerance.  Will rotate to hydromorphone.    Significant decreased level of function.  He has had evaluation for underlying medical conditions to account for the sweating which has been unrevealing.    We met last time I was concerned this component may be sleep apnea.  Reviewed with been unaddressed over time possible association with adrenal fatigue.    Plan above defer adjusting antidepressants.    I did call the pharmacist and medical sens at the patient's request, they do not carry the green powders or the adrenal supplements    Total time 45 minutes      Total time spent:  minutes

## 2023-04-28 ENCOUNTER — TELEPHONE (OUTPATIENT)
Dept: PALLIATIVE MEDICINE | Facility: OTHER | Age: 78
End: 2023-04-28
Payer: MEDICARE

## 2023-04-28 DIAGNOSIS — M51.379 DEGENERATION OF LUMBAR OR LUMBOSACRAL INTERVERTEBRAL DISC: ICD-10-CM

## 2023-04-28 RX ORDER — HYDROMORPHONE HYDROCHLORIDE 4 MG/1
4 TABLET ORAL
Qty: 168 TABLET | Refills: 0 | Status: SHIPPED | OUTPATIENT
Start: 2023-04-28 | End: 2023-05-18

## 2023-04-28 NOTE — CONFIDENTIAL NOTE
RX is currently prescribed as an 18 day quantity.  Dispensed on 4/18/23    Please add quantity to RX for either 28 or 30 days if appropriate    Pending Prescriptions:                       Disp   Refills    HYDROmorphone (DILAUDID) 4 MG tablet             0            Sig: Take 1 tablet (4 mg) by mouth 6 times daily           Dispense 5/4/23 for start 5/6/23    [facility]

## 2023-04-28 NOTE — TELEPHONE ENCOUNTER
[facility] Call Center    Phone Message    May a detailed message be left on voicemail: yes     Reason for Call: Other: Patient's spouse is calling regarding the dilaudid. Patient only got 18 days worth of medication and they wanted to know if this was correct. Please call back to discuss.      Action Taken: Other: Pain    Travel Screening: Not Applicable

## 2023-05-02 NOTE — TELEPHONE ENCOUNTER
[facility] Call Center    Phone Message    May a detailed message be left on voicemail: yes     Reason for Call: Medication Refill Request    Has the patient contacted the pharmacy for the refill? Yes   Name of medication being requested: HYDROmorphone (DILAUDID) 4 MG tablet  Provider who prescribed the medication: Keith  Pharmacy: [address]    Date medication is needed: around 5/10/23 and they want a 30 day supply instead of 18        Action Taken: Other: Pain    Travel Screening: Not Applicable

## 2023-05-18 ENCOUNTER — OFFICE VISIT (OUTPATIENT)
Dept: PALLIATIVE MEDICINE | Facility: OTHER | Age: 78
End: 2023-05-18
Payer: OTHER MISCELLANEOUS

## 2023-05-18 VITALS
SYSTOLIC BLOOD PRESSURE: 144 MMHG | DIASTOLIC BLOOD PRESSURE: 67 MMHG | BODY MASS INDEX: 29.84 KG/M2 | HEART RATE: 63 BPM | OXYGEN SATURATION: 98 % | WEIGHT: 208 LBS

## 2023-05-18 DIAGNOSIS — M51.379 DEGENERATION OF LUMBAR OR LUMBOSACRAL INTERVERTEBRAL DISC: ICD-10-CM

## 2023-05-18 PROCEDURE — 99214 OFFICE O/P EST MOD 30 MIN: CPT | Performed by: ANESTHESIOLOGY

## 2023-05-18 PROCEDURE — G0463 HOSPITAL OUTPT CLINIC VISIT: HCPCS

## 2023-05-18 RX ORDER — BUPROPION HYDROCHLORIDE 150 MG/1
150 TABLET, EXTENDED RELEASE ORAL ONCE
COMMUNITY

## 2023-05-18 RX ORDER — LIDOCAINE 4 G/G
PATCH TOPICAL EVERY 24 HOURS
COMMUNITY

## 2023-05-18 RX ORDER — KETAMINE HCL 100 %
POWDER (GRAM) MISCELLANEOUS
Qty: 90 G | Refills: 3 | Status: SHIPPED | OUTPATIENT
Start: 2023-05-18 | End: 2023-11-13

## 2023-05-18 RX ORDER — HYDROMORPHONE HYDROCHLORIDE 4 MG/1
4 TABLET ORAL
Qty: 168 TABLET | Refills: 0 | Status: SHIPPED | OUTPATIENT
Start: 2023-05-18 | End: 2023-06-28

## 2023-05-18 ASSESSMENT — PAIN SCALES - GENERAL: PAINLEVEL: SEVERE PAIN (6)

## 2023-05-18 NOTE — PATIENT INSTRUCTIONS
"  [facility] Number:  [phone]  Call with any questions about your care and for scheduling assistance.   Calls are returned Monday through Friday between 8 AM and 4:30 PM. We usually get back to you within 2 business days depending on the issue/request.    If we are prescribing your medications:  For opioid medication refills, call the clinic or send a [employer] message 7 days in advance.  Please include:  Name of requested medication  Name of the pharmacy.  For non-opioid medications, call your pharmacy directly to request a refill. Please allow 3-4 days to be processed.   Per [place] State Law:  All controlled substance prescriptions must be filled within 30 days of being written.    For those controlled substances allowing refills, pickup must occur within 30 days of last fill.      We believe regular attendance is key to your success in our program!    Any time you are unable to keep your appointment we ask that you call us at least 24 hours in advance to cancel.This will allow us to offer the appointment time to another patient.   Multiple missed appointments may lead to dismissal from the clinic.     PLAN:    You will continue to contact the sleep medicine doctors for CPAP or BiPAP device.    Continue the hydromorphone 4 mg every 4 hours.    Discussed a topical cream with ketamine and bupivacaine to apply to your back 3 times a day, sent to the [place] compounding pharmacy.  You will see if your Worker's Comp. will cover.    Discussed adrenal supplement \"Adren-All\" from [facility] pharmacy, 6-week trial to see if helps with energy.    Continue duloxetine and Wellbutrin.    You are meeting with a spinal cord stimulator representative today    Follow-up with Dr. Parker in 2 to 3 months in office  "

## 2023-05-18 NOTE — PROGRESS NOTES
Patient presents to the clinic today for a follow up with KRYSTAL BALDERRAMA MD regarding Pain Management.          1/17/2023    11:01 AM 4/18/2023    10:03 AM 5/18/2023     9:38 AM   PEG Score   PEG Total Score 7 6.33 6       UDS/CSA- 10.17.2022    Medications- Hydromorphone 05.18.2023 @5am    QUESTIONS:    Aleta Hargrove  [facility] Visit Facilitator

## 2023-05-18 NOTE — PROGRESS NOTES
[facility] - Office Visit    ASSESSMENT & PLAN     Naveen was seen today for pain.    Diagnoses and all orders for this visit:    Lumbar Disc Degeneration  -     HYDROmorphone (DILAUDID) 4 MG tablet; Take 1 tablet (4 mg) by mouth 6 times daily Dispense  6/1 for 6/2  -     ketamine HCl POWD; Ketamine 10%, Bupivicaine 2%, ketaprofen 10%, apply to back 3 times a day, 90 gm tube        Patient Instructions     [facility] Number:  [phone]    Call with any questions about your care and for scheduling assistance.     Calls are returned Monday through Friday between 8 AM and 4:30 PM. We usually get back to you within 2 business days depending on the issue/request.    If we are prescribing your medications:    For opioid medication refills, call the clinic or send a [employer] message 7 days in advance.  Please include:    Name of requested medication    Name of the pharmacy.    For non-opioid medications, call your pharmacy directly to request a refill. Please allow 3-4 days to be processed.     Per [place] State Law:    All controlled substance prescriptions must be filled within 30 days of being written.      For those controlled substances allowing refills, pickup must occur within 30 days of last fill.      We believe regular attendance is key to your success in our program!      Any time you are unable to keep your appointment we ask that you call us at least 24 hours in advance to cancel.This will allow us to offer the appointment time to another patient.     Multiple missed appointments may lead to dismissal from the clinic.     PLAN:    You will continue to contact the sleep medicine doctors for CPAP or BiPAP device.    Continue the hydromorphone 4 mg every 4 hours.    Discussed a topical cream with ketamine and bupivacaine to apply to your back 3 times a day, sent to the [place] compounding pharmacy.  You will see if your Worker's Comp. will  "cover.    Discussed adrenal supplement \"Adren-All\" from [facility] pharmacy, 6-week trial to see if helps with energy.    Continue duloxetine and Wellbutrin.    You are meeting with a spinal cord stimulator representative today    Follow-up with Dr. Balderrama in 2 to 3 months in office        -----  KRYSTAL BALDERRAMA MD  [facility]       SUBJECTIVE      Naveen Toro is a 78 year old year old male who presents to clinic today for the following:     Follow-up for lumbar radiculopathy.    Seen today with his wife.    He acknowledges he has been \"up more\", though reports he is more sore.  Wife observed that he has been getting out of bed more doing more activity had in the past.  For example he stands in the garage working on a project on the cement floor for hours.  Up where they have their camper he has been climbing up and down into the well..  She is \"amazed\" what he has been able to do.  She anticipates once this work is done and they go up to the cabin he will be active but less strenuous work so may be positive.  He is using the hydromorphone 4 mg every 4 hours.  He notes it is hard to wake up for the 1 AM dose though she sets her alarm.  She then tends to also be up by 5 AM.    The sweating may be a little bit less.  She also notes a correlation when he is up out of bed.  He reports previously could have wrung out the sheets, now thinks the sweating may be a bit less    Still finds that he can have sweating 10 minutes after going to bed.  He has not yet connected with the sleep doctor to get CPAP or BiPAP device.  They reviewed frustrations and communication.    They did not get the adrenal fatigue supplement last time but would like to get it today.  He did try taking a powder supplement but could not tolerate.    He did decrease the duloxetine to 30 mg a start Wellbutrin 150 which may be a correlate also some improvement.  They note the pharmacist called to check on him so " they wonder if there are particular concerns.  I reviewed possible risks and reflected that was not placed concern on the part of the pharmacist though adjusting opioids would have more overall risk.    He has been trying the lidocaine 5% patch.  He wonders there is a higher dose.  This led to discussion about using compounded creams that could have agents such as ketamine, bupivacaine, Luanne Profen.  He would have to be run through his Worker's Comp. though he is interested in pursuing.    We discussed a TENS unit.  He has a spinal cord stimulator.  He demonstrates that he needs to change of position flexing his back to see what it was on.  This was put in by Dr. Stevenson several years ago.  Last saw spinal cord rep a few years ago.  I noted that one of the grafts is in fact here today and he would like to have him meet.    Noted he was last reviewed in 2021 so the battery may be continuing to get lower.    Last urine drug test in October      Current Outpatient Medications:      amLODIPine (NORVASC) 2.5 MG tablet, Take 1 tablet by mouth daily, Disp: , Rfl:      aspirin 325 MG tablet, [ASPIRIN 325 MG TABLET] Take 325 mg by mouth daily., Disp: , Rfl:      atorvastatin (LIPITOR) 40 MG tablet, [ATORVASTATIN (LIPITOR) 40 MG TABLET] Take 40 mg by mouth daily. As directed, Disp: , Rfl:      blood glucose (PRODIGY NO CODING BLOOD GLUC) test strip, , Disp: , Rfl:      buPROPion (WELLBUTRIN SR) 150 MG 12 hr tablet, Take 150 mg by mouth once, Disp: , Rfl:      Coenzyme Q10 (COQ10) 200 MG CAPS, Take 1 capsule by mouth daily, Disp: 30 capsule, Rfl: 3     diclofenac sodium (VOLTAREN) 1 % Gel, [DICLOFENAC SODIUM (VOLTAREN) 1 % GEL] Apply 2 g topically 4 (four) times a day., Disp: 100 g, Rfl: 1     DULoxetine (CYMBALTA) 30 MG capsule, Take 1 capsule (30 mg) by mouth daily, Disp: 30 capsule, Rfl: 1     HYDROmorphone (DILAUDID) 4 MG tablet, Take 1 tablet (4 mg) by mouth 6 times daily Dispense  6/1 for 6/2, Disp: 168 tablet, Rfl: 0      ketamine HCl POWD, Ketamine 10%, Bupivicaine 2%, ketaprofen 10%, apply to back 3 times a day, 90 gm tube, Disp: 90 g, Rfl: 3     lamoTRIgine (LAMICTAL) 25 MG tablet, Take 2 tablets (50 mg) by mouth 2 times daily, Disp: 120 tablet, Rfl: 4     Lancets MISC, 1 each, Disp: , Rfl:      Lidocaine (LIDOCARE) 4 % Patch, Place onto the skin every 24 hours To prevent lidocaine toxicity, patient should be patch free for 12 hrs daily., Disp: , Rfl:      lubiprostone (AMITIZA) 24 MCG capsule, Take 1 capsule (24 mcg) by mouth daily (with breakfast), Disp: 30 capsule, Rfl: 4     metoprolol (LOPRESSOR) 25 MG tablet, [METOPROLOL (LOPRESSOR) 25 MG TABLET] Take 25 mg by mouth 2 (two) times a day., Disp: , Rfl:      naloxone (NARCAN) 4 MG/0.1ML nasal spray, Spray 1 spray (4 mg) into one nostril alternating nostrils once as needed for opioid reversal every 2-3 minutes until assistance arrives, Disp: 0.2 mL, Rfl: 0     polyethylene glycol (MIRALAX) 17 g packet, Take 1 packet by mouth daily, Disp: , Rfl:      vitamin D3 (CHOLECALCIFEROL) 125 MCG (5000 UT) tablet, Take 5,000 Units by mouth, Disp: , Rfl:       Review of Systems   General, psych, musculoskeletal, bowels and bladder otherwise normal other than above.          OBJECTIVE   BP (!) 144/67   Pulse 63   Wt 94.3 kg (208 lb)   SpO2 98%   BMI 29.84 kg/m        Physical Exam  General: Alert, clear sensorium.  Ambulates with cane.  Does not appear diaphoretic.  No pain behavior.  Cardiovascular: Normal rate  Lungs: Pulmonary effort is normal, speaking in full sentences  MSK:   Skin: No concerning rashes or lesions.  Neurologic: No focal deficit, alert and oriented x3  Psychiatric: Affect wider.    Assessment: Lumbar radiculopathy, rotating oxycodone to hydromorphone may be helpful with pain, has been more active, thus he has with wife's perspective doing more work than he would typically done flaring up pain.  Sweating may be's [place] correlated with sleep concerns but reports  less on this.  Reviewed there is a long-acting form of hydromorphone although prohibitively expensive.    We will have a trial of a compounded cream and he will discuss with Worker's Comp.    He is presently meeting with the spinal cord stimulator representative to optimize settings.    Total time 32 minutes with moderate complexity decision making      Total time spent: minutes

## 2023-07-13 ENCOUNTER — OFFICE VISIT (OUTPATIENT)
Dept: PALLIATIVE MEDICINE | Facility: OTHER | Age: 78
End: 2023-07-13
Payer: OTHER MISCELLANEOUS

## 2023-07-13 VITALS — OXYGEN SATURATION: 98 % | SYSTOLIC BLOOD PRESSURE: 145 MMHG | DIASTOLIC BLOOD PRESSURE: 65 MMHG | HEART RATE: 65 BPM

## 2023-07-13 DIAGNOSIS — M96.1 FAILED BACK SURGICAL SYNDROME: Primary | ICD-10-CM

## 2023-07-13 PROCEDURE — G0463 HOSPITAL OUTPT CLINIC VISIT: HCPCS | Performed by: STUDENT IN AN ORGANIZED HEALTH CARE EDUCATION/TRAINING PROGRAM

## 2023-07-13 PROCEDURE — 99204 OFFICE O/P NEW MOD 45 MIN: CPT | Performed by: STUDENT IN AN ORGANIZED HEALTH CARE EDUCATION/TRAINING PROGRAM

## 2023-07-13 ASSESSMENT — PAIN SCALES - GENERAL: PAINLEVEL: SEVERE PAIN (6)

## 2023-07-13 NOTE — PATIENT INSTRUCTIONS
[facility]  VISIT SUMMARY  Procedures: Hold off on SCS battery exchange until battery cannot hold a charge or there is change in efficacy   Diagnostic Studies: Reviewed XR Lumbar Spine 9/9/16  Follow up: with Dr. Parker    Clinic Number:  [phone]  Call with any questions about your care and for scheduling assistance.   Calls are returned Monday through Friday between 8 AM and 4:30 PM. We usually get back to you within 2 business days depending on the issue/request.    If we are prescribing your medications:  For opioid medication refills, call the clinic or send a [employer] message 7 days in advance.  Please include:  Name of requested medication  Name of the pharmacy.  For non-opioid medications, call your pharmacy directly to request a refill. Please allow 3-4 days to be processed.   Per [place] State Law:  All controlled substance prescriptions must be filled within 30 days of being written.    For those controlled substances allowing refills, pickup must occur within 30 days of last fill.        We believe regular attendance is key to your success in our program!    Any time you are unable to keep your appointment we ask that you call us at least 24 hours in advance to cancel.This will allow us to offer the appointment time to another patient.   Multiple missed appointments may lead to dismissal from the clinic.

## 2023-07-13 NOTE — PROGRESS NOTES
Patient presents to the clinic today for a visit with Jim Lovett MD            4/18/2023    10:03 AM 5/18/2023     9:38 AM 7/13/2023     3:48 PM   PEG Score   PEG Total Score 6.33 6 6       UDS/CSA-10/17/22    Medications-  hydromorphone   ketamine     QUESTIONS:     Sugar Peters MA  [facility] [place]

## 2023-07-13 NOTE — PROGRESS NOTES
[facility] Interventional Evaluation    Date of visit: 7/13/2023    Reason for consultation:    Naveen Toro is a 78 year old male who is seen in consultation today for evaluation of an interventional strategy for pain management.    PCP is Everton Hoover.    Please see the [facility] health questionnaire which the patient completed and reviewed with me in detail.    Chief Complaint:    Chief Complaint   Patient presents with     Pain     Pain Management       Pain history:  Naveen Toro is a 78 year old male presenting for interrogation of SCS and discussion regarding possible battery replacement. He reports his pain remains in the bilateral low back and his stimulator provides significant relief (about 50%).    His pain has remained stable over the past few years without any major changes. Continues to be located in the bilateral lower back, occasionally radiating down the right foot. Bending and twisting make it worse, improved with rest, aching, 6/10, no numbness, tingling, or weakness.    Patient denies red flag symptoms of corresponding bowel/bladder symptoms, fever/chills, saddle anesthesia, profound motor loss, weight loss, or sudden unremitting increase in pain.    He charges his battery every 5-6 days.    Past Medical History:  Past Medical History:   Diagnosis Date     Acute renal failure (H)     h/o     Cardiomyopathy (H)      Chronic low back pain      Constipation      Coronary artery disease      Diabetes mellitus (H)      HLD (hyperlipidemia)      Hypercholesterolemia      Hypertension      Lumbar radiculopathy      Post laminectomy syndrome      Postlaminectomy syndrome, lumbar region     Created by Conversion      Sigmoid diverticulitis      Sleep apnea     CPAP     Patient Active Problem List    Diagnosis Date Noted     Radiculopathy of thoracolumbar region      Priority: Medium     Created by Conversion          Hypertension      Priority: Medium     Created by Conversion  Replacement Utility updated for latest IMO load         Lumbar Disc Degeneration      Priority: Medium     Created by Conversion         Postlaminectomy Syndrome (Lumbar)      Priority: Medium     Created by Conversion         Diabetes Mellitus      Priority: Medium     Created by Conversion         Hypercholesterolemia      Priority: Medium     Created by Conversion         Depression      Priority: Medium     Created by Conversion           Past Surgical History:  Past Surgical History:   Procedure Laterality Date     BACK SURGERY       BYPASS GRAFT ARTERY CORONARY  2010    1 vessel     CARDIAC SURGERY       IR LUMBAR EPIDURAL STEROID INJECTION  10/4/2007     IR LUMBAR EPIDURAL STEROID INJECTION  5/7/2008     IR LUMBAR EPIDURAL STEROID INJECTION  1/30/2009     LAMINECTOMY FOR IMPLANTATION / PLACEMENT NEUROSTIMULATOR ELECTRODES       [facility] CARDIAC SURG PROCEDURE UNLIST      Description: Cardiovascular Surgery Cardiac Defect Repair;  Recorded: 10/24/2011;     [facility] LINDSEY W/O FACETEC FORAMOT/DSKC 1/2 VRT SEG, LUMBAR      Description: Laminectomy Decompressive Up To Two Lumbar Segments;  Recorded: 10/24/2011;     Medications:  Current Outpatient Medications   Medication Sig Dispense Refill     amLODIPine (NORVASC) 2.5 MG tablet Take 1 tablet by mouth daily       aspirin 325 MG tablet [ASPIRIN 325 MG TABLET] Take 325 mg by mouth daily.       atorvastatin (LIPITOR) 40 MG tablet [ATORVASTATIN (LIPITOR) 40 MG TABLET] Take 40 mg by mouth daily. As directed       blood glucose (PRODIGY NO CODING BLOOD GLUC) test strip        buPROPion (WELLBUTRIN SR) 150 MG 12 hr tablet Take 150 mg by mouth once       Coenzyme Q10 (COQ10) 200 MG CAPS Take 1 capsule by mouth daily 30 capsule 3     diclofenac sodium (VOLTAREN) 1 % Gel [DICLOFENAC SODIUM (VOLTAREN) 1 % GEL] Apply 2 g topically 4 (four) times a day. 100 g 1     DULoxetine (CYMBALTA) 30 MG capsule Take 1 capsule (30 mg) by  mouth daily 30 capsule 1     HYDROmorphone (DILAUDID) 4 MG tablet Take 1 tablet (4 mg) by mouth 6 times daily 168 tablet 0     ketamine HCl POWD Ketamine 10%, Bupivicaine 2%, ketaprofen 10%, apply to back 3 times a day, 90 gm tube 90 g 3     lamoTRIgine (LAMICTAL) 25 MG tablet Take 2 tablets (50 mg) by mouth 2 times daily 120 tablet 4     Lancets MISC 1 each       Lidocaine (LIDOCARE) 4 % Patch Place onto the skin every 24 hours To prevent lidocaine toxicity, patient should be patch free for 12 hrs daily.       lubiprostone (AMITIZA) 24 MCG capsule Take 1 capsule (24 mcg) by mouth daily (with breakfast) 30 capsule 4     metoprolol (LOPRESSOR) 25 MG tablet [METOPROLOL (LOPRESSOR) 25 MG TABLET] Take 25 mg by mouth 2 (two) times a day.       naloxone (NARCAN) 4 MG/0.1ML nasal spray Spray 1 spray (4 mg) into one nostril alternating nostrils once as needed for opioid reversal every 2-3 minutes until assistance arrives 0.2 mL 0     polyethylene glycol (MIRALAX) 17 g packet Take 1 packet by mouth daily       vitamin D3 (CHOLECALCIFEROL) 125 MCG (5000 UT) tablet Take 5,000 Units by mouth       Allergies:     Allergies   Allergen Reactions     Ace Inhibitors Unknown     Renal failure per preop H&P         Family history:  No family history on file.    Physical Exam:  Vitals:    07/13/23 1548   BP: (!) 145/65   Pulse: 65   SpO2: 98%     Exam:  Constitutional: Well developed, NAD  Head: normocephalic. Atraumatic.   Eyes: no redness or jaundice noted   CV: warm, well perfused extremities   Skin: no suspicious lesions or rashes   Psychiatric: mentation appears normal and affect full    Neuromuscular Examination:  Strength 5/5 throughout bilateral lower extremities  Ambulates without difficulty, non-antalgic  Battery pocket is non-tender to palpation with no underlying fluctuance or erythema     Personally reviewed imaging: yes    Assessment:  1. Failed Back Surgery Syndrome  2. SCS Placement  3. Lumbar Spondylosis    Naveen DEE  Cortez is a 78 year old male with hx of lumbar laminectomy in 2011, failed back surgery syndrome s/p SCS placement, presenting for evaluation of SCS battery replacement. Pt reports his pain remains unchanged in character and location. Continues to be aching, bilateral, occasionally radiating down the right leg. Denies any new concerning symptoms such as weakness, numbness/tingling, fevers, chills, or changes in bowel/bladder habits.     Extensive time was spent discussing the pros and cons of battery replacement. Considering his device is continuing to function as expected, when provided the option, pt and wife preferred to defer battery replacement and its inherent risk of surgery until it is absolutely necessary.     Pt's SCS did not have enough battery charge to be interrogated by [employer] rep today.     Pt and wife offered no further questions or concerns during today's visit.     Plan:  Diagnosis reviewed, treatment option addressed, and risk/benefits discussed.     1. Procedures: Hold off on SCS battery exchange until battery cannot hold a charge or there is change in efficacy   2. Diagnostic Studies: Reviewed XR Lumbar Spine 9/9/16  3. Follow up: with Dr. Parker    51 minutes spent on the date of encounter doing chart review, history, and exam documentation and further activities as noted above.       Jim Lovett MD  Interventional Pain Medicine  [facility]

## 2023-07-18 DIAGNOSIS — G89.4 CHRONIC PAIN SYNDROME: ICD-10-CM

## 2023-07-18 RX ORDER — LUBIPROSTONE 24 UG/1
24 CAPSULE ORAL
Qty: 30 CAPSULE | Refills: 4 | Status: SHIPPED | OUTPATIENT
Start: 2023-07-18 | End: 2023-11-13

## 2023-07-18 NOTE — TELEPHONE ENCOUNTER
Received fax from pharmacy requesting refill(s) for     Lubiprostone (Amitiza) 24 mcg     Date last filled 06/05/2023     Last Appt Date:05/18/2023    Next Appt scheduled: 08/18/2023    Pharmacy:      [facility] - [place], [place] - [address] [place] [address]    Will route for processing    Lata Talley MA  [facility] [place]

## 2023-07-18 NOTE — TELEPHONE ENCOUNTER
Refill Lubiprostone (Amitiza) 24 mcg   Last filled 6/5 for 30 days-per pharmacy  [facility]

## 2023-07-30 ENCOUNTER — HEALTH MAINTENANCE LETTER (OUTPATIENT)
Age: 78
End: 2023-07-30

## 2023-08-18 ENCOUNTER — OFFICE VISIT (OUTPATIENT)
Dept: PALLIATIVE MEDICINE | Facility: OTHER | Age: 78
End: 2023-08-18
Payer: OTHER MISCELLANEOUS

## 2023-08-18 VITALS
BODY MASS INDEX: 28.7 KG/M2 | SYSTOLIC BLOOD PRESSURE: 163 MMHG | WEIGHT: 200 LBS | OXYGEN SATURATION: 97 % | DIASTOLIC BLOOD PRESSURE: 74 MMHG | HEART RATE: 50 BPM

## 2023-08-18 DIAGNOSIS — Z79.891 LONG TERM (CURRENT) USE OF OPIATE ANALGESIC: Primary | ICD-10-CM

## 2023-08-18 DIAGNOSIS — M51.379 DEGENERATION OF LUMBAR OR LUMBOSACRAL INTERVERTEBRAL DISC: ICD-10-CM

## 2023-08-18 LAB
CANNABINOIDS UR QL SCN: NORMAL
CREAT UR-MCNC: 154 MG/DL
ETHANOL UR QL SCN: NORMAL

## 2023-08-18 PROCEDURE — 80353 DRUG SCREENING COCAINE: CPT | Performed by: ANESTHESIOLOGY

## 2023-08-18 PROCEDURE — 80348 DRUG SCREENING BUPRENORPHINE: CPT | Performed by: ANESTHESIOLOGY

## 2023-08-18 PROCEDURE — 80346 BENZODIAZEPINES1-12: CPT | Performed by: ANESTHESIOLOGY

## 2023-08-18 PROCEDURE — 83992 ASSAY FOR PHENCYCLIDINE: CPT | Performed by: ANESTHESIOLOGY

## 2023-08-18 PROCEDURE — 80307 DRUG TEST PRSMV CHEM ANLYZR: CPT | Performed by: ANESTHESIOLOGY

## 2023-08-18 PROCEDURE — 80320 DRUG SCREEN QUANTALCOHOLS: CPT | Performed by: ANESTHESIOLOGY

## 2023-08-18 PROCEDURE — G0463 HOSPITAL OUTPT CLINIC VISIT: HCPCS | Performed by: ANESTHESIOLOGY

## 2023-08-18 PROCEDURE — 99213 OFFICE O/P EST LOW 20 MIN: CPT | Performed by: ANESTHESIOLOGY

## 2023-08-18 RX ORDER — KETAMINE HCL 100 %
POWDER (GRAM) MISCELLANEOUS
Qty: 30 G | Refills: 1 | Status: SHIPPED | OUTPATIENT
Start: 2023-08-18 | End: 2023-10-24

## 2023-08-18 RX ORDER — HYDROMORPHONE HYDROCHLORIDE 4 MG/1
4 TABLET ORAL
Qty: 168 TABLET | Refills: 0 | Status: SHIPPED | OUTPATIENT
Start: 2023-08-18 | End: 2023-09-01

## 2023-08-18 ASSESSMENT — PAIN SCALES - GENERAL: PAINLEVEL: SEVERE PAIN (6)

## 2023-08-18 NOTE — PROGRESS NOTES
[facility] - Office Visit    ASSESSMENT & PLAN     Naveen was seen today for pain.    Diagnoses and all orders for this visit:    Long term (current) use of opiate analgesic  -     Drug Confirmation Panel Urine with Creat; Future  -     Ethanol urine; Future  -     Ethanol urine  -     Drug Confirmation Panel Urine with Creat    Lumbar Disc Degeneration  -     HYDROmorphone (DILAUDID) 4 MG tablet; Take 1 tablet (4 mg) by mouth 6 times daily Dispense 8/26 for 8/28  -     ketamine HCl POWD; 15 mg lozenge  one-half lozenge under tongue 4 times a day as needed, after 5 days may increase to one lozenge under tongue 5 times a day, #30        Patient Instructions     [facility] Number:  [phone]  Call with any questions about your care and for scheduling assistance.   Calls are returned Monday through Friday between 8 AM and 4:30 PM. We usually get back to you within 2 business days depending on the issue/request.    If we are prescribing your medications:  For opioid medication refills, call the clinic or send a [employer] message 7 days in advance.  Please include:  Name of requested medication  Name of the pharmacy.  For non-opioid medications, call your pharmacy directly to request a refill. Please allow 3-4 days to be processed.   Per [place] State Law:  All controlled substance prescriptions must be filled within 30 days of being written.    For those controlled substances allowing refills, pickup must occur within 30 days of last fill.      We believe regular attendance is key to your success in our program!    Any time you are unable to keep your appointment we ask that you call us at least 24 hours in advance to cancel.This will allow us to offer the appointment time to another patient.   Multiple missed appointments may lead to dismissal from the clinic.       PLAN:    You are continuing to try to connect with the sleep medicine program to optimize  your CPAP device.    May use the topical ketamine cream twice a day for back pain.    Trial of ketamine lozenges, 15 mg lozenge, 1/2 lozenge under tongue 4 times a day as needed, after 5 days may increase to 1 lozenge 4 times a day.  Call Dr. Balderrama with an update after 1 week.    Hydromorphone 4 mg every 4 hours as needed.    Continue with the duloxetine and the Wellbutrin with your primary care provider.    Follow-up with Dr. Balderrama in 8-12 weeks      -----  KRYSTAL BALDERRAMA MD  [facility]       SUBJECTIVE      Naveen Toro is a 78 year old year old male who presents to clinic today for the following:     For lumbar radiculopathy.    Reviewing the record signs primary care provider, discussed decrease sweating possibly correlated with decreasing duloxetine transitioning to Wellbutrin.    He is seen today with his wife.  He describes he is the same perhaps more pain.  His wife had not acknowledge he is doing more activity, projects such as working on their camper or the well at their camping site.  He is doing more bending and kneeling.  He notes there are things that have to be done.    He did meet with the [employer] representative.  Apparently the battery was not fully charged so they could not assess it.  They then met with Dr. Lovett.  Discussed his recommendation of waiting to the battery is more completely depleted giving risks of surgery at this age.    He has been trying to connect with the sleep medicine service they are missing appointments.    He is taking the topical ketamine cream.  He acknowledges did not give a consistent trial.    The sweating has dramatically increased.  Now just occasional damp sheets compared to when they were soaking.  This has been helpful as a spending more time in the camper.  May indeed correlate with decreasing the duloxetine in half with Wellbutrin.  He did start some of the adrenal support supplement which may be somewhat helpful.  He ran out for  about a week and might try again.    She has noted at home if there are no projects that he might stay in bed through the day and then the sheets can be a bit more damp.  He responds that he gets cold easily and then covers up and gets warmer and sweats.    Continues with the hydromorphone 4 mg every 4 hours.  He continues want to find a way to decrease.  This led to discussion using ketamine in the form of lozenges for which some patients have found very helpful and able to decrease the opioid.  He would like to try this.  Discussed the off label use of ketamine, multiple mechanism of action, possible side effects, slow Mohs escalation    Urine drug test in October so will be obtained today.      Current Outpatient Medications:     amLODIPine (NORVASC) 2.5 MG tablet, Take 1 tablet by mouth daily, Disp: , Rfl:     aspirin 325 MG tablet, [ASPIRIN 325 MG TABLET] Take 325 mg by mouth daily., Disp: , Rfl:     atorvastatin (LIPITOR) 40 MG tablet, [ATORVASTATIN (LIPITOR) 40 MG TABLET] Take 40 mg by mouth daily. As directed, Disp: , Rfl:     blood glucose (PRODIGY NO CODING BLOOD GLUC) test strip, , Disp: , Rfl:     buPROPion (WELLBUTRIN SR) 150 MG 12 hr tablet, Take 150 mg by mouth once, Disp: , Rfl:     Coenzyme Q10 (COQ10) 200 MG CAPS, Take 1 capsule by mouth daily, Disp: 30 capsule, Rfl: 3    diclofenac sodium (VOLTAREN) 1 % Gel, [DICLOFENAC SODIUM (VOLTAREN) 1 % GEL] Apply 2 g topically 4 (four) times a day., Disp: 100 g, Rfl: 1    DULoxetine (CYMBALTA) 30 MG capsule, Take 1 capsule (30 mg) by mouth daily, Disp: 30 capsule, Rfl: 1    HYDROmorphone (DILAUDID) 4 MG tablet, Take 1 tablet (4 mg) by mouth 6 times daily Dispense 8/26 for 8/28, Disp: 168 tablet, Rfl: 0    ketamine HCl POWD, 15 mg lozenge  one-half lozenge under tongue 4 times a day as needed, after 5 days may increase to one lozenge under tongue 5 times a day, #30, Disp: 30 g, Rfl: 1    ketamine HCl POWD, Ketamine 10%, Bupivicaine 2%, ketaprofen 10%, apply  to back 3 times a day, 90 gm tube, Disp: 90 g, Rfl: 3    lamoTRIgine (LAMICTAL) 25 MG tablet, Take 2 tablets (50 mg) by mouth 2 times daily, Disp: 120 tablet, Rfl: 4    Lancets MISC, 1 each, Disp: , Rfl:     Lidocaine (LIDOCARE) 4 % Patch, Place onto the skin every 24 hours To prevent lidocaine toxicity, patient should be patch free for 12 hrs daily., Disp: , Rfl:     lubiprostone (AMITIZA) 24 MCG capsule, Take 1 capsule (24 mcg) by mouth daily (with breakfast), Disp: 30 capsule, Rfl: 4    metoprolol (LOPRESSOR) 25 MG tablet, [METOPROLOL (LOPRESSOR) 25 MG TABLET] Take 25 mg by mouth 2 (two) times a day., Disp: , Rfl:     naloxone (NARCAN) 4 MG/0.1ML nasal spray, Spray 1 spray (4 mg) into one nostril alternating nostrils once as needed for opioid reversal every 2-3 minutes until assistance arrives, Disp: 0.2 mL, Rfl: 0    polyethylene glycol (MIRALAX) 17 g packet, Take 1 packet by mouth daily, Disp: , Rfl:     vitamin D3 (CHOLECALCIFEROL) 125 MCG (5000 UT) tablet, Take 5,000 Units by mouth, Disp: , Rfl:       Review of Systems   General, psych, musculoskeletal, bowels and bladder otherwise normal other than above.          OBJECTIVE   BP (!) 163/74   Pulse 50   Wt 90.7 kg (200 lb)   SpO2 97%   BMI 28.70 kg/m        Physical Exam  General: Alert, clear sensorium.  Ambulates with four-wheel walker.  No diaphoresis.  No pain behavior.  Cardiovascular:   Lungs: Pulmonary effort is normal, speaking in full sentences  MSK:  Skin:  No concerning rashes or lesions.  Neurologic: No focal deficit, alert and oriented x3  Psychiatric: Affect constricted congruent.    History of lumbar radicular pain.  Dressing with spinal cord stimulator.  Has found the hydromorphone with some benefit.  Being more active recently.    Concerned with sweating continues to possibly have an association with sleep.  However decreasing the duloxetine made significant impact.    Plan as above.  Total time 25 minutes      Total time spent:  minutes

## 2023-08-18 NOTE — PROGRESS NOTES
Patient presents to the clinic today for a visit with KRYSTAL BALDERRAMA MD regarding Pain Management.          5/18/2023     9:38 AM 7/13/2023     3:48 PM 8/18/2023    10:45 AM   PEG Score   PEG Total Score 6 6 5       UDS/CSA- 10.17.2022    Medications- hydromorphone today @9am    Ketamine has not been using as much as he should he reports    QUESTIONS:    Aleta GARCES [facility] Visit Facilitator

## 2023-08-18 NOTE — PATIENT INSTRUCTIONS
[facility] Number:  [phone]  Call with any questions about your care and for scheduling assistance.   Calls are returned Monday through Friday between 8 AM and 4:30 PM. We usually get back to you within 2 business days depending on the issue/request.    If we are prescribing your medications:  For opioid medication refills, call the clinic or send a [employer] message 7 days in advance.  Please include:  Name of requested medication  Name of the pharmacy.  For non-opioid medications, call your pharmacy directly to request a refill. Please allow 3-4 days to be processed.   Per [place] State Law:  All controlled substance prescriptions must be filled within 30 days of being written.    For those controlled substances allowing refills, pickup must occur within 30 days of last fill.      We believe regular attendance is key to your success in our program!    Any time you are unable to keep your appointment we ask that you call us at least 24 hours in advance to cancel.This will allow us to offer the appointment time to another patient.   Multiple missed appointments may lead to dismissal from the clinic.       PLAN:    You are continuing to try to connect with the sleep medicine program to optimize your CPAP device.    May use the topical ketamine cream twice a day for back pain.    Trial of ketamine lozenges, 15 mg lozenge, 1/2 lozenge under tongue 4 times a day as needed, after 5 days may increase to 1 lozenge 4 times a day.  Call Dr. Parker with an update after 1 week.    Hydromorphone 4 mg every 4 hours as needed.    Continue with the duloxetine and the Wellbutrin with your primary care provider.    Follow-up with Dr. Parker in 8-12 weeks

## 2023-08-22 LAB
HYDROMORPHONE UR CFM-MCNC: >3000 NG/ML
HYDROMORPHONE/CREAT UR: ABNORMAL

## 2023-09-18 DIAGNOSIS — M51.379 DEGENERATION OF LUMBAR OR LUMBOSACRAL INTERVERTEBRAL DISC: ICD-10-CM

## 2023-09-18 RX ORDER — DULOXETIN HYDROCHLORIDE 30 MG/1
30 CAPSULE, DELAYED RELEASE ORAL DAILY
Qty: 30 CAPSULE | Refills: 1 | Status: SHIPPED | OUTPATIENT
Start: 2023-09-18 | End: 2023-11-13

## 2023-09-18 NOTE — TELEPHONE ENCOUNTER
Received fax request from [facility] pharmacy requesting refill(s) for DULoxetine (CYMBALTA) 30 MG capsule     Last refilled on 07/31/23    Pt last seen on 08/18/23  Next appt scheduled for 11/13/23    Will facilitate refill.

## 2023-10-08 ENCOUNTER — HEALTH MAINTENANCE LETTER (OUTPATIENT)
Age: 78
End: 2023-10-08

## 2023-10-24 DIAGNOSIS — M51.379 DEGENERATION OF LUMBAR OR LUMBOSACRAL INTERVERTEBRAL DISC: ICD-10-CM

## 2023-10-24 RX ORDER — KETAMINE HCL 100 %
POWDER (GRAM) MISCELLANEOUS
Qty: 30 G | Refills: 1 | Status: SHIPPED | OUTPATIENT
Start: 2023-10-24 | End: 2023-11-13

## 2023-11-02 ENCOUNTER — TELEPHONE (OUTPATIENT)
Dept: PALLIATIVE MEDICINE | Facility: OTHER | Age: 78
End: 2023-11-02
Payer: MEDICARE

## 2023-11-02 NOTE — TELEPHONE ENCOUNTER
Prior authorization requested for:    Disp Refills Start End GABINO    HYDROmorphone (DILAUDID) 4 MG tablet 168 tablet 0 11/1/2023  No   Sig - Route: Take 1 tablet (4 mg) by mouth 6 times daily Dispense/start 11/1/23 - Oral   Sent to pharmacy as: HYDROmorphone HCl 4 MG Oral Tablet (DILAUDID)   Class: E-Prescribe   Earliest Fill Date: 11/1/2023   Order: [id number]   E-Prescribing Status: Receipt confirmed by pharmacy (11/1/2023  4:53 PM CDT)       [facility] [place], [place] - [address]

## 2023-11-07 NOTE — TELEPHONE ENCOUNTER
Prior Authorization Not Needed per Insurance    Medication: HYDROMORPHONE HCL 4 MG PO TABS  Insurance Company:    Expected CoPay: $    Pharmacy Filling the Rx: [facility] - [place], [place] - [facility] [address] [place] [address]  Pharmacy Notified: YES  Patient Notified:  **Instructed pharmacy to notify patient when script is ready to /ship.**    Pharmacy was processing under patient's workers comp.  Pharmacy now has paid claim through workers compy. Closing encounter.

## 2023-11-08 ENCOUNTER — TELEPHONE (OUTPATIENT)
Dept: PALLIATIVE MEDICINE | Facility: OTHER | Age: 78
End: 2023-11-08
Payer: MEDICARE

## 2023-11-11 NOTE — TELEPHONE ENCOUNTER
Prior Authorization Not Needed per Insurance    Medication: LUBIPROSTONE 24 MCG PO CAPS  Insurance Company:    Expected CoPay: $    Pharmacy Filling the Rx: [facility] - [place] - [address] [place] [address]  Pharmacy Notified:   Patient Notified: No    Script should be billed through patient's worker's comp. Will call pharmacy during business hour to notify them of this.         No

## 2023-11-13 ENCOUNTER — OFFICE VISIT (OUTPATIENT)
Dept: PALLIATIVE MEDICINE | Facility: OTHER | Age: 78
End: 2023-11-13
Attending: ANESTHESIOLOGY
Payer: OTHER MISCELLANEOUS

## 2023-11-13 VITALS
OXYGEN SATURATION: 95 % | BODY MASS INDEX: 29.99 KG/M2 | WEIGHT: 209 LBS | SYSTOLIC BLOOD PRESSURE: 129 MMHG | HEART RATE: 56 BPM | DIASTOLIC BLOOD PRESSURE: 63 MMHG

## 2023-11-13 DIAGNOSIS — M51.379 DEGENERATION OF LUMBAR OR LUMBOSACRAL INTERVERTEBRAL DISC: ICD-10-CM

## 2023-11-13 DIAGNOSIS — G89.4 CHRONIC PAIN SYNDROME: ICD-10-CM

## 2023-11-13 PROCEDURE — 99213 OFFICE O/P EST LOW 20 MIN: CPT | Performed by: ANESTHESIOLOGY

## 2023-11-13 PROCEDURE — 99214 OFFICE O/P EST MOD 30 MIN: CPT | Performed by: ANESTHESIOLOGY

## 2023-11-13 RX ORDER — DULOXETIN HYDROCHLORIDE 30 MG/1
30 CAPSULE, DELAYED RELEASE ORAL DAILY
Qty: 30 CAPSULE | Refills: 1 | Status: SHIPPED | OUTPATIENT
Start: 2023-11-13 | End: 2024-01-23

## 2023-11-13 RX ORDER — HYDROMORPHONE HYDROCHLORIDE 4 MG/1
4 TABLET ORAL
Qty: 168 TABLET | Refills: 0 | Status: SHIPPED | OUTPATIENT
Start: 2023-11-13 | End: 2024-01-09

## 2023-11-13 RX ORDER — KETAMINE HCL 100 %
POWDER (GRAM) MISCELLANEOUS
Qty: 60 G | Refills: 1 | Status: SHIPPED | OUTPATIENT
Start: 2023-11-13 | End: 2024-01-17

## 2023-11-13 RX ORDER — LUBIPROSTONE 24 UG/1
24 CAPSULE ORAL
Qty: 30 CAPSULE | Refills: 4 | Status: SHIPPED | OUTPATIENT
Start: 2023-11-13 | End: 2024-05-16

## 2023-11-13 ASSESSMENT — PAIN SCALES - GENERAL: PAINLEVEL: SEVERE PAIN (7)

## 2023-11-13 NOTE — PROGRESS NOTES
Patient presents to the clinic today for a visit with KRYSTAL BALDERRAMA MD regarding Pain Management.          7/13/2023     3:48 PM 8/18/2023    10:45 AM 11/13/2023    11:34 AM   PEG Score   PEG Total Score 6 5 5        UDS/CSA- 08.18.2023    Medications- hydromorphone  today @9am    Ketamine last night @9pm    Ketamine cream was a hassle to put on    QUESTIONS:    Aleta GARCES [facility] Visit Facilitator

## 2023-11-13 NOTE — PROGRESS NOTES
"[facility] - Office Visit    ASSESSMENT & PLAN     Naveen was seen today for pain.    Diagnoses and all orders for this visit:    Chronic pain syndrome  -     lubiprostone (AMITIZA) 24 MCG capsule; Take 1 capsule (24 mcg) by mouth daily (with breakfast)    Lumbar Disc Degeneration  -     ketamine HCl POWD; 40 mg lozenge  one-half lozenge under tongue 4 times a day as needed, after 5 days may increase to 3/4 lozenge 4 times a day  -     HYDROmorphone (DILAUDID) 4 MG tablet; Take 1 tablet (4 mg) by mouth 6 times daily Dispense  11/28 for 11/29        Patient Instructions   PLAN:    Increase the ketamine to 40 mg lozenge, 1/2 lozenge 4 times a day for 5 days, may then increase to three fourths of a lozenge 4 times a day.  Discussed the goal to help with pain and allow you to decrease the hydromorphone.  Contact Dr. Balderrama in 2 to 3 weeks to discuss dosing.    Discussed some Worker's Comp. programs are pain for ketamine.    Continue the hydromorphone 4 mg every 4 hours as needed    Contact Andi at [phone] to optimize your spinal cord stimulator program.    Follow-up with Dr. Balderrama in the office in 3 months          -----  KRYSTAL BALDERRAMA MD  [facility]       SUBJECTIVE      Naveen Toro is a 78 year old year old male who presents to clinic today for the following:     Follow-up for history of lumbar radiculopathy.    Seen today with his wife.    Reports \"not real good\".    Had a week where he had increase in his sciatic pain.  No clear precipitant.  It settled down after a week.  It does that from time to time.    No change in his spinal cord stimulator program.  When seen last time he was to discuss with his representative and he acknowledges he has not done that.  May have a problem of the wire.    He has finally gotten a new CPAP device 4 to 6 weeks ago.  No change.  Sweating can still be an issue at night.  Wife notes that it can be cold in the bedroom to him, not " to her, when he then covers up with a blanket connote significant sweating.  Was an issue again last night.  She describes she could hold the blanket it can drip.  If he has the room much warmer however she cannot tolerate it.    They describe and they go up to their camper he is right next to the furnace in the vent on him and he seems to tolerate that well.    They will continue to try to adjust how they arrange things at home.    He does continue the hydromorphone 4 mg every 4 hours.  Occasionally he sleeps through a 5:00 dose otherwise taking it regularly.  Last urine drug test in August.    He is no longer use the ketamine cream.  He did start using the ketamine lozenges increase to 15 mg 3-4 times a day.  Does help a little bit.  He has not found this to the benefit that he can discontinue the hydromorphone which is at goal.  No side effects.    He asked why the Worker's Comp. is not covering.  I reviewed that as a this is not FDA approved condition and they do not have to though I have had some Worker's Comp. carriers to do cover in an effort to allow patients to decrease the opioids.      Current Outpatient Medications:     amLODIPine (NORVASC) 2.5 MG tablet, Take 1 tablet by mouth daily, Disp: , Rfl:     aspirin 325 MG tablet, [ASPIRIN 325 MG TABLET] Take 325 mg by mouth daily., Disp: , Rfl:     atorvastatin (LIPITOR) 40 MG tablet, [ATORVASTATIN (LIPITOR) 40 MG TABLET] Take 40 mg by mouth daily. As directed, Disp: , Rfl:     blood glucose (PRODIGY NO CODING BLOOD GLUC) test strip, , Disp: , Rfl:     buPROPion (WELLBUTRIN SR) 150 MG 12 hr tablet, Take 150 mg by mouth once, Disp: , Rfl:     Coenzyme Q10 (COQ10) 200 MG CAPS, Take 1 capsule by mouth daily, Disp: 30 capsule, Rfl: 3    diclofenac sodium (VOLTAREN) 1 % Gel, [DICLOFENAC SODIUM (VOLTAREN) 1 % GEL] Apply 2 g topically 4 (four) times a day., Disp: 100 g, Rfl: 1    DULoxetine (CYMBALTA) 30 MG capsule, Take 1 capsule (30 mg) by mouth daily, Disp: 30  capsule, Rfl: 1    HYDROmorphone (DILAUDID) 4 MG tablet, Take 1 tablet (4 mg) by mouth 6 times daily Dispense  11/28 for 11/29, Disp: 168 tablet, Rfl: 0    ketamine HCl POWD, 40 mg lozenge  one-half lozenge under tongue 4 times a day as needed, after 5 days may increase to 3/4 lozenge 4 times a day, Disp: 60 g, Rfl: 1    lamoTRIgine (LAMICTAL) 25 MG tablet, Take 2 tablets (50 mg) by mouth 2 times daily, Disp: 120 tablet, Rfl: 4    Lancets MISC, 1 each, Disp: , Rfl:     Lidocaine (LIDOCARE) 4 % Patch, Place onto the skin every 24 hours To prevent lidocaine toxicity, patient should be patch free for 12 hrs daily., Disp: , Rfl:     lubiprostone (AMITIZA) 24 MCG capsule, Take 1 capsule (24 mcg) by mouth daily (with breakfast), Disp: 30 capsule, Rfl: 4    metoprolol (LOPRESSOR) 25 MG tablet, [METOPROLOL (LOPRESSOR) 25 MG TABLET] Take 25 mg by mouth 2 (two) times a day., Disp: , Rfl:     naloxone (NARCAN) 4 MG/0.1ML nasal spray, Spray 1 spray (4 mg) into one nostril alternating nostrils once as needed for opioid reversal every 2-3 minutes until assistance arrives, Disp: 0.2 mL, Rfl: 0    polyethylene glycol (MIRALAX) 17 g packet, Take 1 packet by mouth daily, Disp: , Rfl:     vitamin D3 (CHOLECALCIFEROL) 125 MCG (5000 UT) tablet, Take 5,000 Units by mouth, Disp: , Rfl:       Review of Systems   General, psych, musculoskeletal, bowels and bladder otherwise normal other than above.          OBJECTIVE   /63   Pulse 56   Wt 94.8 kg (209 lb)   SpO2 95%   BMI 29.99 kg/m            Physical Exam  General: Lower, clear sensorium.  No respiratory distress.  No pain behavior.  Ambulates with a cane and has a 4 wheeled walker.  Cardiovascular: Normal rate  Lungs: Pulmonary effort is normal, speaking in full sentences  MSK:  Skin: Diaphoresis today  Neurologic:  alert and oriented x3  Psychiatric: Affect congruent.    Assessment history of lumbar radiculopathy, pain can fluctuate, does have a spinal cord stimulator  placed.    Significant frustration with sweating at night.  At last appointment thought perhaps related to the duloxetine does not seem to be consistent.  Temperature dysregulation has been a challenge.  He has had this evaluated through his primary care provider.  We reviewed that some opioids can cause temperature dysregulation.  We have tried different opioids has found this 1 most helpful and tolerated.    We will advance the ketamine with his goal to see if would be helpful for pain allow him to diminish the use of the opioids.  Advancing as above.    Total time 28 minutes moderate complexity

## 2023-11-13 NOTE — PATIENT INSTRUCTIONS
PLAN:    Increase the ketamine to 40 mg lozenge, 1/2 lozenge 4 times a day for 5 days, may then increase to three fourths of a lozenge 4 times a day.  Discussed the goal to help with pain and allow you to decrease the hydromorphone.  Contact Dr. Parker in 2 to 3 weeks to discuss dosing.    Discussed some Worker's Comp. programs are pain for ketamine.    Continue the hydromorphone 4 mg every 4 hours as needed    Contact Andi at [phone] to optimize your spinal cord stimulator program.    Follow-up with Dr. Parker in the office in 3 months

## 2023-11-13 NOTE — TELEPHONE ENCOUNTER
Received fax from pharmacy requesting refill(s) for     DULoxetine (CYMBALTA) 30 MG capsule     Last refilled on 10/13/2023.    Pt last seen on 8/18/2023.  Next appt scheduled for 11/13/2023. TODAY.    E-prescribe to:    [facility] - [place] - [address] [place] [address]     Will facilitate refill.

## 2023-12-11 DIAGNOSIS — M54.15 RADICULOPATHY OF THORACOLUMBAR REGION: ICD-10-CM

## 2023-12-11 RX ORDER — LAMOTRIGINE 25 MG/1
50 TABLET ORAL 2 TIMES DAILY
Qty: 120 TABLET | Refills: 4 | Status: SHIPPED | OUTPATIENT
Start: 2023-12-11 | End: 2024-05-16

## 2023-12-11 NOTE — TELEPHONE ENCOUNTER
Received fax request from [facility] pharmacy requesting refill(s) for     lamoTRIgine (LAMICTAL) 25 MG tablet       Last refilled on 11/01/23    Pt last seen on 11/13/23  Next appt scheduled for 02/06/24    Will facilitate refill.

## 2023-12-11 NOTE — TELEPHONE ENCOUNTER
Refills requested for lamoTRIgine (LAMICTAL) 25 MG tablet     [facility] - [place], [place] - [address]

## 2024-01-17 DIAGNOSIS — M51.379 DEGENERATION OF LUMBAR OR LUMBOSACRAL INTERVERTEBRAL DISC: ICD-10-CM

## 2024-01-17 RX ORDER — KETAMINE HCL 100 %
POWDER (GRAM) MISCELLANEOUS
Qty: 60 G | Refills: 1 | Status: SHIPPED | OUTPATIENT
Start: 2024-01-17 | End: 2024-02-06

## 2024-01-17 NOTE — TELEPHONE ENCOUNTER
Received call from patient requesting refill(s) of Ketamine 40 mg marcellus    Last dispensed from pharmacy on 23    Patient's last office/virtual visit by prescribing provider on 23  Next office/virtual appointment scheduled for 24    UDT/CSA = 23     Pending Prescriptions:                       Disp   Refills    ketamine HCl POWD                         60 g   1            Si mg lozenge  one-half lozenge under tongue 4           times a day as needed, after 5 days may increase           to 3/4 lozenge 4 times a day   [place]

## 2024-01-23 DIAGNOSIS — M51.379 DEGENERATION OF LUMBAR OR LUMBOSACRAL INTERVERTEBRAL DISC: ICD-10-CM

## 2024-01-23 RX ORDER — DULOXETIN HYDROCHLORIDE 30 MG/1
30 CAPSULE, DELAYED RELEASE ORAL DAILY
Qty: 30 CAPSULE | Refills: 1 | Status: SHIPPED | OUTPATIENT
Start: 2024-01-23 | End: 2024-03-25

## 2024-01-23 NOTE — TELEPHONE ENCOUNTER
[facility] Call Center    Phone Message    May a detailed message be left on voicemail: yes     Reason for Call: Medication Refill Request    Has the patient contacted the pharmacy for the refill? Yes   Name of medication being requested: DULoxetine (CYMBALTA) 30 MG capsule   Provider who prescribed the medication: Dr. Teo Parker  Pharmacy:   [address]     Date medication is needed: 1/23/2024     Has been waiting on pharmacy  Order(s):     Action Taken: Message routed to:  Other: [facility]    Travel Screening: Not Applicable

## 2024-01-23 NOTE — TELEPHONE ENCOUNTER
Received request from patient requesting refill(s) for DULoxetine (CYMBALTA) 30 MG capsule     Last refilled on 12/14/23    Pt last seen on 11/13/23  Next appt scheduled for 02/06/24    Will facilitate refill.

## 2024-02-06 ENCOUNTER — OFFICE VISIT (OUTPATIENT)
Dept: PALLIATIVE MEDICINE | Facility: OTHER | Age: 79
End: 2024-02-06
Attending: ANESTHESIOLOGY
Payer: OTHER MISCELLANEOUS

## 2024-02-06 VITALS
DIASTOLIC BLOOD PRESSURE: 69 MMHG | OXYGEN SATURATION: 97 % | BODY MASS INDEX: 31.05 KG/M2 | HEART RATE: 51 BPM | WEIGHT: 216.4 LBS | SYSTOLIC BLOOD PRESSURE: 143 MMHG

## 2024-02-06 DIAGNOSIS — M51.379 DEGENERATION OF LUMBAR OR LUMBOSACRAL INTERVERTEBRAL DISC: ICD-10-CM

## 2024-02-06 PROCEDURE — 99215 OFFICE O/P EST HI 40 MIN: CPT | Performed by: ANESTHESIOLOGY

## 2024-02-06 RX ORDER — HYDROMORPHONE HYDROCHLORIDE 4 MG/1
4 TABLET ORAL
Qty: 168 TABLET | Refills: 0 | Status: SHIPPED | OUTPATIENT
Start: 2024-02-06 | End: 2024-03-25

## 2024-02-06 RX ORDER — LIOTHYRONINE SODIUM 5 UG/1
TABLET ORAL
Qty: 60 TABLET | Refills: 3 | Status: SHIPPED | OUTPATIENT
Start: 2024-02-06

## 2024-02-06 RX ORDER — KETAMINE HCL 100 %
POWDER (GRAM) MISCELLANEOUS
Qty: 90 G | Refills: 1 | Status: SHIPPED | OUTPATIENT
Start: 2024-02-06 | End: 2024-03-19

## 2024-02-06 ASSESSMENT — PATIENT HEALTH QUESTIONNAIRE - PHQ9: SUM OF ALL RESPONSES TO PHQ QUESTIONS 1-9: 13

## 2024-02-06 ASSESSMENT — PAIN SCALES - GENERAL: PAINLEVEL: SEVERE PAIN (7)

## 2024-02-06 NOTE — PROGRESS NOTES
[facility] Follow-up    Date of visit: 2/6/2024    Chief complaint:   Chief Complaint   Patient presents with    Pain     Follow-up for lumbar radiculopathy  Interval history:    Reviewing the record talking to primary care provider regarding the request inhalers.    He is seen today with his wife.  He notes if anything during this time more episodes of shortness of breath.  Follow-up with his primary care provider.  There is question whether correlates to the ketamine.    Wife notes he had not been taking the ketamine daily initially, more recently using a 40 mg lozenge three fourths of a lozenge 3 times a day.  He notes some benefit for the pain lasting perhaps 3 hours.  The shortness of breath can be all through the day when it occurs.    Wife does note he has gained some 14 pounds since October, and his hemoglobin A1c went from 6.8-8.7 which has never been that high before.  He did just  the inhaler yesterday.  Started on Jardiance yesterday.  There is times with checked her glucose and it has been 340 which is not the case before.    He did talk to the representative for the spinal cord stimulator and the made some adjustments which may help a little.    Continues using his CPAP device.    Wife notes a pattern recently where he will go to bed around 930, does not want to get up out of bed sometimes until 4 in the afternoon.  In part he attributes it to being cold, this is been ongoing issue.    To get electric blanket to turn on.  He describes piling blankets and wearing close, once he finally gets warming up sweating over that time.  [place] different [place] where the heating was more active.    Also notes when he use the Adren-al appointment this summer for several days he was up and much more active.    Our nursing did a rooming scale for the depression and for the first time wife heard and answered  thoughts of suicide.  She is distressed, he acknowledges the sweating issue is almost as much of a problem as pain.  He can be reluctant to want to get out of bed, even though the sheets are wet he does not want to leave the warmth.    He has had a variety of evaluations through primary care provider.    Thyroid functions were done 2 years ago.    We discussed briefly some benefit with being up to function with the adrenal.  He has had endocrinology assessment previously.    They note primary care provider is now retiring and he will start with a new one in April.    Does continue with the hydromorphone 4 mg decreased to perhaps 5 times a day using the ketamine which was somewhat helpful.    Urine drug test in August        Medications:  Current Outpatient Medications   Medication Sig Dispense Refill    amLODIPine (NORVASC) 2.5 MG tablet Take 1 tablet by mouth daily      aspirin 325 MG tablet [ASPIRIN 325 MG TABLET] Take 325 mg by mouth daily.      atorvastatin (LIPITOR) 40 MG tablet [ATORVASTATIN (LIPITOR) 40 MG TABLET] Take 40 mg by mouth daily. As directed      blood glucose (PRODIGY NO CODING BLOOD GLUC) test strip       buPROPion (WELLBUTRIN SR) 150 MG 12 hr tablet Take 150 mg by mouth once      Coenzyme Q10 (COQ10) 200 MG CAPS Take 1 capsule by mouth daily 30 capsule 3    diclofenac sodium (VOLTAREN) 1 % Gel [DICLOFENAC SODIUM (VOLTAREN) 1 % GEL] Apply 2 g topically 4 (four) times a day. 100 g 1    DULoxetine (CYMBALTA) 30 MG capsule Take 1 capsule (30 mg) by mouth daily 30 capsule 1    HYDROmorphone (DILAUDID) 4 MG tablet Take 1 tablet (4 mg) by mouth 6 times daily 168 tablet 0    ketamine HCl POWD 40 mg lozenge  3/4 lozenge up to 6 times a day 90 g 1    lamoTRIgine (LAMICTAL) 25 MG tablet Take 2 tablets (50 mg) by mouth 2 times daily 120 tablet 4    Lancets MISC 1 each      Lidocaine (LIDOCARE) 4 % Patch Place onto the skin every 24 hours To prevent lidocaine toxicity, patient should be patch free for 12 hrs  daily.      liothyronine (CYTOMEL) 5 MCG tablet One morning for 7 days, then one morning and noon 60 tablet 3    lubiprostone (AMITIZA) 24 MCG capsule Take 1 capsule (24 mcg) by mouth daily (with breakfast) 30 capsule 4    metoprolol (LOPRESSOR) 25 MG tablet [METOPROLOL (LOPRESSOR) 25 MG TABLET] Take 25 mg by mouth 2 (two) times a day.      naloxone (NARCAN) 4 MG/0.1ML nasal spray Spray 1 spray (4 mg) into one nostril alternating nostrils once as needed for opioid reversal every 2-3 minutes until assistance arrives 0.2 mL 0    polyethylene glycol (MIRALAX) 17 g packet Take 1 packet by mouth daily      vitamin D3 (CHOLECALCIFEROL) 125 MCG (5000 UT) tablet Take 5,000 Units by mouth             Physical Exam:  Blood pressure (!) 143/69, pulse 51, weight 98.2 kg (216 lb 6.4 oz), SpO2 97%.    Alert, clear sensorium.  No respiratory distress noted.  He ambulates with a 4 wheeled walker.    He becomes somewhat tearful when he talks about thoughts of suicide.        Assessment:   Lumbar radiculopathy, and found the hydromorphone 4 mg every 4 hours about the best agent.    The sweating has been a factor as long as we work together.  We have used a variety of her opioids as sometimes can be associated, no clear pattern.    He has worked with primary care provider of that with other assessments medically unrevealing.    Today sharing that this experience is as bad as the pain in terms of depression or thoughts of suicide.  The Willie he can manage if we can look into other interventions.    He has had it appears thermal dysregulation independent of the opioids that we rotated.    Described I have used off label very low-dose Cytomel sometimes to help patient's thermoregulation.  They would be interested in trying.  He will start with 5 mcg in the morning.  Wife to give even if he is not getting out of bed.  After 1 week could do morning and noon.  See if this helps with some subjective experience.  They will watch for side  effects including cardiovascular.    Discussed the use of grounding mats and association with circulation.    They did not not have follow-up with the April with her new primary care provider.    Total time 45 minutes  minutes spent on the date of encounter doing chart review, history, and exam documentation and further activities as noted above.     Teo Parker MD  [facility]

## 2024-02-06 NOTE — PATIENT INSTRUCTIONS
PLAN:    Continue the ketamine 40 mg lozenge, three fourths of a lozenge, may increase up to 6 times a day to help with pain.  Will monitor if it flex your breathing problems.    Continue hydromorphone 4 mg every 4 hours, decrease as able with ketamine.    Discussed you are starting new medications for diabetes, concern with increased weight and changes in your diabetic control.    Discussed the use of a grounding she.    Trial of Cytomel to see if relates to your temperature dysregulation.  5 mcg given around 8 in the morning, after 1 week if tolerating without increase in heart rate or other problems may add morning and noon to see if that affects temperature regulation and function.    Discussed you will be transition to new primary care provider.    Return to Dr. Parker in 8 weeks for return visit, call if there are problems

## 2024-02-06 NOTE — PROGRESS NOTES
Patient presents to the clinic today for a visit with KRYSTAL BALDERRAMA MD regarding Pain Management.          8/18/2023    10:45 AM 11/13/2023    11:34 AM 2/6/2024    11:01 AM   PEG Score   PEG Total Score 5 5 5       UDS/CSA- 08.18.2023    Medications- Hydromorphone today @9am    Ketamine today @9am    Notes    Aleta Hargrove  [facility] Clinical Assistant

## 2024-02-07 ENCOUNTER — TELEPHONE (OUTPATIENT)
Dept: PALLIATIVE MEDICINE | Facility: OTHER | Age: 79
End: 2024-02-07
Payer: MEDICARE

## 2024-02-25 ENCOUNTER — HEALTH MAINTENANCE LETTER (OUTPATIENT)
Age: 79
End: 2024-02-25

## 2024-03-19 ENCOUNTER — TELEPHONE (OUTPATIENT)
Dept: PALLIATIVE MEDICINE | Facility: OTHER | Age: 79
End: 2024-03-19
Payer: MEDICARE

## 2024-03-19 DIAGNOSIS — M51.379 DEGENERATION OF LUMBAR OR LUMBOSACRAL INTERVERTEBRAL DISC: ICD-10-CM

## 2024-03-19 RX ORDER — KETAMINE HCL 100 %
POWDER (GRAM) MISCELLANEOUS
Qty: 90 G | Refills: 1 | Status: SHIPPED | OUTPATIENT
Start: 2024-03-19 | End: 2024-04-03

## 2024-03-19 NOTE — TELEPHONE ENCOUNTER
[facility] Call Center    Phone Message    May a detailed message be left on voicemail: yes     Reason for Call: Other: Patient is calling since the pharmacy stated they have not gotten the prescription yet. Please call back when available.      Action Taken: Other: Pain    Travel Screening: Not Applicable

## 2024-03-19 NOTE — TELEPHONE ENCOUNTER
Patient requesting refill(s) of ketamine HCl POWD     Last dispensed from pharmacy on 3/4/24    Patient's last office/virtual visit by prescribing provider on 2/6/24  Next office/virtual appointment scheduled for 4/2/24    Last urine drug screen date 8/18/23  Current opioid agreement on file (completed within the last year) Yes Date of opioid agreement: 8/18/23    E-prescribe to   [facility]     Will route to nursing [place] for review and preparation of prescription(s).

## 2024-03-19 NOTE — TELEPHONE ENCOUNTER
Pending Prescriptions:                       Disp   Refills    ketamine HCl POWD                         90 g   1            Si mg lozenge  3/4 lozenge up to 6 times a day    [facility] pharmacy

## 2024-03-22 DIAGNOSIS — M51.379 DEGENERATION OF LUMBAR OR LUMBOSACRAL INTERVERTEBRAL DISC: ICD-10-CM

## 2024-03-22 NOTE — TELEPHONE ENCOUNTER
[facility] Call Center    Phone Message    May a detailed message be left on voicemail: yes     Reason for Call: Medication Refill Request    Has the patient contacted the pharmacy for the refill? Yes   Name of medication being requested: DULoxetine (CYMBALTA) 30 MG capsule  Provider who prescribed the medication: Teo Parker MD  Pharmacy: [address]  Date medication is needed: 3/25/2024   Patient is aware of 7 day refill policy  Action Taken: Message routed to:  Other: [facility]    Travel Screening: Not Applicable                                                                 t

## 2024-03-25 RX ORDER — DULOXETIN HYDROCHLORIDE 30 MG/1
30 CAPSULE, DELAYED RELEASE ORAL DAILY
Qty: 30 CAPSULE | Refills: 1 | Status: SHIPPED | OUTPATIENT
Start: 2024-03-25

## 2024-03-25 NOTE — TELEPHONE ENCOUNTER
Received request from patient requesting refill(s) for DULoxetine (CYMBALTA) 30 MG capsule     Last refilled on 02/20/24    Pt last seen on 02/06/24  Next appt scheduled for 04/02/24    Will facilitate refill.

## 2024-04-02 ENCOUNTER — OFFICE VISIT (OUTPATIENT)
Dept: PALLIATIVE MEDICINE | Facility: OTHER | Age: 79
End: 2024-04-02
Attending: ANESTHESIOLOGY
Payer: OTHER MISCELLANEOUS

## 2024-04-02 VITALS — OXYGEN SATURATION: 97 % | SYSTOLIC BLOOD PRESSURE: 153 MMHG | HEART RATE: 62 BPM | DIASTOLIC BLOOD PRESSURE: 70 MMHG

## 2024-04-02 DIAGNOSIS — M51.379 DEGENERATION OF LUMBAR OR LUMBOSACRAL INTERVERTEBRAL DISC: Primary | ICD-10-CM

## 2024-04-02 PROCEDURE — 99213 OFFICE O/P EST LOW 20 MIN: CPT | Performed by: ANESTHESIOLOGY

## 2024-04-02 RX ORDER — ASPIRIN 81 MG/1
81 TABLET ORAL DAILY
COMMUNITY

## 2024-04-02 ASSESSMENT — PAIN SCALES - GENERAL: PAINLEVEL: MODERATE PAIN (5)

## 2024-04-02 ASSESSMENT — PAIN SCALES - PAIN ENJOYMENT GENERAL ACTIVITY SCALE (PEG)
INTERFERED_GENERAL_ACTIVITY: 6
INTERFERED_GENERAL_ACTIVITY: 6
INTERFERED_ENJOYMENT_LIFE: 6
PEG_TOTALSCORE: 5.67
AVG_PAIN_PASTWEEK: 5
PEG_TOTALSCORE: 5.67
INTERFERED_ENJOYMENT_LIFE: 6
AVG_PAIN_PASTWEEK: 5

## 2024-04-02 NOTE — PATIENT INSTRUCTIONS
[facility] Number:  [phone]  Call with any questions about your care and for scheduling assistance.   Calls are returned Monday through Friday between 8 AM and 4:30 PM. We usually get back to you within 2 business days depending on the issue/request.    If we are prescribing your medications:  For opioid medication refills, call the clinic or send a M[employer] message 7 days in advance.  Please include:  Name of requested medication  Name of the pharmacy.  For non-opioid medications, call your pharmacy directly to request a refill. Please allow 3-4 days to be processed.   Per [place] State Law:  All controlled substance prescriptions must be filled within 30 days of being written.    For those controlled substances allowing refills, pickup must occur within 30 days of last fill.      We believe regular attendance is key to your success in our program!    Any time you are unable to keep your appointment we ask that you call us at least 24 hours in advance to cancel.This will allow us to offer the appointment time to another patient.   Multiple missed appointments may lead to dismissal from the clinic.     PLAN:    You  have decreased the hydromorphone to 4 mg 3 times a day.  Contact Dr. Parker in a week before you would be due for refill and you are tapering the dose as able.    Increase your ketamine to a 40 mg lozenge   1 lozenge 4 times a day.  Contact Dr. Parker when you are due for refill, if this dose is useful may change to an 80 mg that you may cut in half to save cost.    Discussed the use of low-dose Cytomel to help with your sense of feeling cold, which may be associated with you using a lot of blankets at night and then having excessive sweating.  You will discuss that with your new primary care provider.    Discussed the use of grounding sheets which may help improve your sleep and metabolism.    Your new primary care provider may contact   Keith.    Follow-up for return visit in 3 months

## 2024-04-02 NOTE — PROGRESS NOTES
[facility] Follow-up    Date of visit: 4/2/2024    Chief complaint:   Chief Complaint   Patient presents with    Pain     Follow-up for lumbar radiculopathy.  Interval history:    Seen with his wife.  He describes since last seen has decreased his hydromorphone from 4 mg 6 times a day to 3 times a day.    Has been using the ketamine taking 40 mg lozenges, cutting them into quarters, taking to the day using up to 3 times in a day.  He wonders if that dose could be increased as he is cut down the hydromorphone.    When last seen we discussed trial of Cytomel at low-dose to see if it can help with metabolism as he feels cold all the time, when he goes to bed puts on heavy quilts, then has excessive sweating.  This is not covered by Worker's Comp. does not directly related to his injury, and they did not put her through other insurance.    Wife notes they meet with her new primary care provider next week, she will begin to know the patient regarding his diabetes.  Discussed if she concurs with the use of the Cytomel she could perhaps prescribe and sent into the pharmacy is presently anything at their [facility]'s pharmacy once you Worker's Comp.    He is also interested in the grounding sheet as we discussed may help his metabolism.  Wife reports concerns for co-pays if they are on other expensive medications.    They are looking forward to going to their cam[place] as soon as the weather warms.  Wife notes he always seems to do better when he is there.    Urine drug test in August.        Medications:  Current Outpatient Medications   Medication Sig Dispense Refill    amLODIPine (NORVASC) 2.5 MG tablet Take 1 tablet by mouth daily      aspirin 325 MG tablet [ASPIRIN 325 MG TABLET] Take 325 mg by mouth daily.      aspirin 81 MG EC tablet Take 81 mg by mouth daily      atorvastatin (LIPITOR) 40 MG tablet [ATORVASTATIN (LIPITOR) 40 MG TABLET] Take 40 mg by mouth daily. As  directed      blood glucose (PRODIGY NO CODING BLOOD GLUC) test strip       buPROPion (WELLBUTRIN SR) 150 MG 12 hr tablet Take 150 mg by mouth once      Coenzyme Q10 (COQ10) 200 MG CAPS Take 1 capsule by mouth daily 30 capsule 3    diclofenac sodium (VOLTAREN) 1 % Gel [DICLOFENAC SODIUM (VOLTAREN) 1 % GEL] Apply 2 g topically 4 (four) times a day. 100 g 1    DULoxetine (CYMBALTA) 30 MG capsule Take 1 capsule (30 mg) by mouth daily 30 capsule 1    HYDROmorphone (DILAUDID) 4 MG tablet Take 1 tablet (4 mg) by mouth 6 times daily 168 tablet 0    ketamine HCl POWD 40 mg lozenge  3/4 lozenge up to 6 times a day 90 g 1    lamoTRIgine (LAMICTAL) 25 MG tablet Take 2 tablets (50 mg) by mouth 2 times daily 120 tablet 4    Lancets MISC 1 each      Lidocaine (LIDOCARE) 4 % Patch Place onto the skin every 24 hours To prevent lidocaine toxicity, patient should be patch free for 12 hrs daily.      liothyronine (CYTOMEL) 5 MCG tablet One morning for 7 days, then one morning and noon 60 tablet 3    lubiprostone (AMITIZA) 24 MCG capsule Take 1 capsule (24 mcg) by mouth daily (with breakfast) 30 capsule 4    metoprolol (LOPRESSOR) 25 MG tablet [METOPROLOL (LOPRESSOR) 25 MG TABLET] Take 25 mg by mouth 2 (two) times a day.      naloxone (NARCAN) 4 MG/0.1ML nasal spray Spray 1 spray (4 mg) into one nostril alternating nostrils once as needed for opioid reversal every 2-3 minutes until assistance arrives 0.2 mL 0    polyethylene glycol (MIRALAX) 17 g packet Take 1 packet by mouth daily      vitamin D3 (CHOLECALCIFEROL) 125 MCG (5000 UT) tablet Take 5,000 Units by mouth             Physical Exam:  Blood pressure (!) 153/70, pulse 62, SpO2 97%.    Alert, clear sensorium.  No respiratory distress, no pain behavior.  Ambulates with a 4 wheeled walker and cane    Affect a bit more reactive today.      Assessment:   Lumbar degenerative changes.  Has found the hydromorphone with ketamine helpful to some extent, looking forward to enjoying going  to their cam[place].    Has had a problem with this excessive sweating, did not respond to other medical evaluations.  Has been distressing.  We have tried to decrease the opioid to see if that is related.    I also discussed a trial of using low-dose Cytomel to see if may help support his sense of feeling very cold going to bed at night and using heavy blankets, resulting in sweating.    Plan as above.  Total time 24 minutes.   minutes spent on the date of encounter doing chart review, history, and exam documentation and further activities as noted above.     Teo Parker MD  [facility]

## 2024-04-02 NOTE — CONFIDENTIAL NOTE
Scheduled for an office visit.  Did not show.  Reviewing the record hydromorphone last ordered 3/25.

## 2024-04-03 DIAGNOSIS — M51.379 DEGENERATION OF LUMBAR OR LUMBOSACRAL INTERVERTEBRAL DISC: ICD-10-CM

## 2024-04-03 RX ORDER — KETAMINE HCL 100 %
POWDER (GRAM) MISCELLANEOUS
Qty: 90 G | Refills: 2 | Status: SHIPPED | OUTPATIENT
Start: 2024-04-03 | End: 2024-06-05

## 2024-04-03 NOTE — TELEPHONE ENCOUNTER
[facility] Call Center    Phone Message    May a detailed message be left on voicemail: yes     Reason for Call: Medication Refill Request    Has the patient contacted the pharmacy for the refill? Yes   Name of medication being requested: ketamine HCl POWD 40 mg lozenge   Provider who prescribed the medication: Keith  Pharmacy: [place] [address] [address]   Date medication is needed: Patients spouse called and reported that per patient's visit yesterday this medication was going to be changing. Patient now has enough of his prior refill for 8 more days. Spouse called in to get a refill and to have Dr Parker change the instructions on the medication to 1 lozenge 4 times a day as discussed in the visit on 4/2. Patient will need the refill 4/11      Action Taken: Message routed to:  Other: Pain    Travel Screening: Not Applicable

## 2024-04-03 NOTE — TELEPHONE ENCOUNTER
Plan per last visit 24:  Increase your ketamine to a 40 mg lozenge 1 lozenge 4 times a day. Contact Dr. Parker when you are due for refill, if this dose is useful may change to an 80 mg that you may cut in half to save cost.     Pending Prescriptions:                       Disp   Refills    ketamine HCl POWD                         90 g                Si mg lozenge, 1 lozenge up to 4 times a day     [facility] - [place], [place] - [address] [place] [address] DR. PATEL

## 2024-05-16 ENCOUNTER — TELEPHONE (OUTPATIENT)
Dept: PALLIATIVE MEDICINE | Facility: OTHER | Age: 79
End: 2024-05-16
Payer: MEDICARE

## 2024-05-16 DIAGNOSIS — M54.15 RADICULOPATHY OF THORACOLUMBAR REGION: ICD-10-CM

## 2024-05-16 DIAGNOSIS — G89.4 CHRONIC PAIN SYNDROME: ICD-10-CM

## 2024-05-16 RX ORDER — LUBIPROSTONE 24 UG/1
24 CAPSULE ORAL
Qty: 30 CAPSULE | Refills: 4 | Status: SHIPPED | OUTPATIENT
Start: 2024-05-16

## 2024-05-16 RX ORDER — LAMOTRIGINE 25 MG/1
50 TABLET ORAL 2 TIMES DAILY
Qty: 120 TABLET | Refills: 4 | Status: SHIPPED | OUTPATIENT
Start: 2024-05-16

## 2024-05-16 NOTE — TELEPHONE ENCOUNTER
Prior authorization requested for:   Disp Refills Start End GABINO   lamoTRIgine (LAMICTAL) 25 MG tablet 120 tablet 4 5/16/2024 -- No   Sig - Route: Take 2 tablets (50 mg) by mouth 2 times daily - Oral   Sent to pharmacy as: lamoTRIgine 25 MG Oral Tablet (LaMICtal)   Class: E-Prescribe   Order: [id number]   E-Prescribing Status: Receipt confirmed by pharmacy (5/16/2024  9:53 AM CDT)     [facility] [place] [facility] [address] [place] [address]     KEY: [id number]

## 2024-05-29 NOTE — TELEPHONE ENCOUNTER
Retail Pharmacy Prior Authorization Team   Phone: [phone]      Prior Authorization Not Needed per Insurance    Medication: LAMICTAL 25 MG PO TABS  Insurance Company:  - Phone [phone] Fax [phone]  Expected CoPay: $    Pharmacy Filling the Rx: [facility] - [place], [place] - [address]  Pharmacy Notified: YES  Patient Notified: YES    *Spoke with pharmacy staff, this is a work comp claim. No PA required. They have already filled and dispensed this for the patient.  Thank you,  Fiona Zapata, Bret  [facility] PA Team

## 2024-06-05 ENCOUNTER — OFFICE VISIT (OUTPATIENT)
Dept: PALLIATIVE MEDICINE | Facility: OTHER | Age: 79
End: 2024-06-05
Attending: ANESTHESIOLOGY
Payer: OTHER MISCELLANEOUS

## 2024-06-05 VITALS
WEIGHT: 213 LBS | HEART RATE: 61 BPM | SYSTOLIC BLOOD PRESSURE: 156 MMHG | DIASTOLIC BLOOD PRESSURE: 81 MMHG | BODY MASS INDEX: 30.56 KG/M2 | OXYGEN SATURATION: 94 %

## 2024-06-05 DIAGNOSIS — M51.379 DEGENERATION OF LUMBAR OR LUMBOSACRAL INTERVERTEBRAL DISC: ICD-10-CM

## 2024-06-05 PROCEDURE — 99213 OFFICE O/P EST LOW 20 MIN: CPT | Performed by: ANESTHESIOLOGY

## 2024-06-05 PROCEDURE — 99214 OFFICE O/P EST MOD 30 MIN: CPT | Performed by: ANESTHESIOLOGY

## 2024-06-05 RX ORDER — HYDROMORPHONE HYDROCHLORIDE 4 MG/1
4 TABLET ORAL EVERY 4 HOURS PRN
Qty: 126 TABLET | Refills: 0 | Status: SHIPPED | OUTPATIENT
Start: 2024-06-05 | End: 2024-07-03

## 2024-06-05 RX ORDER — KETAMINE HCL 100 %
POWDER (GRAM) MISCELLANEOUS
Qty: 60 G | Refills: 2 | Status: SHIPPED | OUTPATIENT
Start: 2024-06-05 | End: 2024-08-26

## 2024-06-05 RX ORDER — OMEPRAZOLE 20 MG/1
20 TABLET, DELAYED RELEASE ORAL DAILY
COMMUNITY

## 2024-06-05 ASSESSMENT — PAIN SCALES - GENERAL: PAINLEVEL: SEVERE PAIN (6)

## 2024-06-05 NOTE — PROGRESS NOTES
Patient presents to the clinic today for a visit with KRYSTAL BALDERRAMA MD regarding Pain Management.          11/13/2023    11:34 AM 2/6/2024    11:01 AM 6/5/2024     9:55 AM   PEG Score   PEG Total Score 5 5 5.33       UDS/CSA- 08.18.2023    Medications- Hydromorphone last taken today.     Ketamine last used about 9am today    Notes Had low iron and had it checked and a bacterial infection was found in his stomach    Aleta Hargrove  [facility] Clinical Assistant

## 2024-06-05 NOTE — PATIENT INSTRUCTIONS
PLAN:    Change ketamine to 100 mg lozenge, 1/2 lozenge under tongue up to 4 times a day as needed, to see if a higher strength will be helpful, changing to this format less expensive.    Continue with hydromorphone 4 mg every 4 hours as needed.    You may discuss with your primary care provider the use of low-dose Cytomel to help with temperature regulation.    Continue other medications as you are taking.    Follow-up with Dr. Parker for return visit in the office in 3 months

## 2024-06-05 NOTE — PROGRESS NOTES
[facility] Follow-up    Date of visit: 6/5/2024    Chief complaint:   Chief Complaint   Patient presents with    Pain     Follow-up for low back pain, history of lumbar surgeries.  Interval history:    Reviews today he is quite sore, having some flareup perhaps also with weather related changes with many storms.    He reviews with the ketamine wonders if there are options due to the cost.  He is taking the duloxetine increased to 60 mg and Wellbutrin 150 mg daily, lamotrigine 50 mg twice a day.  Reviewed ketamine has some other mechanism of action.  In the past week have discussed amantadine or Namenda as well for the NMDA receptor system does not appear his Worker's Comp. covered.    Reviewed could change to a higher strength which she cuts in half that would save some of the cost and he would like to try that.    Continues with the hydromorphone 4 mg taking 3-6 in the day.    Struggling with diabetic control.    With voice met last time we discussed use of low-dose Cytomel to help with temperature regulation.  He continues to struggle and would like to discuss that with his primary care provider.    He has some forms for disability which she has been on since at least the early 90s.  Was his orthopedic surgeon initially who will fill these out indicating he would not be expected to return to competitive employment.  He has his last form, I psychologist Dr. Frank has not seen in a year, the office moved.  I did agree to fill out the paperwork as he continues to.  Disabled from competitive employment based on his back pain.    Last urine drug test in August.        Medications:  Current Outpatient Medications   Medication Sig Dispense Refill    amLODIPine (NORVASC) 2.5 MG tablet Take 1 tablet by mouth daily      aspirin 81 MG EC tablet Take 81 mg by mouth daily      atorvastatin (LIPITOR) 40 MG tablet [ATORVASTATIN (LIPITOR) 40 MG TABLET] Take 40 mg by mouth  daily. As directed      blood glucose (PRODIGY NO CODING BLOOD GLUC) test strip       buPROPion (WELLBUTRIN SR) 150 MG 12 hr tablet Take 150 mg by mouth once      Coenzyme Q10 (COQ10) 200 MG CAPS Take 1 capsule by mouth daily 30 capsule 3    diclofenac sodium (VOLTAREN) 1 % Gel [DICLOFENAC SODIUM (VOLTAREN) 1 % GEL] Apply 2 g topically 4 (four) times a day. 100 g 1    DULoxetine (CYMBALTA) 30 MG capsule Take 1 capsule (30 mg) by mouth daily (Patient taking differently: Take 60 mg by mouth daily) 30 capsule 1    HYDROmorphone (DILAUDID) 4 MG tablet Take 1 tablet (4 mg) by mouth every 4 hours as needed for severe pain May fill 6/9 126 tablet 0    ketamine HCl POWD 100 mg lozenge, 1/2 lozenge up to 4 times a day, #60 60 g 2    lamoTRIgine (LAMICTAL) 25 MG tablet Take 2 tablets (50 mg) by mouth 2 times daily 120 tablet 4    Lancets MISC 1 each      Lidocaine (LIDOCARE) 4 % Patch Place onto the skin every 24 hours To prevent lidocaine toxicity, patient should be patch free for 12 hrs daily.      liothyronine (CYTOMEL) 5 MCG tablet One morning for 7 days, then one morning and noon 60 tablet 3    lubiprostone (AMITIZA) 24 MCG capsule Take 1 capsule (24 mcg) by mouth daily (with breakfast) 30 capsule 4    metoprolol (LOPRESSOR) 25 MG tablet [METOPROLOL (LOPRESSOR) 25 MG TABLET] Take 25 mg by mouth 2 (two) times a day.      naloxone (NARCAN) 4 MG/0.1ML nasal spray Spray 1 spray (4 mg) into one nostril alternating nostrils once as needed for opioid reversal every 2-3 minutes until assistance arrives 0.2 mL 0    omeprazole (PRILOSEC OTC) 20 MG EC tablet Take 20 mg by mouth daily      polyethylene glycol (MIRALAX) 17 g packet Take 1 packet by mouth daily      vitamin D3 (CHOLECALCIFEROL) 125 MCG (5000 UT) tablet Take 5,000 Units by mouth             Physical Exam:  Blood pressure (!) 156/81, pulse 61, weight 96.6 kg (213 lb), SpO2 94%.    Alert, clear sensorium.  No respiratory distress, no pain behavior.  Is not diaphoretic  presently.  Ambulates with cane      Affect congruent          Assessment:   Low back pain, history of lumbar surgeries.  We have been adjusting hydromorphone and ketamine.    Comorbid mood disorder medical problems and significant sweating.        Total time 34 minutes spent on the date of encounter doing chart review, history, and exam documentation and further activities as noted above.     Teo Parker MD  [facility]

## 2024-06-17 ENCOUNTER — TELEPHONE (OUTPATIENT)
Dept: PALLIATIVE MEDICINE | Facility: OTHER | Age: 79
End: 2024-06-17
Payer: MEDICARE

## 2024-06-17 NOTE — TELEPHONE ENCOUNTER
Prior Authorization Retail Medication Request    Medication/Dose: lubiprostone (AMITIZA) 24 MCG capsule  Diagnosis and ICD code (if different than what is on RX):    New/renewal/insurance change PA/secondary ins. PA:  Previously Tried and Failed:    Rationale:      Key: [id number]    Pharmacy Information     [facility]   [facility] [address] [place] [id number]  Phone: [phone] Fax: [phone]

## 2024-06-24 NOTE — TELEPHONE ENCOUNTER
Per pharmacy, this is a workers comp claim and it has paid. No PA Is needed. I will close the encounter.

## 2024-08-16 ENCOUNTER — MYC REFILL (OUTPATIENT)
Dept: PALLIATIVE MEDICINE | Facility: OTHER | Age: 79
End: 2024-08-16
Payer: MEDICARE

## 2024-08-16 DIAGNOSIS — G89.4 CHRONIC PAIN SYNDROME: ICD-10-CM

## 2024-08-16 RX ORDER — HYDROMORPHONE HYDROCHLORIDE 4 MG/1
4 TABLET ORAL EVERY 4 HOURS PRN
Qty: 112 TABLET | Refills: 0 | Status: SHIPPED | OUTPATIENT
Start: 2024-08-16 | End: 2024-09-05

## 2024-08-16 NOTE — TELEPHONE ENCOUNTER
"Patient of Dr. Parker    Patient Comment: Per the last communication regarding this medication, the dosage was incorrect (from Dr. Parker). Refill needed for one 4 mg every 4 hours. Thank you.     Per last visit 6/5/24: \"Continue with hydromorphone 4 mg every 4 hours as needed\"    See [employer] message from 7/29/24. Hydromorphone dose erroneously changed to 2 mg q 4hr. Patient is to be taking 4 mg q 4hrs PRN.     Pending Prescriptions:                       Disp   Refills    HYDROmorphone (DILAUDID) 4 MG tablet      112 ta*0            Sig: Take 1 tablet (4 mg) by mouth every 4 hours as           needed for pain    [facility] - [place], [place] - [address]   "

## 2024-08-16 NOTE — TELEPHONE ENCOUNTER
Refills have been requested for the following medications:         HYDROmorphone (DILAUDID) 4 MG tablet [KRYSTAL BALDERRAMA]      Patient Comment: Per the last communication regarding this medication, the dosage was incorrect (from Dr. Balderrama). Refill needed for one 4 mg every 4 hours. Thank you.     [facility]

## 2024-08-16 NOTE — TELEPHONE ENCOUNTER
Received call from patient requesting refill(s) of HYDROmorphone (DILAUDID) 4 MG tablet    Last dispensed from pharmacy on 7/24/2024.    Patient's last office/virtual visit by prescribing provider on 6/5/2024.  Next office/virtual appointment scheduled for 9/24/2024.    Last urine drug screen date 8/18/2023.  Current opioid agreement on file (completed within the last year) Yes Date of opioid agreement: 8/18/2023.    Patient Comment: Per the last communication regarding this medication, the dosage was incorrect (from Dr. Parker). Refill needed for one 4 mg every 4 hours. Thank you.     E-prescribe to:    [facility]  [place], [place] - [address]    Will route to nursing Mulliken for review and preparation of prescription(s).

## 2024-08-26 DIAGNOSIS — M51.379 DEGENERATION OF LUMBAR OR LUMBOSACRAL INTERVERTEBRAL DISC: ICD-10-CM

## 2024-08-26 RX ORDER — KETAMINE HCL 100 %
POWDER (GRAM) MISCELLANEOUS
Qty: 60 G | Refills: 2 | Status: SHIPPED | OUTPATIENT
Start: 2024-08-26 | End: 2024-09-24

## 2024-08-26 NOTE — TELEPHONE ENCOUNTER
Received request for refill(s)   ketamine HCl POWD           Summary: 100 mg lozenge, 1/2 lozenge up to 4 times a day, #6          Last dispensed from pharmacy on 7/29/2024    Patient's last office/virtual visit by 6/25/24 prescribing provider on Next office/virtual appointment scheduled for 9/24/24    Last urine drug screen date 8/18/23  Current opioid agreement on file (completed within the last year) No Date of opioid agreement: 8/18/23    E-prescribe to       [facility] [address], pharmacy    Will route to [facility] for review and preparation of prescription(s).      Sugar Peters MA  [facility] [place]

## 2024-08-26 NOTE — TELEPHONE ENCOUNTER
Pending Prescriptions:                       Disp   Refills    ketamine HCl POWD                         60 g   2            Si mg lozenge, 1/2 lozenge up to 4 times a day,           #60    Located within [facility] - [place], [place] - [address]

## 2024-09-22 ENCOUNTER — HEALTH MAINTENANCE LETTER (OUTPATIENT)
Age: 79
End: 2024-09-22

## 2024-09-24 ENCOUNTER — OFFICE VISIT (OUTPATIENT)
Dept: PALLIATIVE MEDICINE | Facility: OTHER | Age: 79
End: 2024-09-24
Attending: ANESTHESIOLOGY
Payer: OTHER MISCELLANEOUS

## 2024-09-24 VITALS
DIASTOLIC BLOOD PRESSURE: 75 MMHG | SYSTOLIC BLOOD PRESSURE: 128 MMHG | HEART RATE: 61 BPM | BODY MASS INDEX: 30.42 KG/M2 | WEIGHT: 212 LBS | OXYGEN SATURATION: 97 %

## 2024-09-24 DIAGNOSIS — M51.379 DEGENERATION OF LUMBAR OR LUMBOSACRAL INTERVERTEBRAL DISC: ICD-10-CM

## 2024-09-24 DIAGNOSIS — G89.4 CHRONIC PAIN SYNDROME: ICD-10-CM

## 2024-09-24 DIAGNOSIS — Z79.891 LONG TERM (CURRENT) USE OF OPIATE ANALGESIC: Primary | ICD-10-CM

## 2024-09-24 LAB
CANNABINOIDS UR QL SCN: NORMAL
CREAT UR-MCNC: 63 MG/DL
ETHANOL UR QL SCN: NORMAL

## 2024-09-24 PROCEDURE — 80360 METHYLPHENIDATE: CPT | Performed by: ANESTHESIOLOGY

## 2024-09-24 PROCEDURE — 80366 DRUG SCREENING PREGABALIN: CPT | Performed by: ANESTHESIOLOGY

## 2024-09-24 PROCEDURE — 80307 DRUG TEST PRSMV CHEM ANLYZR: CPT | Performed by: ANESTHESIOLOGY

## 2024-09-24 PROCEDURE — 99213 OFFICE O/P EST LOW 20 MIN: CPT | Performed by: ANESTHESIOLOGY

## 2024-09-24 PROCEDURE — 99214 OFFICE O/P EST MOD 30 MIN: CPT | Performed by: ANESTHESIOLOGY

## 2024-09-24 RX ORDER — HYDROMORPHONE HYDROCHLORIDE 4 MG/1
4 TABLET ORAL EVERY 4 HOURS PRN
Qty: 150 TABLET | Refills: 0 | Status: SHIPPED | OUTPATIENT
Start: 2024-09-24

## 2024-09-24 RX ORDER — KETAMINE HCL 100 %
POWDER (GRAM) MISCELLANEOUS
Qty: 120 G | Refills: 2 | Status: SHIPPED | OUTPATIENT
Start: 2024-09-24

## 2024-09-24 ASSESSMENT — PAIN SCALES - GENERAL: PAINLEVEL: SEVERE PAIN (7)

## 2024-09-24 NOTE — PROGRESS NOTES
Patient presents to the clinic today for a visit with KRYSTAL BALDERRAMA MD regarding Pain Management.          2/6/2024    11:01 AM 6/5/2024     9:55 AM 9/24/2024    10:21 AM   PEG Score   PEG Total Score 5 5.33 9.33       UDS/CSA- 08.18.2023    Medications- Hydromorphone last taken this morning    Ketamine last taken yesterday    Notes the medications help with sleep some. It takes forever to get to sleep.    Aleta Hargrove  [facility] Clinical Assistant

## 2024-09-24 NOTE — PROGRESS NOTES
"[facility] Follow-up    Date of visit: 9/24/2024    Chief complaint:   Chief Complaint   Patient presents with    Pain     Follow-up for persistent spinal pain  Interval history: Patient seen with his wife.  He reviews doing worse over the last 5 or 6 weeks in terms of back pain.  No clear precipitant  , Wife wonders about assisting a friend fixing a golf cart though he reports feeling well for the week after that and something else happened.  Pain is in his lumbar area, can radiate around to the right hip, around to the left side not quite as far.  This is the area where he has had 2 previous surgeries, last with Dr. Jackson some 40 years ago.    Reviews did have injections with Dr. Maier, last 7/15/2021 having a L5-S1 bilateral steroid injection.    He does have a spinal cord stimulator which has met with the rep and feels been maximized, thinks it is too old to be MRI compatible.  Last CT imaging several years ago.  Had physical therapy several years ago which flareup of his back.    Asking to have a back brace, recalls used in the past, a \"corset\" kind that had been supportive.    Has been using the ketamine, wife notes taking more than he is scheduled as he has access to that, using the 100 mg lozenges, finds if he takes up to 4 total in a day does better and sleep somewhat better.  No side effects.    Hydromorphone had been 4 mg around 4 a day though more recently increasing to 5 or 6 with this flareup.    Had some episodes of vertigo earlier this summer, could not drive, such [place] and that has been a disappointment.    Sweating is still an issue though sometimes not as bad.    Last urine drug test in August 2023 so would be obtained.            Medications:  Current Outpatient Medications   Medication Sig Dispense Refill    amLODIPine (NORVASC) 2.5 MG tablet Take 1 tablet by mouth daily      aspirin 81 MG EC " tablet Take 81 mg by mouth daily      atorvastatin (LIPITOR) 40 MG tablet [ATORVASTATIN (LIPITOR) 40 MG TABLET] Take 40 mg by mouth daily. As directed      blood glucose (PRODIGY NO CODING BLOOD GLUC) test strip       buPROPion (WELLBUTRIN SR) 150 MG 12 hr tablet Take 150 mg by mouth once      Coenzyme Q10 (COQ10) 200 MG CAPS Take 1 capsule by mouth daily 30 capsule 3    diclofenac sodium (VOLTAREN) 1 % Gel [DICLOFENAC SODIUM (VOLTAREN) 1 % GEL] Apply 2 g topically 4 (four) times a day. 100 g 1    DULoxetine (CYMBALTA) 30 MG capsule Take 1 capsule (30 mg) by mouth daily (Patient taking differently: Take 60 mg by mouth daily.) 30 capsule 1    HYDROmorphone (DILAUDID) 4 MG tablet Take 1 tablet (4 mg) by mouth every 4 hours as needed for pain. May fill/start 9/5/24 150 tablet 0    ketamine HCl POWD 100 mg lozenge, 1/2 lozenge up to 8 times a day, #120, may fill 10/7 120 g 2    lamoTRIgine (LAMICTAL) 25 MG tablet Take 2 tablets (50 mg) by mouth 2 times daily 120 tablet 4    Lancets MISC 1 each      Lidocaine (LIDOCARE) 4 % Patch Place onto the skin every 24 hours To prevent lidocaine toxicity, patient should be patch free for 12 hrs daily.      liothyronine (CYTOMEL) 5 MCG tablet One morning for 7 days, then one morning and noon 60 tablet 3    lubiprostone (AMITIZA) 24 MCG capsule Take 1 capsule (24 mcg) by mouth daily (with breakfast) 30 capsule 4    metoprolol (LOPRESSOR) 25 MG tablet [METOPROLOL (LOPRESSOR) 25 MG TABLET] Take 25 mg by mouth 2 (two) times a day.      naloxone (NARCAN) 4 MG/0.1ML nasal spray Spray 1 spray (4 mg) into one nostril alternating nostrils once as needed for opioid reversal every 2-3 minutes until assistance arrives 0.2 mL 0    omeprazole (PRILOSEC OTC) 20 MG EC tablet Take 20 mg by mouth daily      polyethylene glycol (MIRALAX) 17 g packet Take 1 packet by mouth daily      vitamin D3 (CHOLECALCIFEROL) 125 MCG (5000 UT) tablet Take 5,000 Units by mouth             Physical Exam:  Blood  pressure 128/75, pulse 61, weight 96.2 kg (212 lb), SpO2 97%.    Alert, clear sensorium.  No respiratory distress, ambulates with cane and then 4 wheeled walker.    Loud groaning upon changing in position.    He points to his midline lumbar area being most painful.  The spinal cord stimulator just adjacent to the left.  Negative straight leg raising.  Equal patellar reflex.    Good ankle plantar dorsiflexion.  Romberg negative.            Assessment:   History of lumbar surgeries, spinal cord stimulator placed.    Notes flareup with pain over the last several weeks unclear precipitant.  Recalls steroid injections were helpful in the past.    Using more ketamine and hydromorphone than before.  Decreased function.    Discussed the plan to obtain a lumbar CT to update imaging.    Will have him meet with Dr. Lovett for steroid injections.    For the present can continue with the increase ketamine and hydromorphone, with the goal to decrease previous levels pending the injections.    Total time 32 minutes.        minutes spent on the date of encounter doing chart review, history, and exam documentation and further activities as noted above.     Teo Parker MD  [facility]

## 2024-09-24 NOTE — PATIENT INSTRUCTIONS
PLAN:  Order placed for back brace, you may take the paper copy to any medical supply store.    Order placed for lumbar CT.    Order placed to meet with Dr. Lovett to review the CT and discuss lumbar steroid injections.    Ketamine 100 mg lozenges, may increase to 4 lozenges a day until your back injection may be helpful.    Hydromorphone 4 mg every 4 hours as needed, will give number 150/month.    Follow-up with Dr. Parker in 8 to 12 weeks

## 2024-09-24 NOTE — LETTER

## 2024-09-27 LAB
HYDROMORPHONE UR CFM-MCNC: 2000 NG/ML
HYDROMORPHONE/CREAT UR: 3175 NG/MG {CREAT}

## 2024-10-07 ENCOUNTER — HOSPITAL ENCOUNTER (OUTPATIENT)
Dept: CT IMAGING | Facility: HOSPITAL | Age: 79
Discharge: HOME | End: 2024-10-07
Attending: ANESTHESIOLOGY | Admitting: ANESTHESIOLOGY
Payer: OTHER MISCELLANEOUS

## 2024-10-07 DIAGNOSIS — M51.379 DEGENERATION OF LUMBAR OR LUMBOSACRAL INTERVERTEBRAL DISC: ICD-10-CM

## 2024-10-07 PROCEDURE — 72131 CT LUMBAR SPINE W/O DYE: CPT

## 2024-10-08 DIAGNOSIS — M51.372 DEGENERATION OF INTERVERTEBRAL DISC OF LUMBOSACRAL REGION WITH DISCOGENIC BACK PAIN AND LOWER EXTREMITY PAIN: Primary | ICD-10-CM

## 2024-10-16 DIAGNOSIS — G89.4 CHRONIC PAIN SYNDROME: ICD-10-CM

## 2024-10-16 DIAGNOSIS — M54.15 RADICULOPATHY OF THORACOLUMBAR REGION: ICD-10-CM

## 2024-10-16 RX ORDER — LUBIPROSTONE 24 UG/1
24 CAPSULE ORAL
Qty: 30 CAPSULE | Refills: 4 | Status: SHIPPED | OUTPATIENT
Start: 2024-10-16

## 2024-10-16 RX ORDER — LAMOTRIGINE 25 MG/1
50 TABLET ORAL 2 TIMES DAILY
Qty: 120 TABLET | Refills: 4 | Status: SHIPPED | OUTPATIENT
Start: 2024-10-16

## 2024-10-16 NOTE — TELEPHONE ENCOUNTER
Received fax from pharmacy requesting refill(s) for     lamoTRIgine (LAMICTAL) 25 MG tablet filled 9/10/24  lubiprostone (AMITIZA) 24 MCG capsule filled 9/6/24    Last Appt Date:9/24/24    Next Appt scheduled: 12/17/24    Pharmacy:   [facility]  [place]    Pending Prescriptions:                       Disp   Refills    lubiprostone (AMITIZA) 24 MCG capsule     30 cap*4            Sig: Take 1 capsule (24 mcg) by mouth daily (with           breakfast).    lamoTRIgine (LAMICTAL) 25 MG tablet       120 ta*4            Sig: Take 2 tablets (50 mg) by mouth 2 times daily.

## 2024-10-16 NOTE — TELEPHONE ENCOUNTER
[facility] Call Center    Phone Message    May a detailed message be left on voicemail: yes     Reason for Call: Medication Refill Request    Has the patient contacted the pharmacy for the refill? Yes   Name of medication being requested:     lamoTRIgine (LAMICTAL) 25 MG tablet     lubiprostone (AMITIZA) 24 MCG capsule     Provider who prescribed the medication: Teo Parker    Pharmacy: [facility]    Date medication is needed: ASAP

## 2024-11-26 ENCOUNTER — OFFICE VISIT (OUTPATIENT)
Dept: PALLIATIVE MEDICINE | Facility: OTHER | Age: 79
End: 2024-11-26
Attending: ANESTHESIOLOGY
Payer: OTHER MISCELLANEOUS

## 2024-11-26 VITALS
HEART RATE: 67 BPM | WEIGHT: 196 LBS | BODY MASS INDEX: 28.12 KG/M2 | SYSTOLIC BLOOD PRESSURE: 136 MMHG | DIASTOLIC BLOOD PRESSURE: 79 MMHG

## 2024-11-26 DIAGNOSIS — M47.817 LUMBOSACRAL SPONDYLOSIS WITHOUT MYELOPATHY: ICD-10-CM

## 2024-11-26 DIAGNOSIS — M51.372 DEGENERATION OF INTERVERTEBRAL DISC OF LUMBOSACRAL REGION WITH DISCOGENIC BACK PAIN AND LOWER EXTREMITY PAIN: ICD-10-CM

## 2024-11-26 DIAGNOSIS — M54.16 LUMBAR RADICULOPATHY: Primary | ICD-10-CM

## 2024-11-26 PROCEDURE — 99213 OFFICE O/P EST LOW 20 MIN: CPT | Performed by: STUDENT IN AN ORGANIZED HEALTH CARE EDUCATION/TRAINING PROGRAM

## 2024-11-26 ASSESSMENT — PAIN SCALES - GENERAL: PAINLEVEL_OUTOF10: MODERATE PAIN (5)

## 2024-11-26 NOTE — PROGRESS NOTES
[facility] Interventional Follow-up    Date of visit: 11/26/2024    Chief complaint:   Chief Complaint   Patient presents with    Pain     Low back pain     Pain Management       Interval history:  Since his last visit, Naveen Toro reports:    - Low back pain, some radiation down right buttock  - Describes the pain as dull and achy  - Worst in the past two months, never been so debilitating    Pain scores:  Pain intensity currently is 5 on a scale of 0-10.  Ranges from 3/10 - 9/10.    Current pain treatments:   - SCS, placed in 2009, still using and it is still holding a charge  - hydromorphone 4 mg q4h  - ketamine 50 mg up to 8x/day  - duloxetine 30 mg daily    Past pain treatments:  - Voltaren gel  - lumbar laminectomy 1979 and 1982  - TFESIs from Dr. Maier    Side Effects: N/A    Medications:  Current Outpatient Medications   Medication Sig Dispense Refill    amLODIPine (NORVASC) 2.5 MG tablet Take 1 tablet by mouth daily      aspirin 81 MG EC tablet Take 81 mg by mouth daily      atorvastatin (LIPITOR) 40 MG tablet [ATORVASTATIN (LIPITOR) 40 MG TABLET] Take 40 mg by mouth daily. As directed      blood glucose (PRODIGY NO CODING BLOOD GLUC) test strip       buPROPion (WELLBUTRIN SR) 150 MG 12 hr tablet Take 150 mg by mouth once      diclofenac sodium (VOLTAREN) 1 % Gel [DICLOFENAC SODIUM (VOLTAREN) 1 % GEL] Apply 2 g topically 4 (four) times a day. 100 g 1    DULoxetine (CYMBALTA) 30 MG capsule Take 1 capsule (30 mg) by mouth daily 30 capsule 1    HYDROmorphone (DILAUDID) 4 MG tablet Take 1 tablet (4 mg) by mouth every 4 hours as needed for pain. May fill/start 11/4/24 150 tablet 0    ketamine HCl POWD 100 mg lozenge, 1/2 lozenge up to 8 times a day, #120, may fill 10/7 120 g 2    lamoTRIgine (LAMICTAL) 25 MG tablet Take 2 tablets (50 mg) by mouth 2 times daily. 120 tablet 4    Lancets MISC 1 each      Lidocaine (LIDOCARE) 4 % Patch Place onto the  skin every 24 hours To prevent lidocaine toxicity, patient should be patch free for 12 hrs daily.      liothyronine (CYTOMEL) 5 MCG tablet One morning for 7 days, then one morning and noon 60 tablet 3    lubiprostone (AMITIZA) 24 MCG capsule Take 1 capsule (24 mcg) by mouth daily (with breakfast). 30 capsule 4    metoprolol (LOPRESSOR) 25 MG tablet [METOPROLOL (LOPRESSOR) 25 MG TABLET] Take 25 mg by mouth 2 (two) times a day.      naloxone (NARCAN) 4 MG/0.1ML nasal spray Spray 1 spray (4 mg) into one nostril alternating nostrils once as needed for opioid reversal every 2-3 minutes until assistance arrives 0.2 mL 0    omeprazole (PRILOSEC OTC) 20 MG EC tablet Take 20 mg by mouth daily      polyethylene glycol (MIRALAX) 17 g packet Take 1 packet by mouth daily      vitamin D3 (CHOLECALCIFEROL) 125 MCG (5000 UT) tablet Take 5,000 Units by mouth         Medical History: any changes in medical history since they were last seen? No    Physical Exam:  Blood pressure 136/79, pulse 67, weight 88.9 kg (196 lb).  Constitutional: Well developed, NAD  Head: normocephalic. Atraumatic.   Eyes: no redness or jaundice noted   CV: warm, well perfused extremities   Skin: no suspicious lesions or rashes   Psychiatric: mentation appears normal and affect fi;;  Focused MSK exam:   SLR negative bilaterally  No lumbar paraspinal tenderness  Mild pain with lumbar flexion and extension  Mild Right SI joint tenderness  Seated slump positive for reproducing back and buttock pain on the right  SI joint tests negative: LOUIS, distraction and compression        Diagnostics:  Narrative & Impression   EXAM: CT LUMBAR SPINE W/O CONTRAST  LOCATION: [facility]  DATE: 10/7/2024     INDICATION: hx back surgery, returning lumbar pain, radiating into hips, back of leg, has SCS  COMPARISON: Lumbar spine radiographs 6/9/2020.  TECHNIQUE: Routine CT Lumbar Spine without IV contrast. Multiplanar reformats. Dose reduction techniques  were used.     FINDINGS:  VERTEBRAE: Marked retrolisthesis at L5-S1, measuring 7 mm. Chronic L1 wedge deformity. Multilevel Schmorl's nodes. No acute fracture. Moderate degenerative changes throughout the lumbar spine with facet arthropathy, vertebral body osteophyte formation,   and disc height loss, most pronounced at L5-S1. Sequela of right S1 hemilaminectomy. Bilateral sacroiliac degenerative changes.     CANAL/FORAMINA: Spinal cord stimulator present, entering the spinal canal at the level of T12-L1. No discernible high-grade spinal canal stenosis. Multilevel bilateral foraminal stenosis related to facet arthropathy, incompletely characterized but at   least moderate at L5-S1.     PARASPINAL: Scattered vascular calcifications.                                                                      IMPRESSION:  1.  No acute fracture within the lumbar spine.  2.  Incompletely characterized degenerative findings throughout the lumbar spine without discernible high-grade spinal canal stenosis. CT myelogram could be considered for further assessment.       Personally reviewed: yes    Assessment:   Lumbar radiculopathy  Failed back surgical syndrome  SCS in situ  Lumbar spondylosis    Naveen Toro is a 79 year old male who is seen at the pain clinic for low back and buttock pain.  Patient has a history of laminectomy in 2011, SCS placement, ongoing benefit and charge ability of the IPG.  He would like us to proceed with interventional management of this.  Examination does show reproduction of radiating buttock pain with straight leg raise.  A review of the CT of the lumbar spine does show multiple degenerative changes throughout the lumbar spine, including the lower lumbar spine, likely amenable to epidural steroid injection.  I have ordered caudal MARY JO.  We also discussed that we may proceed with either transforaminal approach or bilateral L3-5 MBB/RFA process in the future.    Plan:  Procedures:   Caudal epidural  steroid injection ordered  Consider transforaminal approach in future if needed  Can also consider L3-5 MBBs/RFA Process as well  Diagnostic Studies:  Reviewed Lumbar CT today in clinic  Follow up: As needed after injection    Fermin Kang MD  Pain Fellow, [facility]    Physician Attestation   I, Jim Lovett MD, saw and evaluated this patient and agree with the findings and plan of care as documented in the note.  Any changes have been made above.    Jim Lovett MD  Interventional Pain Medicine  [facility]

## 2024-11-26 NOTE — PATIENT INSTRUCTIONS
Procedures: Caudal epidural steroid injection  If effective, can continue to schedule further epidural steroid injections as often as every three months  Diagnostic Studies:  Reviewed Lumbar CT today in clinic  Follow up: As needed after injection    [facility] Number:  [phone]  Call with any questions about your care and for scheduling assistance.   Calls are returned Monday through Friday between 8 AM and 4:30 PM. We usually get back to you within 2 business days depending on the issue/request.    If we are prescribing your medications:  For opioid medication refills, call the clinic or send a [employer] message 7 days in advance.  Please include:  Name of requested medication  Name of the pharmacy.  For non-opioid medications, call your pharmacy directly to request a refill. Please allow 3-4 days to be processed.   Per [place] State Law:  All controlled substance prescriptions must be filled within 30 days of being written.    For those controlled substances allowing refills, pickup must occur within 30 days of last fill.      We believe regular attendance is key to your success in our program!    Any time you are unable to keep your appointment we ask that you call us at least 24 hours in advance to cancel.This will allow us to offer the appointment time to another patient.   Multiple missed appointments may lead to dismissal from the clinic.

## 2024-11-26 NOTE — PROGRESS NOTES
Patient presents to the clinic today for a visit  with MD MARIELLA Gruber/RAVINDER-9/24/24     Medications-hydromorphone     QUESTIONS:    Sugar Peters MA  [facility] [place]

## 2024-12-01 ENCOUNTER — HEALTH MAINTENANCE LETTER (OUTPATIENT)
Age: 79
End: 2024-12-01

## 2024-12-02 DIAGNOSIS — G89.4 CHRONIC PAIN SYNDROME: ICD-10-CM

## 2024-12-02 RX ORDER — HYDROMORPHONE HYDROCHLORIDE 4 MG/1
4 TABLET ORAL EVERY 4 HOURS PRN
Qty: 150 TABLET | Refills: 0 | Status: SHIPPED | OUTPATIENT
Start: 2024-12-02

## 2024-12-02 NOTE — TELEPHONE ENCOUNTER
Pending Prescriptions:                       Disp   Refills    HYDROmorphone (DILAUDID) 4 MG tablet      150 ta*0            Sig: Take 1 tablet (4 mg) by mouth every 4 hours as           needed for pain. May fill/start 12/2/24

## 2024-12-02 NOTE — TELEPHONE ENCOUNTER
Received call from patient requesting refill(s) of     HYDROMORPHONE HCL 4 MG PO TABS            Last dispensed from pharmacy on: Nov. 4, 2024       Patient's last office/virtual visit by prescribing provider on: Sep. 24, 2024   Next office/virtual appointment scheduled for: Dec. 17, 2024         UDT: Sep. 24, 2024  CSA: Sep. 24, 2024     E-prescribe to    [facility] [address] [place] [address]    Will route to nursing [place] for review and preparation of prescription(s).

## 2024-12-02 NOTE — TELEPHONE ENCOUNTER
[facility] Call Center    Phone Message    May a detailed message be left on voicemail: yes     Reason for Call: Medication Refill Request    Has the patient contacted the pharmacy for the refill? Yes     Name of medication being requested: HYDROmorphone (DILAUDID) 4 MG tablet     Provider who prescribed the medication: Teo Parker    Pharmacy: [facility] in [facility]    Date medication is needed: ASAP

## 2024-12-17 ENCOUNTER — OFFICE VISIT (OUTPATIENT)
Dept: PALLIATIVE MEDICINE | Facility: OTHER | Age: 79
End: 2024-12-17
Attending: ANESTHESIOLOGY
Payer: OTHER MISCELLANEOUS

## 2024-12-17 VITALS
HEART RATE: 74 BPM | DIASTOLIC BLOOD PRESSURE: 84 MMHG | BODY MASS INDEX: 28.27 KG/M2 | WEIGHT: 197 LBS | SYSTOLIC BLOOD PRESSURE: 144 MMHG | OXYGEN SATURATION: 97 %

## 2024-12-17 DIAGNOSIS — M51.372 DEGENERATION OF INTERVERTEBRAL DISC OF LUMBOSACRAL REGION WITH DISCOGENIC BACK PAIN AND LOWER EXTREMITY PAIN: Primary | ICD-10-CM

## 2024-12-17 PROCEDURE — 99213 OFFICE O/P EST LOW 20 MIN: CPT | Performed by: ANESTHESIOLOGY

## 2024-12-17 PROCEDURE — G2211 COMPLEX E/M VISIT ADD ON: HCPCS | Performed by: ANESTHESIOLOGY

## 2024-12-17 PROCEDURE — 99214 OFFICE O/P EST MOD 30 MIN: CPT | Performed by: ANESTHESIOLOGY

## 2024-12-17 ASSESSMENT — PAIN SCALES - GENERAL: PAINLEVEL_OUTOF10: SEVERE PAIN (6)

## 2024-12-17 NOTE — PATIENT INSTRUCTIONS
[facility] Number:  [phone]  Call with any questions about your care and for scheduling assistance.   Calls are returned Monday through Friday between 8 AM and 4:30 PM. We usually get back to you within 2 business days depending on the issue/request.    If we are prescribing your medications:  For opioid medication refills, call the clinic or send a Maozha[facility]art message 7 days in advance.  Please include:  Name of requested medication  Name of the pharmacy.  For non-opioid medications, call your pharmacy directly to request a refill. Please allow 3-4 days to be processed.   Per [place] State Law:  All controlled substance prescriptions must be filled within 30 days of being written.    For those controlled substances allowing refills, pickup must occur within 30 days of last fill.      We believe regular attendance is key to your success in our program!    Any time you are unable to keep your appointment we ask that you call us at least 24 hours in advance to cancel.This will allow us to offer the appointment time to another patient.   Multiple missed appointments may lead to dismissal from the clinic.       PLAN:    You are scheduled for a caudal injection with Dr. Lovett on 1/8.    Continue the hydromorphone 4 mg every 4 hours.  Discussed there are problems with supply issues and we have been using different strengths of hydromorphone.  Reviewed if that becomes unavailable would change to oxycodone 15 mg 4 times a day.    Continue with the ketamine 100 mg 4 times a day as needed.  Discussed the goal to decrease that with the injections.    Follow-up with Dr. Parker for return visit in 3 months

## 2024-12-17 NOTE — PROGRESS NOTES
"[facility] Follow-up    Date of visit: 12/17/2024    Chief complaint:   Chief Complaint   Patient presents with    Pain    Pain Management       Follow-up for lumbar degenerative changes.    Review of the record had been hospitalized 10/23 to 10/25, with confusion.  There was a comment made that there were changes recently with Ozempic, increasing Cymbalta, adding finger steroid.  Chart later attributed to hydromorphone and ketamine though no changes in the dosage.    He was seen by Dr. Lovett 11/26, scheduled for a caudal injection 1/8.    Seen today with his wife.  They review the hospitalization in October.  Wife indicates son also saw this time that he appeared to have \"confusion\".  There was an episode 3 years ago similarly and also several years before that.  Unclear reason as to precipitate that.  I noted the comment and medication changes.  Wife reviews and setting seem to focus on opioids though it she noted no changes in the dosage of ketamine and hydromorphone.    Following up with her primary care provider next week.    He has not taking Ozempic.  He was adding the finasteride for urinary frequency.    He does continue on the hydromorphone 4 mg 5 times a day.  Aware of challenges with the supply.    They are hoping the injection with Dr. Lovett will be helpful and can get back on a regular schedule for these.  Will review Dr. Lovett outlined a sequence of things he would look into.  Last urine drug test in September.            Medications:  Current Outpatient Medications   Medication Sig Dispense Refill    amLODIPine (NORVASC) 2.5 MG tablet Take 1 tablet by mouth daily      aspirin 81 MG EC tablet Take 81 mg by mouth daily      atorvastatin (LIPITOR) 40 MG tablet [ATORVASTATIN (LIPITOR) 40 MG TABLET] Take 40 mg by mouth daily. As directed      blood glucose (PRODIGY NO CODING BLOOD GLUC) test strip       buPROPion (WELLBUTRIN SR) 150 MG 12 hr tablet " Take 150 mg by mouth once      diclofenac sodium (VOLTAREN) 1 % Gel [DICLOFENAC SODIUM (VOLTAREN) 1 % GEL] Apply 2 g topically 4 (four) times a day. 100 g 1    DULoxetine (CYMBALTA) 30 MG capsule Take 1 capsule (30 mg) by mouth daily 30 capsule 1    HYDROmorphone (DILAUDID) 2 MG tablet Take 2 tablets (4 mg) by mouth every 4 hours as needed for pain. May fill/start 12/6/24 300 tablet 0    HYDROmorphone (DILAUDID) 4 MG tablet Take 1 tablet (4 mg) by mouth every 4 hours as needed for pain. May fill/start 12/2/24 150 tablet 0    ketamine HCl POWD 100 mg lozenge, 1/2 lozenge up to 8 times a day, #120, may fill 10/7 120 g 2    lamoTRIgine (LAMICTAL) 25 MG tablet Take 2 tablets (50 mg) by mouth 2 times daily. 120 tablet 4    Lancets MISC 1 each      Lidocaine (LIDOCARE) 4 % Patch Place onto the skin every 24 hours To prevent lidocaine toxicity, patient should be patch free for 12 hrs daily.      liothyronine (CYTOMEL) 5 MCG tablet One morning for 7 days, then one morning and noon 60 tablet 3    lubiprostone (AMITIZA) 24 MCG capsule Take 1 capsule (24 mcg) by mouth daily (with breakfast). 30 capsule 4    metoprolol (LOPRESSOR) 25 MG tablet [METOPROLOL (LOPRESSOR) 25 MG TABLET] Take 25 mg by mouth 2 (two) times a day.      naloxone (NARCAN) 4 MG/0.1ML nasal spray Spray 1 spray (4 mg) into one nostril alternating nostrils once as needed for opioid reversal every 2-3 minutes until assistance arrives 0.2 mL 0    omeprazole (PRILOSEC OTC) 20 MG EC tablet Take 20 mg by mouth daily      polyethylene glycol (MIRALAX) 17 g packet Take 1 packet by mouth daily      vitamin D3 (CHOLECALCIFEROL) 125 MCG (5000 UT) tablet Take 5,000 Units by mouth             Physical Exam:  Blood pressure (!) 144/84, pulse 74, weight 89.4 kg (197 lb), SpO2 97%.      He is alert, clear sensorium, no rest anisha distress, no pain behavior.    Ambulates with cane.    They note he continues with the same problems with sweating, but do not seem to be associate  with medications.        Assessment:   Persistent spinal pain, has been maintained on this regimen of opioids having been followed with pain clinic several years, trying this regimen most helpful.    Plan: We will have the caudal injection in January followed out sequence.    We reviewed options if the hydromorphone is no longer available.  He had been on morphine in the past, though there is concern for kidney disease, last creatinine 1.33.  Had taken oxycodone previously, up to 15 mg 4 times a day, concerned with tolerance rotating the hydromorphone last year.    Recalls had been on long-acting hydrocodone, would wear off very quickly with the withdrawal so they do not want to use that.    Present plan if hydromorphone not available at this change to the oxycodone.    He will follow-up.The longitudinal plan of care for the diagnosis(es)/condition(s) as documented were addressed during this visit. Due to the added complexity in care, I will continue to support Naveen in the subsequent management and with ongoing continuity of care.    Total time 32 minutes.        minutes spent on the date of encounter doing chart review, history, and exam documentation and further activities as noted above.     Teo Parker MD  [facility]

## 2024-12-17 NOTE — PROGRESS NOTES
Patient presents to the clinic today for a visit  with KRYSTAL BALDERRAMA MD            6/5/2024     9:55 AM 9/24/2024    10:21 AM 12/17/2024    10:26 AM   PEG Score   PEG Total Score 5.33 9.33 5.33       UDS/CSA-9/24/24    Medications-hydromorphone     QUESTIONS:    Sugar Peters MA  [facility]

## 2024-12-17 NOTE — PROGRESS NOTES
Patient presents to the clinic today for a visit with KRYSTAL BALDERRAMA MD regarding Pain Management.    {Rooming staff  Please complete the PEG Assessment  Assess Pain, Function, and Quality of Life. Complete every pain visit :661412}  {After completing assessments, pull in flowsheet scores (Optional) :026008}    UDS/CSA- 09.24.2024    Medications- Hydromorphone 2 mg    Hydromorphone 4 mg    Ketamine    Notes    Aleta Hargrove  [facility] Clinical Assistant

## 2025-01-08 ENCOUNTER — RADIOLOGY INJECTION OFFICE VISIT (OUTPATIENT)
Dept: PALLIATIVE MEDICINE | Facility: OTHER | Age: 80
End: 2025-01-08
Attending: STUDENT IN AN ORGANIZED HEALTH CARE EDUCATION/TRAINING PROGRAM
Payer: OTHER MISCELLANEOUS

## 2025-01-08 VITALS — HEART RATE: 61 BPM | OXYGEN SATURATION: 97 % | SYSTOLIC BLOOD PRESSURE: 142 MMHG | DIASTOLIC BLOOD PRESSURE: 69 MMHG

## 2025-01-08 DIAGNOSIS — M54.16 LUMBAR RADICULOPATHY: ICD-10-CM

## 2025-01-08 PROCEDURE — 250N000009 HC RX 250: Performed by: STUDENT IN AN ORGANIZED HEALTH CARE EDUCATION/TRAINING PROGRAM

## 2025-01-08 PROCEDURE — 250N000011 HC RX IP 250 OP 636: Performed by: STUDENT IN AN ORGANIZED HEALTH CARE EDUCATION/TRAINING PROGRAM

## 2025-01-08 PROCEDURE — 255N000002 HC RX 255 OP 636: Performed by: STUDENT IN AN ORGANIZED HEALTH CARE EDUCATION/TRAINING PROGRAM

## 2025-01-08 PROCEDURE — 62323 NJX INTERLAMINAR LMBR/SAC: CPT | Performed by: STUDENT IN AN ORGANIZED HEALTH CARE EDUCATION/TRAINING PROGRAM

## 2025-01-08 RX ORDER — METHYLPREDNISOLONE ACETATE 40 MG/ML
40 INJECTION, SUSPENSION INTRA-ARTICULAR; INTRALESIONAL; INTRAMUSCULAR; SOFT TISSUE ONCE
Status: COMPLETED | OUTPATIENT
Start: 2025-01-08 | End: 2025-01-08

## 2025-01-08 RX ORDER — LIDOCAINE HYDROCHLORIDE 10 MG/ML
1 INJECTION, SOLUTION EPIDURAL; INFILTRATION; INTRACAUDAL; PERINEURAL ONCE
Status: COMPLETED | OUTPATIENT
Start: 2025-01-08 | End: 2025-01-08

## 2025-01-08 RX ADMIN — LIDOCAINE HYDROCHLORIDE 1 ML: 10 INJECTION, SOLUTION EPIDURAL; INFILTRATION; INTRACAUDAL; PERINEURAL at 15:36

## 2025-01-08 RX ADMIN — IOHEXOL 10 ML: 180 INJECTION INTRAVENOUS at 13:34

## 2025-01-08 RX ADMIN — METHYLPREDNISOLONE ACETATE 40 MG: 40 INJECTION, SUSPENSION INTRA-ARTICULAR; INTRALESIONAL; INTRAMUSCULAR; INTRASYNOVIAL; SOFT TISSUE at 15:36

## 2025-01-08 ASSESSMENT — PAIN SCALES - GENERAL
PAINLEVEL_OUTOF10: MODERATE PAIN (5)
PAINLEVEL_OUTOF10: MILD PAIN (3)

## 2025-01-08 NOTE — PROGRESS NOTES
Pre-procedure Intake    If YES to any questions or NO to having a     Notify nurses and provider    Are you taking any prescribed blood thinners such as Coumadin, Warfarin, Jantoven, Pradaxa Xarelto, Eliquis, Edoxaban, Enoxaparin, Lovenox, Heparin, Arixtra, Fondaparinux, or Fragmin? OR Antiplatelet medication such as Plavix, Brilinta, or Effient?   No   If yes, when did you take your last dose?     Do you take aspirin or use aspirin products?  Yes -   ASA baby 81 mg   If cervical procedure or interlaminar, have you held aspirin for 6 days?   Na     Do you have any allergies to contrast dye, iodine, steroid and/or numbing medications?  NO    Are you currently taking antibiotics or have an active infection?  NO    Have you had a fever/elevated temperature within the past week? NO    Have you had a vaccination of any kind in the last seven days or a Covid vaccine in the last two weeks NO    Do you have a ? Yes    Are you pregnant or breastfeeding?  Not Applicable      Notify provider and RNs if systolic BP >170, diastolic BP >100, P >100 or O2 sats < 90%

## 2025-01-08 NOTE — PROGRESS NOTES
Pre procedure Diagnosis: lumbosacral radiculopathy   Post procedure Diagnosis: Same  Procedure performed: Caudal epidural steroid injection, CPT code 01602  Anesthesia: Local  Complications: None  Operators: Jim Lovett MD, Jovana Boyd MD    Indications:   Naveen Toro is a 80 year old male was sent by Dr. Lovett for caudal epidural steroid injection.     Options/alternatives, benefits and risks were discussed with the patient including bleeding, infection, tissue trauma, numbness, weakness, paralysis, spinal cord injury, radiation exposure, headache, reaction to medications including seizure, and effects on the bladder from local anesthetic.  Questions were answered to his satisfaction and he agrees to proceed. Voluntary informed consent was obtained and signed.     Vitals were reviewed: Yes  Allergies were reviewed:  Yes   Medications were reviewed:  Yes   Pre-procedure pain score: 5/10    Procedure:  After getting informed consent, patient was brought into the procedure suite and was placed in a prone position on the procedure table.   A Pause for Cause was performed.  Patient was prepped and draped in sterile fashion.     The sacral hiatus and cornua were palpated.  Under lateral fluoroscopic guidance sacral hiatus was identified.  1 ml of lidocaine 1% was then injected at the needle entry point to anesthetize the skin. Then a 25 gauge 4.69 inch quincke type spinal needle was inserted into sacral hiatus.  Once the needle was advanced through the sacral hiatus, the needle angle was decreased to allow entrance into the sacral canal and epidural space.  This was verified in both the AP and lateral view for correct alignment.   The needle was advanced using intermittent fluoroscopy.  After negative aspiration for heme and CSF, 2ml of Omnipaque 180 contrast dye was injected.  The contrast showed good epidural spread, and was verified in both the AP and lateral view.    The injection was then accomplished  with 2ml of  1% Lidocaine MPF, 40mg methylprednisolone, and 4ml of preservative free saline. The needle was withdrawn, patient's back was cleaned, and they were brought to recovery area.      Hemostasis was achieved, the area was cleaned, and bandaids were placed when appropriate.  The patient tolerated the procedure well, and was taken to the recovery room.    Images were saved to PACS.    Post-procedure pain score: 3/10  Follow-up includes:   -f/u with referring provider    Jackson Boyd MD  Chronic Pain Fellow PGY-6       Physician Attestation   I, Jim Lovett MD, was present for all key and critical portions of the procedure, and I was immediately available during the remainder of the procedure.  Any changes to the documentation have been made above.    Jim Lovett MD  Interventional Pain Medicine  [facility]

## 2025-01-08 NOTE — PATIENT INSTRUCTIONS
[facility] Procedure Discharge Instructions    Today you saw:   Dr. Lovett    Your procedure:  caudal MARY JO  Medications used:  Lidocaine (anesthetic)  (steroid)        methylprednisolone (steroid)  Omnipaque-contrast          Be cautious when walking as numbness and/or weakness in the legs may occur up to 6-8 hours after the procedure due to effect of the local anesthetic  Do not drive for 6 hours. The effect of the local anesthetic could slow your reflexes.   Avoid strenuous activity for the first 24 hours. You may resume your regular activities after that.   You may shower, however avoid swimming, tub baths or hot tubs for 24 hours following your procedure  You may have a mild to moderate increase in pain for several days following the injection.    You may use ice packs for 10-15 minutes, 3 to 4 times a day at the injection site for comfort  Do not use heat to painful areas for 6 to 8 hours. This will give the local anesthetic time to wear off and prevent you from accidentally burning your skin.  Unless you have been directed to avoid the use of anti-inflammatory medications (NSAIDS-ibuprofen, Aleve, Motrin), you may use these medications or Tylenol for pain control if needed.   With diabetes, check your blood sugar more frequently than usual as your blood sugar may be higher than normal for 10-14 days following a steroid injection. Contact your doctor who manages your diabetes if your blood sugar is higher than usual  Possible side effects of steroids that you may experience include flushing, elevated blood pressure, increased appetite, mild headaches and restlessness.  All of these symptoms will get better with time.  It may take up to 14 days for the steroid medication to start working although you may feel the effect as early as a few days after the procedure.   Follow up with your referring provider in 2-3 weeks    If you experience any of the following, call the pain center line during work  hours at [phone] or on-call physician after hours at [phone]:  -Fever over 100 degree F  -Swelling, bleeding, redness, drainage, warmth at the injection site  -Progressive weakness or numbness in your legs or arms  -Loss of bowel or bladder function  -Unusual headache that is not relieved by Tylenol or your regular headache medication  -Unusual new onset of pain that is not improving

## 2025-01-10 ENCOUNTER — MYC REFILL (OUTPATIENT)
Dept: PALLIATIVE MEDICINE | Facility: OTHER | Age: 80
End: 2025-01-10
Payer: MEDICARE

## 2025-01-10 DIAGNOSIS — G89.4 CHRONIC PAIN SYNDROME: ICD-10-CM

## 2025-01-13 RX ORDER — HYDROMORPHONE HYDROCHLORIDE 4 MG/1
4 TABLET ORAL EVERY 4 HOURS PRN
Qty: 150 TABLET | Refills: 0 | Status: SHIPPED | OUTPATIENT
Start: 2025-01-13

## 2025-01-13 NOTE — TELEPHONE ENCOUNTER
Refills have been requested for the following medications:         HYDROmorphone (DILAUDID) 4 MG tablet [KRYSTAL BALDERRAMA]      Patient Comment: Send to [facility] in [facility] pharmacy: [address]

## 2025-01-13 NOTE — TELEPHONE ENCOUNTER
Pending Prescriptions:                       Disp   Refills    HYDROmorphone (DILAUDID) 4 MG tablet      150 ta*0            Sig: Take 1 tablet (4 mg) by mouth every 4 hours as           needed for pain. May fill 01/09/25; start           01/11/25   Angel Janette

## 2025-01-13 NOTE — TELEPHONE ENCOUNTER
Received request for a refill(s) of HYDROmorphone (DILAUDID) 4 MG tablet      Last dispensed from pharmacy on 12/17/24    Patient's last office/virtual visit by prescribing provider on 12/17/24  Next office/virtual appointment scheduled for 03/18/25    Last urine drug screen date 09/24/24  Current opioid agreement on file (completed within the last year) Yes Date of opioid agreement: 09/24/24    [facility] [place] [facility] [address] [place] [address]     Will route to nursing [place] for review and preparation of prescription(s).

## 2025-02-10 DIAGNOSIS — G89.4 CHRONIC PAIN SYNDROME: ICD-10-CM

## 2025-02-10 RX ORDER — HYDROMORPHONE HYDROCHLORIDE 4 MG/1
4 TABLET ORAL EVERY 4 HOURS PRN
Qty: 150 TABLET | Refills: 0 | Status: SHIPPED | OUTPATIENT
Start: 2025-02-10

## 2025-02-10 NOTE — TELEPHONE ENCOUNTER
Medication refill information reviewed.     dilaudid last due:  fill 01/09/25; start 01/11/25,  picked up on 1/13/25.  Due date:  2/12/25      Prescriptions prepped for review.     Louise RN-BSN  [facility]

## 2025-02-10 NOTE — TELEPHONE ENCOUNTER
[facility] Call Center    Phone Message    May a detailed message be left on voicemail: yes     Reason for Call: Medication Refill Request    Has the patient contacted the pharmacy for the refill? Yes   Name of medication being requested: HYDROmorphone (DILAUDID) 4 MG tablet  Provider who prescribed the medication: Teo Parker  Pharmacy: [address]    Date medication is needed: Patient needs medication in 6 days   {Action Taken: Message routed to:  Other: [facility]    Travel Screening: Not Applicable     Date of Service:

## 2025-02-10 NOTE — TELEPHONE ENCOUNTER
Received call from patient requesting refill(s) HYDROmorphone (DILAUDID) 4 MG tablet    Last dispensed from pharmacy on 01/13/2025    Patient's last office/virtual visit by prescribing provider on 12/17/2024  Next office/virtual appointment scheduled for 03/18/2025    Last urine drug screen date 09/24/2024  Current opioid agreement on file (completed within the last year) Yes Date of opioid agreement: 09/24/2024    E-prescribe to      [place], [place] - [address] [place] [address]      Will route to nursing [place] for review and preparation of prescription(s).     Lata Talley MA  [facility]

## 2025-03-04 ENCOUNTER — OFFICE VISIT (OUTPATIENT)
Dept: PALLIATIVE MEDICINE | Facility: OTHER | Age: 80
End: 2025-03-04
Attending: ANESTHESIOLOGY
Payer: OTHER MISCELLANEOUS

## 2025-03-04 VITALS — OXYGEN SATURATION: 96 % | SYSTOLIC BLOOD PRESSURE: 145 MMHG | HEART RATE: 73 BPM | DIASTOLIC BLOOD PRESSURE: 72 MMHG

## 2025-03-04 DIAGNOSIS — M54.16 LUMBAR RADICULOPATHY: Primary | ICD-10-CM

## 2025-03-04 DIAGNOSIS — M96.1 FAILED BACK SURGICAL SYNDROME: ICD-10-CM

## 2025-03-04 DIAGNOSIS — G89.4 CHRONIC PAIN SYNDROME: ICD-10-CM

## 2025-03-04 PROCEDURE — 99213 OFFICE O/P EST LOW 20 MIN: CPT | Performed by: STUDENT IN AN ORGANIZED HEALTH CARE EDUCATION/TRAINING PROGRAM

## 2025-03-04 PROCEDURE — 99214 OFFICE O/P EST MOD 30 MIN: CPT | Performed by: STUDENT IN AN ORGANIZED HEALTH CARE EDUCATION/TRAINING PROGRAM

## 2025-03-04 ASSESSMENT — PAIN SCALES - GENERAL: PAINLEVEL_OUTOF10: MODERATE PAIN (6)

## 2025-03-04 NOTE — PROGRESS NOTES
Patient presents to the clinic today for a visit with Jim Lovett MD regarding Pain Management.      UDS/CSA- 09.24.2024    Medications- Dilaudid    Notes    Aleta Hargrove  [facility] Clinical Assistant

## 2025-03-04 NOTE — PROGRESS NOTES
[facility] Interventional Follow-up    Date of visit: 3/4/2025    Chief complaint:   Chief Complaint   Patient presents with    Pain       Interval history:  Since his last visit, Naveen Toro reports:  Minimal apparent benefit from caudal MARY JO. Says he has maybe been sleeping better.  Notes getting EOS error from medtronic device    Pain scores:  Pain intensity currently is 6 on a scale of 0-10.     Current pain treatments:   - SCS, placed in 2009, still using and it is still holding a charge  - hydromorphone 4 mg q4h  - ketamine 50 mg up to 8x/day  - duloxetine 30 mg daily     Past pain treatments:  - Voltaren gel    Therapy: has tried in the past  Injections:   1/8/2025 - Caudal MARY JO - helpful for sleep, unable to provide number.   TFESI by Dr. Maier  Surgery:  lumbar laminectomy 1979 and 1982    Side Effects: no side effect    Medications:  Current Outpatient Medications   Medication Sig Dispense Refill    amLODIPine (NORVASC) 2.5 MG tablet Take 1 tablet by mouth daily      aspirin 81 MG EC tablet Take 81 mg by mouth daily      atorvastatin (LIPITOR) 40 MG tablet [ATORVASTATIN (LIPITOR) 40 MG TABLET] Take 40 mg by mouth daily. As directed      blood glucose (PRODIGY NO CODING BLOOD GLUC) test strip       buPROPion (WELLBUTRIN SR) 150 MG 12 hr tablet Take 150 mg by mouth once      diclofenac sodium (VOLTAREN) 1 % Gel [DICLOFENAC SODIUM (VOLTAREN) 1 % GEL] Apply 2 g topically 4 (four) times a day. 100 g 1    DULoxetine (CYMBALTA) 30 MG capsule Take 1 capsule (30 mg) by mouth daily 30 capsule 1    HYDROmorphone (DILAUDID) 4 MG tablet Take 1 tablet (4 mg) by mouth every 4 hours as needed for pain. Fill now.  start 02/12/25 150 tablet 0    ketamine HCl POWD 100 mg lozenge, 1/2 lozenge up to 8 times a day, #120, may fill 10/7 120 g 2    lamoTRIgine (LAMICTAL) 25 MG tablet Take 2 tablets (50 mg) by mouth 2 times daily. 120 tablet 4    Lancets MISC 1 each       Lidocaine (LIDOCARE) 4 % Patch Place onto the skin every 24 hours To prevent lidocaine toxicity, patient should be patch free for 12 hrs daily.      liothyronine (CYTOMEL) 5 MCG tablet One morning for 7 days, then one morning and noon 60 tablet 3    lubiprostone (AMITIZA) 24 MCG capsule Take 1 capsule (24 mcg) by mouth daily (with breakfast). 30 capsule 4    metoprolol (LOPRESSOR) 25 MG tablet [METOPROLOL (LOPRESSOR) 25 MG TABLET] Take 25 mg by mouth 2 (two) times a day.      naloxone (NARCAN) 4 MG/0.1ML nasal spray Spray 1 spray (4 mg) into one nostril alternating nostrils once as needed for opioid reversal every 2-3 minutes until assistance arrives 0.2 mL 0    omeprazole (PRILOSEC OTC) 20 MG EC tablet Take 20 mg by mouth daily      polyethylene glycol (MIRALAX) 17 g packet Take 1 packet by mouth daily      vitamin D3 (CHOLECALCIFEROL) 125 MCG (5000 UT) tablet Take 5,000 Units by mouth         Medical History: any changes in medical history since they were last seen? No    Physical Exam:  Blood pressure (!) 145/72, pulse 73, SpO2 96%.  Constitutional: Well developed, NAD  Head: normocephalic. Atraumatic.   Eyes: no redness or jaundice noted   CV: warm, well perfused extremities   Skin: no suspicious lesions or rashes   Psychiatric: mentation appears normal and affect full    Diagnostics:  A1c 7.2    Personally reviewed: yes    Assessment:   Lumbar radiculopathy  Failed back surgical syndrome  SCS in situ  Lumbar spondylosis  DM2     Naveen Toro is a 80 year old male who is seen at the pain clinic for low back and buttock pain.  Patient has a history of laminectomy in 2011, SCS placement, and has been getting an end of service message from the device.  He would like us to proceed with interventional management of this.  We will perform IPG replacement for the SCS.  We previously performed caudal epidural steroid injection which did not provide much benefit.     Plan:  Procedures:   SCS battery replacement  ordered - discussed glucose management perioperatively  Can also consider L3-5 MBBs/RFA Process as well  Diagnostic Studies:    Previously reviewed Lumbar CT  A1c reviewed with patient  Follow up: for SCS replacement and 1 week postop    Jim Lovett MD  Interventional Pain Medicine  [facility]

## 2025-03-04 NOTE — PATIENT INSTRUCTIONS
Procedures:   SCS battery replacement ordered  Can also consider L3-5 MBBs/RFA Process as well  Diagnostic Studies:    Previously reviewed Lumbar CT  A1c reviewed with patient  Follow up: for SCS replacement and 1 week postop    Jim Lovett MD  Interventional Pain Medicine  [facility]

## 2025-03-18 ENCOUNTER — OFFICE VISIT (OUTPATIENT)
Dept: PALLIATIVE MEDICINE | Facility: OTHER | Age: 80
End: 2025-03-18
Payer: OTHER MISCELLANEOUS

## 2025-03-18 VITALS — OXYGEN SATURATION: 97 % | SYSTOLIC BLOOD PRESSURE: 136 MMHG | DIASTOLIC BLOOD PRESSURE: 81 MMHG | HEART RATE: 59 BPM

## 2025-03-18 DIAGNOSIS — G89.4 CHRONIC PAIN SYNDROME: ICD-10-CM

## 2025-03-18 DIAGNOSIS — M54.15 RADICULOPATHY OF THORACOLUMBAR REGION: ICD-10-CM

## 2025-03-18 PROCEDURE — G2211 COMPLEX E/M VISIT ADD ON: HCPCS | Performed by: ANESTHESIOLOGY

## 2025-03-18 PROCEDURE — 99213 OFFICE O/P EST LOW 20 MIN: CPT | Performed by: ANESTHESIOLOGY

## 2025-03-18 RX ORDER — HYDROMORPHONE HYDROCHLORIDE 4 MG/1
4 TABLET ORAL EVERY 4 HOURS PRN
Qty: 150 TABLET | Refills: 0 | Status: SHIPPED | OUTPATIENT
Start: 2025-03-18

## 2025-03-18 RX ORDER — BUPRENORPHINE 5 UG/H
1 PATCH TRANSDERMAL
Qty: 4 PATCH | Refills: 1 | Status: SHIPPED | OUTPATIENT
Start: 2025-03-18

## 2025-03-18 RX ORDER — KETAMINE HCL 100 %
POWDER (GRAM) MISCELLANEOUS
Qty: 120 G | Refills: 2 | Status: SHIPPED | OUTPATIENT
Start: 2025-03-18

## 2025-03-18 ASSESSMENT — PAIN SCALES - GENERAL: PAINLEVEL_OUTOF10: MODERATE PAIN (5)

## 2025-03-18 NOTE — PROGRESS NOTES
[facility] Follow-up    Date of visit: 3/18/2025    Chief complaint:   Chief Complaint   Patient presents with    Pain    Pain Management       Follow-up for lumbar radiculopathy.    Since last seen 1/he did have a caudal nerve injection.  Follow-up with Dr. Lovett last week reviewed that was not particularly helpful.  They are scheduling a spinal cord stimulator battery replacement.  Also mention was the possibility of L3-5 medial branch blocks.    Seen today with his wife.  They reviewed the caudal injection unfortunately was not helpful.  They are waiting for a schedule for the spinal cord stimulator battery.  They do not recall discussion about medial branch blocks, I reviewed it was indicated morbid contingently plan I reviewed the possibility.  They would be should pursuing his present regimen not doing well.  He is spending much of his time in bed.    Wife reviews [place] which they [place] in August and would like to be able to go again.    Ketamine can take the edge off, usually scheduled to take 100 mg lozenges, divided doses for total in a day.  At 1 time he was frustrated the pain and took more, and did not appear sedated but noticeably helpful.  He asked what else can be done.    We have rotated different opioids, presently using hydromorphone 4 mg 5 times in a day.        [place] so [place] do the [place] medical cannabis program.  Tried some CBD Gummies without benefit.    We have not used buprenorphine augmentation.  Reviewed low-dose buprenorphine, its mechanism of actions, can augment opioids.  Discussed sometimes as we gradually advance can switch over entirely.  This is a way we can continue to treat the problem for which Worker's Comp. is covering as they were concerned about that.    He continues with temperature dysregulation, at times can appear very cold.  They have had a variety of medical  "evaluations.  We reviewed whether opioids or causing thermoregulation problems though difficult to assess.  We discussed buprenorphine may be relatively less likely to do that we could advance.    Last urine drug test in September.      He was seen in neurology, they have been gotten a call about scheduling a 24-hour EEG, has not had any more episodes of \"confusion\".  We discussed driving with adding in buprenorphine, again to use his judgment as he would for any new medication      Medications:  Current Outpatient Medications   Medication Sig Dispense Refill    amLODIPine (NORVASC) 2.5 MG tablet Take 1 tablet by mouth daily      aspirin 81 MG EC tablet Take 81 mg by mouth daily      atorvastatin (LIPITOR) 40 MG tablet [ATORVASTATIN (LIPITOR) 40 MG TABLET] Take 40 mg by mouth daily. As directed      blood glucose (PRODIGY NO CODING BLOOD GLUC) test strip       buprenorphine (BUTRANS) 5 MCG/HR WK patch Place 1 patch over 168 hours onto the skin every 7 days. 4 patch 1    buPROPion (WELLBUTRIN SR) 150 MG 12 hr tablet Take 150 mg by mouth once      diclofenac sodium (VOLTAREN) 1 % Gel [DICLOFENAC SODIUM (VOLTAREN) 1 % GEL] Apply 2 g topically 4 (four) times a day. 100 g 1    DULoxetine (CYMBALTA) 30 MG capsule Take 1 capsule (30 mg) by mouth daily 30 capsule 1    HYDROmorphone (DILAUDID) 4 MG tablet Take 1 tablet (4 mg) by mouth every 4 hours as needed for pain. May fill 4/7 for 4/9 150 tablet 0    ketamine HCl POWD 100 mg lozenge, 1/2 lozenge up to 8 times a day, #120, 120 g 2    lamoTRIgine (LAMICTAL) 25 MG tablet Take 2 tablets (50 mg) by mouth 2 times daily. 120 tablet 4    Lancets MISC 1 each      metoprolol (LOPRESSOR) 25 MG tablet [METOPROLOL (LOPRESSOR) 25 MG TABLET] Take 25 mg by mouth 2 (two) times a day.      naloxone (NARCAN) 4 MG/0.1ML nasal spray Spray 1 spray (4 mg) into one nostril alternating nostrils once as needed for opioid reversal every 2-3 minutes until assistance arrives 0.2 mL 0    omeprazole " (PRILOSEC OTC) 20 MG EC tablet Take 20 mg by mouth daily      polyethylene glycol (MIRALAX) 17 g packet Take 1 packet by mouth daily      vitamin D3 (CHOLECALCIFEROL) 125 MCG (5000 UT) tablet Take 5,000 Units by mouth      Lidocaine (LIDOCARE) 4 % Patch Place onto the skin every 24 hours To prevent lidocaine toxicity, patient should be patch free for 12 hrs daily. (Patient not taking: Reported on 3/18/2025)      lubiprostone (AMITIZA) 24 MCG capsule Take 1 capsule (24 mcg) by mouth daily (with breakfast). 30 capsule 4           Physical Exam:  Blood pressure 136/81, pulse 59, SpO2 97%.      Alert, clear sensorium, no respiratory distress.  Ambulates with cane.    Affect constricted.      Assessment:   Lumbar radiculopathy.    Continues with the pain and difficulties functioning as we have rotated different opioids.    Interventions above including spinal cord stimulator battery replacement and then consideration of lumbar medial branch blocks.    Plan: Will begin micro dosing with buprenorphine 5 mcg patch.  Reviewed Worker's Comp. may request to use [place] and would advance every few weeks to augment the hydromorphone without causing withdrawal.    Continue the hydromorphone [place] 5 times a day.    You will follow-up in 2 to 3 months.    Total time 28 minutesThe longitudinal plan of care for the diagnosis(es)/condition(s) as documented were addressed during this visit. Due to the added complexity in care, I will continue to support Naveen in the subsequent management and with ongoing continuity of care.       minutes spent on the date of encounter doing chart review, history, and exam documentation and further activities as noted above.     Teo Parker MD  [facility]

## 2025-03-18 NOTE — PROGRESS NOTES
Patient presents to the clinic today for a visit  with KRYSTAL BALDERRAMA MD            9/24/2024    10:21 AM 12/17/2024    10:26 AM 3/18/2025    10:23 AM   PEG Score   PEG Total Score 9.33 5.33 5       UDS/CSA-9/24/24    Medications-hydromorphone     QUESTIONS:    Sugar Peters MA  [facility] [place]

## 2025-03-18 NOTE — PATIENT INSTRUCTIONS
"[facility] Number:  [phone]  Call with any questions about your care and for scheduling assistance.   Calls are returned Monday through Friday between 8 AM and 4:30 PM. We usually get back to you within 2 business days depending on the issue/request.    If we are prescribing your medications:  For opioid medication refills, call the clinic or send a PAIEONhart message 7 days in advance.  Please include:  Name of requested medication  Name of the pharmacy.  For non-opioid medications, call your pharmacy directly to request a refill. Please allow 3-4 days to be processed.   Per [place] State Law:  All controlled substance prescriptions must be filled within 30 days of being written.    For those controlled substances allowing refills, pickup must occur within 30 days of last fill.      We believe regular attendance is key to your success in our program!    Any time you are unable to keep your appointment we ask that you call us at least 24 hours in advance to cancel.This will allow us to offer the appointment time to another patient.   Multiple missed appointments may lead to dismissal from the clinic.     PLAN:    Who will be getting a call from Dr. Lovett's office regarding scheduling the spinal cord stimulator battery replacement and possibly medial branch blocks.    Discussed the use of buprenorphine to augment the hydromorphone, discussed concept of \"micro dosing\".  5 mcg patch apply weekly.  Update Dr. Parker in 2 to 3 weeks with response to choose the next dosage.    Continue the hydromorphone 4 mg 5 times a day.    Continue the ketamine as you have been doing.    Follow-up for return visit with Dr. Parker in 2 to 3 months  "

## 2025-04-05 ENCOUNTER — HEALTH MAINTENANCE LETTER (OUTPATIENT)
Age: 80
End: 2025-04-05

## 2025-04-15 ENCOUNTER — TELEPHONE (OUTPATIENT)
Dept: PALLIATIVE MEDICINE | Facility: OTHER | Age: 80
End: 2025-04-15
Payer: MEDICARE

## 2025-04-15 DIAGNOSIS — G89.4 CHRONIC PAIN SYNDROME: ICD-10-CM

## 2025-04-15 DIAGNOSIS — M54.15 RADICULOPATHY OF THORACOLUMBAR REGION: ICD-10-CM

## 2025-04-15 NOTE — TELEPHONE ENCOUNTER
Received request for a refill(s) of     Available refills at preferred pharmacy, patient was notified.    HYDROmorphone (DILAUDID) 4 MG tablet    Able to  fill 04/07/25 and start 04/09/25    lubiprostone (AMITIZA) 24 MCG capsule   lamoTRIgine (LAMICTAL) 25 MG tablet     Patient's last office/virtual visit by prescribing provider on 03/18/25  Next office/virtual appointment scheduled for 06/17/25    Last urine drug screen date 09/24/24  Current opioid agreement on file (completed within the last year) Yes Date of opioid agreement: 09/24/24    E-prescribe to     [facility] [address]  [address]  [facility] [address]  Phone: [phone] Fax: [phone]    Will route to nursing [place] for review and preparation of prescription(s).       Mona Sifuentes MA  [facility] [place]

## 2025-04-15 NOTE — TELEPHONE ENCOUNTER
[facility] Call Center    Phone Message    May a detailed message be left on voicemail: yes     Reason for Call: Medication Refill Request    Has the patient contacted the pharmacy for the refill? Yes   Name of medication being requested: lubiprostone (AMITIZA) 24 MCG capsule   And  lamoTRIgine (LAMICTAL) 25 MG tablet   And  HYDROmorphone (DILAUDID) 4 MG tablet   Provider who prescribed the medication:     Teo Parker MD     Pharmacy: [address]   Date medication is needed: 5 days    Action Taken: Message routed to:  Other: [facility]    Travel Screening: Not Applicable     Date of Service:

## 2025-05-20 ENCOUNTER — PREP FOR PROCEDURE (OUTPATIENT)
Dept: OTHER | Facility: CLINIC | Age: 80
End: 2025-05-20
Payer: MEDICARE

## 2025-05-20 DIAGNOSIS — M96.1 FAILED BACK SURGICAL SYNDROME: Primary | ICD-10-CM

## 2025-06-15 ENCOUNTER — MYC REFILL (OUTPATIENT)
Dept: PALLIATIVE MEDICINE | Facility: OTHER | Age: 80
End: 2025-06-15
Payer: MEDICARE

## 2025-06-15 DIAGNOSIS — G89.4 CHRONIC PAIN SYNDROME: ICD-10-CM

## 2025-06-15 DIAGNOSIS — M54.15 RADICULOPATHY OF THORACOLUMBAR REGION: ICD-10-CM

## 2025-06-16 RX ORDER — LAMOTRIGINE 25 MG/1
50 TABLET ORAL 2 TIMES DAILY
Qty: 120 TABLET | Refills: 4 | Status: SHIPPED | OUTPATIENT
Start: 2025-06-16

## 2025-06-16 RX ORDER — LUBIPROSTONE 24 UG/1
24 CAPSULE ORAL
Qty: 30 CAPSULE | Refills: 4 | Status: SHIPPED | OUTPATIENT
Start: 2025-06-16

## 2025-06-16 RX ORDER — HYDROMORPHONE HYDROCHLORIDE 4 MG/1
4 TABLET ORAL EVERY 4 HOURS PRN
Qty: 150 TABLET | Refills: 0 | Status: SHIPPED | OUTPATIENT
Start: 2025-06-16

## 2025-06-16 NOTE — TELEPHONE ENCOUNTER
Refills have been requested for the following medications:         lubiprostone (AMITIZA) 24 MCG capsule [KRYSTAL BALDERRAMA]         lamoTRIgine (LAMICTAL) 25 MG tablet [KRYSTAL BALDERRAMA]         HYDROmorphone (DILAUDID) 4 MG tablet [KRYSTAL BALDERRAMA]     Preferred pharmacy: [address]

## 2025-06-16 NOTE — TELEPHONE ENCOUNTER
Received request for a refill(s) of   HYDROmorphone (DILAUDID) 4 MG tablet  lamoTRIgine (LAMICTAL) 25 MG tablet  lubiprostone (AMITIZA) 24 MCG capsule     Last dispensed from pharmacy on     HYDROmorphone (DILAUDID) 4 MG tablet - 5/13/2025  lamoTRIgine (LAMICTAL) 25 MG tablet - 5/13/2025  lubiprostone (AMITIZA) 24 MCG capsule - 5/13/2025    Patient's last office/virtual visit by prescribing provider on 3/18/2025  Next office/virtual appointment scheduled for 6/17/2025    Last urine drug screen date 9/24/2024  Current opioid agreement on file (completed within the last year) YesDate of opioid agreement: 9/24/2024    E-prescribe to [facility] - [address] [place] [address]  pharmacy    Will route to nursing [place] for review and preparation of prescription(s).

## 2025-06-17 ENCOUNTER — TELEPHONE (OUTPATIENT)
Dept: PALLIATIVE MEDICINE | Facility: OTHER | Age: 80
End: 2025-06-17
Payer: MEDICARE

## 2025-06-17 ENCOUNTER — OFFICE VISIT (OUTPATIENT)
Dept: PALLIATIVE MEDICINE | Facility: OTHER | Age: 80
End: 2025-06-17
Payer: OTHER MISCELLANEOUS

## 2025-06-17 VITALS — DIASTOLIC BLOOD PRESSURE: 75 MMHG | OXYGEN SATURATION: 99 % | SYSTOLIC BLOOD PRESSURE: 144 MMHG | HEART RATE: 61 BPM

## 2025-06-17 DIAGNOSIS — M54.15 RADICULOPATHY OF THORACOLUMBAR REGION: Primary | ICD-10-CM

## 2025-06-17 PROCEDURE — G2211 COMPLEX E/M VISIT ADD ON: HCPCS | Performed by: ANESTHESIOLOGY

## 2025-06-17 PROCEDURE — 99214 OFFICE O/P EST MOD 30 MIN: CPT | Performed by: ANESTHESIOLOGY

## 2025-06-17 PROCEDURE — 99213 OFFICE O/P EST LOW 20 MIN: CPT | Performed by: ANESTHESIOLOGY

## 2025-06-17 RX ORDER — VENLAFAXINE HYDROCHLORIDE 150 MG/1
150 CAPSULE, EXTENDED RELEASE ORAL DAILY
COMMUNITY
Start: 2025-04-28 | End: 2026-04-28

## 2025-06-17 RX ORDER — BUPRENORPHINE 10 UG/H
1 PATCH TRANSDERMAL
Qty: 4 PATCH | Refills: 1 | Status: SHIPPED | OUTPATIENT
Start: 2025-06-17

## 2025-06-17 ASSESSMENT — PAIN SCALES - GENERAL: PAINLEVEL_OUTOF10: MODERATE PAIN (6)

## 2025-06-17 NOTE — TELEPHONE ENCOUNTER
Prior Authorization Retail Medication Request    Medication/Dose: HYDROmorphone (DILAUDID) 4 MG tablet  Diagnosis and ICD code (if different than what is on RX):    Chronic pain syndrome [G89.4]          MEDICARE - MEDICARE  Subscriber:  Naveen Toro  Subscriber ID:[id number]  Relationship:Self  Member:Naveen Toro  Member ID:[id number]  LOB:None  Plan year:  1/1/2025 - [place]  Effective dates:  8/1/1990 - [place]ward  [employer] INS[employer]  Subscriber:  Naveen Toro  Subscriber ID:[id number]  Relationship:Self  Member:Naveen Toro  Member ID:[id number]  LOB:None  Plan year:  1/1/2025 - [place]ward  Effective dates:  4/1/2018 - Onward    Pharmacy Information (if different than what is on RX)  Name:    [facility] - [place], [place] - [address]     Phone:  [phone]  Fax:[phone]

## 2025-06-17 NOTE — PROGRESS NOTES
Patient presents to the clinic today for a visit  with MD MARIELLA SELLERS/RAVINDER-9/24/24    Medications-BUPRENORPHINE, HYDROMORPHONE     QUESTIONS:    Sugar Peters MA  [facility] [place]

## 2025-06-17 NOTE — PATIENT INSTRUCTIONS
[facility] Number:  [phone]  Call with any questions about your care and for scheduling assistance.   Calls are returned Monday through Friday between 8 AM and 4:30 PM. We usually get back to you within 2 business days depending on the issue/request.    If we are prescribing your medications:  For opioid medication refills, call the clinic or send a Theracoshart message 7 days in advance.  Please include:  Name of requested medication  Name of the pharmacy.  For non-opioid medications, call your pharmacy directly to request a refill. Please allow 3-4 days to be processed.   Per [place] State Law:  All controlled substance prescriptions must be filled within 30 days of being written.    For those controlled substances allowing refills, pickup must occur within 30 days of last fill.      We believe regular attendance is key to your success in our program!    Any time you are unable to keep your appointment we ask that you call us at least 24 hours in advance to cancel.This will allow us to offer the appointment time to another patient.   Multiple missed appointments may lead to dismissal from the clinic.     PLAN:    At checkout schedule with Dr. Lovett for change in your spinal cord stimulator battery, and for scheduling lumbar medial branch blocks.    Continue hydrocodone 4 mg 5 times a day.    Continue the ketamine 100 mg divided doses up to 4 in a day.    Will increase the Butrans patch to 10 mcg weekly, Dr. Parker to send inlater today after addressing computer problem for prescribing    You may review with the GI doctors at the ketamine is related to your gallbladder problems.    Discussed the role of electronic pill dispensing devices.    Follow-up with Dr. Parker return visit in 8 weeks

## 2025-06-17 NOTE — PROGRESS NOTES
[facility] Follow-up    Date of visit: 6/17/2025    Chief complaint:   Chief Complaint   Patient presents with    Pain    Pain Management       Follow-up for lumbar degenerative changes.    Reviewing the record was seen in the hospital 5/30, delirium, cholangitis.    Readmitted 6 9-6 12 with delirium again after the ERCP.    Patient seen today with his wife, she reviews developing confusion and a temperature, initially going to the emergency room found to have some sludge.  Returned home a week later again had temperature and confusion.  He has just completed antibiotics.  He will be following up with GI.    His pain has been worse.  We reviewed with infection and inflammation that can happen.    He was to have his spinal cord stimulator battery change.  That has not yet happened.  Wife also recalls we had discussed another injection that was mentioned, reviewed medial branch blocks.    Wife is concerned that sometimes patient may take more ketamine than as prescribed, the 100 mg lozenges for in a day.  He will sometimes take some during the night and that has run out early.  Reviewed concerns, he does not appear to be sedated during those times.  He is seeking pain relief.    Continues on the hydromorphone 4 mg 5 in the day and the Butrans patch 5 mcg was filled.  He did not notice any change or benefit.    Discussed the concern whether ketamine is related to his gallbladder type concerns.  He has not had them in the past.  This was not brought up while he was in the hospital though they do note doctors were unsure what to make of the ketamine.    Urine drug testing and due to be updated.        Medications:  Current Outpatient Medications   Medication Sig Dispense Refill    amLODIPine (NORVASC) 2.5 MG tablet Take 1 tablet by mouth daily      aspirin 81 MG EC tablet Take 81 mg by mouth daily      blood glucose (PRODIGY NO CODING BLOOD GLUC) test strip        buPROPion (WELLBUTRIN SR) 150 MG 12 hr tablet Take 150 mg by mouth once      diclofenac sodium (VOLTAREN) 1 % Gel [DICLOFENAC SODIUM (VOLTAREN) 1 % GEL] Apply 2 g topically 4 (four) times a day. 100 g 1    HYDROmorphone (DILAUDID) 4 MG tablet Take 1 tablet (4 mg) by mouth every 4 hours as needed for pain. May fill/start 6/16/25 150 tablet 0    ketamine HCl POWD 100 mg lozenge, 1/2 lozenge up to 8 times a day, #120, 120 g 2    lamoTRIgine (LAMICTAL) 25 MG tablet Take 2 tablets (50 mg) by mouth 2 times daily. 120 tablet 4    Lancets MISC 1 each      lubiprostone (AMITIZA) 24 MCG capsule Take 1 capsule (24 mcg) by mouth daily (with breakfast). 30 capsule 4    metoprolol (LOPRESSOR) 25 MG tablet [METOPROLOL (LOPRESSOR) 25 MG TABLET] Take 25 mg by mouth 2 (two) times a day.      naloxone (NARCAN) 4 MG/0.1ML nasal spray Spray 1 spray (4 mg) into one nostril alternating nostrils once as needed for opioid reversal every 2-3 minutes until assistance arrives 0.2 mL 0    omeprazole (PRILOSEC OTC) 20 MG EC tablet Take 20 mg by mouth daily      polyethylene glycol (MIRALAX) 17 g packet Take 1 packet by mouth daily      venlafaxine (EFFEXOR XR) 150 MG 24 hr capsule Take 150 mg by mouth daily.      vitamin D3 (CHOLECALCIFEROL) 125 MCG (5000 UT) tablet Take 5,000 Units by mouth      atorvastatin (LIPITOR) 40 MG tablet [ATORVASTATIN (LIPITOR) 40 MG TABLET] Take 40 mg by mouth daily. As directed (Patient not taking: Reported on 6/17/2025)      Lidocaine (LIDOCARE) 4 % Patch Place onto the skin every 24 hours To prevent lidocaine toxicity, patient should be patch free for 12 hrs daily. (Patient not taking: Reported on 6/17/2025)             Physical Exam:  Blood pressure (!) 144/75, pulse 61, SpO2 99%.    Today patient alert, clear sensorium.  Constricted affect.  Ambulates with cane.        Assessment:   History of lumbar degenerative changes radiculopathy, spinal cord stimulator placed.  Has not responded to steroid  injections.    We have tried a variety of opioid combinations.    Wife notes some concerns about how he is been using the ketamine.    Discussed the plan and all scheduling change in the battery of the spinal cord stimulator for optimizing that benefit and scheduling medial branch blocks.    Will continue with the same dosing for the ketamine.    Will increase the Butrans patch to 10 mcg to see if microdosing will help boost the hydromorphone 4 mg 5 times in a day.    Discussed the role of electronic pill dispenser's.    They will bring up the GI doctors whether think ketamine contributed to the gallbladder type issues.    Follow-up here in several weeks.    Total time 35 minutes   minutes spent on the date of encounter doing chart review, history, and exam documentation and further activities as noted above.     Teo Parker MD  [facility] [place]

## 2025-06-18 ENCOUNTER — TELEPHONE (OUTPATIENT)
Dept: PALLIATIVE MEDICINE | Facility: OTHER | Age: 80
End: 2025-06-18
Payer: MEDICARE

## 2025-06-18 NOTE — TELEPHONE ENCOUNTER
Retail Pharmacy Prior Authorization Team   Phone: [phone]    Prior Authorization Not Needed per Insurance    Medication: HYDROMORPHONE HCL 4 MG PO TABS  Insurance Company:  WORK COMP  Expected CoPay: $    Pharmacy Filling the Rx: [facility] - [place], [place] - [address] [place] [address]  Pharmacy Notified: YES  Patient Notified: ALREADY PICKED UP    I called the pharmacy to confirm billing information, this is a workers comp medication. PA not needed through RPPA Team. Pharmacy can complete W/C PA's on their own for faster outcomes.   RP confirmed they already completed a PA and PT has picked up their medication.   No further action required.

## 2025-06-18 NOTE — TELEPHONE ENCOUNTER
Prior Authorization Retail Medication Request    Medication/Dose: buprenorphine (BUTRANS) 10 MCG/HR WK patch  Diagnosis and ICD code (if different than what is on RX):    Radiculopathy of thoracolumbar region [M54.15]  - Primary          MEDICARE - MEDICARE  Subscriber:  Naveen Toro  Subscriber ID:[id number]  Relationship:Self  Member:Naveen Toro  Member ID:[id number]  LOB:None  Plan year:  1/1/2025 - [place]  Effective dates:  8/1/1990 - [place]    [employer] INSURANCE  Subscriber:  Naveen Toro  Subscriber ID:[id number]  Relationship:Self  Member:Naveen Toro  Member ID:[id number]  LOB:None  Plan year:  1/1/2025 - [place]ward  Effective dates:  4/1/2018 - [place]           Pharmacy Information (if different than what is on RX)  Name:    [facility] - [place], [place] - [address]     Phone:  [phone]  Fax:[phone]

## 2025-06-19 NOTE — TELEPHONE ENCOUNTER
Prior Authorization Not Processed    Medication: BUPRENORPHINE 10 MCG/HR TD PTWK  Insurance Company:    Expected CoPay: $    Pharmacy Filling the Rx: [facility] - [place], [place] - [address] [place] [address]  Pharmacy Notified: YES  Patient Notified: **Instructed pharmacy to notify patient when script is ready to /ship.**    Per pharmacy this is to go through workers comp.  Pharmacy would be responsible for workers comp requests.

## 2025-06-20 ENCOUNTER — MYC REFILL (OUTPATIENT)
Dept: PALLIATIVE MEDICINE | Facility: OTHER | Age: 80
End: 2025-06-20
Payer: MEDICARE

## 2025-06-20 DIAGNOSIS — M54.15 RADICULOPATHY OF THORACOLUMBAR REGION: ICD-10-CM

## 2025-06-23 ENCOUNTER — TELEPHONE (OUTPATIENT)
Dept: PALLIATIVE MEDICINE | Facility: OTHER | Age: 80
End: 2025-06-23
Payer: MEDICARE

## 2025-06-23 NOTE — TELEPHONE ENCOUNTER
Received  request from patient requesting refill(s) for ketamine HCl POWD     Last refilled on 05/18/25    Pt last seen on 06/17/25  Next appt scheduled for 10/20/25    Will facilitate refill.

## 2025-06-23 NOTE — TELEPHONE ENCOUNTER
Prior Authorization Retail Medication Request    Medication/Dose: lamoTRIgine (LAMICTAL) 25 MG tablet  Diagnosis and ICD code (if different than what is on RX):    Radiculopathy of thoracolumbar region [M54.15]          MEDICARE - MEDICARE  Subscriber:  Naveen Toro  Subscriber ID:[id number]  Relationship:Self  Member:Naveen Toro  Member ID:[id number]  LOB:None  Plan year:  1/1/2025 - [place]  Effective dates:  8/1/1990 - [place]    [employer] INSURANCE  Subscriber:  Naveen Toro  Subscriber ID:[id number]  Relationship:Self  Member:Naveen Toro  Member ID:[id number]  LOB:None  Plan year:  1/1/2025 - [place]  Effective dates:  4/1/2018 - [place]        Pharmacy Information (if different than what is on RX)  Name:    [facility] - [place], [place] - [facility] [address] [facility] [address]     Phone:  [phone]  Fax: [phone]

## 2025-06-24 RX ORDER — KETAMINE HCL 100 %
POWDER (GRAM) MISCELLANEOUS
Qty: 120 G | Refills: 2 | Status: SHIPPED | OUTPATIENT
Start: 2025-06-24

## 2025-06-25 NOTE — TELEPHONE ENCOUNTER
Retail Pharmacy Prior Authorization Team   Phone: [phone]      Prior Authorization Not Needed per Insurance    Medication: LAMOTRIGINE 25 MG PO TABS  Insurance Company: [employer] - Phone [phone] Fax [phone]  Expected CoPay: $    Pharmacy Filling the Rx: [facility] - [place] - [address]  Pharmacy Notified: YES  Patient Notified: YES - called pharmacy and left v/m notifying PA not needed and left my direct number if any further issues with process. Pharmacy will notify the patient when ready

## 2025-07-10 ENCOUNTER — HOSPITAL ENCOUNTER (OUTPATIENT)
Facility: CLINIC | Age: 80
End: 2025-07-10
Attending: STUDENT IN AN ORGANIZED HEALTH CARE EDUCATION/TRAINING PROGRAM | Admitting: STUDENT IN AN ORGANIZED HEALTH CARE EDUCATION/TRAINING PROGRAM
Payer: MEDICARE

## 2025-07-15 ENCOUNTER — MYC REFILL (OUTPATIENT)
Dept: PALLIATIVE MEDICINE | Facility: OTHER | Age: 80
End: 2025-07-15
Payer: MEDICARE

## 2025-07-15 DIAGNOSIS — G89.4 CHRONIC PAIN SYNDROME: ICD-10-CM

## 2025-07-15 DIAGNOSIS — M54.15 RADICULOPATHY OF THORACOLUMBAR REGION: ICD-10-CM

## 2025-07-15 RX ORDER — LUBIPROSTONE 24 UG/1
24 CAPSULE ORAL
Qty: 30 CAPSULE | Refills: 4 | Status: SHIPPED | OUTPATIENT
Start: 2025-07-15

## 2025-07-15 RX ORDER — HYDROMORPHONE HYDROCHLORIDE 4 MG/1
4 TABLET ORAL EVERY 4 HOURS PRN
Qty: 150 TABLET | Refills: 0 | Status: SHIPPED | OUTPATIENT
Start: 2025-07-15

## 2025-07-15 RX ORDER — LAMOTRIGINE 25 MG/1
50 TABLET ORAL 2 TIMES DAILY
Qty: 120 TABLET | Refills: 4 | Status: SHIPPED | OUTPATIENT
Start: 2025-07-15

## 2025-07-15 NOTE — TELEPHONE ENCOUNTER
Patient request for a refill(s) of lubiprostone (AMITIZA) 24 MCG capsule   Last dispensed from pharmacy on 6/18/25    lamoTRIgine (LAMICTAL) 25 MG tablet  Last fill 6/18/25    HYDROmorphone (DILAUDID) 4 MG tablet   Last dispensed from pharmacy on 6/17/25    Patient's last office/virtual visit by prescribing provider on 6/17/25  Next office/virtual appointment scheduled for 10/20/25    Last urine drug screen date 9/24/24  Current opioid agreement on file (completed within the last year) Yes Date of opioid agreement: 09/24/24    E-prescribe to   [place], [place] - [address]       Will route to nursing [place] for review and preparation of prescription(s).

## 2025-07-15 NOTE — TELEPHONE ENCOUNTER
Pending Prescriptions:                       Disp   Refills    lubiprostone (AMITIZA) 24 MCG capsule     30 cap*4            Sig: Take 1 capsule (24 mcg) by mouth daily (with           breakfast).    lamoTRIgine (LAMICTAL) 25 MG tablet       120 ta*4            Sig: Take 2 tablets (50 mg) by mouth 2 times daily.    HYDROmorphone (DILAUDID) 4 MG tablet      150 ta*0            Sig: Take 1 tablet (4 mg) by mouth every 4 hours as           needed for pain. May fill/start 7/14/25         Pharmacy: [facility] - [address]

## 2025-07-15 NOTE — TELEPHONE ENCOUNTER
Refills have been requested for the following medications:         lubiprostone (AMITIZA) 24 MCG capsule [KRYSTAL BALDERRAMA]         lamoTRIgine (LAMICTAL) 25 MG tablet [KRYSTAL BALDERRAMA]         HYDROmorphone (DILAUDID) 4 MG tablet [KRYSTAL BALDERRAMA]     Preferred pharmacy: [address]

## 2025-07-17 NOTE — CONFIDENTIAL NOTE
FUTURE VISIT INFORMATION      SURGERY INFORMATION:  Date: 2025    Location: [facility] OR   Surgeon:  Jmi Lovett MD   Anesthesia Type:  MAC  Procedure:  REPLACEMENT, PULSE GENERATOR, NEUROSTIMULATOR       RECORDS REQUESTED FROM:       Primary Care Provider: Everton Hoover     Pertinent Medical History: Hypercholesterolemia, Hypertension,     Most recent EKG+ Tracin2016     Most recent ECHO: 2024 - [facility]

## 2025-08-04 ENCOUNTER — PRE VISIT (OUTPATIENT)
Dept: SURGERY | Facility: CLINIC | Age: 80
End: 2025-08-04

## 2025-08-04 ENCOUNTER — OFFICE VISIT (OUTPATIENT)
Dept: SURGERY | Facility: CLINIC | Age: 80
End: 2025-08-04
Payer: OTHER MISCELLANEOUS

## 2025-08-04 VITALS
OXYGEN SATURATION: 96 % | DIASTOLIC BLOOD PRESSURE: 74 MMHG | WEIGHT: 188 LBS | TEMPERATURE: 97.9 F | HEIGHT: 70 IN | SYSTOLIC BLOOD PRESSURE: 111 MMHG | BODY MASS INDEX: 26.92 KG/M2 | RESPIRATION RATE: 16 BRPM | HEART RATE: 64 BPM

## 2025-08-04 DIAGNOSIS — Z01.818 PRE-OP EVALUATION: Primary | ICD-10-CM

## 2025-08-04 PROCEDURE — 99205 OFFICE O/P NEW HI 60 MIN: CPT | Performed by: PHYSICIAN ASSISTANT

## 2025-08-04 RX ORDER — FAMOTIDINE 20 MG/1
20 TABLET, FILM COATED ORAL 2 TIMES DAILY
COMMUNITY
Start: 2025-07-29 | End: 2026-07-29

## 2025-08-04 RX ORDER — CEFDINIR 300 MG/1
300 CAPSULE ORAL 2 TIMES DAILY
COMMUNITY
Start: 2025-07-29 | End: 2025-08-08

## 2025-08-04 RX ORDER — FINASTERIDE 5 MG/1
5 TABLET, FILM COATED ORAL EVERY MORNING
COMMUNITY
Start: 2024-10-17 | End: 2025-10-17

## 2025-08-04 RX ORDER — METOPROLOL SUCCINATE 50 MG/1
50 TABLET, EXTENDED RELEASE ORAL DAILY
COMMUNITY
Start: 2025-07-20

## 2025-08-04 RX ORDER — METFORMIN HYDROCHLORIDE 500 MG/1
1000 TABLET, EXTENDED RELEASE ORAL 2 TIMES DAILY WITH MEALS
COMMUNITY
Start: 2025-06-05 | End: 2026-06-05

## 2025-08-04 ASSESSMENT — PAIN SCALES - GENERAL: PAINLEVEL_OUTOF10: MODERATE PAIN (6)

## 2025-08-19 ENCOUNTER — MYC REFILL (OUTPATIENT)
Dept: PALLIATIVE MEDICINE | Facility: OTHER | Age: 80
End: 2025-08-19
Payer: MEDICARE

## 2025-08-19 DIAGNOSIS — G89.4 CHRONIC PAIN SYNDROME: ICD-10-CM

## 2025-08-20 RX ORDER — HYDROMORPHONE HYDROCHLORIDE 4 MG/1
4 TABLET ORAL EVERY 4 HOURS PRN
Qty: 150 TABLET | Refills: 0 | Status: SHIPPED | OUTPATIENT
Start: 2025-08-20